# Patient Record
Sex: FEMALE | Race: WHITE | NOT HISPANIC OR LATINO | Employment: OTHER | URBAN - METROPOLITAN AREA
[De-identification: names, ages, dates, MRNs, and addresses within clinical notes are randomized per-mention and may not be internally consistent; named-entity substitution may affect disease eponyms.]

---

## 2017-03-05 ENCOUNTER — HOSPITAL ENCOUNTER (INPATIENT)
Facility: MEDICAL CENTER | Age: 57
LOS: 9 days | DRG: 902 | End: 2017-03-14
Attending: EMERGENCY MEDICINE | Admitting: SURGERY
Payer: MEDICARE

## 2017-03-05 ENCOUNTER — RESOLUTE PROFESSIONAL BILLING HOSPITAL PROF FEE (OUTPATIENT)
Dept: HOSPITALIST | Facility: MEDICAL CENTER | Age: 57
End: 2017-03-05
Payer: MEDICARE

## 2017-03-05 ENCOUNTER — APPOINTMENT (OUTPATIENT)
Dept: RADIOLOGY | Facility: MEDICAL CENTER | Age: 57
DRG: 902 | End: 2017-03-05
Attending: EMERGENCY MEDICINE
Payer: MEDICARE

## 2017-03-05 DIAGNOSIS — R10.10 PAIN OF UPPER ABDOMEN: ICD-10-CM

## 2017-03-05 DIAGNOSIS — L02.211 ABSCESS OF ABDOMINAL WALL: ICD-10-CM

## 2017-03-05 DIAGNOSIS — K59.00 CONSTIPATION, UNSPECIFIED CONSTIPATION TYPE: ICD-10-CM

## 2017-03-05 DIAGNOSIS — R11.0 NAUSEA: ICD-10-CM

## 2017-03-05 DIAGNOSIS — L02.91 ABSCESS: ICD-10-CM

## 2017-03-05 DIAGNOSIS — R19.09 ABDOMINAL MASS OF OTHER SITE: ICD-10-CM

## 2017-03-05 LAB
ALBUMIN SERPL BCP-MCNC: 3.5 G/DL (ref 3.2–4.9)
ALBUMIN/GLOB SERPL: 0.9 G/DL
ALP SERPL-CCNC: 56 U/L (ref 30–99)
ALT SERPL-CCNC: 9 U/L (ref 2–50)
ANION GAP SERPL CALC-SCNC: 7 MMOL/L (ref 0–11.9)
APPEARANCE UR: CLEAR
AST SERPL-CCNC: 14 U/L (ref 12–45)
BASOPHILS # BLD AUTO: 0.5 % (ref 0–1.8)
BASOPHILS # BLD: 0.07 K/UL (ref 0–0.12)
BILIRUB SERPL-MCNC: 0.6 MG/DL (ref 0.1–1.5)
BUN SERPL-MCNC: 10 MG/DL (ref 8–22)
CALCIUM SERPL-MCNC: 9.5 MG/DL (ref 8.5–10.5)
CHLORIDE SERPL-SCNC: 102 MMOL/L (ref 96–112)
CO2 SERPL-SCNC: 23 MMOL/L (ref 20–33)
COLOR UR AUTO: NORMAL
CREAT SERPL-MCNC: 0.8 MG/DL (ref 0.5–1.4)
EOSINOPHIL # BLD AUTO: 0.14 K/UL (ref 0–0.51)
EOSINOPHIL NFR BLD: 1.1 % (ref 0–6.9)
ERYTHROCYTE [DISTWIDTH] IN BLOOD BY AUTOMATED COUNT: 47.8 FL (ref 35.9–50)
GFR SERPL CREATININE-BSD FRML MDRD: >60 ML/MIN/1.73 M 2
GLOBULIN SER CALC-MCNC: 4 G/DL (ref 1.9–3.5)
GLUCOSE SERPL-MCNC: 136 MG/DL (ref 65–99)
GLUCOSE UR QL STRIP.AUTO: NEGATIVE MG/DL
GRAM STN SPEC: NORMAL
HCT VFR BLD AUTO: 44.6 % (ref 37–47)
HGB BLD-MCNC: 14.3 G/DL (ref 12–16)
IMM GRANULOCYTES # BLD AUTO: 0.07 K/UL (ref 0–0.11)
IMM GRANULOCYTES NFR BLD AUTO: 0.5 % (ref 0–0.9)
KETONES UR QL STRIP.AUTO: NEGATIVE MG/DL
LEUKOCYTE ESTERASE UR QL STRIP.AUTO: NEGATIVE
LIPASE SERPL-CCNC: 8 U/L (ref 11–82)
LYMPHOCYTES # BLD AUTO: 1.84 K/UL (ref 1–4.8)
LYMPHOCYTES NFR BLD: 14 % (ref 22–41)
MCH RBC QN AUTO: 28.3 PG (ref 27–33)
MCHC RBC AUTO-ENTMCNC: 32.1 G/DL (ref 33.6–35)
MCV RBC AUTO: 88.3 FL (ref 81.4–97.8)
MONOCYTES # BLD AUTO: 0.8 K/UL (ref 0–0.85)
MONOCYTES NFR BLD AUTO: 6.1 % (ref 0–13.4)
NEUTROPHILS # BLD AUTO: 10.24 K/UL (ref 2–7.15)
NEUTROPHILS NFR BLD: 77.8 % (ref 44–72)
NITRITE UR QL STRIP.AUTO: NEGATIVE
NRBC # BLD AUTO: 0 K/UL
NRBC BLD AUTO-RTO: 0 /100 WBC
PH UR STRIP.AUTO: 6 [PH]
PLATELET # BLD AUTO: 307 K/UL (ref 164–446)
PMV BLD AUTO: 8.7 FL (ref 9–12.9)
POTASSIUM SERPL-SCNC: 3.8 MMOL/L (ref 3.6–5.5)
PROT SERPL-MCNC: 7.5 G/DL (ref 6–8.2)
PROT UR QL STRIP: NEGATIVE MG/DL
RBC # BLD AUTO: 5.05 M/UL (ref 4.2–5.4)
RBC UR QL AUTO: NEGATIVE
SIGNIFICANT IND 70042: NORMAL
SITE SITE: NORMAL
SODIUM SERPL-SCNC: 132 MMOL/L (ref 135–145)
SOURCE SOURCE: NORMAL
SP GR UR: 1.01
WBC # BLD AUTO: 13.2 K/UL (ref 4.8–10.8)

## 2017-03-05 PROCEDURE — 500887 HCHG PACK, MAJOR BASIN: Performed by: COLON & RECTAL SURGERY

## 2017-03-05 PROCEDURE — 110382 HCHG SHELL REV 271: Performed by: COLON & RECTAL SURGERY

## 2017-03-05 PROCEDURE — 87205 SMEAR GRAM STAIN: CPT

## 2017-03-05 PROCEDURE — 160036 HCHG PACU - EA ADDL 30 MINS PHASE I: Performed by: COLON & RECTAL SURGERY

## 2017-03-05 PROCEDURE — 160048 HCHG OR STATISTICAL LEVEL 1-5: Performed by: COLON & RECTAL SURGERY

## 2017-03-05 PROCEDURE — 500371 HCHG DRAIN, BLAKE 10MM: Performed by: COLON & RECTAL SURGERY

## 2017-03-05 PROCEDURE — 96374 THER/PROPH/DIAG INJ IV PUSH: CPT

## 2017-03-05 PROCEDURE — 700111 HCHG RX REV CODE 636 W/ 250 OVERRIDE (IP): Performed by: HOSPITALIST

## 2017-03-05 PROCEDURE — 700111 HCHG RX REV CODE 636 W/ 250 OVERRIDE (IP)

## 2017-03-05 PROCEDURE — 700111 HCHG RX REV CODE 636 W/ 250 OVERRIDE (IP): Performed by: EMERGENCY MEDICINE

## 2017-03-05 PROCEDURE — 87077 CULTURE AEROBIC IDENTIFY: CPT

## 2017-03-05 PROCEDURE — 160041 HCHG SURGERY MINUTES - EA ADDL 1 MIN LEVEL 4: Performed by: COLON & RECTAL SURGERY

## 2017-03-05 PROCEDURE — 500122 HCHG BOVIE, BLADE: Performed by: COLON & RECTAL SURGERY

## 2017-03-05 PROCEDURE — 700111 HCHG RX REV CODE 636 W/ 250 OVERRIDE (IP): Performed by: INTERNAL MEDICINE

## 2017-03-05 PROCEDURE — 700105 HCHG RX REV CODE 258: Performed by: HOSPITALIST

## 2017-03-05 PROCEDURE — 80053 COMPREHEN METABOLIC PANEL: CPT

## 2017-03-05 PROCEDURE — 700105 HCHG RX REV CODE 258

## 2017-03-05 PROCEDURE — 87186 SC STD MICRODIL/AGAR DIL: CPT

## 2017-03-05 PROCEDURE — 700102 HCHG RX REV CODE 250 W/ 637 OVERRIDE(OP)

## 2017-03-05 PROCEDURE — 160035 HCHG PACU - 1ST 60 MINS PHASE I: Performed by: COLON & RECTAL SURGERY

## 2017-03-05 PROCEDURE — 0WPF0JZ REMOVAL OF SYNTHETIC SUBSTITUTE FROM ABDOMINAL WALL, OPEN APPROACH: ICD-10-PCS | Performed by: COLON & RECTAL SURGERY

## 2017-03-05 PROCEDURE — 94760 N-INVAS EAR/PLS OXIMETRY 1: CPT

## 2017-03-05 PROCEDURE — 501838 HCHG SUTURE GENERAL: Performed by: COLON & RECTAL SURGERY

## 2017-03-05 PROCEDURE — 700102 HCHG RX REV CODE 250 W/ 637 OVERRIDE(OP): Performed by: HOSPITALIST

## 2017-03-05 PROCEDURE — 0JB80ZZ EXCISION OF ABDOMEN SUBCUTANEOUS TISSUE AND FASCIA, OPEN APPROACH: ICD-10-PCS | Performed by: COLON & RECTAL SURGERY

## 2017-03-05 PROCEDURE — 99291 CRITICAL CARE FIRST HOUR: CPT

## 2017-03-05 PROCEDURE — 87040 BLOOD CULTURE FOR BACTERIA: CPT | Mod: 91

## 2017-03-05 PROCEDURE — 0HR7XK3 REPLACEMENT OF ABDOMEN SKIN WITH NONAUTOLOGOUS TISSUE SUBSTITUTE, FULL THICKNESS, EXTERNAL APPROACH: ICD-10-PCS | Performed by: COLON & RECTAL SURGERY

## 2017-03-05 PROCEDURE — 700101 HCHG RX REV CODE 250

## 2017-03-05 PROCEDURE — 87070 CULTURE OTHR SPECIMN AEROBIC: CPT

## 2017-03-05 PROCEDURE — 0J980ZZ DRAINAGE OF ABDOMEN SUBCUTANEOUS TISSUE AND FASCIA, OPEN APPROACH: ICD-10-PCS | Performed by: COLON & RECTAL SURGERY

## 2017-03-05 PROCEDURE — 96376 TX/PRO/DX INJ SAME DRUG ADON: CPT

## 2017-03-05 PROCEDURE — 502240 HCHG MISC OR SUPPLY RC 0272: Performed by: COLON & RECTAL SURGERY

## 2017-03-05 PROCEDURE — 500380 HCHG DRAIN, PENROSE 1/4X12: Performed by: COLON & RECTAL SURGERY

## 2017-03-05 PROCEDURE — 160029 HCHG SURGERY MINUTES - 1ST 30 MINS LEVEL 4: Performed by: COLON & RECTAL SURGERY

## 2017-03-05 PROCEDURE — 85025 COMPLETE CBC W/AUTO DIFF WBC: CPT

## 2017-03-05 PROCEDURE — 700105 HCHG RX REV CODE 258: Performed by: EMERGENCY MEDICINE

## 2017-03-05 PROCEDURE — 501445 HCHG STAPLER, SKIN DISP: Performed by: COLON & RECTAL SURGERY

## 2017-03-05 PROCEDURE — 36415 COLL VENOUS BLD VENIPUNCTURE: CPT

## 2017-03-05 PROCEDURE — 770001 HCHG ROOM/CARE - MED/SURG/GYN PRIV*

## 2017-03-05 PROCEDURE — 87102 FUNGUS ISOLATION CULTURE: CPT

## 2017-03-05 PROCEDURE — 74177 CT ABD & PELVIS W/CONTRAST: CPT

## 2017-03-05 PROCEDURE — 160002 HCHG RECOVERY MINUTES (STAT): Performed by: COLON & RECTAL SURGERY

## 2017-03-05 PROCEDURE — A9270 NON-COVERED ITEM OR SERVICE: HCPCS

## 2017-03-05 PROCEDURE — 500440 HCHG DRESSING, STERILE ROLL (KERLIX): Performed by: COLON & RECTAL SURGERY

## 2017-03-05 PROCEDURE — 160009 HCHG ANES TIME/MIN: Performed by: COLON & RECTAL SURGERY

## 2017-03-05 PROCEDURE — 81002 URINALYSIS NONAUTO W/O SCOPE: CPT

## 2017-03-05 PROCEDURE — A9270 NON-COVERED ITEM OR SERVICE: HCPCS | Performed by: HOSPITALIST

## 2017-03-05 PROCEDURE — 83690 ASSAY OF LIPASE: CPT

## 2017-03-05 PROCEDURE — 502704 HCHG DEVICE, LIGASURE IMPACT: Performed by: COLON & RECTAL SURGERY

## 2017-03-05 PROCEDURE — 87075 CULTR BACTERIA EXCEPT BLOOD: CPT

## 2017-03-05 PROCEDURE — 500698 HCHG HEMOCLIP, MEDIUM: Performed by: COLON & RECTAL SURGERY

## 2017-03-05 PROCEDURE — 110371 HCHG SHELL REV 272: Performed by: COLON & RECTAL SURGERY

## 2017-03-05 PROCEDURE — A4606 OXYGEN PROBE USED W OXIMETER: HCPCS | Performed by: COLON & RECTAL SURGERY

## 2017-03-05 PROCEDURE — 700117 HCHG RX CONTRAST REV CODE 255: Performed by: EMERGENCY MEDICINE

## 2017-03-05 PROCEDURE — 500389 HCHG DRAIN, RESERVOIR SUCT JP 100CC: Performed by: COLON & RECTAL SURGERY

## 2017-03-05 PROCEDURE — 96375 TX/PRO/DX INJ NEW DRUG ADDON: CPT

## 2017-03-05 DEVICE — MATRISTEM MICROMATRIX 1000MG (1/EA): Type: IMPLANTABLE DEVICE | Status: FUNCTIONAL

## 2017-03-05 RX ORDER — AMOXICILLIN 250 MG
2 CAPSULE ORAL 2 TIMES DAILY
Status: DISCONTINUED | OUTPATIENT
Start: 2017-03-05 | End: 2017-03-14 | Stop reason: HOSPADM

## 2017-03-05 RX ORDER — PAROXETINE HYDROCHLORIDE 20 MG/1
20 TABLET, FILM COATED ORAL
Status: DISCONTINUED | OUTPATIENT
Start: 2017-03-05 | End: 2017-03-14 | Stop reason: HOSPADM

## 2017-03-05 RX ORDER — MORPHINE SULFATE 4 MG/ML
3 INJECTION, SOLUTION INTRAMUSCULAR; INTRAVENOUS EVERY 4 HOURS PRN
Status: DISCONTINUED | OUTPATIENT
Start: 2017-03-05 | End: 2017-03-06

## 2017-03-05 RX ORDER — DEXTROSE MONOHYDRATE 25 G/50ML
25 INJECTION, SOLUTION INTRAVENOUS
Status: DISCONTINUED | OUTPATIENT
Start: 2017-03-05 | End: 2017-03-11

## 2017-03-05 RX ORDER — BISACODYL 5 MG
10 TABLET, DELAYED RELEASE (ENTERIC COATED) ORAL 2 TIMES DAILY
COMMUNITY
End: 2017-04-04

## 2017-03-05 RX ORDER — POLYETHYLENE GLYCOL 3350 17 G/17G
1 POWDER, FOR SOLUTION ORAL
Status: DISCONTINUED | OUTPATIENT
Start: 2017-03-05 | End: 2017-03-14 | Stop reason: HOSPADM

## 2017-03-05 RX ORDER — ONDANSETRON 2 MG/ML
4 INJECTION INTRAMUSCULAR; INTRAVENOUS ONCE
Status: COMPLETED | OUTPATIENT
Start: 2017-03-05 | End: 2017-03-05

## 2017-03-05 RX ORDER — HYDROCODONE BITARTRATE AND ACETAMINOPHEN 10; 325 MG/1; MG/1
1 TABLET ORAL EVERY 4 HOURS PRN
COMMUNITY

## 2017-03-05 RX ORDER — OXYCODONE HCL 20 MG/1
20 TABLET, FILM COATED, EXTENDED RELEASE ORAL EVERY 12 HOURS
Status: DISCONTINUED | OUTPATIENT
Start: 2017-03-05 | End: 2017-03-14

## 2017-03-05 RX ORDER — SODIUM CHLORIDE 9 MG/ML
INJECTION, SOLUTION INTRAVENOUS CONTINUOUS
Status: DISCONTINUED | OUTPATIENT
Start: 2017-03-05 | End: 2017-03-08

## 2017-03-05 RX ORDER — OXYCODONE HCL 5 MG/5 ML
SOLUTION, ORAL ORAL
Status: COMPLETED
Start: 2017-03-05 | End: 2017-03-05

## 2017-03-05 RX ORDER — LEVOTHYROXINE SODIUM 0.05 MG/1
200 TABLET ORAL EVERY MORNING
Status: DISCONTINUED | OUTPATIENT
Start: 2017-03-05 | End: 2017-03-14 | Stop reason: HOSPADM

## 2017-03-05 RX ORDER — LIDOCAINE HYDROCHLORIDE 10 MG/ML
INJECTION, SOLUTION INFILTRATION; PERINEURAL
Status: COMPLETED
Start: 2017-03-05 | End: 2017-03-05

## 2017-03-05 RX ORDER — MEPERIDINE HYDROCHLORIDE 50 MG/ML
25 INJECTION INTRAMUSCULAR; INTRAVENOUS; SUBCUTANEOUS ONCE
Status: COMPLETED | OUTPATIENT
Start: 2017-03-05 | End: 2017-03-05

## 2017-03-05 RX ORDER — HYDROMORPHONE HYDROCHLORIDE 2 MG/ML
INJECTION, SOLUTION INTRAMUSCULAR; INTRAVENOUS; SUBCUTANEOUS
Status: COMPLETED
Start: 2017-03-05 | End: 2017-03-05

## 2017-03-05 RX ORDER — OXYCODONE HCL 10 MG/1
20 TABLET, FILM COATED, EXTENDED RELEASE ORAL EVERY 12 HOURS
Status: DISCONTINUED | OUTPATIENT
Start: 2017-03-05 | End: 2017-03-05

## 2017-03-05 RX ORDER — MORPHINE SULFATE 4 MG/ML
3 INJECTION, SOLUTION INTRAMUSCULAR; INTRAVENOUS ONCE
Status: COMPLETED | OUTPATIENT
Start: 2017-03-05 | End: 2017-03-05

## 2017-03-05 RX ORDER — CLONAZEPAM 1 MG/1
2 TABLET ORAL
Status: DISCONTINUED | OUTPATIENT
Start: 2017-03-05 | End: 2017-03-14 | Stop reason: HOSPADM

## 2017-03-05 RX ORDER — NICOTINE 21 MG/24HR
21 PATCH, TRANSDERMAL 24 HOURS TRANSDERMAL
Status: DISCONTINUED | OUTPATIENT
Start: 2017-03-05 | End: 2017-03-06

## 2017-03-05 RX ORDER — MORPHINE SULFATE 4 MG/ML
4 INJECTION, SOLUTION INTRAMUSCULAR; INTRAVENOUS ONCE
Status: COMPLETED | OUTPATIENT
Start: 2017-03-05 | End: 2017-03-05

## 2017-03-05 RX ORDER — MEPERIDINE HYDROCHLORIDE 25 MG/ML
INJECTION INTRAMUSCULAR; INTRAVENOUS; SUBCUTANEOUS
Status: COMPLETED
Start: 2017-03-05 | End: 2017-03-05

## 2017-03-05 RX ORDER — DOCUSATE SODIUM 100 MG/1
200 CAPSULE, LIQUID FILLED ORAL 2 TIMES DAILY
COMMUNITY
End: 2017-04-04

## 2017-03-05 RX ORDER — SODIUM CHLORIDE 9 MG/ML
INJECTION, SOLUTION INTRAVENOUS
Status: COMPLETED
Start: 2017-03-05 | End: 2017-03-05

## 2017-03-05 RX ORDER — SODIUM CHLORIDE 9 MG/ML
INJECTION, SOLUTION INTRAVENOUS CONTINUOUS
Status: DISCONTINUED | OUTPATIENT
Start: 2017-03-05 | End: 2017-03-05

## 2017-03-05 RX ORDER — BISACODYL 10 MG
10 SUPPOSITORY, RECTAL RECTAL
Status: DISCONTINUED | OUTPATIENT
Start: 2017-03-05 | End: 2017-03-14 | Stop reason: HOSPADM

## 2017-03-05 RX ORDER — HYDROCODONE BITARTRATE AND ACETAMINOPHEN 7.5; 325 MG/1; MG/1
1 TABLET ORAL EVERY 6 HOURS PRN
Status: DISCONTINUED | OUTPATIENT
Start: 2017-03-05 | End: 2017-03-06

## 2017-03-05 RX ADMIN — HYDROMORPHONE HYDROCHLORIDE 1 MG: 1 INJECTION, SOLUTION INTRAMUSCULAR; INTRAVENOUS; SUBCUTANEOUS at 12:19

## 2017-03-05 RX ADMIN — SODIUM CHLORIDE: 9 INJECTION, SOLUTION INTRAVENOUS at 18:52

## 2017-03-05 RX ADMIN — FENTANYL CITRATE 50 MCG: 50 INJECTION, SOLUTION INTRAMUSCULAR; INTRAVENOUS at 18:10

## 2017-03-05 RX ADMIN — AMPICILLIN SODIUM AND SULBACTAM SODIUM 3 G: 2; 1 INJECTION, POWDER, FOR SOLUTION INTRAMUSCULAR; INTRAVENOUS at 21:49

## 2017-03-05 RX ADMIN — HYDROMORPHONE HYDROCHLORIDE 0.2 MG: 1 INJECTION, SOLUTION INTRAMUSCULAR; INTRAVENOUS; SUBCUTANEOUS at 18:59

## 2017-03-05 RX ADMIN — MORPHINE SULFATE 4 MG: 4 INJECTION INTRAVENOUS at 11:32

## 2017-03-05 RX ADMIN — PAROXETINE HYDROCHLORIDE 20 MG: 20 TABLET, FILM COATED ORAL at 21:52

## 2017-03-05 RX ADMIN — IOHEXOL 100 ML: 350 INJECTION, SOLUTION INTRAVENOUS at 13:00

## 2017-03-05 RX ADMIN — OXYCODONE HYDROCHLORIDE 10 MG: 5 SOLUTION ORAL at 17:47

## 2017-03-05 RX ADMIN — MORPHINE SULFATE 3 MG: 4 INJECTION INTRAVENOUS at 21:52

## 2017-03-05 RX ADMIN — VANCOMYCIN HYDROCHLORIDE 2500 MG: 10 INJECTION, POWDER, LYOPHILIZED, FOR SOLUTION INTRAVENOUS at 22:57

## 2017-03-05 RX ADMIN — SODIUM CHLORIDE: 9 INJECTION, SOLUTION INTRAVENOUS at 11:30

## 2017-03-05 RX ADMIN — MEPERIDINE HYDROCHLORIDE 25 MG: 25 INJECTION INTRAMUSCULAR; INTRAVENOUS; SUBCUTANEOUS at 17:56

## 2017-03-05 RX ADMIN — HYDROMORPHONE HYDROCHLORIDE 1 MG: 1 INJECTION, SOLUTION INTRAMUSCULAR; INTRAVENOUS; SUBCUTANEOUS at 15:33

## 2017-03-05 RX ADMIN — HYDROMORPHONE HYDROCHLORIDE 0.2 MG: 1 INJECTION, SOLUTION INTRAMUSCULAR; INTRAVENOUS; SUBCUTANEOUS at 18:49

## 2017-03-05 RX ADMIN — LIDOCAINE HYDROCHLORIDE: 10 INJECTION, SOLUTION INFILTRATION; PERINEURAL at 18:03

## 2017-03-05 RX ADMIN — STANDARDIZED SENNA CONCENTRATE AND DOCUSATE SODIUM 2 TABLET: 8.6; 5 TABLET, FILM COATED ORAL at 21:52

## 2017-03-05 RX ADMIN — HYDROMORPHONE HYDROCHLORIDE 0.2 MG: 1 INJECTION, SOLUTION INTRAMUSCULAR; INTRAVENOUS; SUBCUTANEOUS at 18:44

## 2017-03-05 RX ADMIN — HYDROMORPHONE HYDROCHLORIDE 0.5 MG: 2 INJECTION INTRAMUSCULAR; INTRAVENOUS; SUBCUTANEOUS at 19:22

## 2017-03-05 RX ADMIN — HYDROMORPHONE HYDROCHLORIDE 0.5 MG: 2 INJECTION INTRAMUSCULAR; INTRAVENOUS; SUBCUTANEOUS at 19:27

## 2017-03-05 RX ADMIN — FENTANYL CITRATE 50 MCG: 50 INJECTION, SOLUTION INTRAMUSCULAR; INTRAVENOUS at 18:20

## 2017-03-05 RX ADMIN — HYDROMORPHONE HYDROCHLORIDE 0.2 MG: 1 INJECTION, SOLUTION INTRAMUSCULAR; INTRAVENOUS; SUBCUTANEOUS at 18:54

## 2017-03-05 RX ADMIN — ONDANSETRON 4 MG: 2 INJECTION, SOLUTION INTRAMUSCULAR; INTRAVENOUS at 11:30

## 2017-03-05 RX ADMIN — CLONAZEPAM 2 MG: 1 TABLET ORAL at 21:00

## 2017-03-05 RX ADMIN — HYDROMORPHONE HYDROCHLORIDE 0.2 MG: 1 INJECTION, SOLUTION INTRAMUSCULAR; INTRAVENOUS; SUBCUTANEOUS at 19:04

## 2017-03-05 RX ADMIN — OXYCODONE HYDROCHLORIDE 20 MG: 20 TABLET, FILM COATED, EXTENDED RELEASE ORAL at 20:27

## 2017-03-05 RX ADMIN — MORPHINE SULFATE 3 MG: 4 INJECTION INTRAVENOUS at 23:48

## 2017-03-05 ASSESSMENT — PAIN SCALES - GENERAL
PAINLEVEL_OUTOF10: 7
PAINLEVEL_OUTOF10: 7
PAINLEVEL_OUTOF10: 6
PAINLEVEL_OUTOF10: 8
PAINLEVEL_OUTOF10: 6
PAINLEVEL_OUTOF10: 5
PAINLEVEL_OUTOF10: 7
PAINLEVEL_OUTOF10: 8
PAINLEVEL_OUTOF10: 7
PAINLEVEL_OUTOF10: 7
PAINLEVEL_OUTOF10: 9
PAINLEVEL_OUTOF10: 5
PAINLEVEL_OUTOF10: 8
PAINLEVEL_OUTOF10: 6
PAINLEVEL_OUTOF10: 7
PAINLEVEL_OUTOF10: 6
PAINLEVEL_OUTOF10: 6

## 2017-03-05 ASSESSMENT — LIFESTYLE VARIABLES
SUBSTANCE_ABUSE: 1
DO YOU DRINK ALCOHOL: NO
EVER_SMOKED: YES

## 2017-03-05 ASSESSMENT — ENCOUNTER SYMPTOMS
CONSTIPATION: 1
BRUISES/BLEEDS EASILY: 0
WEIGHT LOSS: 0
LOSS OF CONSCIOUSNESS: 0
SHORTNESS OF BREATH: 0
COUGH: 0
VOMITING: 0
NAUSEA: 0
WHEEZING: 0
ABDOMINAL PAIN: 1
BLURRED VISION: 0
PALPITATIONS: 0
SORE THROAT: 0
DOUBLE VISION: 0
DIARRHEA: 0
CONSTIPATION: 0
DEPRESSION: 1
BLOOD IN STOOL: 0
PND: 0
WEAKNESS: 0
BACK PAIN: 1
FEVER: 1
DIZZINESS: 0
MYALGIAS: 0
HEARTBURN: 0
CHILLS: 1
SEIZURES: 0
FLANK PAIN: 0
POLYDIPSIA: 0
NERVOUS/ANXIOUS: 1
INSOMNIA: 1
ORTHOPNEA: 0
DIAPHORESIS: 0
HEARTBURN: 1
HEMOPTYSIS: 0
SPUTUM PRODUCTION: 0

## 2017-03-05 NOTE — IP AVS SNAPSHOT
" Home Care Instructions                                                                                                                  Name:Thelma Abbott  Medical Record Number:[id number]  CSN: [id number]    YOB: 1960   Age: 56 y.o.  Sex: female  HT:1.651 m (5' 5\") WT: 104.1 kg (229 lb 8 oz)          Admit Date: 3/5/2017     Discharge Date:   Today's Date: 3/14/2017  Attending Doctor:  Warner Figueroa D.O.                  Allergies:  Tape            Discharge Instructions       - Call or seek medical attention for questions or concerns  - Follow up with Dr. Figueroa on 3/22/17. Bring wound vac dressing supplies and wound vac with you to this visit.   - Follow up with IV infusion center daily, starting 3/15/17 for Daptomycin infusion. PICC  will be done here. Leave dressing in place. Do not submerge line. Do not inject anything into line.   - Follow up with primary care provider within one weeks time  - Home health to manage woundvac  - Resume regular diet  - Continue daily over the counter stool softener while on narcotics  - No operation of machinery or motorized vehicles while under the influence of narcotics  - No alcohol use while under the influence of narcotics  - No swimming, hot tubs, baths or wound submersion until cleared by outpatient provider. May shower.   - No contact sports, strenuous activities, or heavy lifting until cleared by outpatient provider  - If respiratory decompensation, increase in pain or changes in bowels, or signs or symptoms of infection occur seek medical attention    Discharge Instructions    Discharged to home by car with relative. Discharged via wheelchair, hospital escort: Yes.  Special equipment needed: Oxygen, wound vac    Be sure to schedule a follow-up appointment with your primary care doctor or any specialists as instructed.     Discharge Plan:   Diet Plan: Discussed  Activity Level: Discussed  Smoking Cessation Offered: Patient " Counseled  Confirmed Follow up Appointment: Patient to Call and Schedule Appointment  Confirmed Symptoms Management: Discussed  Medication Reconciliation Updated: Yes  Influenza Vaccine Indication: Patient Refuses    I understand that a diet low in cholesterol, fat, and sodium is recommended for good health. Unless I have been given specific instructions below for another diet, I accept this instruction as my diet prescription.   Other diet: Regular Diet    Special Instructions: None    · Is patient discharged on Warfarin / Coumadin?   No     · Is patient Post Blood Transfusion?  No    Depression / Suicide Risk    As you are discharged from this [facility] facility, it is important to learn how to keep safe from harming yourself.    Recognize the warning signs:  · Abrupt changes in personality, positive or negative- including increase in energy   · Giving away possessions  · Change in eating patterns- significant weight changes-  positive or negative  · Change in sleeping patterns- unable to sleep or sleeping all the time   · Unwillingness or inability to communicate  · Depression  · Unusual sadness, discouragement and loneliness  · Talk of wanting to die  · Neglect of personal appearance   · Rebelliousness- reckless behavior  · Withdrawal from people/activities they love  · Confusion- inability to concentrate     If you or a loved one observes any of these behaviors or has concerns about self-harm, here's what you can do:  · Talk about it- your feelings and reasons for harming yourself  · Remove any means that you might use to hurt yourself (examples: pills, rope, extension cords, firearm)  · Get professional help from the community (Mental Health, Substance Abuse, psychological counseling)  · Do not be alone:Call your Safe Contact- someone whom you trust who will be there for you.  · Call your local CRISIS HOTLINE 690-0855 or 860-461-6101  · Call your local Children's Mobile Crisis Response Team [facility]  (756) 430-9962 or www.[employer]  · Call the toll free National Suicide Prevention Hotlines   · National Suicide Prevention Lifeline 285-145-VIGM (5408)  · National Hope Line Network 800-SUICIDE (910-6444)        Your appointments     Mar 15, 2017  5:00 PM   New Antibiotics with RN 2   Infusion Services ([facility])    [id number]  [place] [address]   [phone]            Mar 16, 2017  4:00 PM   Est Antibiotics with RN 5   Infusion Services ([facility])    [address]  [place] [address]   [phone]            Mar 17, 2017  4:30 PM   Est Antibiotics with INFUSION QUICK INJECT   Infusion Services ([facility])    [address] [address]  [place] [address]   [phone]            Mar 18, 2017  4:00 PM   Est Antibiotics with RN 2   Infusion Services ([facility])    [address] [address]  [place] [address]   [phone]            Mar 19, 2017  4:30 PM   Est Antibiotics with RN 1   Infusion Services ([facility])    [address] [address]  [place] [id number] [phone]            Mar 20, 2017  5:30 PM   Est Antibiotics with INFUSION QUICK INJECT   Infusion Services ([facility])    [address] [address]  [place] [address]   [phone]            Mar 21, 2017  5:00 PM   Est Antibiotics with RN 6   Infusion Services ([facility])    [address] [address]  [place] [address]   [phone]            Mar 22, 2017  4:00 PM   Est Antibiotics with RN 5   Infusion Services ([facility])    [address] [address]  [place] [address]   [phone]            Mar 23, 2017  4:00 PM   Est Antibiotics with RN 5   Infusion Services ([facility])    [address] [address]  [place] [address]   [phone]            Mar 24, 2017  4:30 PM   Est Antibiotics with INFUSION QUICK INJECT   Infusion Services ([facility])    [address]  [place] [address]   [phone]            Mar 25, 2017  4:30 PM   Est Antibiotics with RN 2   Infusion Services ([facility])    [address] [address]  [place]  [address]   [phone]            Mar 26, 2017  3:00 PM   Est Antibiotics with RN 3   Infusion Services ([facility])    [address]  [place] [address]   [phone]            Mar 27, 2017  5:00 PM   Est Antibiotics with INFUSION QUICK INJECT   Infusion Services ([facility])    [address]  [place] [address]   [phone]            Mar 28, 2017  5:00 PM   Est Antibiotics with INFUSION QUICK INJECT   Infusion Services ([facility])    [address]  [place] [address]   [phone]            Mar 29, 2017  5:00 PM   Est Antibiotics with INFUSION QUICK INJECT   Infusion Services ([facility])    [address]  [place] [address]   [phone]            Mar 30, 2017  5:00 PM   Est Antibiotics with INFUSION QUICK INJECT   Infusion Services ([facility])    [address]  [place] [address]   [phone]            Mar 31, 2017  4:30 PM   Est Antibiotics with INFUSION QUICK INJECT   Infusion Services ([facility])    [address]  [place] [address]   [phone]            Apr 01, 2017  4:00 PM   Est Antibiotics with RN 3   Infusion Services ([facility])    [address]  [place] [address]   [phone]            Apr 02, 2017  4:00 PM   Est Antibiotics with RN 3   Infusion Services ([facility])    [address]  [place] [address]   [phone]            Apr 03, 2017  5:00 PM   Est Antibiotics with INFUSION QUICK INJECT   Infusion Services ([facility])    [address]  [place] [address]   [phone]            Apr 04, 2017  4:30 PM   Est Antibiotics with INFUSION QUICK INJECT   Infusion Services ([facility])    [address]  [place] [address]   [phone]            Apr 05, 2017  5:00 PM   Est Antibiotics with INFUSION QUICK INJECT   Infusion Services ([facility])    [address]  [place] [address]   [phone]            Apr 06, 2017  5:00 PM   Est Antibiotics with INFUSION QUICK INJECT   Infusion  Services ([facility])    [address]  [place] [address]   [phone]              Follow-up Information     1. Follow up with Warner Figueroa D.O.. Schedule an appointment as soon as possible for a visit on 3/22/2017.    Specialty:  Surgery    Why:  For wound re-check    Contact information    [address] [facility] [address]  [place] [address] [phone]          2. Schedule an appointment as soon as possible for a visit with JUSTICE Pedersen.    Specialty:  Family Medicine    Contact information    [address]  [address]  [place] [address]  [phone]          3. Follow up with IV infusion center On 3/15/2017.    Why:  for IV antibiotic infusion         Discharge Medication Instructions:    Below are the medications your physician expects you to take upon discharge:    Review all your home medications and newly ordered medications with your doctor and/or pharmacist. Follow medication instructions as directed by your doctor and/or pharmacist.    Please keep your medication list with you and share with your physician.               Medication List      CONTINUE taking these medications        Instructions    bisacodyl EC 5 MG Tbec    Take 10 mg by mouth 2 Times a Day.   Dose:  10 mg       docusate sodium 100 MG Caps   Commonly known as:  COLACE    Take 200 mg by mouth 2 times a day.   Dose:  200 mg       hydrocodone/acetaminophen  MG Tabs   Commonly known as:  NORCO    Take 1 Tab by mouth every four hours as needed (breakthrough pain).   Dose:  1 Tab       levothyroxine 200 MCG Tabs   Last time this was given:  200 mcg on 3/14/2017  7:55 AM   Commonly known as:  SYNTHROID    Take 1 Tab by mouth every morning. 30 mins before breakfast   Dose:  200 mcg       OXYCONTIN 20 MG T12a   Last time this was given:  20 mg on 3/14/2017  7:55 AM   Generic drug:  oxyCODONE CR    Take 20 mg by mouth every 12 hours. Indications: Moderate to Severe Chronic Pain   Dose:  20 mg       paroxetine  20 MG Tabs   Last time this was given:  20 mg on 3/13/2017  9:00 PM   Commonly known as:  PAXIL    Take 20 mg by mouth every bedtime.   Dose:  20 mg       trazodone 100 MG Tabs   Last time this was given:  200 mg on 3/13/2017  9:39 PM   Commonly known as:  DESYREL    Take 200 mg by mouth every bedtime.   Dose:  200 mg         ASK your doctor about these medications        Instructions    clonazepam 1 MG Tabs   Last time this was given:  2 mg on 3/13/2017  9:39 PM   Commonly known as:  KLONOPIN   Ask about: Which instructions should I use?    Take 2 mg by mouth every bedtime.   Dose:  2 mg               Instructions           Diet / Nutrition:    Follow any diet instructions given to you by your doctor or the dietician, including how much salt (sodium) you are allowed each day.    If you are overweight, talk to your doctor about a weight reduction plan.    Activity:    Remain physically active following your doctor's instructions about exercise and activity.    Rest often.     Any time you become even a little tired or short of breath, SIT DOWN and rest.    Worsening Symptoms:    Report any of the following signs and symptoms to the doctor's office immediately:    *Pain of jaw, arm, or neck  *Chest pain not relieved by medication                               *Dizziness or loss of consciousness  *Difficulty breathing even when at rest   *More tired than usual                                       *Bleeding drainage or swelling of surgical site  *Swelling of feet, ankles, legs or stomach                 *Fever (>100ºF)  *Pink or blood tinged sputum  *Weight gain (3lbs/day or 5lbs /week)           *Shock from internal defibrillator (if applicable)  *Palpitations or irregular heartbeats                *Cool and/or numb extremities    Stroke Awareness    Common Risk Factors for Stroke include:    Age  Atrial Fibrillation  Carotid Artery Stenosis  Diabetes Mellitus  Excessive alcohol consumption  High blood  pressure  Overweight   Physical inactivity  Smoking    Warning signs and symptoms of a stroke include:    *Sudden numbness or weakness of the face, arm or leg (especially on one side of the body).  *Sudden confusion, trouble speaking or understanding.  *Sudden trouble seeing in one or both eyes.  *Sudden trouble walking, dizziness, loss of balance or coordination.Sudden severe headache with no known cause.    It is very important to get treatment quickly when a stroke occurs. If you experience any of the above warning signs, call 911 immediately.                   Disclaimer         Quit Smoking / Tobacco Use:    I understand the use of any tobacco products increases my chance of suffering from future heart disease or stroke and could cause other illnesses which may shorten my life. Quitting the use of tobacco products is the single most important thing I can do to improve my health. For further information on smoking / tobacco cessation call a Toll Free Quit Line at 1-514.116.3958 (*National Cancer [place]) or 1-539.238.6939 (American Lung Association) or you can access the web based program at www.lungCo-Work.org.    [place] Tobacco Users Help Line:  (580) 747-7294       Toll Free: 1-560.584.2756  Quit Tobacco Program [facility] ([phone]    Crisis Hotline:    [place] Crisis Hotline:  4-287-GYMGNDG or 1-193.955.6158    [place] Crisis Hotline:    1-793.797.3908 or 057-304-6724    Discharge Survey:   Thank you for choosing [facility]. We hope we did everything we could to make your hospital stay a pleasant one. You may be receiving a phone survey and we would appreciate your time and participation in answering the questions. Your input is very valuable to us in our efforts to improve our service to our patients and their families.        My signature on this form indicates that:    1. I have reviewed and understand the above information.  2. My questions regarding this information have  been answered to my satisfaction.  3. I have formulated a plan with my discharge nurse to obtain my prescribed medications for home.                  Disclaimer         __________________________________                     __________       ________                       Patient Signature                                                 Date                    Time

## 2017-03-05 NOTE — ED PROVIDER NOTES
ED Provider Note    CHIEF COMPLAINT  Chief Complaint   Patient presents with   • Abdominal Pain     started on Friday in [place].  Pt has palpable hardened mass to abdomen which she states is the origin of the pain. Pt reports this mass moved over to the L upper side of her abdomen yesterday.   • Constipation     x 2 weeks.  Pt reported to EMS she took senokot, stool softener, and a handful of other laxatives and had 1/2 cup watery BM this morning, other wise last BM 2 weeks ago.       HPI  Thelma Abbott is a 56 y.o. female who presents with abdominal pain, for the last 3 days, upper abdomen, severe dull gradual in onset, nausea no vomiting no diarrhea no stool no gas, unable to pass gas. Pain is severe dull gradual without radiation going on for 3 days no dysuria urgency or frequency and no fever no chills. History of several abdominal hernias, diverticulitis,  Evidently she had a bowel anastomosis, with leakage,. Dr. Figueroa did not do the original surgery but took care of the complications and she would like us to call Dr. Figueroa for any surgical problem. However she evidently did have surgery from a plastic surgeon, Dr. Fernandez to repair defects in the abdominal wall  REVIEW OF SYSTEMS  See HPI for further details. History of diverticulitis, as multiple hernia surgeries depression hypertension GERD psoriasis diverticulitisDenies other G.I., G.U.. endrocine, cardiovascular, respriatory or neurological problems.  All other systems are negative.     PAST MEDICAL HISTORY  Past Medical History   Diagnosis Date   • Diverticulitis    • Diverticulosis    • Hypothyroid 6/24/2010   • Thyroid cancer (CMS-HCC) 1990's   • HTN (hypertension) 6/24/2010   • Psoriasis 6/24/2010   • GERD (gastroesophageal reflux disease)    • Palpitations    • Unspecified urinary incontinence    • Psychiatric disorder      depression/anxiety   • MRSA infection      MRSA   • Pain 02-03-05     abd, 5/10       FAMILY HISTORY  Family History    Problem Relation Age of Onset   • Cancer Mother    • Stroke Father    • Hypertension Father        SOCIAL HISTORY she is a smoker and I have urged her to stop  Social History     Social History   • Marital Status:      Spouse Name: N/A   • Number of Children: N/A   • Years of Education: N/A     Social History Main Topics   • Smoking status: Current Some Day Smoker -- 0.50 packs/day     Types: Cigarettes   • Smokeless tobacco: Never Used   • Alcohol Use: No   • Drug Use: No   • Sexual Activity: Not Asked     Other Topics Concern   • None     Social History Narrative    She [place] from [facility].  She has 4 grandkids.  She likes to [place] in [place].  Used to live there.  Grew up swimming with a pool in her back yard in [facility].   Wants to quit smoking.  Previously a book keeper, now retired on disability.               SURGICAL HISTORY  Past Surgical History   Procedure Laterality Date   • Wound dehiscence  12/11/2012     Performed by Magno Baez M.D. at SURGERY [facility]   • Abdominal abscess drainage  12/15/2012     Performed by Warner Figueroa D.O. at SURGERY [facility]   • Colostomy  12/15/2012     Performed by Warner Figueroa D.O. at SURGERY [facility]   • Parastomal hernia repair   5/22/2013     Performed by Warner Figueroa D.O. at SURGERY [facility]   • Colostomy closure  5/22/2013     Performed by Warner Figueroa D.O. at SURGERY [facility]   • Other abdominal surgery  c/section x 2   • Colon resection laparoscopic  12/5/2012     Performed by Magno Baez M.D. at SURGERY [facility]   • Exploratory laparotomy  12/15/2012     Performed by Warner Figueroa D.O. at SURGERY [facility]   • Primary c section     • Tubal coagulation laparoscopic bilateral     • Other  1990's     thyroidectomy   • Incision hernia repair  5/1/2014     Performed by Joaquin Larios M.D. at SURGERY [facility] DAY [facility]   • Wide excision  2/5/2015     Performed  "by Miki Samuel Jr., M.D. at SURGERY SAME DAY [facility]   • Flap closure  2/5/2015     Performed by Miki Samuel Jr., M.D. at SURGERY SAME DAY [facility]   • Colonoscopy  1/18/2016     Procedure: COLONOSCOPY;  Surgeon: Denilson Menendez M.D.;  Location: SURGERY [facility];  Service:        CURRENT MEDICATIONS  Home Medications     **Home medications have not yet been reviewed for this encounter**          ALLERGIES  Allergies   Allergen Reactions   • Nkda [No Known Drug Allergy]    • Tape Rash       PHYSICAL EXAM  VITAL SIGNS: /62 mmHg  Pulse 73  Temp(Src) 37.1 °C (98.8 °F)  Resp 18  Ht 1.651 m (5' 5\")  Wt 100.699 kg (222 lb)  BMI 36.94 kg/m2  SpO2 91%  LMP 09/17/2007  Constitutional: Well developed, Well nourished, moderately obese appears to be uncomfortable HENT: Normocephalic, Atraumatic, Bilateral external ears normal, Oropharynx moist, No oral exudates, Nose normal.   Eyes: PERRL, EOMI, Conjunctiva normal, No discharge.   Neck: Normal range of motion, No tenderness, Supple, No stridor.   Lymphatic: No lymphadenopathy noted.   Cardiovascular: Normal heart rate, Normal rhythm, No murmurs, No rubs, No gallops.   Thorax & Lungs: Few radials bilaterally, No chest tenderness.   Abdomen: Tender mass, left upper periumbilical area no guarding no rigidity and the abdomen is soft.  No masses, No pulsatile masses.  Skin: Warm, Dry, No erythema, No rash.   Back: No tenderness, No CVA tenderness.   Extremities: Intact distal pulses, No edema, No tenderness, No cyanosis, No clubbing.   Musculoskeletal: Good range of motion in all major joints. No tenderness to palpation or major deformities noted.   Neurologic: Alert & oriented x 3, Normal motor function, Normal sensory function, No focal deficits noted.   Psychiatric: Affect normal, Judgment normal, Mood normal.       RADIOLOGY/PROCEDURES  CT-ABDOMEN-PELVIS WITH   Final Result      1.  Two rim enhancing abdominal wall fluid collection " that are highly suspicious for abscess      2.  More cranial and superficial measures 10.4 x 7.8 x 3.9 cm      3.  More caudal and deeper measures 8.7 x 7.8 x 1.8 cm      4.  Resolution of colonic inflammation/wall thickening since most recent CT scan            COURSE & MEDICAL DECISION MAKING  Pertinent Labs & Imaging studies reviewed. (See chart for details) urinalysis normal, electrolytes unremarkable renal function normal liver function, lipase normal white count elevated at 13.2, hematocrit normal platelet count normal,    . Her white count is elevated. CAT scan consistent with possible abscess abdominal wall ×2. I have talked with Dr. monroy who will consult [facility] to admit  FINAL IMPRESSION  1.   1. Pain of upper abdomen    2. Constipation, unspecified constipation type    3. Nausea    4. Abdominal mass of other site        2. Abdominal wall abscess  3.     Disposition  [facility] to admit, Dr. monroy consultan. Blood cultures pending, given IV vancomycin    Electronically signed by: David Petty, 3/5/2017 11:08 AM

## 2017-03-05 NOTE — ED NOTES
Pt on the call light multiple times (x4-5) requesting more pain medication.  ERP has been aware, admitting MD bedside at this time.

## 2017-03-05 NOTE — IP AVS SNAPSHOT
" <p align=\"LEFT\"><IMG SRC=\"//[email or web address]\" alt=\"Image\" WIDTH=\"50%\" HEIGHT=\"200\" BORDER=\"\"></p>                   Name:Thelma Abbott  Medical Record Number:[id number]  CSN: [id number]    YOB: 1960   Age: 56 y.o.  Sex: female  HT:1.651 m (5' 5\") WT: 104.1 kg (229 lb 8 oz)          Admit Date: 3/5/2017     Discharge Date:   Today's Date: 3/14/2017  Attending Doctor:  Warner Figueroa D.O.                  Allergies:  Tape          Your appointments     Mar 15, 2017  5:00 PM   New Antibiotics with RN 2   [facility] ([facility])    [address] [address]  [place] [address]   [phone]            Mar 16, 2017  4:00 PM   Est Antibiotics with RN 5   [facility] ([facility])    [address] [address]  [place] [address]   [phone]            Mar 17, 2017  4:30 PM   Est Antibiotics with INFUSION QUICK INJECT   [facility] ([facility])    [address] [address]  [place] [address]   [phone]            Mar 18, 2017  4:00 PM   Est Antibiotics with RN 2   [facility] ([facility])    [address] [address]  [place] [address]   [phone]            Mar 19, 2017  4:30 PM   Est Antibiotics with RN 1   [facility] ([facility])    [address] [address]  [place] [address]   [phone]            Mar 20, 2017  5:30 PM   Est Antibiotics with INFUSION QUICK INJECT   [facility] ([facility])    [address] [address]  [place] [address]   [phone]            Mar 21, 2017  5:00 PM   Est Antibiotics with RN 6   [facility] ([facility])    [address] [address]  [place] [address]   [phone]            Mar 22, 2017  4:00 PM   Est Antibiotics with RN 5   [facility] ([facility])    [address] [address]  [place] [address]   [phone]            Mar 23, 2017  4:00 PM   Est Antibiotics with RN 5   [facility] ([facility])    [address] [facility] [address]  [place] [address]   [phone]            Mar 24, 2017  4:30 PM   Est Antibiotics " with INFUSION QUICK INJECT   [facility] ([facility])    [address] [facility] [address]  [place] [address]   [phone]            Mar 25, 2017  4:30 PM   Est Antibiotics with RN 2   [facility] ([facility])    [address] [facility] [address]  [place] [address]   [phone]            Mar 26, 2017  3:00 PM   Est Antibiotics with RN 3   [facility] ([facility])    [address]  [place] [address]   [phone]            Mar 27, 2017  5:00 PM   Est Antibiotics with INFUSION QUICK INJECT   [facility] ([facility])    [address]  [place] [address]   [phone]            Mar 28, 2017  5:00 PM   Est Antibiotics with INFUSION QUICK INJECT   [facility] ([facility])    [address] [facility] [address]  [place] [address]   [phone]            Mar 29, 2017  5:00 PM   Est Antibiotics with INFUSION QUICK INJECT   [facility] ([facility])    [address]  [place] [address]   [phone]            Mar 30, 2017  5:00 PM   Est Antibiotics with INFUSION QUICK INJECT   [facility] ([facility])    [address]  [place] [address]   [phone]            Mar 31, 2017  4:30 PM   Est Antibiotics with INFUSION QUICK INJECT   [facility] ([facility])    [address] [facility] [address]  [place] [address]   [phone]            Apr 01, 2017  4:00 PM   Est Antibiotics with RN 3   [facility] ([facility])    [address] [facility] [address]  [place] [address]   [phone]            Apr 02, 2017  4:00 PM   Est Antibiotics with RN 3   [facility] ([facility])    [address]  [place] [address]   [phone]            Apr 03, 2017  5:00 PM   Est Antibiotics with INFUSION QUICK INJECT   [facility] ([facility])    [address] [facility] [address]  [place] [address]   [phone]            Apr 04, 2017  4:30 PM   Est Antibiotics with INFUSION QUICK INJECT   [facility] ([facility])    [address]  [place] [address]   [phone]            Apr  05, 2017  5:00 PM   Est Antibiotics with INFUSION QUICK INJECT   [facility] ([facility])    [address] [facility] [address]  [place] [address]   [phone]            Apr 06, 2017  5:00 PM   Est Antibiotics with INFUSION QUICK INJECT   [facility] ([facility])    [address] [facility] [address]  [place] [address]   [phone]              Follow-up Information     1. Follow up with Warner Figueroa D.O.. Schedule an appointment as soon as possible for a visit on 3/22/2017.    Specialty:  Surgery    Why:  For wound re-check    Contact information    [address] [facility] [address]  [place] [address]  [phone]          2. Schedule an appointment as soon as possible for a visit with JUSTICE Pedersen.    Specialty:  Family Medicine    Contact information    [address] [facility]  [address]  [place] [address]  [phone]          3. Follow up with IV infusion center On 3/15/2017.    Why:  for IV antibiotic infusion         Medication List      Take these Medications        Instructions    bisacodyl EC 5 MG Tbec    Take 10 mg by mouth 2 Times a Day.   Dose:  10 mg       docusate sodium 100 MG Caps   Commonly known as:  COLACE    Take 200 mg by mouth 2 times a day.   Dose:  200 mg       hydrocodone/acetaminophen  MG Tabs   Commonly known as:  NORCO    Take 1 Tab by mouth every four hours as needed (breakthrough pain).   Dose:  1 Tab       levothyroxine 200 MCG Tabs   Commonly known as:  SYNTHROID    Take 1 Tab by mouth every morning. 30 mins before breakfast   Dose:  200 mcg       OXYCONTIN 20 MG T12a   Generic drug:  oxyCODONE CR    Take 20 mg by mouth every 12 hours. Indications: Moderate to Severe Chronic Pain   Dose:  20 mg       paroxetine 20 MG Tabs   Commonly known as:  PAXIL    Take 20 mg by mouth every bedtime.   Dose:  20 mg       trazodone 100 MG Tabs   Commonly known as:  DESYREL    Take 200 mg by mouth every bedtime.   Dose:  200 mg         Ask your Physician about these medications         Instructions    clonazepam 1 MG Tabs   Commonly known as:  KLONOPIN   Ask about: Which instructions should I use?    Take 2 mg by mouth every bedtime.   Dose:  2 mg

## 2017-03-05 NOTE — ED NOTES
Med rec updated and complete  Allergies reviewed.  Pt denies antibiotics in last 30 days.  Pt stated that her pain medications at   Home are not working.

## 2017-03-05 NOTE — ED NOTES
"Pt screaming and yelling from the room, cursing \"Get me a fucking doctor right now!\"  Pt was assisted by pod mate who she told \"Get the hell out of my room!\"  "

## 2017-03-05 NOTE — H&P
"       [facility] Internal Medicine Admitting History and Physical    Name Thelma Abbott     1960   Age/Sex 56 y.o. female   MRN [id number]   Code Status Full code      After 5PM or if no immediate response to page, please call for cross-coverage  Attending/Team: Dr Huddleston / [place]  Call [phone] to page   1st Call - Day Intern (R1):   Dr Huntley  2nd Call - Day Sr. Resident (R2/R3):   Dr Conway        Chief Complaint:  Abdominal pain     HPI:  Ms Abbott is a 57 y/o female with past medical history significant for ischemic colitis (1/15/17), chronic diverticulosis, perforated diverticulitis with subsequent wound dehiscence and anastomotic dehiscence, repair of large incisional hernia with mesh complicated with infected abdominal wound (2/27/15), necrotizing fasciitis of abdominal wall and perforated viscus (2012) s/p incision and debridement by Dr Figueroa, patient presented with worsening abdominal pain over the last 5 days. Pain started over the right lateral quadrant and \"moved across\" the epigastric region to the left lateral quadrant, associated with subjective fever, chills, night sweats.   In the ED she received a dose of vancomycin, NS bolus, ERP consulted Dr Curran who will do incision and drainage.   During my interview she was very upset, tearful about not getting dilaudid for pain. She states that she is in high dose pain medications given by her pain doctor.        Review of Systems   Constitutional: Positive for fever and chills.   HENT: Negative for congestion and sore throat.    Eyes: Negative for blurred vision.   Respiratory: Negative for sputum production and shortness of breath.    Cardiovascular: Negative for chest pain and palpitations.   Gastrointestinal: Positive for heartburn, abdominal pain and constipation. Negative for nausea, vomiting and blood in stool.   Genitourinary: Negative for dysuria.   Musculoskeletal: Positive for back pain. Negative for myalgias.        Chronic " back pain    Skin: Positive for rash. Negative for itching.        Over bilateral elbows and knees    Neurological: Negative for dizziness, seizures, loss of consciousness and weakness.   Psychiatric/Behavioral: Positive for depression and substance abuse. The patient is nervous/anxious and has insomnia.         Tobacco            Past Medical History:   Past Medical History   Diagnosis Date   • Diverticulitis    • Diverticulosis    • Hypothyroid 6/24/2010   • Thyroid cancer (CMS-HCC) 1990's   • HTN (hypertension) 6/24/2010   • Psoriasis 6/24/2010   • GERD (gastroesophageal reflux disease)    • Palpitations    • Unspecified urinary incontinence    • Psychiatric disorder      depression/anxiety   • MRSA infection      MRSA   • Pain 02-03-05     abd, 5/10       Past Surgical History:  Past Surgical History   Procedure Laterality Date   • Wound dehiscence  12/11/2012     Performed by Magno Baez M.D. at SURGERY [facility]   • Abdominal abscess drainage  12/15/2012     Performed by Warner Figueroa D.O. at SURGERY [facility]   • Colostomy  12/15/2012     Performed by Warner Figueroa D.O. at SURGERY [facility]   • Parastomal hernia repair   5/22/2013     Performed by Warner Figueroa D.O. at SURGERY [facility]   • Colostomy closure  5/22/2013     Performed by Warner Figueroa D.O. at SURGERY [facility]   • Other abdominal surgery  c/section x 2   • Colon resection laparoscopic  12/5/2012     Performed by Magno Baez M.D. at SURGERY [facility]   • Exploratory laparotomy  12/15/2012     Performed by Warner Figueroa D.O. at SURGERY [facility]   • Primary c section     • Tubal coagulation laparoscopic bilateral     • Other  1990's     thyroidectomy   • Incision hernia repair  5/1/2014     Performed by Joaquin Larios M.D. at [facility]   • Wide excision  2/5/2015     Performed by Miki Samuel Jr., M.D. at [facility]ERY [facility]   • Flap  closure  2/5/2015     Performed by Miki Samuel Jr., M.D. at SURGERY [facility]   • Colonoscopy  1/18/2016     Procedure: COLONOSCOPY;  Surgeon: Denilson Menendez M.D.;  Location: SURGERY [facility];  Service:        Current Outpatient Medications:  Home Medications     Reviewed by Markell Lozano (Pharmacy Tech) on 03/05/17 at 1431  Med List Status: Complete    Medication Last Dose Status    bisacodyl EC (DULCOLAX) 5 MG Tablet Delayed Response 3/4/2017 Active    clonazepam (KLONOPIN) 1 MG TABS 3/4/2017 Active    docusate sodium (COLACE) 100 MG Cap 3/4/2017 Active    hydrocodone/acetaminophen (NORCO)  MG Tab 3/4/2017 Active    levothyroxine (SYNTHROID) 200 MCG TABS 3/4/2017 Active    oxyCODONE CR (OXYCONTIN) 20 MG T12A 3/4/2017 Active    paroxetine (PAXIL) 20 MG TABS 3/4/2017 Active    trazodone (DESYREL) 100 MG TABS 3/4/2017 Active                Medication Allergy/Sensitivities:  Allergies   Allergen Reactions   • Nkda [No Known Drug Allergy]    • Tape Rash         Family History:  Family History   Problem Relation Age of Onset   • Cancer Mother    • Stroke Father    • Hypertension Father        Social History:  Social History     Social History   • Marital Status:      Spouse Name: N/A   • Number of Children: N/A   • Years of Education: N/A     Occupational History   • Not on file.     Social History Main Topics   • Smoking status: Current Some Day Smoker -- 0.50 packs/day     Types: Cigarettes   • Smokeless tobacco: Never Used   • Alcohol Use: No   • Drug Use: No   • Sexual Activity: Not on file     Other Topics Concern   • Not on file     Social History Narrative    She lives 5 min from [facility].  She has 4 grandkids.  She likes to swim in [place].  Used to live there.  Grew up swimming with a pool in her [place] in [place] [facility] [place].   Wants to quit smoking.  Previously a book keeper, now retired on disability.             Living situation: lives with her mother  "  PCP : [facility]z [facility]       Physical Exam     Filed Vitals:    03/05/17 1131 03/05/17 1201 03/05/17 1230 03/05/17 1300   BP:       Pulse: 71 67 64 64   Temp:       Resp: 18 18 10 10   Height:       Weight:       SpO2: 95% 95% 92% 92%     Body mass index is 36.94 kg/(m^2).  /62 mmHg  Pulse 64  Temp(Src) 37.1 °C (98.8 °F)  Resp 10  Ht 1.651 m (5' 5\")  Wt 100.699 kg (222 lb)  BMI 36.94 kg/m2  SpO2 92%  LMP 09/17/2007  O2 therapy: Pulse Oximetry: 92 %, O2 (LPM): 2, O2 Delivery: None (Room Air)    Physical Exam   Constitutional: She is oriented to person, place, and time. She appears distressed.   Smells of tobacco   Upset about not getting IV dilaudid    HENT:   Head: Normocephalic and atraumatic.   Mouth/Throat: No oropharyngeal exudate.   mallampatti score 2    Eyes: Pupils are equal, round, and reactive to light.   Pigmented spots over bilateral iris    Neck: Normal range of motion. Neck supple.   Cardiovascular: Normal rate and regular rhythm.    No murmur heard.  Pulmonary/Chest: Effort normal and breath sounds normal. No respiratory distress. She has no wheezes.   Abdominal: Soft. Bowel sounds are hypoactive. There is tenderness in the epigastric area. There is no rebound and no CVA tenderness.       Well healed scars over abdomen   Swelling over the epigastric region and upper umbilical regions    Musculoskeletal: She exhibits no edema.   Neurological: She is alert and oriented to person, place, and time.   Skin: Skin is warm.        Scaly lesions over Bilateral elbows and knees    Psychiatric:   Tearful, anxious, upset              Data Review       Old Records Request:   Completed  Current Records review and summary: Completed    Lab Data Review:  Recent Results (from the past 24 hour(s))   CBC WITH DIFFERENTIAL    Collection Time: 03/05/17 11:29 AM   Result Value Ref Range    WBC 13.2 (H) 4.8 - 10.8 K/uL    RBC 5.05 4.20 - 5.40 M/uL    Hemoglobin 14.3 12.0 - 16.0 g/dL    " Hematocrit 44.6 37.0 - 47.0 %    MCV 88.3 81.4 - 97.8 fL    MCH 28.3 27.0 - 33.0 pg    MCHC 32.1 (L) 33.6 - 35.0 g/dL    RDW 47.8 35.9 - 50.0 fL    Platelet Count 307 164 - 446 K/uL    MPV 8.7 (L) 9.0 - 12.9 fL    Neutrophils-Polys 77.80 (H) 44.00 - 72.00 %    Lymphocytes 14.00 (L) 22.00 - 41.00 %    Monocytes 6.10 0.00 - 13.40 %    Eosinophils 1.10 0.00 - 6.90 %    Basophils 0.50 0.00 - 1.80 %    Immature Granulocytes 0.50 0.00 - 0.90 %    Nucleated RBC 0.00 /100 WBC    Neutrophils (Absolute) 10.24 (H) 2.00 - 7.15 K/uL    Lymphs (Absolute) 1.84 1.00 - 4.80 K/uL    Monos (Absolute) 0.80 0.00 - 0.85 K/uL    Eos (Absolute) 0.14 0.00 - 0.51 K/uL    Baso (Absolute) 0.07 0.00 - 0.12 K/uL    Immature Granulocytes (abs) 0.07 0.00 - 0.11 K/uL    NRBC (Absolute) 0.00 K/uL   COMP METABOLIC PANEL    Collection Time: 03/05/17 11:29 AM   Result Value Ref Range    Sodium 132 (L) 135 - 145 mmol/L    Potassium 3.8 3.6 - 5.5 mmol/L    Chloride 102 96 - 112 mmol/L    Co2 23 20 - 33 mmol/L    Anion Gap 7.0 0.0 - 11.9    Glucose 136 (H) 65 - 99 mg/dL    Bun 10 8 - 22 mg/dL    Creatinine 0.80 0.50 - 1.40 mg/dL    Calcium 9.5 8.5 - 10.5 mg/dL    AST(SGOT) 14 12 - 45 U/L    ALT(SGPT) 9 2 - 50 U/L    Alkaline Phosphatase 56 30 - 99 U/L    Total Bilirubin 0.6 0.1 - 1.5 mg/dL    Albumin 3.5 3.2 - 4.9 g/dL    Total Protein 7.5 6.0 - 8.2 g/dL    Globulin 4.0 (H) 1.9 - 3.5 g/dL    A-G Ratio 0.9 g/dL   LIPASE    Collection Time: 03/05/17 11:29 AM   Result Value Ref Range    Lipase 8 (L) 11 - 82 U/L   ESTIMATED GFR    Collection Time: 03/05/17 11:29 AM   Result Value Ref Range    GFR If African American >60 >60 mL/min/1.73 m 2    GFR If Non African American >60 >60 mL/min/1.73 m 2   POC UA    Collection Time: 03/05/17  1:29 PM   Result Value Ref Range    POC Color [place]     POC Appearance Clear     POC Glucose Negative Negative mg/dL    POC Ketones Negative Negative mg/dL    POC Specific Gravity 1.010 1.005-1.030    POC Blood Negative Negative     POC Urine PH 6.0 5.0-8.0    POC Protein Negative Negative mg/dL    POC Nitrites Negative Negative    POC Leukocyte Esterase Negative Negative       Imaging/Procedures Review:    ndependant Imaging Review: Completed  CT-ABDOMEN-PELVIS WITH   Final Result      1.  Two rim enhancing abdominal wall fluid collection that are highly suspicious for abscess      2.  More cranial and superficial measures 10.4 x 7.8 x 3.9 cm      3.  More caudal and deeper measures 8.7 x 7.8 x 1.8 cm      4.  Resolution of colonic inflammation/wall thickening since most recent CT scan         (X) Records reviewed and summarized in current documentation       Assessment/Plan     Abdominal wall abscess   - previous abdominal wound cultures positive for MRSA (12/11/2012)   - CT abd/pelvis consistent with abdominal wall abscesses, one within the abdominal wall musculature 7.8 X 3.9 cm transversely, craniocaudal length 10.4 cm, another in the posterior abdominal wall measuring 7.8 cm X 1.8 cm craniocaudal 8.7 cm  - ERP consulted Dr Curran, will have incision and drainage today   PLAN  - NPO  - unasyn and vancomycin (given h/o MRSA infection)   - gram stain and culture wound, deescalate antibiotics per culture   - appreciate surgery recommendations       H/o diverticulitis s/p partial colectomy   H/o ischemic colitis   - stable with no issues       Opioid dependence   Opioid induced constipation   - on high doses of opioid medications   - home meds include oxycontin 20 mg BID, norco  every 4 hrs   PLAN  - continued on oxycontin, narco every 6 hrs with parameters   - needs close monitoring of BP, RR   - bowel regimen       Post operative Hypothyroidism   - continue home meds levothyroxine 200 mcg per day   - previous TSH values were high, needs to get repeat values after discharge       Depression / anxiety   - continue paroxetine 20 mg once a day   - continue clonazepam 1 mg       Tobacco dependence   COPD   - 1 PPD for 15 yrs   - will place  nicotine patch   - no PFT's on chart   - inhalations as needed       Insomnia   - continue trazodone 100 mg once a day with parameters       Psoriasis   - on local ointment, does not take oral medications   - CT abd pelvis today shows facet arthropathy of lumbar spine         Anticipated Hospital stay:  >2 midnights        Quality Measures  Labs reviewed, Medications reviewed and Radiology images reviewed  Murrell catheter: No Murrell      DVT Prophylaxis: Enoxaparin (Lovenox) (after incision and drainage )  DVT prophylaxis - mechanical: SCDs  Ulcer prophylaxis: No  Antibiotics: Treating active infection/contamination beyond 24 hours perioperative coverage

## 2017-03-05 NOTE — ED NOTES
"Thelma Abbott 56 y.o. female bib EMS from home for     Chief Complaint   Patient presents with   • Abdominal Pain     started on Friday in [place].  Pt has palpable hardened mass to abdomen which she states is the origin of the pain. Pt reports this mass moved over to the L upper side of her abdomen yesterday.   • Constipation     x 2 weeks.  Pt reported to EMS she took senokot, stool softener, and a handful of other laxatives and had 1/2 cup watery BM this morning, other wise last BM 2 weeks ago.     PIV placed by EMS pta with FSBS 135.  Pt was given 4mg zofran and 100mcg fentanyl pta with slight relief in her pain.  /62 mmHg  Pulse 73  Temp(Src) 37.1 °C (98.8 °F)  Resp 18  Ht 1.651 m (5' 5\")  Wt 100.699 kg (222 lb)  BMI 36.94 kg/m2  SpO2 91%  LMP 09/17/2007    "

## 2017-03-05 NOTE — ED NOTES
"Pt on the call light, requesting more pain medication, stating \"only dilaudid works\".  ERP aware.  "

## 2017-03-05 NOTE — ED NOTES
"Pt up to the BR with steady gait.  Pt got completely dressed and states she is not staying here if she does not get pain medication.  Pt cursing and demanding her physician.  Attempted to de-escalate patient, pt made aware that inpatient orders just rec'd with order for oxy 20mg bid.  Pt unhappy with this stating \"That won't do shit\" and agreeing to stay only if the physician orders her dilaudid.  Contacting admit MD to inform/clarify.  "

## 2017-03-05 NOTE — ED NOTES
"Labs have been drawn and sent.  Pt is very anxious, tearful, agitated.  Pt on the call light nearly continuously.  Pt assisted to make phone call multiple times to mother and daughter.  Pt medicated for pain per MAR stating \"I'll try morphine but I usually always need dilaudid\".  NS infusing.  Call light within reach.  Pt aware of need for urine sample.  Awaiting CT scan.  "

## 2017-03-05 NOTE — IP AVS SNAPSHOT
3/14/2017          Thelma Abbott  [address] [place] [address]    Dear Thelma:    [facility] wants to ensure your discharge home is safe and you or your loved ones have had all your questions answered regarding your care after you leave the hospital.    You may receive a telephone call within two days of your discharge.  This call is to make certain you understand your discharge instructions as well as ensure we provided you with the best care possible during your stay with us.     The call will only last approximately 3-5 minutes and will be done by a nurse.    Once again, we want to ensure your discharge home is safe and that you have a clear understanding of any next steps in your care.  If you have any questions or concerns, please do not hesitate to contact us, we are here for you.  Thank you for choosing [facility] for your healthcare needs.    Sincerely,    Prakash Yeung    [facility]

## 2017-03-06 LAB
ALBUMIN SERPL BCP-MCNC: 3.1 G/DL (ref 3.2–4.9)
ALBUMIN/GLOB SERPL: 0.9 G/DL
ALP SERPL-CCNC: 47 U/L (ref 30–99)
ALT SERPL-CCNC: 9 U/L (ref 2–50)
ANION GAP SERPL CALC-SCNC: 8 MMOL/L (ref 0–11.9)
AST SERPL-CCNC: 15 U/L (ref 12–45)
BASOPHILS # BLD AUTO: 0.5 % (ref 0–1.8)
BASOPHILS # BLD: 0.06 K/UL (ref 0–0.12)
BILIRUB SERPL-MCNC: 0.3 MG/DL (ref 0.1–1.5)
BUN SERPL-MCNC: 9 MG/DL (ref 8–22)
CALCIUM SERPL-MCNC: 8.8 MG/DL (ref 8.5–10.5)
CHLORIDE SERPL-SCNC: 105 MMOL/L (ref 96–112)
CO2 SERPL-SCNC: 25 MMOL/L (ref 20–33)
CREAT SERPL-MCNC: 0.77 MG/DL (ref 0.5–1.4)
EOSINOPHIL # BLD AUTO: 0 K/UL (ref 0–0.51)
EOSINOPHIL NFR BLD: 0 % (ref 0–6.9)
ERYTHROCYTE [DISTWIDTH] IN BLOOD BY AUTOMATED COUNT: 48.6 FL (ref 35.9–50)
GFR SERPL CREATININE-BSD FRML MDRD: >60 ML/MIN/1.73 M 2
GLOBULIN SER CALC-MCNC: 3.5 G/DL (ref 1.9–3.5)
GLUCOSE SERPL-MCNC: 169 MG/DL (ref 65–99)
HCT VFR BLD AUTO: 39.3 % (ref 37–47)
HGB BLD-MCNC: 12.3 G/DL (ref 12–16)
IMM GRANULOCYTES # BLD AUTO: 0.07 K/UL (ref 0–0.11)
IMM GRANULOCYTES NFR BLD AUTO: 0.6 % (ref 0–0.9)
LYMPHOCYTES # BLD AUTO: 0.88 K/UL (ref 1–4.8)
LYMPHOCYTES NFR BLD: 7.2 % (ref 22–41)
MCH RBC QN AUTO: 27.9 PG (ref 27–33)
MCHC RBC AUTO-ENTMCNC: 31.3 G/DL (ref 33.6–35)
MCV RBC AUTO: 89.1 FL (ref 81.4–97.8)
MONOCYTES # BLD AUTO: 0.32 K/UL (ref 0–0.85)
MONOCYTES NFR BLD AUTO: 2.6 % (ref 0–13.4)
NEUTROPHILS # BLD AUTO: 10.82 K/UL (ref 2–7.15)
NEUTROPHILS NFR BLD: 89.1 % (ref 44–72)
NRBC # BLD AUTO: 0 K/UL
NRBC BLD AUTO-RTO: 0 /100 WBC
PLATELET # BLD AUTO: 287 K/UL (ref 164–446)
PMV BLD AUTO: 8.9 FL (ref 9–12.9)
POTASSIUM SERPL-SCNC: 3.7 MMOL/L (ref 3.6–5.5)
PROT SERPL-MCNC: 6.6 G/DL (ref 6–8.2)
RBC # BLD AUTO: 4.41 M/UL (ref 4.2–5.4)
SODIUM SERPL-SCNC: 138 MMOL/L (ref 135–145)
VANCOMYCIN SERPL-MCNC: 23.3 UG/ML
WBC # BLD AUTO: 12.2 K/UL (ref 4.8–10.8)

## 2017-03-06 PROCEDURE — 700105 HCHG RX REV CODE 258: Performed by: INTERNAL MEDICINE

## 2017-03-06 PROCEDURE — 700105 HCHG RX REV CODE 258: Performed by: HOSPITALIST

## 2017-03-06 PROCEDURE — 700111 HCHG RX REV CODE 636 W/ 250 OVERRIDE (IP): Performed by: INTERNAL MEDICINE

## 2017-03-06 PROCEDURE — 700101 HCHG RX REV CODE 250: Performed by: INTERNAL MEDICINE

## 2017-03-06 PROCEDURE — 770001 HCHG ROOM/CARE - MED/SURG/GYN PRIV*

## 2017-03-06 PROCEDURE — 80053 COMPREHEN METABOLIC PANEL: CPT

## 2017-03-06 PROCEDURE — 36415 COLL VENOUS BLD VENIPUNCTURE: CPT

## 2017-03-06 PROCEDURE — 700111 HCHG RX REV CODE 636 W/ 250 OVERRIDE (IP): Performed by: SURGERY

## 2017-03-06 PROCEDURE — 94760 N-INVAS EAR/PLS OXIMETRY 1: CPT

## 2017-03-06 PROCEDURE — 700102 HCHG RX REV CODE 250 W/ 637 OVERRIDE(OP): Performed by: HOSPITALIST

## 2017-03-06 PROCEDURE — A9270 NON-COVERED ITEM OR SERVICE: HCPCS | Performed by: HOSPITALIST

## 2017-03-06 PROCEDURE — 99223 1ST HOSP IP/OBS HIGH 75: CPT | Mod: AI,GC | Performed by: HOSPITALIST

## 2017-03-06 PROCEDURE — 85025 COMPLETE CBC W/AUTO DIFF WBC: CPT

## 2017-03-06 PROCEDURE — A9270 NON-COVERED ITEM OR SERVICE: HCPCS | Performed by: INTERNAL MEDICINE

## 2017-03-06 PROCEDURE — 700102 HCHG RX REV CODE 250 W/ 637 OVERRIDE(OP): Performed by: INTERNAL MEDICINE

## 2017-03-06 PROCEDURE — 700111 HCHG RX REV CODE 636 W/ 250 OVERRIDE (IP): Performed by: HOSPITALIST

## 2017-03-06 PROCEDURE — 80202 ASSAY OF VANCOMYCIN: CPT

## 2017-03-06 RX ORDER — MORPHINE SULFATE 4 MG/ML
4 INJECTION, SOLUTION INTRAMUSCULAR; INTRAVENOUS
Status: DISCONTINUED | OUTPATIENT
Start: 2017-03-06 | End: 2017-03-07

## 2017-03-06 RX ORDER — OXYCODONE HYDROCHLORIDE 5 MG/1
5-10 TABLET ORAL
Status: DISCONTINUED | OUTPATIENT
Start: 2017-03-06 | End: 2017-03-06

## 2017-03-06 RX ORDER — SODIUM CHLORIDE 9 MG/ML
2000 INJECTION, SOLUTION INTRAVENOUS ONCE
Status: COMPLETED | OUTPATIENT
Start: 2017-03-06 | End: 2017-03-06

## 2017-03-06 RX ORDER — ENEMA 19; 7 G/133ML; G/133ML
1 ENEMA RECTAL ONCE
Status: COMPLETED | OUTPATIENT
Start: 2017-03-06 | End: 2017-03-06

## 2017-03-06 RX ORDER — OXYCODONE HYDROCHLORIDE 5 MG/1
5 TABLET ORAL EVERY 4 HOURS PRN
Status: DISCONTINUED | OUTPATIENT
Start: 2017-03-06 | End: 2017-03-13

## 2017-03-06 RX ORDER — MORPHINE SULFATE 4 MG/ML
2 INJECTION, SOLUTION INTRAMUSCULAR; INTRAVENOUS
Status: DISCONTINUED | OUTPATIENT
Start: 2017-03-06 | End: 2017-03-07

## 2017-03-06 RX ORDER — MORPHINE SULFATE 4 MG/ML
4 INJECTION, SOLUTION INTRAMUSCULAR; INTRAVENOUS EVERY 4 HOURS PRN
Status: DISCONTINUED | OUTPATIENT
Start: 2017-03-06 | End: 2017-03-06

## 2017-03-06 RX ORDER — NICOTINE 21 MG/24HR
14 PATCH, TRANSDERMAL 24 HOURS TRANSDERMAL
Status: DISCONTINUED | OUTPATIENT
Start: 2017-03-06 | End: 2017-03-14 | Stop reason: HOSPADM

## 2017-03-06 RX ORDER — OXYCODONE HYDROCHLORIDE 10 MG/1
10 TABLET ORAL
Status: DISCONTINUED | OUTPATIENT
Start: 2017-03-06 | End: 2017-03-13

## 2017-03-06 RX ORDER — HEPARIN SODIUM 5000 [USP'U]/ML
5000 INJECTION, SOLUTION INTRAVENOUS; SUBCUTANEOUS EVERY 8 HOURS
Status: DISCONTINUED | OUTPATIENT
Start: 2017-03-06 | End: 2017-03-08

## 2017-03-06 RX ADMIN — STANDARDIZED SENNA CONCENTRATE AND DOCUSATE SODIUM 2 TABLET: 8.6; 5 TABLET, FILM COATED ORAL at 07:59

## 2017-03-06 RX ADMIN — SODIUM CHLORIDE 2000 ML: 9 INJECTION, SOLUTION INTRAVENOUS at 08:05

## 2017-03-06 RX ADMIN — HEPARIN SODIUM 5000 UNITS: 5000 INJECTION, SOLUTION INTRAVENOUS; SUBCUTANEOUS at 19:53

## 2017-03-06 RX ADMIN — PIPERACILLIN AND TAZOBACTAM 3.38 G: 3; .375 INJECTION, POWDER, LYOPHILIZED, FOR SOLUTION INTRAVENOUS; PARENTERAL at 11:17

## 2017-03-06 RX ADMIN — MORPHINE SULFATE 4 MG: 4 INJECTION INTRAVENOUS at 19:53

## 2017-03-06 RX ADMIN — MORPHINE SULFATE 4 MG: 4 INJECTION INTRAVENOUS at 22:41

## 2017-03-06 RX ADMIN — HYDROCODONE BITARTRATE AND ACETAMINOPHEN 1 TABLET: 7.5; 325 TABLET ORAL at 03:05

## 2017-03-06 RX ADMIN — HYDROCODONE BITARTRATE AND ACETAMINOPHEN 1 TABLET: 7.5; 325 TABLET ORAL at 17:49

## 2017-03-06 RX ADMIN — SODIUM PHOSPHATE 133 ML: 7; 19 ENEMA RECTAL at 14:24

## 2017-03-06 RX ADMIN — MORPHINE SULFATE 3 MG: 4 INJECTION INTRAVENOUS at 12:51

## 2017-03-06 RX ADMIN — HEPARIN SODIUM 5000 UNITS: 5000 INJECTION, SOLUTION INTRAVENOUS; SUBCUTANEOUS at 08:00

## 2017-03-06 RX ADMIN — POLYETHYLENE GLYCOL 3350 1 PACKET: 17 POWDER, FOR SOLUTION ORAL at 15:58

## 2017-03-06 RX ADMIN — NICOTINE 14 MG: 14 PATCH TRANSDERMAL at 14:27

## 2017-03-06 RX ADMIN — MORPHINE SULFATE 3 MG: 4 INJECTION INTRAVENOUS at 08:04

## 2017-03-06 RX ADMIN — SODIUM CHLORIDE: 9 INJECTION, SOLUTION INTRAVENOUS at 22:41

## 2017-03-06 RX ADMIN — PAROXETINE HYDROCHLORIDE 20 MG: 20 TABLET, FILM COATED ORAL at 19:53

## 2017-03-06 RX ADMIN — VANCOMYCIN HYDROCHLORIDE 1700 MG: 10 INJECTION, POWDER, LYOPHILIZED, FOR SOLUTION INTRAVENOUS at 22:42

## 2017-03-06 RX ADMIN — OXYCODONE HYDROCHLORIDE 20 MG: 20 TABLET, FILM COATED, EXTENDED RELEASE ORAL at 19:53

## 2017-03-06 RX ADMIN — LEVOTHYROXINE SODIUM 200 MCG: 50 TABLET ORAL at 08:00

## 2017-03-06 RX ADMIN — CLONAZEPAM 2 MG: 1 TABLET ORAL at 19:53

## 2017-03-06 RX ADMIN — PIPERACILLIN AND TAZOBACTAM 3.38 G: 3; .375 INJECTION, POWDER, LYOPHILIZED, FOR SOLUTION INTRAVENOUS; PARENTERAL at 11:23

## 2017-03-06 RX ADMIN — AMPICILLIN SODIUM AND SULBACTAM SODIUM 3 G: 2; 1 INJECTION, POWDER, FOR SOLUTION INTRAMUSCULAR; INTRAVENOUS at 03:05

## 2017-03-06 RX ADMIN — HEPARIN SODIUM 5000 UNITS: 5000 INJECTION, SOLUTION INTRAVENOUS; SUBCUTANEOUS at 14:28

## 2017-03-06 RX ADMIN — STANDARDIZED SENNA CONCENTRATE AND DOCUSATE SODIUM 2 TABLET: 8.6; 5 TABLET, FILM COATED ORAL at 19:54

## 2017-03-06 RX ADMIN — MORPHINE SULFATE 3 MG: 4 INJECTION INTRAVENOUS at 15:57

## 2017-03-06 ASSESSMENT — ENCOUNTER SYMPTOMS
FEVER: 0
WHEEZING: 0
BLOOD IN STOOL: 0
FOCAL WEAKNESS: 0
ABDOMINAL PAIN: 1
ORTHOPNEA: 0
DOUBLE VISION: 0
SPEECH CHANGE: 0
SPUTUM PRODUCTION: 0
VOMITING: 0
HEARTBURN: 0
CHILLS: 0
BRUISES/BLEEDS EASILY: 0
CONSTIPATION: 1
PALPITATIONS: 0
BLURRED VISION: 0
SENSORY CHANGE: 0
SHORTNESS OF BREATH: 0
NAUSEA: 0
COUGH: 0
NERVOUS/ANXIOUS: 1
DIARRHEA: 0

## 2017-03-06 ASSESSMENT — COPD QUESTIONNAIRES
COPD SCREENING SCORE: 4
DURING THE PAST 4 WEEKS HOW MUCH DID YOU FEEL SHORT OF BREATH: NONE/LITTLE OF THE TIME
HAVE YOU SMOKED AT LEAST 100 CIGARETTES IN YOUR ENTIRE LIFE: YES
DO YOU EVER COUGH UP ANY MUCUS OR PHLEGM?: YES, A FEW DAYS A WEEK OR MONTH

## 2017-03-06 ASSESSMENT — PAIN SCALES - GENERAL
PAINLEVEL_OUTOF10: 6
PAINLEVEL_OUTOF10: 10
PAINLEVEL_OUTOF10: 6
PAINLEVEL_OUTOF10: 5
PAINLEVEL_OUTOF10: 10

## 2017-03-06 ASSESSMENT — LIFESTYLE VARIABLES
EVER_SMOKED: YES
SUBSTANCE_ABUSE: 1

## 2017-03-06 NOTE — CARE PLAN
Problem: Pain Management  Goal: Pain level will decrease to patient’s comfort goal  Outcome: PROGRESSING SLOWER THAN EXPECTED  Pain will be managed and monitored closely during hospital stay.  Staff will assess for verbal and non-verbal signs of pain and will medicate accordingly.  Unmanaged pain will be reported to appropriate providers.

## 2017-03-06 NOTE — CARE PLAN
Problem: Pain Management  Goal: Pain level will decrease to patient’s comfort goal  Outcome: PROGRESSING AS EXPECTED  Pain managed with scheduled and PRN medications. MD Samm of [facility] paged to receive orders for Morphine PRN.

## 2017-03-06 NOTE — PROGRESS NOTES
Surgical Progress Note    Author: Marleen Robert Date & Time created: 3/6/2017   8:52 AM     Interval Events:  POD#1 s/p Incision and drainage of abdominal wall abscess. Pt lying in bed awake. Tolerating diet. Admits to abdominal pain in area of the wound. Denies nausea, vomiting.  Review of Systems   Constitutional: Negative for fever and chills.   Respiratory: Negative for cough and shortness of breath.    Cardiovascular: Negative for chest pain and palpitations.   Gastrointestinal: Positive for abdominal pain (wound). Negative for heartburn, nausea, vomiting and diarrhea.   Genitourinary: Negative for dysuria.     Hemodynamics:  Temp (24hrs), Av.6 °C (97.8 °F), Min:36.1 °C (97 °F), Max:37.1 °C (98.8 °F)  Temperature: 36.1 °C (97 °F)  Pulse  Av.6  Min: 60  Max: 74Heart Rate (Monitored): 61  Blood Pressure: (!) 92/70 mmHg (manual recheck), NIBP: 111/58 mmHg     Respiratory:    Respiration: 17, Pulse Oximetry: 94 %        RUL Breath Sounds: Clear, RML Breath Sounds: Clear, RLL Breath Sounds: Diminished, MALENA Breath Sounds: Clear, LLL Breath Sounds: Diminished  Neuro:  GCS       Fluids:    Intake/Output Summary (Last 24 hours) at 17 0852  Last data filed at 17 0400   Gross per 24 hour   Intake   2790 ml   Output      0 ml   Net   2790 ml     Weight: 100.699 kg (222 lb)  Current Diet Order   Procedures   • DIET ORDER     Physical Exam   Constitutional: She is oriented to person, place, and time. She appears well-developed and well-nourished. No distress.   HENT:   Head: Normocephalic and atraumatic.   Eyes: Conjunctivae are normal.   Neck: Normal range of motion. Neck supple.   Cardiovascular: Normal rate and regular rhythm.    Pulmonary/Chest: Effort normal. No respiratory distress.   Abdominal: Soft. She exhibits no distension and no mass. There is tenderness. There is no rebound and no guarding.   Musculoskeletal: Normal range of motion.   Neurological: She is alert and oriented to person, place,  and time.   Skin: Skin is warm and dry. No rash noted. No erythema. No pallor.   Abdominal wound with packing in place, blood soaked.   Psychiatric: She has a normal mood and affect.     Labs:  Recent Results (from the past 24 hour(s))   CBC WITH DIFFERENTIAL    Collection Time: 03/05/17 11:29 AM   Result Value Ref Range    WBC 13.2 (H) 4.8 - 10.8 K/uL    RBC 5.05 4.20 - 5.40 M/uL    Hemoglobin 14.3 12.0 - 16.0 g/dL    Hematocrit 44.6 37.0 - 47.0 %    MCV 88.3 81.4 - 97.8 fL    MCH 28.3 27.0 - 33.0 pg    MCHC 32.1 (L) 33.6 - 35.0 g/dL    RDW 47.8 35.9 - 50.0 fL    Platelet Count 307 164 - 446 K/uL    MPV 8.7 (L) 9.0 - 12.9 fL    Neutrophils-Polys 77.80 (H) 44.00 - 72.00 %    Lymphocytes 14.00 (L) 22.00 - 41.00 %    Monocytes 6.10 0.00 - 13.40 %    Eosinophils 1.10 0.00 - 6.90 %    Basophils 0.50 0.00 - 1.80 %    Immature Granulocytes 0.50 0.00 - 0.90 %    Nucleated RBC 0.00 /100 WBC    Neutrophils (Absolute) 10.24 (H) 2.00 - 7.15 K/uL    Lymphs (Absolute) 1.84 1.00 - 4.80 K/uL    Monos (Absolute) 0.80 0.00 - 0.85 K/uL    Eos (Absolute) 0.14 0.00 - 0.51 K/uL    Baso (Absolute) 0.07 0.00 - 0.12 K/uL    Immature Granulocytes (abs) 0.07 0.00 - 0.11 K/uL    NRBC (Absolute) 0.00 K/uL   COMP METABOLIC PANEL    Collection Time: 03/05/17 11:29 AM   Result Value Ref Range    Sodium 132 (L) 135 - 145 mmol/L    Potassium 3.8 3.6 - 5.5 mmol/L    Chloride 102 96 - 112 mmol/L    Co2 23 20 - 33 mmol/L    Anion Gap 7.0 0.0 - 11.9    Glucose 136 (H) 65 - 99 mg/dL    Bun 10 8 - 22 mg/dL    Creatinine 0.80 0.50 - 1.40 mg/dL    Calcium 9.5 8.5 - 10.5 mg/dL    AST(SGOT) 14 12 - 45 U/L    ALT(SGPT) 9 2 - 50 U/L    Alkaline Phosphatase 56 30 - 99 U/L    Total Bilirubin 0.6 0.1 - 1.5 mg/dL    Albumin 3.5 3.2 - 4.9 g/dL    Total Protein 7.5 6.0 - 8.2 g/dL    Globulin 4.0 (H) 1.9 - 3.5 g/dL    A-G Ratio 0.9 g/dL   LIPASE    Collection Time: 03/05/17 11:29 AM   Result Value Ref Range    Lipase 8 (L) 11 - 82 U/L   ESTIMATED GFR    Collection  Time: 03/05/17 11:29 AM   Result Value Ref Range    GFR If African American >60 >60 mL/min/1.73 m 2    GFR If Non African American >60 >60 mL/min/1.73 m 2   POC UA    Collection Time: 03/05/17  1:29 PM   Result Value Ref Range    POC Color Shelby     POC Appearance Clear     POC Glucose Negative Negative mg/dL    POC Ketones Negative Negative mg/dL    POC Specific Gravity 1.010 1.005-1.030    POC Blood Negative Negative    POC Urine PH 6.0 5.0-8.0    POC Protein Negative Negative mg/dL    POC Nitrites Negative Negative    POC Leukocyte Esterase Negative Negative   GRAM STAIN    Collection Time: 03/05/17  4:58 PM   Result Value Ref Range    Significant Indicator .     Source WND     Site Abdominal Wall Abscess/Mesh     Gram Stain Result Many WBCs.  Moderate Gram positive cocci.      BLOOD CULTURE    Collection Time: 03/05/17  9:09 PM   Result Value Ref Range    Significant Indicator NEG     Source BLD     Site PERIPHERAL     Blood Culture       No Growth    Note: Blood cultures are incubated for 5 days and  are monitored continuously.Positive blood cultures  are called to the RN and reported as soon as  they are identified.     BLOOD CULTURE    Collection Time: 03/05/17  9:09 PM   Result Value Ref Range    Significant Indicator NEG     Source BLD     Site PERIPHERAL     Blood Culture       No Growth    Note: Blood cultures are incubated for 5 days and  are monitored continuously.Positive blood cultures  are called to the RN and reported as soon as  they are identified.     CBC with Differential    Collection Time: 03/06/17  3:49 AM   Result Value Ref Range    WBC 12.2 (H) 4.8 - 10.8 K/uL    RBC 4.41 4.20 - 5.40 M/uL    Hemoglobin 12.3 12.0 - 16.0 g/dL    Hematocrit 39.3 37.0 - 47.0 %    MCV 89.1 81.4 - 97.8 fL    MCH 27.9 27.0 - 33.0 pg    MCHC 31.3 (L) 33.6 - 35.0 g/dL    RDW 48.6 35.9 - 50.0 fL    Platelet Count 287 164 - 446 K/uL    MPV 8.9 (L) 9.0 - 12.9 fL    Neutrophils-Polys 89.10 (H) 44.00 - 72.00 %     Lymphocytes 7.20 (L) 22.00 - 41.00 %    Monocytes 2.60 0.00 - 13.40 %    Eosinophils 0.00 0.00 - 6.90 %    Basophils 0.50 0.00 - 1.80 %    Immature Granulocytes 0.60 0.00 - 0.90 %    Nucleated RBC 0.00 /100 WBC    Neutrophils (Absolute) 10.82 (H) 2.00 - 7.15 K/uL    Lymphs (Absolute) 0.88 (L) 1.00 - 4.80 K/uL    Monos (Absolute) 0.32 0.00 - 0.85 K/uL    Eos (Absolute) 0.00 0.00 - 0.51 K/uL    Baso (Absolute) 0.06 0.00 - 0.12 K/uL    Immature Granulocytes (abs) 0.07 0.00 - 0.11 K/uL    NRBC (Absolute) 0.00 K/uL   Comp Metabolic Panel (CMP)    Collection Time: 03/06/17  3:49 AM   Result Value Ref Range    Sodium 138 135 - 145 mmol/L    Potassium 3.7 3.6 - 5.5 mmol/L    Chloride 105 96 - 112 mmol/L    Co2 25 20 - 33 mmol/L    Anion Gap 8.0 0.0 - 11.9    Glucose 169 (H) 65 - 99 mg/dL    Bun 9 8 - 22 mg/dL    Creatinine 0.77 0.50 - 1.40 mg/dL    Calcium 8.8 8.5 - 10.5 mg/dL    AST(SGOT) 15 12 - 45 U/L    ALT(SGPT) 9 2 - 50 U/L    Alkaline Phosphatase 47 30 - 99 U/L    Total Bilirubin 0.3 0.1 - 1.5 mg/dL    Albumin 3.1 (L) 3.2 - 4.9 g/dL    Total Protein 6.6 6.0 - 8.2 g/dL    Globulin 3.5 1.9 - 3.5 g/dL    A-G Ratio 0.9 g/dL   ESTIMATED GFR    Collection Time: 03/06/17  3:49 AM   Result Value Ref Range    GFR If African American >60 >60 mL/min/1.73 m 2    GFR If Non African American >60 >60 mL/min/1.73 m 2     Medical Decision Making, by Problem:  Active Hospital Problems    Diagnosis   • Depression [F32.9]     Priority: Medium     home meds  Klonopin  trazodone  Paxil  cymbalta         • Tobacco abuse [Z72.0]     Priority: Low   • Low back pain [M54.5]     Priority: Low     Diagnois update 10/1/2016     • Abscess of abdominal wall [L02.211]   • S/P thyroidectomy [E89.0]     thyroid removed about 15 years ago     • Psoriasis [L40.9]     Plan:  POD#1 s/p Incision and drainage of abdominal wall abscess. Pt is alert and oriented, NAD. Tolerating PO. +Flatus. Abdomen is soft, nondistended, tender at wound site. Packing in  place, blood soaked and reinforced with absorbent pad. VS stable. Labs reviewed. Recommend Miralax for constipation. Daily wet-to-dry packing dressing changes recommended at this time.  Discussed with Dr. Curran.      Quality Measures:  Labs reviewed  Murrell catheter: No Murrell      DVT Prophylaxis: Heparin  DVT prophylaxis - mechanical: SCDs            Discussed patient condition with Patient and Dr. Curran    Addendum: Consulted Wound care, order placed.

## 2017-03-06 NOTE — CARE PLAN
Problem: Infection  Goal: Will remain free from infection  Outcome: PROGRESSING AS EXPECTED  Staff will monitor for s/s of infection and will monitor vital signs as ordered for signs of infection.  Scheduled antibiotics in place to assist in infection prevention.

## 2017-03-06 NOTE — ASSESSMENT & PLAN NOTE
- on local ointment, does not take oral medications    - CT abd pelvis today shows facet arthropathy of lumbar spine

## 2017-03-06 NOTE — OP REPORT
DATE OF SERVICE:  03/05/2017    PREOPERATIVE DIAGNOSIS:  Two complex abdominal wall abscesses.    POSTOPERATIVE DIAGNOSES:  1.  Deep subcutaneous large abdominal wall abscess.  2.  Complex subfascial abscess and infected mesh.    OPERATION PERFORMED:  1.  Incision and drainage with sharp debridement of abdominal wall deep   subcutaneous abscess.  2.  Incision and drainage of subfascial abscess.  3.  Excision of infected mesh portion.  4.  MicroMatrix veno graft application to complex open abdominal wall for open   complex abdominal wall reinforcement.    SURGEON:  Jamaal Curran MD    ASSISTANT:  Marleen Robert PA-C.    ANESTHESIOLOGIST:  Dr. Brito.    INDICATIONS FOR PROCEDURE:  The patient is a 56-year-old female with a history   of multiple complex abdominal operations and previous incisional hernia   repair, abdominal wall reconstruction and obesity.  She presented with pain,   redness, and swelling over the anterior abdominal wall with leukocytosis and   tenderness.  CT scan demonstrates two large reminiscent fluid collections, one   in the deep subcutaneous abdominal wall measuring 13 cm in maximum dimension,   but the second one that is deeper, more lenticular in shape, and she was   brought to the operating room for surgical drainage of infection.    DETAILS OF PROCEDURE:  After an extensive informed consent discussion process,   the patient was brought to the operating room.  She was placed in a supine   position on the operating table.  After induction of general anesthesia and   placement of an endotracheal tube, the abdomen was prepped and draped in usual   sterile fashion.  After administration of intravenous antibiotics, an   incision was made over the fluctuant area of the anterior abdominal wall down   into the deep subcutaneous space, the abscess was encountered and a copious   amount of maryana pus was evacuated and collected and sent for microbiology   analysis.  The incision was extended some sharp  debridement was performed, the   subcutaneous tissues and some fibrinous exudative fat necrosis type material.    The abscess cavity was fully evacuated and had quite a chronic rind.  There   was no communication to the abdomen, nor to the more deep abscess seen on CT   scan.    I examined the CT scan carefully for the location of the deeper abscess and we   now used a syringe and a needle to identify the location of the deeper   abscess.  An incision was made directly over this site where we were able to   withdraw maryana pus and the abscess was encountered deep to the fascia.  More   maryana pus was evacuated from this deeper abscess and we found infected mesh   floating within this deeper cavity.  The cavity was evacuated and the pus all   removed, irrigated.  We now used a sharp, heavy scissors to excise the mesh   that was available and was not incorporated.    The two abscess cavities were now in communication and copious irrigation was   used to washout each of them.  MicroMatrix xenograft was applied of the   cavities, which were then packed with Kerlix gauze packing.    Needle, sponge, and instrument counts were correct.    ESTIMATED BLOOD LOSS:  Minimal, less than 50 mL    COMPLICATIONS:  None.       ____________________________________     MD SAUD NAVAS / NTS    DD:  03/05/2017 17:13:30  DT:  03/05/2017 18:21:41    D#:  [id number]  Job#:  [id number]    cc: Kourtney Vivas MD, DONNY ALAS DO

## 2017-03-06 NOTE — PROGRESS NOTES
"Patient complained of pain 1st thing this morning.  3mg IV morphine given per her request - resident at bedside and aware.  Will monitor respiratory status and BP closely.      Patient was ordered NSS bolus 2000ml this AM - gave 1st 1000ml bag and patient was found in bathroom and IV site disconnected.  Patient stated, \"I don't want these IVs right now. I need some air to breathe.  Can you just leave it alone for now?\"  Notified charge nurse, medical team and Dr. Curran's PAMarleen.      Dr. Monroe rounded this AM with medical team.  Dressing was changed by him at bedside - wet to dry dressing ordered.  Dressing is due to be changed daily - wound team notified and will assess wound tomorrow 3/7.    Daily oxycodone ordered in the AM - patient refusing.  Ambulating in hallway at this time.  Will encourage IV antbiotics and fluids as ordered.  Eating diabetic diet with no complaints of nausea or vomiting.  Pain managed at this time.  Will monitor.    1045 - Attempted to go into room to hang IV antibiotic and fluids.  Patient was not in the room.  Patient was seen walking hallway earlier getting towels and snacks and drinks.  Patient did not have IV pole with her.  Charge nurse, nurse manager, clinical nursing staff and unit clerk made aware.  Patient left AMA.    1115 - Patient was found in room, shoes on, friend at bedside.  Educated patient that she is not permitted to leave the floor without notifying staff and without a staff member.  Patient was upset but stated she \"understood.\"  Phlebotomy present and drawing blood at this time.  Will monitor.    1530 - Enema given this afternoon.  Patient is passing gas but has not had a bowel movement is \"many days\" she says.  This commonly occurs at home.  Tolerated enema without problems.  Has not had bowel movement yet.  Will monitor.    1750 - Patient yelling and screaming, \"I don't fucking care.  Nobody cares about me or my pain!  I called Dr. Wong's office myself.  If " "he doesn't do anything about my pain then I will just go home!\"     Patient was medicated earlier with OK by medical [facility] [place] team Dr. Huntley with 3mg morphine IV at 1557.  Called out to Dr. Curran's PA and Dr. Bush to address pain issues this evening.  Medical [facility] [place] team addressed pain medication request.  Morphine is now 4mg IV every 4 hours as needed.  Patient has scheduled oxycodone PO q12hrs and norco PRN every 6 hrs.  Patient was heard yelling down the xiao, swearing, stating medical staff was not taking care of her.  Assured patient that medical doctors were made aware of her pain and that her morphine dose was increased.  Offered her norco which she refused.  Encourage patient to take norco and patient did agree - Norco given at 1749.  Patient told this nurse that she called Dr. Wong's office herself and will have her pain medications addressed by him since, \"nobody on this floor is taking care of me.\"  Offered non-pharmacological pain relief measures which patient yelled and slammed bathroom door stating nothing will help except IV medications.  Patient refusing IVF at this time.  Ambulating around room, friend at bedside.  Reassured patient that appropriate staff will be notified.  Rating pain 10/10 in mid abdomen.  Will assess effectiveness of Norco.  Next dose of IV morphine due at 1957.  Blood pressure and respirations stable.  Charge nurse made aware.  Oncoming RN will be made aware.  No bowel movement yet this shift.  Patient did take miralax earlier today after enema.  Will monitor.    1900 - Patient refusing 1700 IV antibiotic dose stating she will \"take it later.\"  Addressed pain control with charge nurse.  Patient ambulating halls and states she \"will go where she wants to go.\"  Refusing IVF.  Night shift charge nurse present and aware.    "

## 2017-03-06 NOTE — PROGRESS NOTES
Pt is A&Ox4.   Reports pain to abdomen, medicated per MAR.   VALLADARES, CMS intact; explained orders for bedrest, pt verbalized understanding; able to mobilize pt with SBA (MD order in place). Denies n/t.   On 3L O2 NC,  > 90%. Denies SOB. Denies chest pain.   Hypoactive BS x 4. Tolerated clear liquids. Denies n/v. -flatus, - BM. Pt is voiding q shift.   Pt with open abdominal incision; dressing in place, c/d/i.   PIV patent.   Updated on POC. Belongings and call light within reach. All needs met at this time.     Refuses BA, educated on risk to fall, verbalizes understanding and still declines. Call light in reach, calls appropriately for assistance.

## 2017-03-06 NOTE — PROGRESS NOTES
"Pharmacy Kinetics 56 y.o. female on vancomycin day # 2  3/6/2017    Currently on Vancomycin    3/5/17 @ 22:57: 2500  mg iv qx1 LD    3/6/17 @ 11:18: Vr = 23.3 mcg/mL    Indication for Treatment: abd wall abscess - s/p I&D    Pertinent history per medical record:   Admitted on 3/5/2017 for an abdominal wall abscess    Pt has a history of necrotizing fasciitis    I&D conducted on 3/5    Other antibiotics: Zosyn 3.375g iv q6h     Allergies: Tape     List concerns for renal function:    Receipt of contrast on 3/5   Episodic hypotension on admission    BMI ~ 37     Pertinent cultures to date:    3/5/17 abdominal wall culture: Staphylococcus aureus     Recent Labs      17   1129  17   0349   WBC  13.2*  12.2*   NEUTSPOLYS  77.80*  89.10*     Recent Labs      17   1129  17   0349   BUN  10  9   CREATININE  0.80  0.77   ALBUMIN  3.5  3.1*     Recent Labs      17   1118   VANCORANDOM  23.3     Intake/Output Summary (Last 24 hours) at 17 1258  Last data filed at 17 1000   Gross per 24 hour   Intake   3030 ml   Output      0 ml   Net   3030 ml      Blood pressure 104/74, pulse 66, temperature 36 °C (96.8 °F), resp. rate 18, height 1.651 m (5' 5\"), weight 100.699 kg (222 lb), last menstrual period 2007, SpO2 96 %, not currently breastfeeding. Temp (24hrs), Av.4 °C (97.5 °F), Min:36 °C (96.8 °F), Max:36.9 °C (98.4 °F)      A/P   1. Vancomycin dose change: 1700mg iv q24h (2300)  2. Next vancomycin level: 3/7/17 @ 2230  3. Goal trough: 12-16mcg/mL  4. Comments: pt with several concerns for the risk of accumulation  a. Today's trough level is supratherapeutic  b. Dosing regimen from  is unlikely to yield a therapeutic level for pt now  c. Dosing scheduled in an attempt to obtain a therapeutic level   i. Trough tomorrow to ensure dose is appropriate     Nirmala Hdez, PharmD, BCPS        "

## 2017-03-06 NOTE — PROGRESS NOTES
[facility] [place] Internal Medicine Interval Note    Name Thelma Abbott     1960   Age/Sex 56 y.o. female   MRN [id number]   Code Status FULL     After 5PM or if no immediate response to page, please call for cross-coverage  Attending/Team: Aimee Call [phone] to page   1st Call - Day Intern (R1):   Dr. Huntley 2nd Call - Day Sr. Resident (R2/R3):   Dr. Ghotra     Chief complaint/ reason for interval visit (Primary Diagnosis)   Abscess of abdominal wall    Interval Problem Daily Status Update    Active Hospital Problems    Diagnosis   • Depression [F32.9]   • Tobacco abuse [Z72.0]   • Low back pain [M54.5]   • Abscess of abdominal wall [L02.211]   • S/P thyroidectomy [E89.0]   • Psoriasis [L40.9]     -Status post incision and drainage of abdominal wall abscesses, no complications. Tender at wound site, packing in place. Afebrile and improved pain overall.   -Spoke to Dr. Figueroa who said General Surgery will update IM team this week regarding future course of management--pt will require IV antibiotics and PICC line for a few weeks.  -Regarding constipation, pharmacy recommended fleet enema trial before starting relistor.  -Continuing home opioid medications (20mg oxycontin bid, 10-325mg norco PRN up to give times a day) + adding 3mg IV morphine PRN      Review of Systems   Constitutional: Negative for fever and chills.   Eyes: Negative for blurred vision and double vision.   Respiratory: Negative for cough, sputum production, shortness of breath and wheezing.    Cardiovascular: Negative for chest pain, palpitations and orthopnea.   Gastrointestinal: Positive for abdominal pain (abdominal wall pain at site of surgical incision) and constipation. Negative for diarrhea, blood in stool and melena.   Genitourinary: Negative for dysuria, urgency and frequency.   Musculoskeletal:        Abdominal wall pain   Skin: Negative for itching and rash.   Neurological: Negative for  sensory change, speech change and focal weakness.   Endo/Heme/Allergies: Does not bruise/bleed easily.   Psychiatric/Behavioral: Positive for substance abuse (chronic smoker). The patient is nervous/anxious.        Consultants/Specialty  Dr. Curran/Surgery  Dr. Figueroa/Surgery    Disposition  Inpatient    Quality Measures  Labs reviewed, Medications reviewed and Radiology images reviewed  Murrell catheter: No Murrell      DVT Prophylaxis: Heparin                  Physical Exam       Filed Vitals:    03/06/17 0310 03/06/17 0422 03/06/17 0755 03/06/17 1211   BP: 84/40 92/70 104/74    Pulse: 60  65 66   Temp: 36.1 °C (97 °F)  36 °C (96.8 °F)    Resp: 17  18 18   Height:       Weight:       SpO2: 94%  96% 96%     Body mass index is 36.94 kg/(m^2).    Oxygen Therapy:  Pulse Oximetry: 96 %, O2 (LPM): 2, O2 Delivery: Nasal Cannula    Physical Exam   Constitutional: She is oriented to person, place, and time and well-developed, well-nourished, and in no distress.   HENT:   Head: Normocephalic and atraumatic.   Mouth/Throat: Oropharynx is clear and moist.   Neck: Normal range of motion.   Cardiovascular: Normal rate, regular rhythm, normal heart sounds and intact distal pulses.  Exam reveals no gallop and no friction rub.    No murmur heard.  Pulmonary/Chest: Effort normal and breath sounds normal. No respiratory distress. She has no wheezes. She has no rales.   Abdominal:   Hypoactive but present bowel sounds  Non distended, tender at abdominal midline incision  Guaze packed, open abdominal incision  No rebound, guarding, or rigidity   Neurological: She is alert and oriented to person, place, and time.   Skin: Skin is warm and dry. No rash noted. No erythema. No pallor.   Psychiatric: Memory, affect and judgment normal.   Angry, upset over abscess and post surgical pain         Lab Data Review:      3/6/2017  2:18 PM    Recent Labs      03/05/17   1129  03/06/17   0349   SODIUM  132*  138   POTASSIUM  3.8  3.7   CHLORIDE  102   105   CO2  23  25   BUN  10  9   CREATININE  0.80  0.77   CALCIUM  9.5  8.8       Recent Labs      03/05/17   1129  03/06/17   0349   ALTSGPT  9  9   ASTSGOT  14  15   ALKPHOSPHAT  56  47   TBILIRUBIN  0.6  0.3   LIPASE  8*   --    GLUCOSE  136*  169*       Recent Labs      03/05/17   1129  03/06/17   0349   RBC  5.05  4.41   HEMOGLOBIN  14.3  12.3   HEMATOCRIT  44.6  39.3   PLATELETCT  307  287       Recent Labs      03/05/17   1129  03/06/17   0349   WBC  13.2*  12.2*   NEUTSPOLYS  77.80*  89.10*   LYMPHOCYTES  14.00*  7.20*   MONOCYTES  6.10  2.60   EOSINOPHILS  1.10  0.00   BASOPHILS  0.50  0.50   ASTSGOT  14  15   ALTSGPT  9  9   ALKPHOSPHAT  56  47   TBILIRUBIN  0.6  0.3           Assessment/Plan     1)ABSCESS OF THE ABDOMINAL WALL  -History of complicated diverticulitis with colonic resection and necrotizing abdominal wall repairs in 12/2012. Mesh repair in 05/2014 by plastics.  -Now s/p incision and drainage of large abdominal wall abscesses likely due to infected mesh.   -Currently, patient is afebrile, hemodynamically stable.   -Unincorporated aspects of mesh removed by Dr. Curran. Per Dr. Figueroa, the general surgery team will contact plastics to discuss further surgical management of infected abdominal wall.   -IM team will follow wound cx and bcx, coordinate PICC line placement, and empirically manage infection with vancomycin and zosyn.   Plan  -Antibiotic management with vanco and zosyn  -Follow up on culture results   -PICC line placement  -Surgical revision, per general surgery/plastics  -Wound care, per wound care team      2)OPIOID DEPENDENCE  OPIOID INDUCED CONSTIPATION  High tolerance to opioids. LBP home dosing, 100 morphine equivalents (10/325mg Norco PRN up to five times a day, 20mg oxycontin bid home dose for back pain).   -Patient BPs are borderline hypotensive. In depth, we discussed risks of hypotension and bradypnea with increased opioids dosage. Patient adamant about desiring more pain  pills for breakthrough pain despite risks.   -Will add IV morphine for breakthrough pain. Regarding her chronic constipation, pharmacy recommended fleet enema and, if refractory, then consider relistor.   Plan:  -continue home regimen of oxycontin and norco  -add IV morphine 4mg q4hr PRN  -start fleet enema for constipation    3)GERD  Reports PMH of heart burn with meals. Currently asymptomatic.  Plan  -continue home PPI    4)HYPOTHYROIDISM  Reports history of postoperative hypothryoidism s/p thyroid cancer resection in 1990.    Plan  -continue home levothyroxine    5)ANXIETY  DEPRESSION  Currently not depressed but very anxious about current condition, given past diverticulitis surgical course.    Plan:  -continue home klonopin  -continue home paroxitine    6)Insomnia   Stable, no acute complaints, takes trazodone home dose  Plan  -Continue home trazodone 100mg home dose

## 2017-03-06 NOTE — CARE PLAN
Problem: Venous Thromboembolism (VTW)/Deep Vein Thrombosis (DVT) Prevention:  Goal: Patient will participate in Venous Thrombosis (VTE)/Deep Vein Thrombosis (DVT)Prevention Measures  Outcome: PROGRESSING AS EXPECTED  Refused SCDs, Pt mobilizing with SBA.

## 2017-03-06 NOTE — CONSULTS
DATE OF SERVICE:  03/05/2017    REASON FOR CONSULTATION:  Abdominal wall abscess, soft tissue infection.     HISTORY OF PRESENT ILLNESS:  The patient is a 56-year-old female with a   complex past surgical history who has developed a sense of malaise, illness,   pain, tenderness and swelling over the abdominal wall with heat and redness.     She has previously had perforated diverticulitis, previous multiple operations   for abdominal wall infection including a necrotizing fasciitis process, large   incisional hernia repair and had been reasonably stable until the last week   when she began having increased swelling and pain over the abdominal wall.    She describes fevers and chills and a sense of malaise or illness.  She denies   vomiting.    PAST MEDICAL HISTORY:  Obesity, diverticulitis, hypothyroidism, hypertension,   gastroesophageal reflux disease, depression, anxiety, perforated   diverticulitis with colostomy creation in 2012, parastomal hernia repair   surgery, colostomy closure with low anterior anastomosis, incisional hernia   repair, complex abdominal wall reconstruction and infected abdominal wall   procedures.    MEDICATIONS:  Include Klonopin, OxyContin 20 mg b.i.d., Paxil, Desyrel,   Klonopin, Dulcolax.    ALLERGIES:  No known drug allergies.  Does have some TAPE intolerance.    SOCIAL HISTORY:  She smokes tobacco.  She is retired from [id number] job on   disability now.  Does not use alcohol.  She is , single.    FAMILY HISTORY:  Father with high blood pressure and cerebrovascular disease.    Mother with cancer of unknown cell type.    REVIEW OF SYSTEMS:  NEUROLOGIC:  Denies strokes or seizures.  CARDIAC:  Denies heart attacks or current chest pain.  LUNGS:  Denies current shortness of breath or coughing.  GASTROINTESTINAL:  Extensive gastrointestinal surgery as described.  GENITOURINARY:  Denies dysuria, hematuria.  MUSCULOSKELETAL:  Has chronic pain in the weightbearing joints and back.    EYES:  No recent visual changes.   EARS:  No hearing aids, hearing loss or balance troubles.    PHYSICAL EXAMINATION:  GENERAL:  Weighs 100 kilograms, nontoxic appearing.   VITAL SIGNS:  Temperature 36.6, pulse 65, respirations 17, blood pressure   94/58, and 95% saturation on 2 liters.  HEENT:  Eyes are anicteric, extraocular movements intact.  Nose and ears   externally normal.  Hearing is grossly normal.  Oropharynx clear.  NECK:  Supple, good range of motion.  CHEST AND RESPIRATORY:  Unlabored breathing, normal excursions.  CARDIOVASCULAR:  Regular rate and rhythm.  ABDOMEN:  Has healed scars, that is very warm and erythematous over the mid   abdomen, there is a large fluctuant fluid collection in the mid abdomen, there   does not appear to be any peritonitis.  EXTREMITIES:  Warm and acyanotic.  No edema.  NEUROLOGIC:  Appears nonfocal with normal power and strength throughout.    Mood, affect and judgment appear within normal limits.    LABORATORY STUDIES:  White blood count of 13.  The remainder of laboratory   studies reviewed as depicted.    IMAGING:  CT scan of the abdomen and pelvis is reviewed.  This demonstrates 2   rim enhancing abdominal wall fluid collections highly suspicious for abscess.    One that measures 10 cm in greatest dimension and the next measuring 8.7 cm   in greatest dimension.  Resolution of colonic inflammation and wall thickening   since it was seen on the past CT scan.    IMPRESSION:    1.  Abdominal pain with abdominal wall swelling, warmth and redness.   2.  Abnormal CT scan with abdominal wall fluid collections highly suspicious   for abscess.   3.  Leukocytosis.   4.  Chronic opioid tolerance.   5.  Anxiety.   6.  Tobacco abuse.  _____     PLAN:  I talked at length with the patient about the nature of her symptoms,   nature of the findings.  We discussed rationale, pros and cons, risks and   alternatives of incision and drainage of the abscesses, possible open wound   packing  or VAC treatment, possible drain placement, other potential long or   short term interventions, possible complications, nonhealing or prolonged   wound care, intraabdominal injury, infection, fistula.  Her questions were   answered in full.  She understands and wished to proceed ahead.       ____________________________________     MD SAUD NAVAS / LUKAS    DD:  03/05/2017 16:43:25  DT:  03/05/2017 19:50:07    D#:  [id number]  Job#:  [id number]

## 2017-03-06 NOTE — ASSESSMENT & PLAN NOTE
- previous abdominal wound cultures positive for MRSA (12/11/2012)    - CT abd/pelvis consistent with abdominal wall abscesses, one within the abdominal wall musculature 7.8 X 3.9 cm transversely, craniocaudal length 10.4 cm, another in the posterior abdominal wall measuring 7.8 cm X 1.8 cm craniocaudal 8.7 cm  - ERP consulted Dr Curran, will have incision and drainage today    PLAN  - NPO  - unasyn and vancomycin (given h/o MRSA infection)    - gram stain and culture wound, deescalate antibiotics per culture    - appreciate surgery recommendations

## 2017-03-06 NOTE — PROGRESS NOTES
"Pharmacy Kinetics 56 y.o. female on vancomycin day # 1 3/5/2017    Currently on Vancomycin 2500 mg iv loading dose    Indication for Treatment: SSTI    Pertinent history per medical record: Admitted on 3/5/2017 for abscess of abdominal wall. Pt with history of perforated diverticulitis with previous multiple operations for abdominal wall infection including necrotizing fasciitis and large incisional hernia repair presents to ED complaining of upper abdominal pain for 5 days. CT of abdomen tonight reveals two rim enhancing abdominal wall fluid collection that are highly suspicious for abscess. Plan for incision and drainage of abscess by Dr. Curran.    Other antibiotics: Ampicillin/sulbactam 3g IV Q6H    Allergies: Tape     List concerns for renal function: Obesity (BMI=36.9)    Pertinent cultures to date:   3/5/17 at 1658 Wound from abdominal wall abscess/mesh: Gram stain - Many WBCs, Moderate Gram positive cocci    Recent Labs      17   1129   WBC  13.2*   NEUTSPOLYS  77.80*     Recent Labs      17   1129   BUN  10   CREATININE  0.80   ALBUMIN  3.5     No results for input(s): VANCOTROUGH, VANCOPEAK, VANCORANDOM in the last 72 hours.  Intake/Output Summary (Last 24 hours) at 17 2227  Last data filed at 17 1930   Gross per 24 hour   Intake   1070 ml   Output      0 ml   Net   1070 ml      Blood pressure 101/80, pulse 60, temperature 36.5 °C (97.7 °F), resp. rate 14, height 1.651 m (5' 5\"), weight 100.699 kg (222 lb), last menstrual period 2007, SpO2 96 %, not currently breastfeeding. Temp (24hrs), Av.8 °C (98.2 °F), Min:36.5 °C (97.7 °F), Max:37.1 °C (98.8 °F)      A/P   1. Vancomycin dose change: Initiate pulse dosing  2. Next vancomycin level: 12 hour VR  3. Goal trough: 12-16 mcg/mL  4. Comments: Significant concern for accumulation due to obesity (BMI=36.9). Will check a 12 hour vancomycin random level to assess patient's ability to clear loading dose. Pt has history of " vancomycin use in December 2012; however, pt has since gained about 17kg. Clinical pharmacist to f/u in AM.    Roel LepeD

## 2017-03-06 NOTE — OR NURSING
Dr. Brito notified that pt's SBP remains low, DBP is in 90's. Pt asymptomatic. No additional orders given by MD.

## 2017-03-06 NOTE — WOUND TEAM
Wound team consult placed regarding wound care for patient following surgical intervention.  Wound team will need an order from the specific surgeon to initiate any desired wound care.  Will await order from surgeon.

## 2017-03-06 NOTE — PROGRESS NOTES
[facility] [place] Internal Medicine Admitting History and Physical    Name Thelma Abbott     1960   Age/Sex 56 y.o. female   MRN [id number]   Code Status FULL     After 5PM or if no immediate response to page, please call for cross-coverage  Attending/Team: Ivanna Call [phone] to page   1st Call - Day Intern (R1):   Audi 2nd Call - Day Sr. Resident (R2/R3):   Man       Chief Complaint:  Abdominal pain with abdominal mass    HPI:  Patient is a 56 year old female with a past medical history significant for complicated diverticulitis with abdominal wall fascial dehisence, necrotizing fascitis of abdominal wall, intra-abdominal abscess, and perforated colon. Her most recent hospitalization was in 2016 for colitis, presenting as bright watery diarrhea--she improved with cipro and flagyl, and her colonoscopy revealed likely ischemic colitis.     Since 2016, she reports that she has been asymptomatic and in good health. She reports that on Friday, she developed two tender palpable abdominal wall masses in her midline above her umbilicus and L lateral to her umbilicus. Initially dull, now exquisitely tender. She reports associated fevers since Friday. She also reports constipation over the last ten days, occasionally defecating small amounts of stool. She denies any post prandial pain, nausea, vomiting, diarrhea, hematemesis, hematochezia, or melena.      Review of Systems   Constitutional: Positive for fever and chills. Negative for weight loss, malaise/fatigue and diaphoresis.   HENT: Negative for congestion and sore throat.    Eyes: Negative for blurred vision and double vision.   Respiratory: Negative for cough, hemoptysis, sputum production, shortness of breath and wheezing.    Cardiovascular: Negative for chest pain, palpitations, orthopnea, leg swelling and PND.   Gastrointestinal: Positive for abdominal pain. Negative for heartburn, nausea, vomiting, diarrhea, constipation, blood in  stool and melena.   Genitourinary: Negative for dysuria, urgency, frequency, hematuria and flank pain.   Musculoskeletal: Negative for myalgias and joint pain.   Skin: Negative for itching and rash.   Endo/Heme/Allergies: Negative for environmental allergies and polydipsia. Does not bruise/bleed easily.   Psychiatric/Behavioral: The patient is nervous/anxious.              Past Medical History:   Past Medical History   Diagnosis Date   • Diverticulitis    • Diverticulosis    • Hypothyroid 6/24/2010   • Thyroid cancer (CMS-HCC) 1990's   • HTN (hypertension) 6/24/2010   • Psoriasis 6/24/2010   • GERD (gastroesophageal reflux disease)    • Palpitations    • Unspecified urinary incontinence    • Psychiatric disorder      depression/anxiety   • MRSA infection      MRSA   • Pain 02-03-05     abd, 5/10       Past Surgical History:  Past Surgical History   Procedure Laterality Date   • Wound dehiscence  12/11/2012     Performed by Magno Baez M.D. at SURGERY [facility]   • Abdominal abscess drainage  12/15/2012     Performed by Warner Figueroa D.O. at SURGERY [facility]   • Colostomy  12/15/2012     Performed by Warner Figueroa D.O. at SURGERY [facility]   • Parastomal hernia repair   5/22/2013     Performed by Warner Figueroa D.O. at SURGERY [facility]   • Colostomy closure  5/22/2013     Performed by Warner Figueroa D.O. at SURGERY [facility]   • Other abdominal surgery  c/section x 2   • Colon resection laparoscopic  12/5/2012     Performed by Magno Baez M.D. at SURGERY [facility]   • Exploratory laparotomy  12/15/2012     Performed by Warner Figueroa D.O. at SURGERY [facility]   • Primary c section     • Tubal coagulation laparoscopic bilateral     • Other  1990's     thyroidectomy   • Incision hernia repair  5/1/2014     Performed by Joaquin Larios M.D. at SURGERY [facility] DAY [facility]   • Wide excision  2/5/2015     Performed by Miki Samuel Jr., M.D. at  SURGERY SAME DAY [facility]S   • Flap closure  2/5/2015     Performed by Miki Samuel Jr., M.D. at SURGERY SAME DAY [facility]   • Colonoscopy  1/18/2016     Procedure: COLONOSCOPY;  Surgeon: Denilson Menendez M.D.;  Location: SURGERY [facility];  Service:        Complicated diverticulitis: 12/2012, anterior resection for recurrent diverticulitis of colon of the colon, complicated by facial dehisence, necrotizing fascitits of abdominal wall, intra-abdominal abscess, and perforated colon. 05/2013, colostomy closure. 05/2014, repair of large incisional hernia with mesh.         Current Outpatient Medications:  Home Medications     Reviewed by Beau Rod R.N. (Registered Nurse) on 03/05/17 at 1451  Med List Status: Complete    Medication Last Dose Status    bisacodyl EC (DULCOLAX) 5 MG Tablet Delayed Response 3/4/2017 Active    clonazepam (KLONOPIN) 1 MG TABS 3/4/2017 Active    docusate sodium (COLACE) 100 MG Cap 3/4/2017 Active    hydrocodone/acetaminophen (NORCO)  MG Tab 3/4/2017 Active    levothyroxine (SYNTHROID) 200 MCG TABS 3/4/2017 Active    oxyCODONE CR (OXYCONTIN) 20 MG T12A 3/4/2017 Active    paroxetine (PAXIL) 20 MG TABS 3/4/2017 Active    trazodone (DESYREL) 100 MG TABS 3/4/2017 Active                Medication Allergy/Sensitivities:  Allergies   Allergen Reactions   • Tape Rash         Family History:  Family History   Problem Relation Age of Onset   • Cancer Mother    • Stroke Father    • Hypertension Father        Social History:  Social History     Social History   • Marital Status:      Spouse Name: N/A   • Number of Children: N/A   • Years of Education: N/A     Occupational History   • Not on file.     Social History Main Topics   • Smoking status: Current Some Day Smoker -- 0.50 packs/day     Types: Cigarettes   • Smokeless tobacco: Never Used   • Alcohol Use: No   • Drug Use: No   • Sexual Activity: Not on file     Other Topics Concern   • Not on file     Social  "History Narrative    She lives 5 min from [facility].  She has 4 grandkids.  She likes to swim in [place].  Used to live there.  Grew up swimming with a pool in her back yard in [facility].   Wants to quit smoking.  Previously a book keeper, now retired on disability.             Living situation: Lives with 84 year old mother  PCP : TORIN      Physical Exam     Filed Vitals:    03/05/17 1401 03/05/17 1432 03/05/17 1501 03/05/17 1530   BP:       Pulse: 63 68  62   Temp:       Resp: 23 24 22 21   Height:       Weight:       SpO2:    97%     Body mass index is 36.94 kg/(m^2).  /62 mmHg  Pulse 62  Temp(Src) 37.1 °C (98.8 °F)  Resp 21  Ht 1.651 m (5' 5\")  Wt 100.699 kg (222 lb)  BMI 36.94 kg/m2  SpO2 97%  LMP 09/17/2007  O2 therapy: Pulse Oximetry: 97 %, O2 (LPM): 2, O2 Delivery: Nasal Cannula    Physical Exam   Constitutional: She is oriented to person, place, and time. She appears distressed.   HENT:   Head: Normocephalic and atraumatic.   Eyes: Conjunctivae and EOM are normal. Pupils are equal, round, and reactive to light. No scleral icterus.   Cardiovascular: Normal rate, regular rhythm, normal heart sounds and intact distal pulses.  Exam reveals no gallop and no friction rub.    No murmur heard.  Pulmonary/Chest: Effort normal and breath sounds normal. No respiratory distress. She has no wheezes. She has no rales.   Abdominal:       Large midline, ventral incisional scar  Normoactive BS  Non distended, tender in the abdominal wall above the umbilicus, tender in the lateral rectus left to the umbilicus. Two palpable fluctuating, indurated masses in the same same regions.   No rebound, guarding, or rigidity     Neurological: She is alert and oriented to person, place, and time.   Skin: Skin is warm and dry. No rash noted. She is not diaphoretic. No erythema. No pallor.             Data Review       Old Records Request:   Completed  Current Records review and summary: Completed    Lab Data " Review:  Recent Results (from the past 24 hour(s))   CBC WITH DIFFERENTIAL    Collection Time: 03/05/17 11:29 AM   Result Value Ref Range    WBC 13.2 (H) 4.8 - 10.8 K/uL    RBC 5.05 4.20 - 5.40 M/uL    Hemoglobin 14.3 12.0 - 16.0 g/dL    Hematocrit 44.6 37.0 - 47.0 %    MCV 88.3 81.4 - 97.8 fL    MCH 28.3 27.0 - 33.0 pg    MCHC 32.1 (L) 33.6 - 35.0 g/dL    RDW 47.8 35.9 - 50.0 fL    Platelet Count 307 164 - 446 K/uL    MPV 8.7 (L) 9.0 - 12.9 fL    Neutrophils-Polys 77.80 (H) 44.00 - 72.00 %    Lymphocytes 14.00 (L) 22.00 - 41.00 %    Monocytes 6.10 0.00 - 13.40 %    Eosinophils 1.10 0.00 - 6.90 %    Basophils 0.50 0.00 - 1.80 %    Immature Granulocytes 0.50 0.00 - 0.90 %    Nucleated RBC 0.00 /100 WBC    Neutrophils (Absolute) 10.24 (H) 2.00 - 7.15 K/uL    Lymphs (Absolute) 1.84 1.00 - 4.80 K/uL    Monos (Absolute) 0.80 0.00 - 0.85 K/uL    Eos (Absolute) 0.14 0.00 - 0.51 K/uL    Baso (Absolute) 0.07 0.00 - 0.12 K/uL    Immature Granulocytes (abs) 0.07 0.00 - 0.11 K/uL    NRBC (Absolute) 0.00 K/uL   COMP METABOLIC PANEL    Collection Time: 03/05/17 11:29 AM   Result Value Ref Range    Sodium 132 (L) 135 - 145 mmol/L    Potassium 3.8 3.6 - 5.5 mmol/L    Chloride 102 96 - 112 mmol/L    Co2 23 20 - 33 mmol/L    Anion Gap 7.0 0.0 - 11.9    Glucose 136 (H) 65 - 99 mg/dL    Bun 10 8 - 22 mg/dL    Creatinine 0.80 0.50 - 1.40 mg/dL    Calcium 9.5 8.5 - 10.5 mg/dL    AST(SGOT) 14 12 - 45 U/L    ALT(SGPT) 9 2 - 50 U/L    Alkaline Phosphatase 56 30 - 99 U/L    Total Bilirubin 0.6 0.1 - 1.5 mg/dL    Albumin 3.5 3.2 - 4.9 g/dL    Total Protein 7.5 6.0 - 8.2 g/dL    Globulin 4.0 (H) 1.9 - 3.5 g/dL    A-G Ratio 0.9 g/dL   LIPASE    Collection Time: 03/05/17 11:29 AM   Result Value Ref Range    Lipase 8 (L) 11 - 82 U/L   ESTIMATED GFR    Collection Time: 03/05/17 11:29 AM   Result Value Ref Range    GFR If African American >60 >60 mL/min/1.73 m 2    GFR If Non African American >60 >60 mL/min/1.73 m 2   POC UA    Collection Time:  03/05/17  1:29 PM   Result Value Ref Range    POC Color Shelby     POC Appearance Clear     POC Glucose Negative Negative mg/dL    POC Ketones Negative Negative mg/dL    POC Specific Gravity 1.010 1.005-1.030    POC Blood Negative Negative    POC Urine PH 6.0 5.0-8.0    POC Protein Negative Negative mg/dL    POC Nitrites Negative Negative    POC Leukocyte Esterase Negative Negative       Imaging/Procedures Review:    ndependant Imaging Review: Completed  CT-ABDOMEN-PELVIS WITH   Final Result      1.  Two rim enhancing abdominal wall fluid collection that are highly suspicious for abscess      2.  More cranial and superficial measures 10.4 x 7.8 x 3.9 cm      3.  More caudal and deeper measures 8.7 x 7.8 x 1.8 cm      4.  Resolution of colonic inflammation/wall thickening since most recent CT scan                  Assessment/Plan     ABSCESS OF THE ABDOMINAL WALL  Patient has a history of multiple abdominal surgeries and abdominal wall repairs s/p colonic resection in 12/2012, now presenting with two abdominal wall abscesses. At this moment, not quite sure of the source of her colonization as she does not have any signs of recurrent diverticulitis, colitis, or superficial entry wounds. Nevertheless, she will treated with unasyn and vancomycin (previous MRSA positive wound) and sent to the OR for incision and drainage.   Plan  -unasyn  -vancomycin  -general surgery consult  -maintain NPO    OPIOID DEPENDENCE  Currently on 10/325mg Norco PRN up to five times a day, 20mg oxycontin bid home dose for back pain. Likely contributing to her constipation. Morphine equivalent at 100 daily. Understandably, patient is in acute pain, and she may be tolerate to opioids with caution, with attention to RR and BPs   Plan  -continue home back pain regimen  -uptitrate acute pain opioids with caution    GERD  Reports PMH of heart burn with meals. Currently asymptomatic.  Plan  -continue home PPI    HYPOTHYROIDISM  Reports history of  postoperative hypothryoidism s/p thyroid cancer resection in 1990.   Plan  -continue home levothyroxine    ANXIETY  DEPRESSION  Currently not depressed but very anxious about current condition, given past diverticulitis surgical course.   Plan:  -continue home klonopin  -continue home paroxitine    Insomnia   Stable, no acute complaints, takes trazodone home dose  Plan  -Continue home trazodone 100mg home dose      Quality Measures  Labs reviewed, Medications reviewed and Radiology images reviewed  Murrell catheter: No Murrell        DVT prophylaxis - mechanical: SCDs

## 2017-03-07 ENCOUNTER — APPOINTMENT (OUTPATIENT)
Dept: RADIOLOGY | Facility: MEDICAL CENTER | Age: 57
DRG: 902 | End: 2017-03-07
Attending: INTERNAL MEDICINE
Payer: MEDICARE

## 2017-03-07 LAB
BACTERIA WND AEROBE CULT: ABNORMAL
BACTERIA WND AEROBE CULT: ABNORMAL
GRAM STN SPEC: ABNORMAL
SIGNIFICANT IND 70042: ABNORMAL
SITE SITE: ABNORMAL
SOURCE SOURCE: ABNORMAL
VANCOMYCIN TROUGH SERPL-MCNC: 8.3 UG/ML (ref 10–20)

## 2017-03-07 PROCEDURE — 700111 HCHG RX REV CODE 636 W/ 250 OVERRIDE (IP): Performed by: INTERNAL MEDICINE

## 2017-03-07 PROCEDURE — 700105 HCHG RX REV CODE 258: Performed by: INTERNAL MEDICINE

## 2017-03-07 PROCEDURE — 700111 HCHG RX REV CODE 636 W/ 250 OVERRIDE (IP): Performed by: HOSPITALIST

## 2017-03-07 PROCEDURE — 700102 HCHG RX REV CODE 250 W/ 637 OVERRIDE(OP): Performed by: HOSPITALIST

## 2017-03-07 PROCEDURE — 700105 HCHG RX REV CODE 258: Performed by: HOSPITALIST

## 2017-03-07 PROCEDURE — 36415 COLL VENOUS BLD VENIPUNCTURE: CPT

## 2017-03-07 PROCEDURE — A9270 NON-COVERED ITEM OR SERVICE: HCPCS | Performed by: SURGERY

## 2017-03-07 PROCEDURE — 99233 SBSQ HOSP IP/OBS HIGH 50: CPT | Mod: GC | Performed by: HOSPITALIST

## 2017-03-07 PROCEDURE — A9270 NON-COVERED ITEM OR SERVICE: HCPCS | Performed by: HOSPITALIST

## 2017-03-07 PROCEDURE — 700111 HCHG RX REV CODE 636 W/ 250 OVERRIDE (IP): Performed by: SURGERY

## 2017-03-07 PROCEDURE — 80202 ASSAY OF VANCOMYCIN: CPT

## 2017-03-07 PROCEDURE — 700102 HCHG RX REV CODE 250 W/ 637 OVERRIDE(OP): Performed by: SURGERY

## 2017-03-07 PROCEDURE — 770001 HCHG ROOM/CARE - MED/SURG/GYN PRIV*

## 2017-03-07 PROCEDURE — A9270 NON-COVERED ITEM OR SERVICE: HCPCS | Performed by: INTERNAL MEDICINE

## 2017-03-07 PROCEDURE — 700102 HCHG RX REV CODE 250 W/ 637 OVERRIDE(OP): Performed by: INTERNAL MEDICINE

## 2017-03-07 RX ORDER — FLUCONAZOLE 50 MG/1
50 TABLET ORAL ONCE
Status: COMPLETED | OUTPATIENT
Start: 2017-03-07 | End: 2017-03-07

## 2017-03-07 RX ORDER — MORPHINE SULFATE 4 MG/ML
2 INJECTION, SOLUTION INTRAMUSCULAR; INTRAVENOUS
Status: DISCONTINUED | OUTPATIENT
Start: 2017-03-07 | End: 2017-03-07

## 2017-03-07 RX ORDER — MORPHINE SULFATE 4 MG/ML
4 INJECTION, SOLUTION INTRAMUSCULAR; INTRAVENOUS
Status: DISCONTINUED | OUTPATIENT
Start: 2017-03-07 | End: 2017-03-12

## 2017-03-07 RX ORDER — TRAZODONE HYDROCHLORIDE 50 MG/1
200 TABLET ORAL
Status: DISCONTINUED | OUTPATIENT
Start: 2017-03-07 | End: 2017-03-14 | Stop reason: HOSPADM

## 2017-03-07 RX ADMIN — MORPHINE SULFATE 4 MG: 4 INJECTION INTRAVENOUS at 08:19

## 2017-03-07 RX ADMIN — MORPHINE SULFATE 4 MG: 4 INJECTION INTRAVENOUS at 01:49

## 2017-03-07 RX ADMIN — PIPERACILLIN AND TAZOBACTAM 3.38 G: 3; .375 INJECTION, POWDER, LYOPHILIZED, FOR SOLUTION INTRAVENOUS; PARENTERAL at 17:00

## 2017-03-07 RX ADMIN — PIPERACILLIN AND TAZOBACTAM 3.38 G: 3; .375 INJECTION, POWDER, LYOPHILIZED, FOR SOLUTION INTRAVENOUS; PARENTERAL at 11:25

## 2017-03-07 RX ADMIN — PAROXETINE HYDROCHLORIDE 20 MG: 20 TABLET, FILM COATED ORAL at 21:00

## 2017-03-07 RX ADMIN — FLUCONAZOLE 50 MG: 50 TABLET ORAL at 12:48

## 2017-03-07 RX ADMIN — METHYLNALTREXONE BROMIDE 12 MG: 12 INJECTION, SOLUTION SUBCUTANEOUS at 12:48

## 2017-03-07 RX ADMIN — OXYCODONE HYDROCHLORIDE 20 MG: 20 TABLET, FILM COATED, EXTENDED RELEASE ORAL at 08:19

## 2017-03-07 RX ADMIN — OXYCODONE HYDROCHLORIDE 20 MG: 20 TABLET, FILM COATED, EXTENDED RELEASE ORAL at 20:22

## 2017-03-07 RX ADMIN — NICOTINE 14 MG: 14 PATCH TRANSDERMAL at 05:28

## 2017-03-07 RX ADMIN — MORPHINE SULFATE 4 MG: 4 INJECTION INTRAVENOUS at 11:20

## 2017-03-07 RX ADMIN — HEPARIN SODIUM 5000 UNITS: 5000 INJECTION, SOLUTION INTRAVENOUS; SUBCUTANEOUS at 20:21

## 2017-03-07 RX ADMIN — SODIUM CHLORIDE: 9 INJECTION, SOLUTION INTRAVENOUS at 20:23

## 2017-03-07 RX ADMIN — VANCOMYCIN HYDROCHLORIDE 1700 MG: 10 INJECTION, POWDER, LYOPHILIZED, FOR SOLUTION INTRAVENOUS at 23:27

## 2017-03-07 RX ADMIN — LEVOTHYROXINE SODIUM 200 MCG: 50 TABLET ORAL at 08:19

## 2017-03-07 RX ADMIN — MORPHINE SULFATE 4 MG: 4 INJECTION INTRAVENOUS at 21:19

## 2017-03-07 RX ADMIN — HEPARIN SODIUM 5000 UNITS: 5000 INJECTION, SOLUTION INTRAVENOUS; SUBCUTANEOUS at 12:48

## 2017-03-07 RX ADMIN — PIPERACILLIN AND TAZOBACTAM 3.38 G: 3; .375 INJECTION, POWDER, LYOPHILIZED, FOR SOLUTION INTRAVENOUS; PARENTERAL at 05:28

## 2017-03-07 RX ADMIN — MORPHINE SULFATE 4 MG: 4 INJECTION INTRAVENOUS at 17:00

## 2017-03-07 RX ADMIN — MORPHINE SULFATE 4 MG: 4 INJECTION INTRAVENOUS at 19:08

## 2017-03-07 RX ADMIN — STANDARDIZED SENNA CONCENTRATE AND DOCUSATE SODIUM 2 TABLET: 8.6; 5 TABLET, FILM COATED ORAL at 20:22

## 2017-03-07 RX ADMIN — MORPHINE SULFATE 4 MG: 4 INJECTION INTRAVENOUS at 05:07

## 2017-03-07 RX ADMIN — STANDARDIZED SENNA CONCENTRATE AND DOCUSATE SODIUM 2 TABLET: 8.6; 5 TABLET, FILM COATED ORAL at 08:19

## 2017-03-07 RX ADMIN — MORPHINE SULFATE 4 MG: 4 INJECTION INTRAVENOUS at 23:27

## 2017-03-07 RX ADMIN — OXYCODONE HYDROCHLORIDE 10 MG: 10 TABLET ORAL at 18:25

## 2017-03-07 RX ADMIN — MAGNESIUM HYDROXIDE 30 ML: 400 SUSPENSION ORAL at 08:19

## 2017-03-07 RX ADMIN — OXYCODONE HYDROCHLORIDE 10 MG: 10 TABLET ORAL at 22:10

## 2017-03-07 RX ADMIN — TRAZODONE HYDROCHLORIDE 200 MG: 50 TABLET ORAL at 20:22

## 2017-03-07 RX ADMIN — CLONAZEPAM 2 MG: 1 TABLET ORAL at 20:22

## 2017-03-07 RX ADMIN — MORPHINE SULFATE 2 MG: 4 INJECTION INTRAVENOUS at 13:05

## 2017-03-07 RX ADMIN — SODIUM CHLORIDE: 9 INJECTION, SOLUTION INTRAVENOUS at 12:49

## 2017-03-07 RX ADMIN — PIPERACILLIN AND TAZOBACTAM 3.38 G: 3; .375 INJECTION, POWDER, LYOPHILIZED, FOR SOLUTION INTRAVENOUS; PARENTERAL at 22:07

## 2017-03-07 ASSESSMENT — ENCOUNTER SYMPTOMS
BACK PAIN: 0
TINGLING: 0
HEARTBURN: 0
BLOOD IN STOOL: 0
SPUTUM PRODUCTION: 0
FEVER: 0
PALPITATIONS: 0
VOMITING: 0
CONSTIPATION: 1
ABDOMINAL PAIN: 1
SENSORY CHANGE: 0
HEMOPTYSIS: 0
DIZZINESS: 0
PHOTOPHOBIA: 0
HEADACHES: 0
WEIGHT LOSS: 0
WHEEZING: 0
DIAPHORESIS: 0
SPEECH CHANGE: 0
BRUISES/BLEEDS EASILY: 0
CLAUDICATION: 0
CHILLS: 0
SHORTNESS OF BREATH: 0
FOCAL WEAKNESS: 0
BLURRED VISION: 0
DOUBLE VISION: 0
ORTHOPNEA: 0
MYALGIAS: 0
PND: 0
FLANK PAIN: 0
DEPRESSION: 0
NAUSEA: 0
NECK PAIN: 0
COUGH: 0
TREMORS: 0

## 2017-03-07 ASSESSMENT — PAIN SCALES - GENERAL
PAINLEVEL_OUTOF10: 8
PAINLEVEL_OUTOF10: 6
PAINLEVEL_OUTOF10: 9
PAINLEVEL_OUTOF10: 9
PAINLEVEL_OUTOF10: 10
PAINLEVEL_OUTOF10: 9
PAINLEVEL_OUTOF10: 6
PAINLEVEL_OUTOF10: 9
PAINLEVEL_OUTOF10: 8
PAINLEVEL_OUTOF10: 10
PAINLEVEL_OUTOF10: 6
PAINLEVEL_OUTOF10: 9
PAINLEVEL_OUTOF10: 8
PAINLEVEL_OUTOF10: 10

## 2017-03-07 ASSESSMENT — LIFESTYLE VARIABLES: SUBSTANCE_ABUSE: 0

## 2017-03-07 NOTE — PROGRESS NOTES
"Surgery    Pain control improved w changes    Cx MRSA    BP 86/43 mmHg  Pulse 60  Temp(Src) 36.5 °C (97.7 °F)  Resp 17  Ht 1.651 m (5' 5\")  Wt 104.1 kg (229 lb 8 oz)  BMI 38.19 kg/m2  SpO2 95%  LMP 09/17/2007  Breastfeeding? No    Wound clean, packing changed    S/P drainage abd wall abscess, removal of mesh remnant and closure w xenograft    MRSA: pt will likely need at least 2 weeks of abx  ? If there is any retained mesh: discussed w Donna Curran and Jimmie  Wound care consult for VAC dressings  Pt education RE VAC dressings  "

## 2017-03-07 NOTE — CARE PLAN
Problem: Safety  Goal: Will remain free from injury  Outcome: PROGRESSING AS EXPECTED  Pt ambulated self with steady gait.     Problem: Pain Management  Goal: Pain level will decrease to patient’s comfort goal  Outcome: PROGRESSING SLOWER THAN EXPECTED  Pt states pain is not being controlled. Dr. Mccord.

## 2017-03-07 NOTE — PROGRESS NOTES
Pt A&O x 4  Complains of unresolved pain. Medicated per MAR  Denies nausea and vomiting  Educated pt on plan for the night and medication for pain regimen  VSS.   Abdominal dressing in place. CDI  POC discussed  No further needs at this time.

## 2017-03-07 NOTE — PROGRESS NOTES
Pt reports MD Ghotra stated she would change pt diet, however no diet order has been placed. Page has been sent to [facility] regarding this matter.

## 2017-03-07 NOTE — PROGRESS NOTES
"Pharmacy Kinetics 56 y.o. female on vancomycin day # 3  3/7/2017    Currently on Vancomycin     3/5/17 @ 22:57: 2500  mg iv qx1 LD                          3/6/17 @ 11:18: Vr = 23.3 mcg/mL   Followed by: 1700 mg iv q24h (2300)    Indication for Treatment: abd wall abscess - s/p I&D    Pertinent history per medical record:    Admitted on 3/5/2017 for an abdominal wall abscess                          Pt has a history of necrotizing fasciitis                I&D conducted on 3/5    MRSA grew in cultures - per surgery, likely x2 weeks of abx therapy     Other antibiotics: piperacillin/tazobactam 3.375g iv q6h    Allergies: Tape     List concerns for renal function:    3/5 receipt of contrast              Episodic hypotension               BMI ~ 37      Pertinent cultures to date:    3/5/17 abdominal wall culture: MRSA     Recent Labs      17   1129  17   0349   WBC  13.2*  12.2*   NEUTSPOLYS  77.80*  89.10*     Recent Labs      17   1129  17   0349   BUN  10  9   CREATININE  0.80  0.77   ALBUMIN  3.5  3.1*     Recent Labs      17   1118   VANCORANDOM  23.3     Intake/Output Summary (Last 24 hours) at 17 1007  Last data filed at 17 0500   Gross per 24 hour   Intake   1340 ml   Output      1 ml   Net   1339 ml      Blood pressure 86/43, pulse 60, temperature 36.5 °C (97.7 °F), resp. rate 17, height 1.651 m (5' 5\"), weight 104.1 kg (229 lb 8 oz), last menstrual period 2007, SpO2 95 %, not currently breastfeeding. Temp (24hrs), Av.4 °C (97.5 °F), Min:36.3 °C (97.3 °F), Max:36.5 °C (97.7 °F)      A/P   1. Vancomycin dose change: not indicated  2. Next vancomycin level: 3/7/17 @ 2230  3. Goal trough: 12-16 mcg/mL  4. Comments: several concerns exist regarding the risk of accumulation/renal insult  a. Random level was supratherapeutic at 12 hours; thus, q24h dosing was established  b. Trough level tonight to determine if a dose increase is indicated  c. Reviewed pt's dosing " regimen from 2012; suspect that will yield a supratherapeutic level    Nirmala Hdez, PharmD, BCPS

## 2017-03-07 NOTE — PROGRESS NOTES
Received report from off going RN.  Assumed patient care and introduced self to patient. Patient AOx4. Pt had low BP at 86/44 this AM, however pt alert, awake, denies dizziness/lighthededness. Update given to [facility] MD Ghotra at the bedside. MD Ghotra request update if manual BP is still low. Pt complaints of chronic pain/discomfort, and updated MD Ghotra at this time. Bed in lowest position, bed locked, treaded socks in place, call light within reach. No bed alarm applicable since pt ambulates independently; pt still encouraged to call/page for assistance when getting out of bed due to hospital setting and IV fluids infusing. Will continue to monitor.

## 2017-03-07 NOTE — PROGRESS NOTES
[facility] Internal Medicine Interval Note    Name Thelma Abbott     1960   Age/Sex 56 y.o. female   MRN [id number]   Code Status FULL     After 5PM or if no immediate response to page, please call for cross-coverage  Attending/Team: Aimee Call [phone] to page   1st Call - Day Intern (R1):   Audi 2nd Call - Day Sr. Resident (R2/R3):   Brandin         Chief complaint/ reason for interval visit (Primary Diagnosis)   Abscess of abdominal wall    Interval Problem Daily Status Update    Active Hospital Problems    Diagnosis   • Depression [F32.9]   • Tobacco abuse [Z72.0]   • Low back pain [M54.5]   • Abscess of abdominal wall [L02.211]   • S/P thyroidectomy [E89.0]   • Psoriasis [L40.9]     POD#2 s/p Incision and drainage of abdominal wall abscess.   -Tolerating PO intake with pain much better controlled(increased frequency of  IV Morphine 4 mg q3prn.)  - Packing in place, blood soaked with absorbent pad.   -No Bowel movement, but + flatus after fleet enema, gave a trial of Methylnaltrexone IV once.   -Dr. Figueroa recommends antibiotics, wound care. Waiting of mesh repair recs from plastics-gen surg joint discussions  -Wound Cx speciation back: Positive for MRSA.  -Ordered PICC line consult, pending.    Review of Systems   Constitutional: Negative for fever, chills, weight loss, malaise/fatigue and diaphoresis.   HENT: Negative for hearing loss.    Eyes: Negative for blurred vision, double vision and photophobia.   Respiratory: Negative for cough, hemoptysis, sputum production, shortness of breath and wheezing.    Cardiovascular: Negative for chest pain, palpitations, orthopnea, claudication, leg swelling and PND.   Gastrointestinal: Positive for abdominal pain and constipation. Negative for heartburn, nausea, vomiting, blood in stool and melena.   Genitourinary: Negative for dysuria, urgency, frequency, hematuria and flank pain.   Musculoskeletal: Negative for  myalgias, back pain, joint pain and neck pain.   Skin: Negative for rash.   Neurological: Negative for dizziness, tingling, tremors, sensory change, speech change, focal weakness and headaches.   Endo/Heme/Allergies: Does not bruise/bleed easily.   Psychiatric/Behavioral: Negative for depression, suicidal ideas and substance abuse.     Consultants:  Bina Figueroa/General Surgery    Disposition  Inpatient    Quality Measures  Labs reviewed, Medications reviewed and Radiology images reviewed  Murrell catheter: No Murrell      DVT Prophylaxis: Enoxaparin (Lovenox)            Labs reviewed, Medications reviewed and Radiology images reviewed  Murrell catheter: No Murrell  DVT Prophylaxis: Heparin          Physical Exam       Filed Vitals:    03/07/17 0415 03/07/17 0709 03/07/17 0800 03/07/17 1046   BP: 91/59 86/43 98/70 87/49   Pulse: 61 60  60   Temp: 36.3 °C (97.3 °F) 36.5 °C (97.7 °F)  36.3 °C (97.4 °F)   Resp: 18 17  17   Height:       Weight:       SpO2: 95% 95%  98%     Body mass index is 38.19 kg/(m^2).    Oxygen Therapy:  Pulse Oximetry: 98 %, O2 (LPM): 2, O2 Delivery: Nasal Cannula    Physical Exam   Constitutional: She is oriented to person, place, and time. No distress.   Neck: No JVD present.   Cardiovascular: Normal rate, regular rhythm, normal heart sounds and intact distal pulses.  Exam reveals no gallop and no friction rub.    No murmur heard.  Pulmonary/Chest: Effort normal and breath sounds normal. No respiratory distress. She has no wheezes. She has no rales. She exhibits no tenderness.   Abdominal: She exhibits no distension and no mass. There is tenderness. There is no rebound and no guarding.   -Hypoactive BS  -Non distended, tender at the midline incision site.  -No peritoneal signs of rebound/guarding/rigidity.        Musculoskeletal: Normal range of motion. She exhibits no edema or tenderness.   Neurological: She is alert and oriented to person, place, and time. She displays normal reflexes. No cranial  nerve deficit. She exhibits normal muscle tone. Coordination normal.         3/7/2017  3:30 PM    Recent Labs      03/05/17   1129  03/06/17   0349   SODIUM  132*  138   POTASSIUM  3.8  3.7   CHLORIDE  102  105   CO2  23  25   BUN  10  9   CREATININE  0.80  0.77   CALCIUM  9.5  8.8       Recent Labs      03/05/17   1129  03/06/17   0349   ALTSGPT  9  9   ASTSGOT  14  15   ALKPHOSPHAT  56  47   TBILIRUBIN  0.6  0.3   LIPASE  8*   --    GLUCOSE  136*  169*       Recent Labs      03/05/17   1129  03/06/17   0349   RBC  5.05  4.41   HEMOGLOBIN  14.3  12.3   HEMATOCRIT  44.6  39.3   PLATELETCT  307  287       Recent Labs      03/05/17 1129 03/06/17   0349   WBC  13.2*  12.2*   NEUTSPOLYS  77.80*  89.10*   LYMPHOCYTES  14.00*  7.20*   MONOCYTES  6.10  2.60   EOSINOPHILS  1.10  0.00   BASOPHILS  0.50  0.50   ASTSGOT  14  15   ALTSGPT  9  9   ALKPHOSPHAT  56  47   TBILIRUBIN  0.6  0.3       Assessment/Plan   1)ABSCESS OF THE ABDOMINAL WALL  -History of complicated diverticulitis with colonic resection and necrotizing abdominal wall repairs in 12/2012. Mesh repair in 05/2014 by plastics. Now s/p incision and drainage of large abdominal wall abscesses.   -Unincorporated aspects of mesh removed by Dr. Curran. Per Dr. Figueroa, the general surgery team will contact plastics to discuss further surgical management of infected abdominal wall  -Wound cx: MRSA. Blood cx pending. Waiting on PICC line placement. Will empirically manage with vancomycin and zosyn (high risk of polymicrobial).   Plan  -Antibiotic management with vanco and zosyn   -Bcx no growth yet.  -PICC line placement  -Surgical revision, per general surgery/plastics  -Wound care, per wound care team      2)OPIOID DEPENDENCE  OPIOID INDUCED CONSTIPATION  -High tolerance to opioids. LBP home dosing, 100 morphine equivalents (10/325mg Norco PRN up to five times a day, 20mg oxycontin bid home dose for back pain).    -Added 4mg IV morphine PRN q 3 hrs. BPs 90-100s  systolic--stable since admission.   -Pt reports some pain relief. Reiterated previous risks of hypotension/bradynpea with increased opioids. Pt remains adamant of opioid dose need.   -Fleet enema working marginally (passing gas); starting relistor.     Plan:  -continue home regimen of oxycontin and norco  -add IV morphine 4mg q3hr PRN  -start fleet enema for constipation    3)GERD  Reports PMH of heart burn with meals. Currently asymptomatic.  Plan  -continue home PPI    4)HYPOTHYROIDISM  Reports history of postoperative hypothryoidism s/p thyroid cancer resection in 1990.    Plan  -continue home levothyroxine    5)ANXIETY  DEPRESSION  Currently not depressed but very anxious about current condition, given past diverticulitis surgical course.    Plan:  -continue home klonopin  -continue home paroxitine    6)Insomnia   Stable, no acute complaints, takes trazodone home dose  Plan  -Continue home trazodone 100mg home dose

## 2017-03-07 NOTE — PROGRESS NOTES
Received return call from MD Huntley regarding pt diet. MD Huntley gives this RN verbal order for Regular Diet; will place accordingly.

## 2017-03-08 ENCOUNTER — APPOINTMENT (OUTPATIENT)
Dept: RADIOLOGY | Facility: MEDICAL CENTER | Age: 57
DRG: 902 | End: 2017-03-08
Attending: INTERNAL MEDICINE
Payer: MEDICARE

## 2017-03-08 ENCOUNTER — APPOINTMENT (OUTPATIENT)
Dept: RADIOLOGY | Facility: MEDICAL CENTER | Age: 57
DRG: 902 | End: 2017-03-08
Attending: SURGERY
Payer: MEDICARE

## 2017-03-08 PROCEDURE — 700111 HCHG RX REV CODE 636 W/ 250 OVERRIDE (IP): Performed by: INTERNAL MEDICINE

## 2017-03-08 PROCEDURE — 700101 HCHG RX REV CODE 250: Performed by: SURGERY

## 2017-03-08 PROCEDURE — A9270 NON-COVERED ITEM OR SERVICE: HCPCS | Performed by: SURGERY

## 2017-03-08 PROCEDURE — 770001 HCHG ROOM/CARE - MED/SURG/GYN PRIV*

## 2017-03-08 PROCEDURE — G8990 OTHER PT/OT CURRENT STATUS: HCPCS | Mod: CH

## 2017-03-08 PROCEDURE — 700111 HCHG RX REV CODE 636 W/ 250 OVERRIDE (IP)

## 2017-03-08 PROCEDURE — 700102 HCHG RX REV CODE 250 W/ 637 OVERRIDE(OP): Performed by: SURGERY

## 2017-03-08 PROCEDURE — 700105 HCHG RX REV CODE 258: Performed by: INTERNAL MEDICINE

## 2017-03-08 PROCEDURE — A9270 NON-COVERED ITEM OR SERVICE: HCPCS | Performed by: INTERNAL MEDICINE

## 2017-03-08 PROCEDURE — 700102 HCHG RX REV CODE 250 W/ 637 OVERRIDE(OP): Performed by: HOSPITALIST

## 2017-03-08 PROCEDURE — 306263 VAC CANNISTER W/GEL 500ML: Performed by: SURGERY

## 2017-03-08 PROCEDURE — 700105 HCHG RX REV CODE 258: Performed by: HOSPITALIST

## 2017-03-08 PROCEDURE — G8991 OTHER PT/OT GOAL STATUS: HCPCS | Mod: CH

## 2017-03-08 PROCEDURE — 700105 HCHG RX REV CODE 258

## 2017-03-08 PROCEDURE — 700102 HCHG RX REV CODE 250 W/ 637 OVERRIDE(OP): Performed by: INTERNAL MEDICINE

## 2017-03-08 PROCEDURE — 99233 SBSQ HOSP IP/OBS HIGH 50: CPT | Mod: GC | Performed by: HOSPITALIST

## 2017-03-08 PROCEDURE — 36569 INSJ PICC 5 YR+ W/O IMAGING: CPT

## 2017-03-08 PROCEDURE — 700111 HCHG RX REV CODE 636 W/ 250 OVERRIDE (IP): Performed by: SURGERY

## 2017-03-08 PROCEDURE — A9270 NON-COVERED ITEM OR SERVICE: HCPCS | Performed by: HOSPITALIST

## 2017-03-08 PROCEDURE — 76937 US GUIDE VASCULAR ACCESS: CPT

## 2017-03-08 PROCEDURE — 97161 PT EVAL LOW COMPLEX 20 MIN: CPT

## 2017-03-08 PROCEDURE — 97606 NEG PRS WND THER DME>50 SQCM: CPT

## 2017-03-08 RX ORDER — DEXTROSE MONOHYDRATE, SODIUM CHLORIDE, AND POTASSIUM CHLORIDE 50; 1.49; 4.5 G/1000ML; G/1000ML; G/1000ML
INJECTION, SOLUTION INTRAVENOUS CONTINUOUS
Status: DISCONTINUED | OUTPATIENT
Start: 2017-03-08 | End: 2017-03-11

## 2017-03-08 RX ORDER — POLYETHYLENE GLYCOL 3350 17 G/17G
1 POWDER, FOR SOLUTION ORAL DAILY
Status: DISCONTINUED | OUTPATIENT
Start: 2017-03-08 | End: 2017-03-14 | Stop reason: HOSPADM

## 2017-03-08 RX ADMIN — STANDARDIZED SENNA CONCENTRATE AND DOCUSATE SODIUM 2 TABLET: 8.6; 5 TABLET, FILM COATED ORAL at 20:07

## 2017-03-08 RX ADMIN — MORPHINE SULFATE 4 MG: 4 INJECTION INTRAVENOUS at 12:25

## 2017-03-08 RX ADMIN — MORPHINE SULFATE 4 MG: 4 INJECTION INTRAVENOUS at 01:28

## 2017-03-08 RX ADMIN — OXYCODONE HYDROCHLORIDE 10 MG: 10 TABLET ORAL at 01:28

## 2017-03-08 RX ADMIN — STANDARDIZED SENNA CONCENTRATE AND DOCUSATE SODIUM 2 TABLET: 8.6; 5 TABLET, FILM COATED ORAL at 07:42

## 2017-03-08 RX ADMIN — MORPHINE SULFATE 4 MG: 4 INJECTION INTRAVENOUS at 15:05

## 2017-03-08 RX ADMIN — TRAZODONE HYDROCHLORIDE 200 MG: 50 TABLET ORAL at 20:07

## 2017-03-08 RX ADMIN — OXYCODONE HYDROCHLORIDE 20 MG: 20 TABLET, FILM COATED, EXTENDED RELEASE ORAL at 07:42

## 2017-03-08 RX ADMIN — MORPHINE SULFATE 4 MG: 4 INJECTION INTRAVENOUS at 05:35

## 2017-03-08 RX ADMIN — CLONAZEPAM 2 MG: 1 TABLET ORAL at 20:07

## 2017-03-08 RX ADMIN — PIPERACILLIN AND TAZOBACTAM 3.38 G: 3; .375 INJECTION, POWDER, LYOPHILIZED, FOR SOLUTION INTRAVENOUS; PARENTERAL at 10:18

## 2017-03-08 RX ADMIN — MORPHINE SULFATE 4 MG: 4 INJECTION INTRAVENOUS at 03:38

## 2017-03-08 RX ADMIN — MORPHINE SULFATE 4 MG: 4 INJECTION INTRAVENOUS at 07:41

## 2017-03-08 RX ADMIN — NICOTINE 14 MG: 14 PATCH TRANSDERMAL at 05:36

## 2017-03-08 RX ADMIN — OXYCODONE HYDROCHLORIDE 10 MG: 10 TABLET ORAL at 05:28

## 2017-03-08 RX ADMIN — SODIUM CHLORIDE: 9 INJECTION, SOLUTION INTRAVENOUS at 08:09

## 2017-03-08 RX ADMIN — OXYCODONE HYDROCHLORIDE 20 MG: 20 TABLET, FILM COATED, EXTENDED RELEASE ORAL at 20:07

## 2017-03-08 RX ADMIN — ENOXAPARIN SODIUM 40 MG: 40 INJECTION SUBCUTANEOUS at 10:25

## 2017-03-08 RX ADMIN — MORPHINE SULFATE 4 MG: 4 INJECTION INTRAVENOUS at 17:22

## 2017-03-08 RX ADMIN — PAROXETINE HYDROCHLORIDE 20 MG: 20 TABLET, FILM COATED ORAL at 20:07

## 2017-03-08 RX ADMIN — POTASSIUM CHLORIDE, DEXTROSE MONOHYDRATE AND SODIUM CHLORIDE: 150; 5; 450 INJECTION, SOLUTION INTRAVENOUS at 12:24

## 2017-03-08 RX ADMIN — MORPHINE SULFATE 4 MG: 4 INJECTION INTRAVENOUS at 10:25

## 2017-03-08 RX ADMIN — MORPHINE SULFATE 4 MG: 4 INJECTION INTRAVENOUS at 23:01

## 2017-03-08 RX ADMIN — POLYETHYLENE GLYCOL 3350 1 PACKET: 17 POWDER, FOR SOLUTION ORAL at 15:04

## 2017-03-08 RX ADMIN — HEPARIN SODIUM 5000 UNITS: 5000 INJECTION, SOLUTION INTRAVENOUS; SUBCUTANEOUS at 06:00

## 2017-03-08 RX ADMIN — PIPERACILLIN AND TAZOBACTAM 3.38 G: 3; .375 INJECTION, POWDER, LYOPHILIZED, FOR SOLUTION INTRAVENOUS; PARENTERAL at 05:35

## 2017-03-08 RX ADMIN — POTASSIUM CHLORIDE, DEXTROSE MONOHYDRATE AND SODIUM CHLORIDE: 150; 5; 450 INJECTION, SOLUTION INTRAVENOUS at 23:01

## 2017-03-08 RX ADMIN — LEVOTHYROXINE SODIUM 200 MCG: 50 TABLET ORAL at 07:41

## 2017-03-08 RX ADMIN — VANCOMYCIN HYDROCHLORIDE 1700 MG: 10 INJECTION, POWDER, LYOPHILIZED, FOR SOLUTION INTRAVENOUS at 17:22

## 2017-03-08 RX ADMIN — MORPHINE SULFATE 4 MG: 4 INJECTION INTRAVENOUS at 20:06

## 2017-03-08 ASSESSMENT — ENCOUNTER SYMPTOMS
SENSORY CHANGE: 0
PHOTOPHOBIA: 0
DIAPHORESIS: 0
SHORTNESS OF BREATH: 0
PND: 0
DIZZINESS: 0
WEIGHT LOSS: 0
CONSTIPATION: 1
VOMITING: 0
HEADACHES: 0
CLAUDICATION: 0
ABDOMINAL PAIN: 1
BRUISES/BLEEDS EASILY: 0
NAUSEA: 0
HEMOPTYSIS: 0
MYALGIAS: 0
CHILLS: 0
HEARTBURN: 0
FLANK PAIN: 0
BLOOD IN STOOL: 0
PALPITATIONS: 0
WHEEZING: 0
NECK PAIN: 0
FOCAL WEAKNESS: 0
DEPRESSION: 0
SPEECH CHANGE: 0
BACK PAIN: 0
TREMORS: 0
SPUTUM PRODUCTION: 0
DOUBLE VISION: 0
ORTHOPNEA: 0
TINGLING: 0
COUGH: 0
FEVER: 0
BLURRED VISION: 0

## 2017-03-08 ASSESSMENT — PAIN SCALES - GENERAL
PAINLEVEL_OUTOF10: 8
PAINLEVEL_OUTOF10: 9
PAINLEVEL_OUTOF10: 8
PAINLEVEL_OUTOF10: 7
PAINLEVEL_OUTOF10: 8
PAINLEVEL_OUTOF10: 9
PAINLEVEL_OUTOF10: 10

## 2017-03-08 ASSESSMENT — LIFESTYLE VARIABLES: SUBSTANCE_ABUSE: 0

## 2017-03-08 NOTE — CARE PLAN
Problem: Infection  Goal: Will remain free from infection  Outcome: PROGRESSING SLOWER THAN EXPECTED  MRSA infection confirmed, vancomycin in use as well as zosyn.  Reminded patient of the signs and symptoms of infection and encouraged patient to report any of these findings in order to promote best outcomes.    Problem: Pain Management  Goal: Pain level will decrease to patient’s comfort goal  Outcome: PROGRESSING SLOWER THAN EXPECTED  Assessed pain and medicated per MAR.  Reminded patient to report any changes in severity or quality of pain in order to best manage pain.

## 2017-03-08 NOTE — PROGRESS NOTES
"Pharmacy Kinetics 56 y.o. female on vancomycin day # 4 3/8/2017    Currently on Vancomycin:               3/5/17 @ 22:57: 2500  mg IV x 1 LD                          3/6/17 @ 11:18: VR = 23.3 mcg/mL              Followed by: 1700 mg IV q24h (2300)    Indication for Treatment: abd wall abscess - s/p I&D    Pertinent history per medical record: Admitted on 3/5/2017 for an abdominal wall abscess. Pt has a history of necrotizing fasciitis. I&D conducted on 3/5. MRSA grew in cultures - per surgery, likely x 2 weeks of abx therapy .    Other antibiotics: Piperacillin/tazobactam 3.375 g IV Q6h    Allergies: Tape     List concerns for renal function: BMI ~38 kg/m2, CT w/contrast 3/5, albumin 3.1, episodic hypotension    Pertinent cultures to date:   3/5/17 - wound (abdominal wall): MRSA (Vanc KATARZYNA 1)  3/5/17 - blood (peripheral) x 2: NGTD    Recent Labs      17   0349   WBC  12.2*   NEUTSPOLYS  89.10*     Recent Labs      17   0349   BUN  9   CREATININE  0.77   ALBUMIN  3.1*     Recent Labs      17   1118  17   2218   VANCOTROUGH   --   8.3*   VANCORANDOM  23.3   --      Intake/Output Summary (Last 24 hours) at 17 1352  Last data filed at 17 0900   Gross per 24 hour   Intake   2772 ml   Output      0 ml   Net   2772 ml      Blood pressure 100/42, pulse 57, temperature 36.2 °C (97.2 °F), resp. rate 18, height 1.651 m (5' 5\"), weight 104.1 kg (229 lb 8 oz), last menstrual period 2007, SpO2 91 %, not currently breastfeeding. Temp (24hrs), Av.4 °C (97.5 °F), Min:36.1 °C (97 °F), Max:36.7 °C (98 °F)    A/P   1. Vancomycin dose change: Start vancomycin 1700 mg (~16 mg/kg) IV Q24h (1700)  2. Next vancomycin level: ~2-3 days  3. Goal trough: 12-16 mcg/mL  4. A/P: Patient afebrile overnight, BP labile (80s-130s/40s-90s), otherwise VSS. Renal indices stable; concerns for accumulation/clearance noted above. Vanco level prior to the 3rd total dose resulted subtherapeutic at 8.3 mcg/mL. " Patient likely to accumulate some once steady state achieved. Will continue vancomycin ~16 mg/kg IV Q24h as outlined above, however schedule next dose for ~18 hours following previous dose. Per surgery note, plan for removal of mesh remnant and biologic mesh reconstruction for 3/9; ID consult requested for abx management. Pharmacy to monitor and adjust as needed.     Angie Esquivel, SherleyD

## 2017-03-08 NOTE — CARE PLAN
Problem: Safety  Goal: Will remain free from falls  Outcome: PROGRESSING AS EXPECTED  Patient is resting in bed with call light in reach.      Problem: Pain Management  Goal: Pain level will decrease to patient’s comfort goal  Outcome: PROGRESSING AS EXPECTED  Patient advised to request prn pain medication as needed for pain management.

## 2017-03-08 NOTE — PROGRESS NOTES
Patient is alert and oriented to person, place, time, and situation.  Ambulates independently.  Tolerating diet.  Bed in lowest position, non slip socks on, and call light within reach.

## 2017-03-08 NOTE — PROGRESS NOTES
PICC Insertion Procedure Note    Consents confirmed, vessel patency confirmed with ultrasound. Risks and benefits of procedure explained to patient/family and education regarding central line associated bloodstream infections provided. Questions answered.     PICC placed in RUE per MD order with ultrasound guidance. 4 Fr, SINGLE lumen PICC placed in BASILIC vein after 1 attempt(s). 4 cc's of 1% lidocaine injected intradermally, 21 gauge microintroducer needle and modified Seldinger technique used. 41 cm total catheter length inserted with good blood return. Each lumen flushed without resistance with 10 mL 0.9% normal saline. PICC line secured with Biopatch and Tegaderm.    Pt tolerated procedure WELL.  Patient condition relayed to unit RN or ordering physician via this post procedure note in the EMR.     BARD Power PICC LOT [id number] REF [id number]

## 2017-03-08 NOTE — PROGRESS NOTES
Received bedside report and assumed care of patient.  Assessment complete; discussed POC with patient.  AO x4, patient calls for assistance.  Patient appears anxious but exhibits no signs of distress.  Patient reports pain of 8/10, medicated per MAR PRN with oxycodone IR and IV morphine, and with scheduled oxycodone.  Abdominal dressing in place, moderate saturation to dressing, will change today.  Pt inquired regarding PICC placement today, confirmed order with [facility] MD.  Awaiting assessment from MD for possible need for wound vac to abdominal MLI.  Patient tolerating current diet.  BS normoactive x 4, LBM this morning per pt, patient voids ambulating to bathroom PRN.  Patient is self ambulating with no assist, gait steady.  Call light within reach, bed in lowest position, SCDs refused, bed alarm refused.

## 2017-03-08 NOTE — PROGRESS NOTES
Surgery    Case discussed with Plastics and Gen Surg.  Plan for removal of mesh remnant and biologic mesh reconstruction  On schedule for 3/9 at 1200  Discussed w patient: she is willing to proceed  Consent requested  NPO at midnight    ID consult requested for abx mgmt.    Will take pt onto surgical service post op

## 2017-03-08 NOTE — PROGRESS NOTES
[facility] [place] [facility] [place]    Name Thelma Abbott     1960   Age/Sex 56 y.o. female   MRN [id number]   Code Status FULL     After 5PM or if no immediate response to page, please call for cross-coverage  Attending/Team: Aimee Call [phone] to page   1st Call - Day Intern (R1):   Audi 2nd Call - Day Sr. Resident (R2/R3):   Brandin         Chief complaint/ reason for interval visit (Primary Diagnosis)   Abscess of abdominal wall    Interval Problem Daily Status Update    Active Hospital Problems    Diagnosis   • Depression [F32.9]   • Tobacco abuse [Z72.0]   • Low back pain [M54.5]   • Abscess of abdominal wall [L02.211]   • S/P thyroidectomy [E89.0]   • Psoriasis [L40.9]     POD#3 s/p Incision and drainage of abdominal wall abscess.   -Admitted on 3/5/2017 for an abdominal wall abscess with complicated history of necrotizing fasciitis and diverticulitis s/p colonic resection and hernia repairs.  I&D conducted on 3/5. MRSA grew in cultures .  - Packing in place, blood soaked with absorbent pad.   -Small Bowel movement s/p Methylnaltrexone, + flatus. Added Miralax today. Spoke to pharmacy and advised to wait for atleast 24 hours before giving another shot.   -Plan for removal of mesh remnant and biological mesh reconstruction tomorrow. NPO after midnight.  -Wound team consult placed regarding VAC to abdomen  -Wound Cx speciation back: Positive for MRSA>> discontinued Zosyn today.   -Awaiting PICC line for IV antibiotics.          Review of Systems   Constitutional: Negative for fever, chills, weight loss, malaise/fatigue and diaphoresis.   HENT: Negative for hearing loss.    Eyes: Negative for blurred vision, double vision and photophobia.   Respiratory: Negative for cough, hemoptysis, sputum production, shortness of breath and wheezing.    Cardiovascular: Negative for chest pain, palpitations, orthopnea, claudication, leg swelling and PND.    Gastrointestinal: Positive for abdominal pain and constipation. Negative for heartburn, nausea, vomiting, blood in stool and melena.   Genitourinary: Negative for dysuria, urgency, frequency, hematuria and flank pain.   Musculoskeletal: Negative for myalgias, back pain, joint pain and neck pain.   Skin: Negative for rash.   Neurological: Negative for dizziness, tingling, tremors, sensory change, speech change, focal weakness and headaches.   Endo/Heme/Allergies: Does not bruise/bleed easily.   Psychiatric/Behavioral: Negative for depression, suicidal ideas and substance abuse.     Consultants:  Bina Figueroa/General Surgery    Disposition  Inpatient for IV antibiotics for abdominal wall abscess. Will go for removal of mesh tomorrow.    Quality Measures  Labs reviewed, Medications reviewed and Radiology images reviewed  Murrell catheter: No Murrell      DVT Prophylaxis: Enoxaparin (Lovenox)            Labs reviewed, Medications reviewed and Radiology images reviewed  Murrell catheter: No Murrell  DVT Prophylaxis: Heparin          Physical Exam       Filed Vitals:    03/08/17 0420 03/08/17 0528 03/08/17 0746 03/08/17 1140   BP: 96/58 105/53 90/51 100/42   Pulse: 78 75 60 57   Temp: 36.6 °C (97.8 °F)  36.1 °C (97 °F) 36.2 °C (97.2 °F)   Resp: 18  18 18   Height:       Weight:       SpO2: 98%  97% 91%     Body mass index is 38.19 kg/(m^2).    Oxygen Therapy:  Pulse Oximetry: 91 %, O2 (LPM): 2, O2 Delivery: Nasal Cannula    Physical Exam   Constitutional: She is oriented to person, place, and time. No distress.   Neck: No JVD present.   Cardiovascular: Normal rate, regular rhythm, normal heart sounds and intact distal pulses.  Exam reveals no gallop and no friction rub.    No murmur heard.  Pulmonary/Chest: Effort normal and breath sounds normal. No respiratory distress. She has no wheezes. She has no rales. She exhibits no tenderness.   Abdominal: She exhibits no distension and no mass. There is tenderness. There is no  rebound and no guarding.   -Normoactive BS.   -Non distended, tender at the midline incision site.  -No peritoneal signs of rebound/guarding/rigidity.        Musculoskeletal: Normal range of motion. She exhibits no edema or tenderness.   Neurological: She is alert and oriented to person, place, and time. She displays normal reflexes. No cranial nerve deficit. She exhibits normal muscle tone. Coordination normal.         3/7/2017  3:30 PM    Recent Labs      03/06/17 0349   SODIUM  138   POTASSIUM  3.7   CHLORIDE  105   CO2  25   BUN  9   CREATININE  0.77   CALCIUM  8.8       Recent Labs      03/06/17 0349   ALTSGPT  9   ASTSGOT  15   ALKPHOSPHAT  47   TBILIRUBIN  0.3   GLUCOSE  169*       Recent Labs      03/06/17 0349   RBC  4.41   HEMOGLOBIN  12.3   HEMATOCRIT  39.3   PLATELETCT  287       Recent Labs      03/06/17 0349   WBC  12.2*   NEUTSPOLYS  89.10*   LYMPHOCYTES  7.20*   MONOCYTES  2.60   EOSINOPHILS  0.00   BASOPHILS  0.50   ASTSGOT  15   ALTSGPT  9   ALKPHOSPHAT  47   TBILIRUBIN  0.3       Assessment/Plan     1)ABSCESS OF THE ABDOMINAL WALL  -History of complicated diverticulitis with colonic resection and necrotizing abdominal wall repairs in 12/2012. Mesh repair in 05/2014 by plastics.   -Now s/p incision and drainage of large abdominal wall abscesses.   -Unincorporated aspects of mesh removed by Dr. Curran.   --Plan for removal of mesh remnant and biological mesh reconstruction tomorrow. NPO after midnight.  -Wound cx: MRSA.  - Awaiting on PICC line placement.   -Discontinue Zosyn and will continue with Vancomycin based on sensitivities.     Plan  -Antibiotic management with vanco   -Bcx no growth yet.  -PICC line placement  -Surgical revision, per general surgery/plastics  --Wound team consult placed regarding VAC to abdomen.      2)OPIOID DEPENDENCE  OPIOID INDUCED CONSTIPATION  -High tolerance to opioids. LBP home dosing, 100 morphine equivalents (10/325mg Norco PRN up to five times a day, 20mg  oxycontin bid home dose for back pain).    -Added 4mg IV morphine PRN q 3 hrs. BPs 90-100s systolic--stable since admission.   -Pt reports some pain relief. Reiterated previous risks of hypotension/bradynpea with increased opioids. Pt remains adamant of opioid dose need.   -Fleet enema working marginally (passing gas); s/p Relistor .   Plan:  -continue home regimen of oxycontin and norco  - IV morphine 4mg q2hr PRN  -Added scheduled Miralax today.     3)GERD  Reports PMH of heart burn with meals. Currently asymptomatic.  Plan  -continue home PPI    4)HYPOTHYROIDISM  Reports history of postoperative hypothryoidism s/p thyroid cancer resection in 1990.    Plan  -continue home levothyroxine    5)ANXIETY  DEPRESSION  Currently not depressed but very anxious about current condition, given past diverticulitis surgical course.    Plan:  -continue home klonopin  -continue home paroxitine    6)Insomnia   Stable, no acute complaints, takes trazodone home dose  Plan  -Continue home trazodone 100mg home dose

## 2017-03-09 LAB
ANION GAP SERPL CALC-SCNC: 7 MMOL/L (ref 0–11.9)
BACTERIA SPEC ANAEROBE CULT: NORMAL
BASOPHILS # BLD AUTO: 0.2 % (ref 0–1.8)
BASOPHILS # BLD: 0.02 K/UL (ref 0–0.12)
BUN SERPL-MCNC: 6 MG/DL (ref 8–22)
CALCIUM SERPL-MCNC: 9 MG/DL (ref 8.5–10.5)
CHLORIDE SERPL-SCNC: 107 MMOL/L (ref 96–112)
CO2 SERPL-SCNC: 27 MMOL/L (ref 20–33)
CREAT SERPL-MCNC: 0.86 MG/DL (ref 0.5–1.4)
EOSINOPHIL # BLD AUTO: 0.32 K/UL (ref 0–0.51)
EOSINOPHIL NFR BLD: 3.8 % (ref 0–6.9)
ERYTHROCYTE [DISTWIDTH] IN BLOOD BY AUTOMATED COUNT: 51.5 FL (ref 35.9–50)
GFR SERPL CREATININE-BSD FRML MDRD: >60 ML/MIN/1.73 M 2
GLUCOSE SERPL-MCNC: 99 MG/DL (ref 65–99)
HCT VFR BLD AUTO: 39.4 % (ref 37–47)
HGB BLD-MCNC: 12.1 G/DL (ref 12–16)
IMM GRANULOCYTES # BLD AUTO: 0.03 K/UL (ref 0–0.11)
IMM GRANULOCYTES NFR BLD AUTO: 0.4 % (ref 0–0.9)
LYMPHOCYTES # BLD AUTO: 2.39 K/UL (ref 1–4.8)
LYMPHOCYTES NFR BLD: 28.3 % (ref 22–41)
MCH RBC QN AUTO: 28.4 PG (ref 27–33)
MCHC RBC AUTO-ENTMCNC: 30.7 G/DL (ref 33.6–35)
MCV RBC AUTO: 92.5 FL (ref 81.4–97.8)
MONOCYTES # BLD AUTO: 0.62 K/UL (ref 0–0.85)
MONOCYTES NFR BLD AUTO: 7.3 % (ref 0–13.4)
NEUTROPHILS # BLD AUTO: 5.07 K/UL (ref 2–7.15)
NEUTROPHILS NFR BLD: 60 % (ref 44–72)
NRBC # BLD AUTO: 0 K/UL
NRBC BLD AUTO-RTO: 0 /100 WBC
PLATELET # BLD AUTO: 246 K/UL (ref 164–446)
PMV BLD AUTO: 8.6 FL (ref 9–12.9)
POTASSIUM SERPL-SCNC: 4.2 MMOL/L (ref 3.6–5.5)
RBC # BLD AUTO: 4.26 M/UL (ref 4.2–5.4)
SIGNIFICANT IND 70042: NORMAL
SITE SITE: NORMAL
SODIUM SERPL-SCNC: 141 MMOL/L (ref 135–145)
SOURCE SOURCE: NORMAL
WBC # BLD AUTO: 8.5 K/UL (ref 4.8–10.8)

## 2017-03-09 PROCEDURE — 700111 HCHG RX REV CODE 636 W/ 250 OVERRIDE (IP): Performed by: INTERNAL MEDICINE

## 2017-03-09 PROCEDURE — 99233 SBSQ HOSP IP/OBS HIGH 50: CPT | Mod: GC | Performed by: HOSPITALIST

## 2017-03-09 PROCEDURE — 500452 HCHG DRESSING, WOUND VAC MED.: Performed by: SURGERY

## 2017-03-09 PROCEDURE — 160035 HCHG PACU - 1ST 60 MINS PHASE I: Performed by: SURGERY

## 2017-03-09 PROCEDURE — 700111 HCHG RX REV CODE 636 W/ 250 OVERRIDE (IP)

## 2017-03-09 PROCEDURE — 700102 HCHG RX REV CODE 250 W/ 637 OVERRIDE(OP): Performed by: HOSPITALIST

## 2017-03-09 PROCEDURE — 500122 HCHG BOVIE, BLADE: Performed by: SURGERY

## 2017-03-09 PROCEDURE — 160002 HCHG RECOVERY MINUTES (STAT): Performed by: SURGERY

## 2017-03-09 PROCEDURE — 160048 HCHG OR STATISTICAL LEVEL 1-5: Performed by: SURGERY

## 2017-03-09 PROCEDURE — 700102 HCHG RX REV CODE 250 W/ 637 OVERRIDE(OP)

## 2017-03-09 PROCEDURE — 501445 HCHG STAPLER, SKIN DISP: Performed by: SURGERY

## 2017-03-09 PROCEDURE — A9270 NON-COVERED ITEM OR SERVICE: HCPCS | Performed by: HOSPITALIST

## 2017-03-09 PROCEDURE — 500697 HCHG HEMOCLIP, LARGE (ORANGE): Performed by: SURGERY

## 2017-03-09 PROCEDURE — 160029 HCHG SURGERY MINUTES - 1ST 30 MINS LEVEL 4: Performed by: SURGERY

## 2017-03-09 PROCEDURE — A9270 NON-COVERED ITEM OR SERVICE: HCPCS

## 2017-03-09 PROCEDURE — 700101 HCHG RX REV CODE 250

## 2017-03-09 PROCEDURE — 0JB80ZZ EXCISION OF ABDOMEN SUBCUTANEOUS TISSUE AND FASCIA, OPEN APPROACH: ICD-10-PCS | Performed by: SURGERY

## 2017-03-09 PROCEDURE — 700101 HCHG RX REV CODE 250: Performed by: SURGERY

## 2017-03-09 PROCEDURE — 110371 HCHG SHELL REV 272: Performed by: SURGERY

## 2017-03-09 PROCEDURE — A4606 OXYGEN PROBE USED W OXIMETER: HCPCS | Performed by: SURGERY

## 2017-03-09 PROCEDURE — 500698 HCHG HEMOCLIP, MEDIUM: Performed by: SURGERY

## 2017-03-09 PROCEDURE — 770021 HCHG ROOM/CARE - ISO PRIVATE

## 2017-03-09 PROCEDURE — 110382 HCHG SHELL REV 271: Performed by: SURGERY

## 2017-03-09 PROCEDURE — 700102 HCHG RX REV CODE 250 W/ 637 OVERRIDE(OP): Performed by: SURGERY

## 2017-03-09 PROCEDURE — 85025 COMPLETE CBC W/AUTO DIFF WBC: CPT

## 2017-03-09 PROCEDURE — 501838 HCHG SUTURE GENERAL: Performed by: SURGERY

## 2017-03-09 PROCEDURE — 160041 HCHG SURGERY MINUTES - EA ADDL 1 MIN LEVEL 4: Performed by: SURGERY

## 2017-03-09 PROCEDURE — 502240 HCHG MISC OR SUPPLY RC 0272: Performed by: SURGERY

## 2017-03-09 PROCEDURE — 160009 HCHG ANES TIME/MIN: Performed by: SURGERY

## 2017-03-09 PROCEDURE — 500887 HCHG PACK, MAJOR BASIN: Performed by: SURGERY

## 2017-03-09 PROCEDURE — A9270 NON-COVERED ITEM OR SERVICE: HCPCS | Performed by: INTERNAL MEDICINE

## 2017-03-09 PROCEDURE — 80048 BASIC METABOLIC PNL TOTAL CA: CPT

## 2017-03-09 PROCEDURE — 700105 HCHG RX REV CODE 258

## 2017-03-09 PROCEDURE — 700102 HCHG RX REV CODE 250 W/ 637 OVERRIDE(OP): Performed by: INTERNAL MEDICINE

## 2017-03-09 PROCEDURE — 160036 HCHG PACU - EA ADDL 30 MINS PHASE I: Performed by: SURGERY

## 2017-03-09 PROCEDURE — 0WUF0KZ SUPPLEMENT ABDOMINAL WALL WITH NONAUTOLOGOUS TISSUE SUBSTITUTE, OPEN APPROACH: ICD-10-PCS | Performed by: SURGERY

## 2017-03-09 PROCEDURE — A9270 NON-COVERED ITEM OR SERVICE: HCPCS | Performed by: SURGERY

## 2017-03-09 DEVICE — TISSUE STRATTICE PLIABLE 8X16: Type: IMPLANTABLE DEVICE | Status: FUNCTIONAL

## 2017-03-09 RX ORDER — IPRATROPIUM BROMIDE AND ALBUTEROL SULFATE 2.5; .5 MG/3ML; MG/3ML
SOLUTION RESPIRATORY (INHALATION)
Status: COMPLETED
Start: 2017-03-09 | End: 2017-03-09

## 2017-03-09 RX ORDER — ONDANSETRON 2 MG/ML
INJECTION INTRAMUSCULAR; INTRAVENOUS
Status: COMPLETED
Start: 2017-03-09 | End: 2017-03-09

## 2017-03-09 RX ORDER — BACITRACIN 50000 [IU]/1
INJECTION, POWDER, FOR SOLUTION INTRAMUSCULAR
Status: DISCONTINUED | OUTPATIENT
Start: 2017-03-09 | End: 2017-03-09 | Stop reason: HOSPADM

## 2017-03-09 RX ORDER — OXYCODONE HCL 5 MG/5 ML
SOLUTION, ORAL ORAL
Status: COMPLETED
Start: 2017-03-09 | End: 2017-03-09

## 2017-03-09 RX ADMIN — STANDARDIZED SENNA CONCENTRATE AND DOCUSATE SODIUM 2 TABLET: 8.6; 5 TABLET, FILM COATED ORAL at 20:13

## 2017-03-09 RX ADMIN — VANCOMYCIN HYDROCHLORIDE 1700 MG: 10 INJECTION, POWDER, LYOPHILIZED, FOR SOLUTION INTRAVENOUS at 18:11

## 2017-03-09 RX ADMIN — MORPHINE SULFATE 4 MG: 4 INJECTION INTRAVENOUS at 05:06

## 2017-03-09 RX ADMIN — TRAZODONE HYDROCHLORIDE 200 MG: 50 TABLET ORAL at 20:12

## 2017-03-09 RX ADMIN — OXYCODONE HYDROCHLORIDE 10 MG: 5 SOLUTION ORAL at 15:52

## 2017-03-09 RX ADMIN — FENTANYL CITRATE 50 MCG: 50 INJECTION, SOLUTION INTRAMUSCULAR; INTRAVENOUS at 15:53

## 2017-03-09 RX ADMIN — MORPHINE SULFATE 4 MG: 4 INJECTION INTRAVENOUS at 11:31

## 2017-03-09 RX ADMIN — NICOTINE 14 MG: 14 PATCH TRANSDERMAL at 05:03

## 2017-03-09 RX ADMIN — CLONAZEPAM 2 MG: 1 TABLET ORAL at 20:13

## 2017-03-09 RX ADMIN — PAROXETINE HYDROCHLORIDE 20 MG: 20 TABLET, FILM COATED ORAL at 20:14

## 2017-03-09 RX ADMIN — MORPHINE SULFATE 4 MG: 4 INJECTION INTRAVENOUS at 20:11

## 2017-03-09 RX ADMIN — ONDANSETRON 4 MG: 2 INJECTION, SOLUTION INTRAMUSCULAR; INTRAVENOUS at 15:37

## 2017-03-09 RX ADMIN — IPRATROPIUM BROMIDE AND ALBUTEROL SULFATE 3 ML: .5; 3 SOLUTION RESPIRATORY (INHALATION) at 15:13

## 2017-03-09 RX ADMIN — MORPHINE SULFATE 4 MG: 4 INJECTION INTRAVENOUS at 07:56

## 2017-03-09 RX ADMIN — OXYCODONE HYDROCHLORIDE 10 MG: 10 TABLET ORAL at 02:36

## 2017-03-09 RX ADMIN — MORPHINE SULFATE 4 MG: 4 INJECTION INTRAVENOUS at 18:04

## 2017-03-09 RX ADMIN — POTASSIUM CHLORIDE, DEXTROSE MONOHYDRATE AND SODIUM CHLORIDE: 150; 5; 450 INJECTION, SOLUTION INTRAVENOUS at 20:14

## 2017-03-09 RX ADMIN — FENTANYL CITRATE 50 MCG: 50 INJECTION, SOLUTION INTRAMUSCULAR; INTRAVENOUS at 15:37

## 2017-03-09 RX ADMIN — OXYCODONE HYDROCHLORIDE 20 MG: 20 TABLET, FILM COATED, EXTENDED RELEASE ORAL at 20:14

## 2017-03-09 ASSESSMENT — ENCOUNTER SYMPTOMS
ORTHOPNEA: 0
WEIGHT LOSS: 0
DEPRESSION: 1
NERVOUS/ANXIOUS: 1
HEARTBURN: 0
TINGLING: 0
HEMOPTYSIS: 0
DEPRESSION: 0
COUGH: 0
SPUTUM PRODUCTION: 0
DIAPHORESIS: 0
TREMORS: 0
BRUISES/BLEEDS EASILY: 0
PND: 0
WHEEZING: 0
BLURRED VISION: 0
SHORTNESS OF BREATH: 0
BACK PAIN: 0
NECK PAIN: 0
SHORTNESS OF BREATH: 1
ABDOMINAL PAIN: 1
NAUSEA: 0
DIZZINESS: 0
CLAUDICATION: 0
FOCAL WEAKNESS: 0
PHOTOPHOBIA: 0
CHILLS: 0
BLOOD IN STOOL: 0
CONSTIPATION: 1
DOUBLE VISION: 0
FLANK PAIN: 0
FEVER: 0
SENSORY CHANGE: 0
PALPITATIONS: 0
HEADACHES: 0
SPEECH CHANGE: 0
MYALGIAS: 0
VOMITING: 0

## 2017-03-09 ASSESSMENT — LIFESTYLE VARIABLES: SUBSTANCE_ABUSE: 0

## 2017-03-09 ASSESSMENT — PAIN SCALES - GENERAL
PAINLEVEL_OUTOF10: 7
PAINLEVEL_OUTOF10: 7
PAINLEVEL_OUTOF10: 10
PAINLEVEL_OUTOF10: 7
PAINLEVEL_OUTOF10: 3
PAINLEVEL_OUTOF10: 8
PAINLEVEL_OUTOF10: 6
PAINLEVEL_OUTOF10: 5

## 2017-03-09 NOTE — PROGRESS NOTES
[facility] [place] [facility] Interval Note    Name Thelma Abbott     1960   Age/Sex 56 y.o. female   MRN [id number]   Code Status FULL     After 5PM or if no immediate response to page, please call for cross-coverage  Attending/Team: Aimee Call [phone] to page   1st Call - Day Intern (R1):   Audi 2nd Call - Day Sr. Resident (R2/R3):   Brandin         Chief complaint/ reason for interval visit (Primary Diagnosis)   Abscess of abdominal wall    Interval Problem Daily Status Update    Abdominal wall abscess and infected mesh: Pain well controlled. Wound vac placed yesterday. OR today for mesh removal. Continuing IV vancomycin for MRSA + infection.  Microperforation of bowel: Clinically stable. Continuing IV C3+ Flagyl  Chronic pain and Opioid dependence: IV morphine 4 mg Q2 hrs alternates with po oxycodone.  Opioid induced Constipation: Has one BM yesterday after a dose of methylnalotrexone. Now on scheduled miralax.   Depression: Stable    Review of Systems   Constitutional: Negative for fever, chills, weight loss, malaise/fatigue and diaphoresis.   HENT: Negative for hearing loss.    Eyes: Negative for blurred vision, double vision and photophobia.   Respiratory: Negative for cough, hemoptysis, sputum production, shortness of breath and wheezing.    Cardiovascular: Negative for chest pain, palpitations, orthopnea, claudication, leg swelling and PND.   Gastrointestinal: Positive for abdominal pain and constipation. Negative for heartburn, nausea, vomiting, blood in stool and melena.   Genitourinary: Negative for dysuria, urgency, frequency, hematuria and flank pain.   Musculoskeletal: Negative for myalgias, back pain, joint pain and neck pain.   Skin: Negative for rash.   Neurological: Negative for dizziness, tingling, tremors, sensory change, speech change, focal weakness and headaches.   Endo/Heme/Allergies: Does not bruise/bleed easily.   Psychiatric/Behavioral:  Negative for depression, suicidal ideas and substance abuse.     Consultants:  Bina Figueroa/General Surgery  Dr. Raymundo/ ID    Disposition  Inpatient for IV antibiotics for abdominal wall abscess. Will go for removal of mesh tomorrow.    Quality Measures  Labs reviewed, Medications reviewed and Radiology images reviewed  Murrell catheter: No Murrell      DVT Prophylaxis: Enoxaparin (Lovenox)            Labs reviewed, Medications reviewed and Radiology images reviewed  Murrell catheter: No Murrell  DVT Prophylaxis: Heparin    Physical Exam       Filed Vitals:    03/08/17 1939 03/09/17 0315 03/09/17 0715 03/09/17 1325   BP: 105/65 127/70 119/68 117/73   Pulse: 60 71 53 61   Temp: 36.4 °C (97.5 °F) 36.4 °C (97.6 °F) 37 °C (98.6 °F) 36.8 °C (98.3 °F)   Resp: 16 17 18 16   Height:       Weight:       SpO2: 97% 96%  94%     Body mass index is 38.19 kg/(m^2).    Oxygen Therapy:  Pulse Oximetry: 94 %, O2 (LPM): 2, O2 Delivery: None (Room Air)    Physical Exam   Constitutional: She is oriented to person, place, and time. No distress.   Neck: No JVD present.   Cardiovascular: Normal rate, regular rhythm, normal heart sounds and intact distal pulses.  Exam reveals no gallop and no friction rub.    No murmur heard.  Pulmonary/Chest: Effort normal and breath sounds normal. No respiratory distress. She has no wheezes. She has no rales. She exhibits no tenderness.   Abdominal: She exhibits no distension and no mass. There is tenderness. There is no rebound and no guarding.   -Normoactive BS.   -Non distended, tender at the midline incision site.  -No peritoneal signs of rebound/guarding/rigidity.  - Wound vac in place with minimal drainage        Musculoskeletal: Normal range of motion. She exhibits no edema or tenderness.   Neurological: She is alert and oriented to person, place, and time. She displays normal reflexes. No cranial nerve deficit. She exhibits normal muscle tone. Coordination normal.         3/7/2017  3:30 PM    Recent  Labs      03/09/17   0230   SODIUM  141   POTASSIUM  4.2   CHLORIDE  107   CO2  27   BUN  6*   CREATININE  0.86   CALCIUM  9.0       Recent Labs      03/09/17   0230   GLUCOSE  99       Recent Labs      03/09/17   0230   RBC  4.26   HEMOGLOBIN  12.1   HEMATOCRIT  39.4   PLATELETCT  246       Recent Labs      03/09/17 0230   WBC  8.5   NEUTSPOLYS  60.00   LYMPHOCYTES  28.30   MONOCYTES  7.30   EOSINOPHILS  3.80   BASOPHILS  0.20       Assessment/Plan     1)ABSCESS OF THE ABDOMINAL WALL  History of complicated diverticulitis with colonic resection and necrotizing abdominal wall repairs in 12/2012. Mesh repair in 05/2014 by plastics. Now s/p incision and drainage of large abdominal wall abscesses.   Unincorporated aspects of mesh removed by Dr. Curran. Wound cx: MRSA.  - To surgery today for mesh removal  - Will continue with IV vancomycin per ID recs. Zosyn was stopped yesterday.     2)OPIOID DEPENDENCE  High tolerance to opioids. LBP home dosing, 100 morphine equivalents (10/325mg Norco PRN up to five times a day, 20mg oxycontin bid home dose for back pain).    - Now at a reasonable pain control with scheduled oxycontin. PRN Oxycodone and IV morphine 4 mg Qhrs.      3)OPIOID INDUCED CONSTIPATION  Finally has one BM after a dose of Relistor on 3/8  - Will continue with scheduled miralax and bowel protocol  - May consider repeating a dose of Relistor tomorrow if needed    3)GERD  Stable  -continue home PPI    4)HYPOTHYROIDISM  Reports history of postoperative hypothryoidism s/p thyroid cancer resection in 1990.    -continue home dose levothyroxine    5)ANXIETY/ DEPRESSION  Currently not depressed but very anxious about current condition, given past diverticulitis surgical course.    -continue home klonopin  -continue home paroxitine    6)Insomnia   Stable, no acute complaints, takes trazodone home dose  -Continue home trazodone 100mg home dose

## 2017-03-09 NOTE — PROGRESS NOTES
Assumed care of Ms Abbott at 1900.    Pt is A&O x4.  Pain 8/10.  Medicated per MAR  Nausea denied  Tolerating Diet   Midline abdominal incision.  Wound vac in place.  Set to suction, Patent, no leaks  Positive Urine output  Positive BM Void   Positive Flatus  Up Self   Bed in lowest position and locked.    ** Bed alarm refused despite pt education on fall risk.  Pt is A&O x4 with steady gait and demonstrates appropriate use of call light to call for assistance.  CLIP, Hourly rounding in place.    .  Pt resting comfortably now.  Review plan of care with patient  Call light within reach  Hourly rounds in place  All needs met at this time

## 2017-03-09 NOTE — PROGRESS NOTES
Received bedside report and assumed care of patient.  Assessment complete; discussed POC with patient.  AO x4, patient calls for assistance.  Patient appears calm and exhibits no signs of distress.  Patient reports pain of 5/10, IV morphine given for pain.  Abdominal wound vac dressing in place and CDI.  Patient NPO since 0000, morning medications held.  BS normoactive x 4, LBM 3/8/17, patient voids ambulating to bathroom PRN.  Patient is self ambulating with no assist, gait steady.  Call light within reach, bed in lowest position, SCDs refused, bed alarm refused.  Will continue to monitor pt for changes in status and provide care.

## 2017-03-09 NOTE — PROGRESS NOTES
"  Trauma/Surgical Progress Note    Author: Wolf Ruggiero Date & Time created: 3/9/2017   10:56 AM     Interval Events:    OR today for removal of mesh remnant and biologic mesh reconstruction  Antibiotics per ID   Counseled     Review of Systems   Constitutional: Negative for fever and chills.   Respiratory: Positive for shortness of breath.    Cardiovascular: Negative for chest pain.   Gastrointestinal: Positive for abdominal pain. Negative for nausea and vomiting.        Incisional   3/8 BM   Psychiatric/Behavioral: Positive for depression (history of ). The patient is nervous/anxious (surgery today).      Hemodynamics:  Blood pressure 119/68, pulse 53, temperature 37 °C (98.6 °F), resp. rate 18, height 1.651 m (5' 5\"), weight 104.1 kg (229 lb 8 oz), last menstrual period 09/17/2007, SpO2 96 %, not currently breastfeeding.     Respiratory:    Respiration: 18, Pulse Oximetry: 96 %        RUL Breath Sounds: Clear, RML Breath Sounds: Clear, RLL Breath Sounds: Clear, MALENA Breath Sounds: Clear, LLL Breath Sounds: Diminished  Fluids:    Intake/Output Summary (Last 24 hours) at 03/09/17 1056  Last data filed at 03/09/17 0523   Gross per 24 hour   Intake   2160 ml   Output      0 ml   Net   2160 ml     Admit Weight: 100.699 kg (222 lb)  Current      Physical Exam   Constitutional: She is oriented to person, place, and time. No distress.   Pulmonary/Chest: She exhibits no tenderness.   Supplemental oxygen    Abdominal: There is tenderness.   Obese   Wound Vac functioning   Musculoskeletal: She exhibits no edema.   Neurological: She is alert and oriented to person, place, and time.   Skin: Skin is warm and dry. She is not diaphoretic.   Nursing note and vitals reviewed.      Medical Decision Making/Problem List:    Active Hospital Problems    Diagnosis   • Abscess of abdominal wall [L02.211]     Priority: High     3/5 To OR    - I&D  of abdominal wall deep subcutaneous abscess and subfascial abscess.  - Excision of infected " mesh portion.  - [employer] veno graft application to complex open abdominal wall for open complex abdominal wall reinforcement  3/9  OR today removal of mesh remnant and biologic mesh reconstruction     • Depression [F32.9]     Priority: Medium     Chronic condition treated with Klonopin,trazodone, Paxil, cymbalta .  Resume maintenance medication.     • Tobacco abuse [Z72.0]     Priority: Low   • Low back pain [M54.5]     Priority: Low     Diagnois update 10/1/2016     • S/P thyroidectomy [E89.0]     Priority: Low     Chronic condition treated with synthroid.  Resume maintenance medication.     • Psoriasis [L40.9]     Priority: Low     Core Measures & Quality Metrics:  Labs reviewed, Medications reviewed and Radiology images reviewed  Murrell catheter: No Murrell  Central line in place: Need for access    DVT Prophylaxis: Enoxaparin (Lovenox)    Ulcer prophylaxis: No  Antibiotics: Treating active infection/contamination beyond 24 hours perioperative coverage  Assessed for rehab: Patient returned to prior level of function, rehabilitation not indicated at this time    Total Score: 0    Discussed patient condition with RN, Patient and trauma surgery, Dr. Warner Figueroa.

## 2017-03-09 NOTE — WOUND TEAM
[facility]  Inpatient Services  Initial Wound and Skin Care Evaluation    Admission Date:    3/5/17  HPI, PMH, SH: Reviewed  Unit where seen by Wound Team:   T4    WOUND CONSULT RELATED TO:  md request to place npwt drsg; pt to go to surgery 3/8    SUBJECTIVE:  Pleasant/agreeable    Self Report / Pain Level:   maggie well with pre-med    OBJECTIVE:   Prev wet-to-dry drsg was intact    WOUND TYPE, LOCATION, CHARACTERISTICS (Pressure ulcers: location, stage, POA or date identified)    Location and type of wound: Full thickness surgical wnd midline abdomen; POA        Periwound:      intact  Drainage:      Min serosanguinous  Tissue Type and %:     100% pale pink  Wound Edges:     intact  Odor:       none  Exposed structure(s):   adipose  S&S of Infection:    none      Measurements: (cm)   Taken 3/8/17  Length:      12.0  Width:       6.0  Depth:     6.5      INTERVENTIONS BY WOUND TEAM:   Prev drsg removed -- wnd irrigated with wnd cleanser -- measurements and photograph done -- no-sting and drape ruben-wnd -- black foam to fill the wnd -- sealed with drape and cont neg pressure applied at 125mmhg    Interdisciplinary consultation: nsg; pt    EVALUATION:  Clean wnd at this point; will await post-op orders; pt reports having had a bad reaction to the vac drape at some point in the past > will see how she does using no-sting.    Factors affecting wound healing:  Infection?    Goals:   Will reassess post-op    NURSING PLAN OF CARE ORDERS (x):    Dressing changes: See Dressing Maintenance orders: x  Skin care: See Skin Care orders: x  Rectal tube care: See Rectal Tube Care orders:   Other orders:    RSKIN: CURRENT (X) ORDERED (O)  Q shift Jama:  X  Q shift pressure point assessments:  X  Pressure redistribution mattress        CLAIR      Bariatric CLAIR      Bariatric foam        Heel float boots       Heels floated on pillows      Barrier wipes      Barrier Cream      Barrier paste      Sacral silicone dressing       Silicone O2 tubing      Anchorfast      Trach with Optifoam split foam       Waffle cushion      Rectal tube or BMS      Antifungal tx    Turn q 2 hours     Up to chair     Ambulate x  PT/OT     Dietician      PO     TF   TPN     PVN    NPO   # days   Other       WOUND TEAM PLAN OF CARE (x):   NPWT change 3 x week:        Dressing changes by wound team:       Follow up as needed:     x  Other (explain):    Anticipated discharge plans (x):  SNF:           Home Care:           Outpatient Wound Center:            Self Care:            Other:    tbd

## 2017-03-09 NOTE — PROGRESS NOTES
"Pharmacy Kinetics 56 y.o. female on vancomycin day # 5 3/9/2017    Currently on Vancomycin 1700 mg IV q24hr    Indication for Treatment: Abd wall abscess - s/p I&D    Pertinent history per medical record: Admitted on 3/5/2017 for an abdominal wall abscess. Pt has a history of necrotizing fasciitis. I&D conducted on 3/5. MRSA grew in cultures - per surgery, likely x 2 weeks of abx therapy .    Other antibiotics:     Allergies: Tape     List concerns for renal function: BMI ~38 kg/m2, CT w/contrast 3/5, albumin 3.1, episodic hypotension    Pertinent cultures to date:    3/5/17 - wound (abdominal wall): MRSA (Vanc KATARZYNA 1)  3/5/17 - blood (peripheral) x 2: NGTD    Recent Labs      17   0230   WBC  8.5   NEUTSPOLYS  60.00     Recent Labs      17   0230   BUN  6*   CREATININE  0.86     Recent Labs      17   2218   VANCOTROUGH  8.3*     Intake/Output Summary (Last 24 hours) at 17 1152  Last data filed at 17 0523   Gross per 24 hour   Intake   2160 ml   Output      0 ml   Net   2160 ml      Blood pressure 119/68, pulse 53, temperature 37 °C (98.6 °F), resp. rate 18, height 1.651 m (5' 5\"), weight 104.1 kg (229 lb 8 oz), last menstrual period 2007, SpO2 96 %, not currently breastfeeding. Temp (24hrs), Av.5 °C (97.7 °F), Min:36.1 °C (97 °F), Max:37 °C (98.6 °F)    A/P   1. Vancomycin dose change: Continue vancomycin 1700 mg (~16 mg/kg) IV Q24h (1700)  2. Next vancomycin level: 1-2 days  3. Goal trough: 12-16 mcg/mL  4. A/P: BP stable today, patient afebrile, leukocytosis resolved. Slight bump in SCr. Vanco level prior to the 3rd total dose resulted subtherapeutic yesterday at 8.3 mcg/mL; subsequent dose scheduled for 18 hours following 3rd dose. Patient likely to accumulate some once steady state achieved, additionally, patient's body habitus puts patient at risk for accumulation as well. ID consulted, recommends vancomycin therapy and removal of mesh followed by prolonged antibiotics. " Plan for OR today for removal of mesh remnant and biologic mesh reconstruction. Will continue with vancomycin ~16 mg/kg IV Q24h as outlined above and check a level tomorrow @ 1630. Pharmacy to monitor and adjust as needed.     Angie Esquivel, SherleyD

## 2017-03-09 NOTE — CARE PLAN
Problem: Safety  Goal: Will remain free from falls  Outcome: PROGRESSING AS EXPECTED  Pt remains free from fall.  Pt educated on fall risk.  Pt  Demonstrates understanding by appropriate use of call light.  CLIP, Hourly rounding in place    Problem: Venous Thromboembolism (VTW)/Deep Vein Thrombosis (DVT) Prevention:  Goal: Patient will participate in Venous Thrombosis (VTE)/Deep Vein Thrombosis (DVT)Prevention Measures    03/08/17 1957   OTHER   Risk Assessment Score 4   VTE RISK High   Mechanical Prophylaxis SCDs, Sequentials (Intermittent Pneumatic Compression Devices)   Pharmacologic Prophylaxis Used Lovenox

## 2017-03-09 NOTE — OP REPORT
DATE OF SERVICE:  03/09/2017    PREOPERATIVE DIAGNOSIS:  Abdominal abscess with open abdominal wall wound.    POSTOPERATIVE DIAGNOSIS:  Abdominal abscess with open abdominal wall wound.    PROCEDURES PERFORMED:  Abdominal wound exploration, excision of infected   foreign material and abdominal closure with Strattice mesh with negative   pressure wound therapy.    SURGEON:  Warner Figueroa DO    ASSISTANT:  Topher Sánchez MD    ANESTHESIA:  General endotracheal.    ESTIMATED BLOOD LOSS:  Less than 20 mL.    SPECIMENS:  None.    DRAINS:  Negative pressure wound therapy device to 125 mmHg suction.    BRIEF HISTORY:  This is a 56-year-old female who is well known to me over the   last several years, who had presented to the hospital this past weekend with   an abdominal wall abscess that was drained by Dr. Curran.  The patient had   significant amount of retained mesh from his prior hernia repair performed by   another surgeon, so we decided we should explore the wound and remove as much   foreign material as possible.    OPERATIVE REPORT:  The patient was brought to the operating room and placed   supine on the operating table.  After adequate general anesthesia, the abdomen   was prepped with Betadine gel and then draped out in the standard fashion.    We initially explored the open wound and noted there to be significant   granulation and healing tissue throughout the wound base and edges.  Below the   subcutaneous tissues, there appeared to be a layer of fascial closure, which   did include some form of incorporated foreign material.  This was trimmed away   all the way to the wound edges.  The entirety of the wound edge at this point   appeared to be pink, healing tissue that was granulating very well.  There   was clearly an open area fascia in the deep midline of the wound and we were   unable to identify any structures or organs in that space.  There was   significant amount of adhesion and scar tissue and what we  were looking it may   well have been the abdominal wall or may well have been bowel, it was   uncertain.  We were able to dissect up along the length of the wound opening   the midline fascia up until we reached an area where it became softer, though   we were still unable to identify anything that would be called normal anatomy.    We decided at this point that since the wound was very clean and there was   no residual signs of infection after debridement that we would approximate the   fascia as best we could and perform an onlay with biologic mesh.  Less than   10 cubic cm of tissue was removed.  The wound was irrigated and then we closed   the midline tissues with 2-0 Vicryl stitch in interrupted fashion, taking   care not to penetrate into the deeper areas of the wound that were more   nebulous.  Once this was completed, the wound was again irrigated.  We took a   piece of Strattice mesh and trimmed it to fit the deep area of the wound in an   ovoid shape.  The mesh was perforated to allow for drainage and avoid   collection of fluid deep to the mesh.  The mesh was then circumferentially   tacked to the free edge of the superficial fascias using interrupted 2-0 and   3-0 Vicryl stitch.  The mesh appeared to lay well in the base of the wound and   appeared to be draining fluid well from underneath.  We then placed the   vacuum sponge and applied the occlusive drape.  The negative pressure wound   device was then applied at 125 mmHg suction.  The patient was then cleaned and   dried and awakened from anesthesia.  She was extubated and taken to the   postanesthesia care unit in stable condition.  The sponge, needle, and   instrument count was correct at the end of the case.       ____________________________________     DO SHANNON PRIEST / LUKAS    DD:  03/09/2017 15:13:54  DT:  03/09/2017 15:30:37    D#:  [id number]  Job#:  [id number]

## 2017-03-09 NOTE — CONSULTS
DATE OF SERVICE:  03/08/2017    REFERRING PHYSICIAN:  Warner Figueroa DO.    REASON FOR CONSULTATION:  Abdominal wall infection.    HISTORY OF PRESENT ILLNESS:  The patient is a 56-year-old white female who has   history of obesity and tobacco dependence.  She had repeated episodes of   diverticulitis in 2012.  On 12/05/2012, she underwent low anterior resection   of the colon with EEA anastomosis.  She had multiple issues with nonhealing of   the wound after that.  On December 11th, she underwent again wound   exploration and closure of the dehiscence, and on 12/15/2012, she underwent I   and D of the abdominal wall, necrotizing fasciitis, drainage of the abdominal   abscess, colostomy, and placement of a wound VAC.  In May 2013 she underwent   laparoscopic reversal of the colostomy.  She developed multiple incisional   hernias at the old colostomy site.  She underwent incisional hernia repair in   May of 2014.  She continued to have draining wound and on 02/05/2015, she   underwent excision of the infected abdominal wound and removal of infected   tissues and foreign bodies.  Ultimately, her wound healed and she had been   doing fairly well when she developed abdominal pain, as well as she developed   a hardened mass, hence she came back into the emergency room.  Since she has   been here, she underwent a CT scan on 03/05/2017, which showed two abscesses.    She was taken to the OR by Dr. Curran and she underwent I and D of both the   subcutaneous and subfascial abscesses, as well as an excision of the infected   mesh.  The OR cultures are growing MRSA.  In view of that, infectious disease   consult has been called.    REVIEW OF SYSTEMS:  Patient denies any fevers, denies any chills.  No nausea,   vomiting, had some abdominal pain.  No cough.  No shortness of breath.  Other   review of systems is negative per AMA and CMS criteria.    ALLERGIES:  No known drug allergies.    PAST MEDICAL HISTORY:  History of  thyroid CA resulting in hypothyroidism, mood   disorder, and multiple surgeries for abdominal abscess.    PAST SURGICAL HISTORY:  Left breast biopsy, thyroidectomy, and multiple   surgeries as above.    SOCIAL HISTORY:  Continues to smoke one pack per day.  Denies any alcohol or   drug use.    FAMILY HISTORY:  Reviewed and noncontributory.    MEDICATIONS:  Currently, she is on Klonopin, Lovenox, Synthroid, Nicoderm,   OxyContin, paroxetine, vancomycin, and Desyrel.    LABORATORY DATA:  Her WBC count is 12.2, platelets of 287,000, and creatinine   is 0.77.  Wound cultures are as above.    PHYSICAL EXAMINATION:  GENERAL:  Patient is nontoxic, looks tired.  VITAL SIGNS:  T-max is 98, blood pressure is 90/51, and pulse is 60.  HEAD AND ENT:  Mucosa is moist.  No oral thrush.  NECK:  Supple.  No lymphadenopathy.  PULMONARY:  Bilateral air entry.  Occasional rhonchi present.  CARDIOVASCULAR:  Regular.  No murmurs and no gallops heard.  ABDOMEN:  Obese, has a wound VAC, soft.  No significant tenderness.  EXTREMITIES:  No edema.  CENTRAL NERVOUS SYSTEM:  Alert and oriented x3.  No focal neurological   deficit.    ASSESSMENT:  1.  Abdominal wound infection.  Cultures are Methicillin-resistant   Staphylococcus aureus.  2.  Infected mesh.  3.  Tobacco abuse.  4.  Previous history of nonhealing wound.    RECOMMENDATIONS:  At this time, I would recommend to continue with the   vancomycin.  I would recommend taking the entire mesh out and then follow it   with the prolonged antibiotics.  Continue with the wound care.  I have   reviewed all the records.  Case was discussed with surgery.    Thank you for the consultation.       ____________________________________     BRIANNE LONG MD JD / LUKAS    DD:  03/08/2017 15:21:00  DT:  03/08/2017 18:10:35    D#:  [id number]  Job#:  [id number]

## 2017-03-09 NOTE — OR SURGEON
Immediate Post-Operative Note      PreOp Diagnosis: Abdominal abscess with open abdominal wound    PostOp Diagnosis: same    Procedure(s):  ABDMONIAL WOUND EXPLORATION, DEBRIDEMENT, CLOSURE WITH STRATISS MESH AND PLACEMENT OF WOUND VAC - Wound Class: Dirty or Infected    Surgeon(s):  Warner Figueroa D.O.  Assistant:  Topher Sánchez M.D.    Anesthesiologist/Type of Anesthesia:  Anesthesiologist: Ja Anne M.D./General    Surgical Staff:  Circulator: Maria Alejandra Hernandez R.N.; Jesi Oviedo  Scrub Person: OMKAR Agustin IV; Marcelle Domínguez    Specimen: none    Estimated Blood Loss: <20cc    Findings: clean, granulating wound base. Minimal contaminated appearing foreign material debrided from edges of fascia.    Complications: none noted        3/9/2017 3:14 PM Warner Figueroa

## 2017-03-10 LAB
ALBUMIN SERPL BCP-MCNC: 2.6 G/DL (ref 3.2–4.9)
ALBUMIN/GLOB SERPL: 0.9 G/DL
ALP SERPL-CCNC: 41 U/L (ref 30–99)
ALT SERPL-CCNC: 8 U/L (ref 2–50)
ANION GAP SERPL CALC-SCNC: 3 MMOL/L (ref 0–11.9)
AST SERPL-CCNC: 11 U/L (ref 12–45)
BACTERIA BLD CULT: NORMAL
BACTERIA BLD CULT: NORMAL
BASOPHILS # BLD AUTO: 0.5 % (ref 0–1.8)
BASOPHILS # BLD: 0.04 K/UL (ref 0–0.12)
BILIRUB SERPL-MCNC: 0.2 MG/DL (ref 0.1–1.5)
BUN SERPL-MCNC: 4 MG/DL (ref 8–22)
CALCIUM SERPL-MCNC: 8.2 MG/DL (ref 8.5–10.5)
CHLORIDE SERPL-SCNC: 106 MMOL/L (ref 96–112)
CO2 SERPL-SCNC: 29 MMOL/L (ref 20–33)
CREAT SERPL-MCNC: 0.75 MG/DL (ref 0.5–1.4)
EOSINOPHIL # BLD AUTO: 0.29 K/UL (ref 0–0.51)
EOSINOPHIL NFR BLD: 3.3 % (ref 0–6.9)
ERYTHROCYTE [DISTWIDTH] IN BLOOD BY AUTOMATED COUNT: 49.9 FL (ref 35.9–50)
GFR SERPL CREATININE-BSD FRML MDRD: >60 ML/MIN/1.73 M 2
GLOBULIN SER CALC-MCNC: 2.8 G/DL (ref 1.9–3.5)
GLUCOSE SERPL-MCNC: 121 MG/DL (ref 65–99)
HCT VFR BLD AUTO: 36.2 % (ref 37–47)
HGB BLD-MCNC: 11.3 G/DL (ref 12–16)
IMM GRANULOCYTES # BLD AUTO: 0.11 K/UL (ref 0–0.11)
IMM GRANULOCYTES NFR BLD AUTO: 1.2 % (ref 0–0.9)
LYMPHOCYTES # BLD AUTO: 1.44 K/UL (ref 1–4.8)
LYMPHOCYTES NFR BLD: 16.3 % (ref 22–41)
MCH RBC QN AUTO: 28.5 PG (ref 27–33)
MCHC RBC AUTO-ENTMCNC: 31.2 G/DL (ref 33.6–35)
MCV RBC AUTO: 91.2 FL (ref 81.4–97.8)
MONOCYTES # BLD AUTO: 0.52 K/UL (ref 0–0.85)
MONOCYTES NFR BLD AUTO: 5.9 % (ref 0–13.4)
NEUTROPHILS # BLD AUTO: 6.44 K/UL (ref 2–7.15)
NEUTROPHILS NFR BLD: 72.8 % (ref 44–72)
NRBC # BLD AUTO: 0 K/UL
NRBC BLD AUTO-RTO: 0 /100 WBC
PLATELET # BLD AUTO: 264 K/UL (ref 164–446)
PMV BLD AUTO: 8.4 FL (ref 9–12.9)
POTASSIUM SERPL-SCNC: 4.2 MMOL/L (ref 3.6–5.5)
PROT SERPL-MCNC: 5.4 G/DL (ref 6–8.2)
RBC # BLD AUTO: 3.97 M/UL (ref 4.2–5.4)
SIGNIFICANT IND 70042: NORMAL
SIGNIFICANT IND 70042: NORMAL
SITE SITE: NORMAL
SITE SITE: NORMAL
SODIUM SERPL-SCNC: 138 MMOL/L (ref 135–145)
SOURCE SOURCE: NORMAL
SOURCE SOURCE: NORMAL
WBC # BLD AUTO: 8.8 K/UL (ref 4.8–10.8)

## 2017-03-10 PROCEDURE — A9270 NON-COVERED ITEM OR SERVICE: HCPCS | Performed by: HOSPITALIST

## 2017-03-10 PROCEDURE — 99232 SBSQ HOSP IP/OBS MODERATE 35: CPT | Mod: GC | Performed by: HOSPITALIST

## 2017-03-10 PROCEDURE — 770021 HCHG ROOM/CARE - ISO PRIVATE

## 2017-03-10 PROCEDURE — 700102 HCHG RX REV CODE 250 W/ 637 OVERRIDE(OP): Performed by: HOSPITALIST

## 2017-03-10 PROCEDURE — 700111 HCHG RX REV CODE 636 W/ 250 OVERRIDE (IP)

## 2017-03-10 PROCEDURE — 80053 COMPREHEN METABOLIC PANEL: CPT

## 2017-03-10 PROCEDURE — 85025 COMPLETE CBC W/AUTO DIFF WBC: CPT

## 2017-03-10 PROCEDURE — A9270 NON-COVERED ITEM OR SERVICE: HCPCS | Performed by: SURGERY

## 2017-03-10 PROCEDURE — 700111 HCHG RX REV CODE 636 W/ 250 OVERRIDE (IP): Performed by: SURGERY

## 2017-03-10 PROCEDURE — 700102 HCHG RX REV CODE 250 W/ 637 OVERRIDE(OP): Performed by: SURGERY

## 2017-03-10 PROCEDURE — A9270 NON-COVERED ITEM OR SERVICE: HCPCS | Performed by: INTERNAL MEDICINE

## 2017-03-10 PROCEDURE — 700105 HCHG RX REV CODE 258

## 2017-03-10 PROCEDURE — 700111 HCHG RX REV CODE 636 W/ 250 OVERRIDE (IP): Performed by: INTERNAL MEDICINE

## 2017-03-10 PROCEDURE — 700102 HCHG RX REV CODE 250 W/ 637 OVERRIDE(OP): Performed by: INTERNAL MEDICINE

## 2017-03-10 RX ADMIN — ENOXAPARIN SODIUM 40 MG: 40 INJECTION SUBCUTANEOUS at 08:01

## 2017-03-10 RX ADMIN — MORPHINE SULFATE 4 MG: 4 INJECTION INTRAVENOUS at 16:25

## 2017-03-10 RX ADMIN — PAROXETINE HYDROCHLORIDE 20 MG: 20 TABLET, FILM COATED ORAL at 20:03

## 2017-03-10 RX ADMIN — TRAZODONE HYDROCHLORIDE 200 MG: 50 TABLET ORAL at 20:02

## 2017-03-10 RX ADMIN — MORPHINE SULFATE 4 MG: 4 INJECTION INTRAVENOUS at 18:35

## 2017-03-10 RX ADMIN — NICOTINE 14 MG: 14 PATCH TRANSDERMAL at 05:09

## 2017-03-10 RX ADMIN — MORPHINE SULFATE 4 MG: 4 INJECTION INTRAVENOUS at 05:09

## 2017-03-10 RX ADMIN — MORPHINE SULFATE 4 MG: 4 INJECTION INTRAVENOUS at 10:12

## 2017-03-10 RX ADMIN — OXYCODONE HYDROCHLORIDE 20 MG: 20 TABLET, FILM COATED, EXTENDED RELEASE ORAL at 20:02

## 2017-03-10 RX ADMIN — STANDARDIZED SENNA CONCENTRATE AND DOCUSATE SODIUM 2 TABLET: 8.6; 5 TABLET, FILM COATED ORAL at 08:02

## 2017-03-10 RX ADMIN — STANDARDIZED SENNA CONCENTRATE AND DOCUSATE SODIUM 2 TABLET: 8.6; 5 TABLET, FILM COATED ORAL at 20:02

## 2017-03-10 RX ADMIN — MORPHINE SULFATE 4 MG: 4 INJECTION INTRAVENOUS at 13:43

## 2017-03-10 RX ADMIN — OXYCODONE HYDROCHLORIDE 20 MG: 20 TABLET, FILM COATED, EXTENDED RELEASE ORAL at 08:01

## 2017-03-10 RX ADMIN — MORPHINE SULFATE 4 MG: 4 INJECTION INTRAVENOUS at 21:27

## 2017-03-10 RX ADMIN — CLONAZEPAM 2 MG: 1 TABLET ORAL at 20:02

## 2017-03-10 RX ADMIN — POLYETHYLENE GLYCOL 3350 1 PACKET: 17 POWDER, FOR SOLUTION ORAL at 08:02

## 2017-03-10 RX ADMIN — LEVOTHYROXINE SODIUM 200 MCG: 50 TABLET ORAL at 08:01

## 2017-03-10 RX ADMIN — VANCOMYCIN HYDROCHLORIDE 1700 MG: 10 INJECTION, POWDER, LYOPHILIZED, FOR SOLUTION INTRAVENOUS at 18:32

## 2017-03-10 ASSESSMENT — ENCOUNTER SYMPTOMS
NAUSEA: 0
WEIGHT LOSS: 0
DIZZINESS: 0
PALPITATIONS: 0
BRUISES/BLEEDS EASILY: 0
ORTHOPNEA: 0
SPUTUM PRODUCTION: 0
VOMITING: 0
SENSORY CHANGE: 0
CHILLS: 0
COUGH: 0
SHORTNESS OF BREATH: 0
SPEECH CHANGE: 0
FLANK PAIN: 0
FOCAL WEAKNESS: 0
NECK PAIN: 0
TINGLING: 0
SHORTNESS OF BREATH: 1
ABDOMINAL PAIN: 1
FEVER: 0
BLURRED VISION: 0
CONSTIPATION: 1
HEARTBURN: 0
CLAUDICATION: 0
PHOTOPHOBIA: 0
TREMORS: 0
PND: 0
DOUBLE VISION: 0
DEPRESSION: 1
MYALGIAS: 0
BLOOD IN STOOL: 0
WHEEZING: 0
BACK PAIN: 0
DIAPHORESIS: 0
DEPRESSION: 0
HEADACHES: 0
HEMOPTYSIS: 0

## 2017-03-10 ASSESSMENT — PAIN SCALES - GENERAL
PAINLEVEL_OUTOF10: 5
PAINLEVEL_OUTOF10: 8
PAINLEVEL_OUTOF10: 7

## 2017-03-10 ASSESSMENT — LIFESTYLE VARIABLES: SUBSTANCE_ABUSE: 0

## 2017-03-10 NOTE — PROGRESS NOTES
"  Trauma/Surgical Progress Note    Author: Wolf Ruggiero Date & Time created: 3/10/2017   9:36 AM     Interval Events:    Wound vac functioning. Adequate pain control. Murrell removal. Antibiotics per ID   Counseled     Review of Systems   Constitutional: Negative for fever and chills.   Respiratory: Positive for shortness of breath.    Cardiovascular: Negative for chest pain.   Gastrointestinal: Positive for abdominal pain. Negative for nausea and vomiting.        Incisional   3/8 BM   Psychiatric/Behavioral: Positive for depression (history of ).     Hemodynamics:  Blood pressure 96/58, pulse 64, temperature 36.7 °C (98.1 °F), resp. rate 17, height 1.651 m (5' 5\"), weight 104.1 kg (229 lb 8 oz), last menstrual period 09/17/2007, SpO2 96 %, not currently breastfeeding.     Respiratory:    Respiration: 17, Pulse Oximetry: 96 %     Work Of Breathing / Effort: Mild;Shallow  RUL Breath Sounds: Clear, RML Breath Sounds: Clear, RLL Breath Sounds: Diminished, MALENA Breath Sounds: Clear, LLL Breath Sounds: Diminished  Fluids:    Intake/Output Summary (Last 24 hours) at 03/10/17 0936  Last data filed at 03/10/17 0800   Gross per 24 hour   Intake      0 ml   Output   3200 ml   Net  -3200 ml     Admit Weight: 100.699 kg (222 lb)  Current      Physical Exam   Constitutional: She is oriented to person, place, and time. No distress.   Pulmonary/Chest: She exhibits no tenderness.   Supplemental oxygen    Abdominal: There is tenderness.   Obese   Wound Vac functioning   Musculoskeletal: She exhibits no edema.   Neurological: She is alert and oriented to person, place, and time.   Skin: Skin is warm and dry. She is not diaphoretic.   Nursing note and vitals reviewed.      Medical Decision Making/Problem List:    Active Hospital Problems    Diagnosis   • Abscess of abdominal wall [L02.211]     Priority: High     3/5 To OR    - I&D of abdominal wall deep subcutaneous abscess and subfascial abscess. Wound culture positive for MRSA.  - " Excision of infected mesh portion.  - MicroMatrix veno graft application to complex open abdominal wall for open complex abdominal wall reinforcement  3/9 - Abdominal wound exploration, excision of infected foreign material and abdominal closure with Strattice mesh with negative pressure wound therapy.  Antibiotics per ID      • Depression [F32.9]     Priority: Medium     Chronic condition treated with Klonopin,trazodone, Paxil, cymbalta .  Resume maintenance medication.      • Tobacco abuse [Z72.0]     Priority: Low   • Low back pain [M54.5]     Priority: Low     Diagnois update 10/1/2016     • S/P thyroidectomy [E89.0]     Priority: Low     Chronic condition treated with synthroid.  Resume maintenance medication.      • Psoriasis [L40.9]     Priority: Low     Core Measures & Quality Metrics:  Labs reviewed, Medications reviewed and Radiology images reviewed  Murrell catheter: No Murrell  Central line in place: Need for access    DVT Prophylaxis: Enoxaparin (Lovenox)    Ulcer prophylaxis: No  Antibiotics: Treating active infection/contamination beyond 24 hours perioperative coverage  Assessed for rehab: Patient returned to prior level of function, rehabilitation not indicated at this time    Total Score: 0    Discussed patient condition with RN, Patient and trauma surgery, Dr. Warner Figueroa.

## 2017-03-10 NOTE — PROGRESS NOTES
Infectious Disease Progress Note    Author: Joleen Joshua M.D.    Date & Time created: 3/10/2017  3:45 PM        Chief Complaint   Patient presents with   • Abdominal Pain     started on Friday in [facility].  Pt has palpable hardened mass to abdomen which she states is the origin of the pain. Pt reports this mass moved over to the L upper side of her abdomen yesterday.   • Constipation     x 2 weeks.  Pt reported to EMS she took senokot, stool softener, and a handful of other laxatives and had 1/2 cup watery BM this morning, other wise last BM 2 weeks ago.     F/u mrsa mesh infection  Interval History:  56 year old female with multiple surgeries to heal a wound from the past now again presents with infected abdominal wound  3/10- no fevers. C/o abdominal pain. Wbc 9  Labs Reviewed, Medications Reviewed, Radiology Reviewed and Wound Reviewed.    Review of Systems:  Review of Systems   Constitutional: Positive for malaise/fatigue. Negative for fever.   Respiratory: Negative for cough and shortness of breath.    Cardiovascular: Negative for chest pain and leg swelling.   Gastrointestinal: Positive for abdominal pain. Negative for nausea and vomiting.   Genitourinary: Negative for dysuria.   Musculoskeletal: Negative for myalgias.   Neurological: Negative for sensory change, speech change and headaches.       Hemodynamics:  Temp (24hrs), Av.7 °C (98 °F), Min:36.6 °C (97.9 °F), Max:36.8 °C (98.2 °F)  Temperature: 36.7 °C (98 °F)  Pulse  Av.1  Min: 52  Max: 82Heart Rate (Monitored): 68  Blood Pressure: (!) 97/60 mmHg (nurse notified), NIBP: 126/65 mmHg       Physical Exam:  Physical Exam   Constitutional: She is oriented to person, place, and time. No distress.   HENT:   Mouth/Throat: No oropharyngeal exudate.   Eyes: No scleral icterus.   Neck: Neck supple.   Cardiovascular: Regular rhythm.    No murmur heard.  Pulmonary/Chest: She has no wheezes. She has no rales.   Abdominal: There is tenderness.   Midline wound  vac   Musculoskeletal: She exhibits no edema.   Neurological: She is alert and oriented to person, place, and time.   Vitals reviewed.      Labs:  Recent Labs      03/09/17   0230  03/10/17   0753   WBC  8.5  8.8   RBC  4.26  3.97*   HEMOGLOBIN  12.1  11.3*   HEMATOCRIT  39.4  36.2*   MCV  92.5  91.2   MCH  28.4  28.5   RDW  51.5*  49.9   PLATELETCT  246  264   MPV  8.6*  8.4*   NEUTSPOLYS  60.00  72.80*   LYMPHOCYTES  28.30  16.30*   MONOCYTES  7.30  5.90   EOSINOPHILS  3.80  3.30   BASOPHILS  0.20  0.50     Recent Labs      03/09/17   0230  03/10/17   0753   SODIUM  141  138   POTASSIUM  4.2  4.2   CHLORIDE  107  106   CO2  27  29   GLUCOSE  99  121*   BUN  6*  4*     Recent Labs      03/09/17   0230  03/10/17   0753   ALBUMIN   --   2.6*   TBILIRUBIN   --   0.2   ALKPHOSPHAT   --   41   TOTPROTEIN   --   5.4*   ALTSGPT   --   8   ASTSGOT   --   11*   CREATININE  0.86  0.75        Imaging:  Ct-abdomen-pelvis With    3/5/2017  3/5/2017 12:45 PM HISTORY/REASON FOR EXAM:  Tender mass midabdomen. Evaluate for hernia TECHNIQUE/EXAM DESCRIPTION:  CT scan of the abdomen and pelvis with contrast. Contrast-enhanced helical scanning was obtained from the diaphragmatic domes through the pubic symphysis following the bolus administration of nonionic contrast without complication. 100 mL of Omnipaque 350 nonionic contrast was administered without complication. Low dose optimization technique was utilized for this CT exam including automated exposure control and adjustment of the mA and/or kV according to patient size. COMPARISON: CT abdomen and pelvis 1/15/2016 FINDINGS: Lung bases: Show mild atelectasis. Osseous structures:  There is facet arthropathy of the lumbar spine. Abdomen: There is a rim-enhancing fluid collection within the abdominal wall musculature within into the left midline. This fluid collection measures 7.8 x 3.9 cm transversely and spans a craniocaudal length of 10.4 cm There is a second slightly more  posterior abdominal wall fluid collection measuring 7.8 x 1.8 cm transversely and spanning craniocaudal length of 8.7 cm. There is moderate surrounding inflammation. Both are suspicious for abscess. There is hepatomegaly with craniocaudal length of 21 cm. There is no biliary dilation. The spleen is normal in appearance. The pancreas is normal in appearance. The splenic vein and portal vein enhance normally. Both adrenal glands are normal in appearance. The right kidney contains a simple cyst. The left kidney is normal in appearance. There is no hydronephrosis. Bowel and mesentery: Within normal limits. Specifically, colonic wall thickening/inflammation has resolved since prior CT scan. The aorta is normal in appearance. The terminal ileum is normal in appearance. The appendix is normal in appearance. Pelvis: Within normal limits. Uterus and adnexa appear normal.     3/5/2017  1.  Two rim enhancing abdominal wall fluid collection that are highly suspicious for abscess 2.  More cranial and superficial measures 10.4 x 7.8 x 3.9 cm 3.  More caudal and deeper measures 8.7 x 7.8 x 1.8 cm 4.  Resolution of colonic inflammation/wall thickening since most recent CT scan    Dx-chest-for Picc Line Perform Procedure In:: Picc Room    3/8/2017  3/8/2017 9:52 AM HISTORY/REASON FOR EXAM:  PICC line placement. TECHNIQUE/EXAM DESCRIPTION AND NUMBER OF VIEWS: Single view of the chest. COMPARISON: 9/14/2015 FINDINGS: A peripherally inserted catheter has been placed.  The tip projects appropriately over the superior vena cava near the cavoatrial juncture. Cardiomegaly. Hypoinflation and diffuse interstitial opacities. No pleural abnormalities are noted.     3/8/2017  1.  Right upper extremity PICC line tip overlies the SVC with tip near the cavoatrial juncture.. 2.  Hypoinflation and diffuse moderate atelectasis/possible interstitial edema.    Ir-picc Line Placement > Age 5    3/8/2017  HISTORY/REASON FOR EXAM:   PICC placement.  TECHNIQUE/EXAM DESCRIPTION AND NUMBER OF VIEWS:   PICC line insertion with ultrasound guidance.  The procedure was performed using maximal sterile barrier technique including sterile gown, mask, cap, and donning of sterile gloves following appropriate hand hygiene and/or sterile scrub. Patient skin site was prepped with 2% Chlorhexidine solution. FINDINGS:  PICC line insertion with Ultrasound Guidance was performed by qualified nursing staff without the assistance of a Radiologist.  A follow up chest x-ray will be performed to determine appropriateness of PICC positioning.     3/8/2017           Ultrasound-guided PICC placement performed by qualified nursing staff as above.     Medications:  Current Facility-Administered Medications   Medication Dose Frequency Provider Last Rate Last Dose   • enoxaparin (LOVENOX) inj 40 mg  40 mg DAILY Warner Figueroa D.O.   40 mg at 03/10/17 0801   • normal saline PF 10-20 mL  10-20 mL PRN SUSSY Osuna.OJordon       • dextrose 5 % and 0.45 % NaCl with KCl 20 mEq   Continuous SUSSY Osuna.O. 75 mL/hr at 03/09/17 2014     • polyethylene glycol/lytes (MIRALAX) PACKET 1 Packet  1 Packet DAILY Bala Huntley M.D.   1 Packet at 03/10/17 0802   • vancomycin 1,700 mg in  mL IVPB  1,700 mg Q24HR Angie Esquivel, PHARMD   Stopped at 03/09/17 2011   • trazodone (DESYREL) tablet 200 mg  200 mg QHS Warner Figueroa D.O.   200 mg at 03/09/17 2012   • normal saline PF 10-20 mL  10-20 mL PRN Bala Huntley M.D.       • morphine (pf) 4 mg/ml injection 4 mg  4 mg Q2HRS PRN Ellen Ghotra M.D.   4 mg at 03/10/17 1343   • nicotine (NICODERM) 14 MG/24HR 14 mg  14 mg Daily-0600 Bala Huntley M.D.   14 mg at 03/10/17 0509   • oxycodone immediate-release (ROXICODONE) tablet 5 mg  5 mg Q4HRS PRN MADELEINE OsunaOJordon        Or   • oxycodone immediate release (ROXICODONE) tablet 10 mg  10 mg Q3HRS PRN Warner Figueroa D.O.   10 mg at 03/09/17 0236   • senna-docusate (PERICOLACE or  SENOKOT S) 8.6-50 MG per tablet 2 Tab  2 Tab BID Kourtney Conway M.D.   2 Tab at 03/10/17 0802    And   • polyethylene glycol/lytes (MIRALAX) PACKET 1 Packet  1 Packet QDAY PRGRISELDA Conway M.D.   1 Packet at 03/08/17 1504    And   • magnesium hydroxide (MILK OF MAGNESIA) suspension 30 mL  30 mL QDAY PRN Kourtney Conway M.D.   30 mL at 03/07/17 0819    And   • bisacodyl (DULCOLAX) suppository 10 mg  10 mg QDAY PRN Kourtney Conway M.D.       • Respiratory Care per Protocol   Continuous RT Kourtney Conway M.D.       • nicotine polacrilex (NICORETTE) 2 MG piece 2 mg  2 mg Q HOUR PRN Kourtney Conway M.D.       • glucose 4 g chewable tablet 16 g  16 g Q15 MIN PRN Kourtney Conway M.D.        And   • dextrose 50% (D50W) injection 25 mL  25 mL Q15 MIN PRGRISELDA Conway M.D.       • clonazepam (KLONOPIN) tablet 2 mg  2 mg QHS Kourtney Conway M.D.   2 mg at 03/09/17 2013   • levothyroxine (SYNTHROID) tablet 200 mcg  200 mcg [facility] Kourtney Conway M.D.   200 mcg at 03/10/17 0801   • paroxetine (PAXIL) tablet 20 mg  20 mg QHS Kourtney Conway M.D.   20 mg at 03/09/17 2014   • oxyCODONE CR (OXYCONTIN) tablet 20 mg  20 mg Q12HRS Kourtney Conway M.D.   20 mg at 03/10/17 0801   • MD ALERT... vancomycin per pharmacy protocol   PRGRISELDA Conway M.D.         Last reviewed on 3/5/2017  2:51 PM by Beau Rod R.N.    Micro:  Results     Procedure Component Value Units Date/Time    CULTURE WOUND W/ GRAM STAIN [[id number]]  (Abnormal)  (Susceptibility) Collected:  03/05/17 1658    Order Status:  Completed Specimen Information:  Wound Updated:  03/09/17 1617     Significant Indicator POS (POS)      Source WND      Site Abdominal Wall Abscess/Mesh      Culture Result Wound -- (A)      Gram Stain Result --      Result:        Many WBCs.  Moderate Gram positive cocci.       Culture Result Wound -- (A)      Result:        Methicillin Resistant Staphylococcus aureus  Moderate growth      Narrative:      CALL   Rodriguez  141 tel. [phone],  CALLED  141 tel. [phone] 03/07/2017, 08:05, RB PERF. RESULTS CALLED  TO:[id number] RN and faxed to [facility]    Culture & Susceptibility     METHICILLIN RESISTANT STAPHYLOCOCCUS AUREUS     Antibiotic Sensitivity Microscan Unit Status    Ampicillin/sulbactam Resistant 16/8 mcg/mL Final    Clindamycin Sensitive <=0.5 mcg/mL Final    Daptomycin Sensitive <=0.5 mcg/mL Final    Erythromycin Resistant >4 mcg/mL Final    Moxifloxacin Sensitive 2 mcg/mL Final    Oxacillin Resistant >2 mcg/mL Final    Penicillin Resistant >8 mcg/mL Final    Tetracycline Sensitive <=4 mcg/mL Final    Trimeth/Sulfa Sensitive <=0.5/9.5 mcg/mL Final    Vancomycin Sensitive 1 mcg/mL Final                       ANAEROBIC CULTURE [[id number]] Collected:  03/05/17 1658    Order Status:  Completed Specimen Information:  Wound Updated:  03/09/17 1617     Significant Indicator NEG      Source WND      Site Abdominal Wall Abscess/Mesh      Anaerobic Culture, Culture Res No Anaerobes isolated.     Narrative:      CALL  Rodriguez  141 tel. [phone],  CALLED  141 tel. [phone] 03/07/2017, 08:05, RB PERF. RESULTS CALLED  TO:[id number] RN and faxed to [facility]    FUNGAL CULTURE [[id number]] Collected:  03/05/17 1658    Order Status:  Completed Specimen Information:  Wound Updated:  03/09/17 1617     Significant Indicator NEG      Source WND      Site Abdominal Wall Abscess/Mesh      Fungal Culture Culture in progress.     Narrative:      CALL  Rodriguez  141 tel. [phone],  CALLED  141 tel. [phone] 03/07/2017, 08:05, RB PERF. RESULTS CALLED  TO:[phone] RN and faxed to [facility]    BLOOD CULTURE [[id number]] Collected:  03/05/17 2109    Order Status:  Completed Specimen Information:  Blood from Peripheral Updated:  03/06/17 0743     Significant Indicator NEG      Source BLD      Site PERIPHERAL      Blood Culture --      Result:        No Growth    Note: Blood cultures are incubated for 5 days and  are monitored continuously.Positive blood cultures  are  "called to the RN and reported as soon as  they are identified.      Narrative:      1 of 2 for Blood Culture x 2 sites order. Per Hospital  Policy: Only change Specimen Src: to \"Line\" if specified by  physician order.    BLOOD CULTURE [[id number]] Collected:  03/05/17 2109    Order Status:  Completed Specimen Information:  Blood from Peripheral Updated:  03/06/17 0743     Significant Indicator NEG      Source BLD      Site PERIPHERAL      Blood Culture --      Result:        No Growth    Note: Blood cultures are incubated for 5 days and  are monitored continuously.Positive blood cultures  are called to the RN and reported as soon as  they are identified.      Narrative:      2 of 2 blood culture x2  Sites order. Per Hospital Policy:  Only change Specimen Src: to \"Line\" if specified by physician  order.    GRAM STAIN [[id number]] Collected:  03/05/17 1658    Order Status:  Completed Specimen Information:  Wound Updated:  03/05/17 2008     Significant Indicator .      Source WND      Site Abdominal Wall Abscess/Mesh      Gram Stain Result --      Result:        Many WBCs.  Moderate Gram positive cocci.      CULTURE WOUND W/ GRAM STAIN [[id number]] Collected:  03/05/17 0000    Order Status:  Canceled Specimen Information:  Other from Abscess     Narrative:      TEST Wound Culture w/GS WAS CANCELLED, 03/09/17 01:05 Test autocancelled, not  collected or received  Abdominal wall abscess after incision and drainage          Assessment:  Active Hospital Problems    Diagnosis   • Abscess of abdominal wall [L02.211]   • Depression [F32.9]   • Tobacco abuse [Z72.0]   • Low back pain [M54.5]   • S/P thyroidectomy [E89.0]   • Psoriasis [L40.9]       Plan:    Abdominal  wall abscess  S/p drainage 3/5  Another I&D 3/9  Spoke to surgery- not able to remove the entire mesh  C/s are mrsa  Continue vanco  Literature suggests there may be a role of gent irrigation- will discuss logistics with surgery  Aim for 4 weeks of abx followed by " oral    Tobacco abuse  Counseled that it hinders wound healing    Obesity  Risk factor for hernia    Prognosis  guarded    Discussed with internal medicine

## 2017-03-10 NOTE — CARE PLAN
Problem: Infection  Goal: Will remain free from infection  Outcome: PROGRESSING AS EXPECTED  Reminded patient of the signs and symptoms of infection and encouraged patient to report any of these findings in order to promote best outcomes.    Problem: Psychosocial Needs:  Goal: Level of anxiety will decrease  Outcome: PROGRESSING AS EXPECTED  Decreased stimuli, dimmed lighting, provided emotional support, reduced noise levels and minimized interruptions.

## 2017-03-10 NOTE — FACE TO FACE
Face to Face Supporting Documentation - Home Health    The encounter with this patient was in whole or in part the primary reason for home health admission.    Date of encounter:   Patient:                    MRN:                       YOB: 2015  Thelma Abbott  [id number]  1960     Home health to see patient for:  Skilled Nursing care for assessment, interventions & education, Wound Care and Comment: wound Vac changes    Skilled need for:  Surgical Aftercare wound care and vac    Skilled nursing interventions to include:  Wound Care    Homebound status evidenced by:  Need the aid of supportive devices such as crutches, canes, wheelchairs or walkers or Needs the assistance of another person in order to leave the home. Leaving home requires a considerable and taxing effort. There is a normal inability to leave the home.    Community Physician to provide follow up care: JUSTICE Pedersen     Optional Interventions? No      I certify the face to face encounter for this home health care referral meets the CMS requirements and the encounter/clinical assessment with the patient was, in whole, or in part, for the medical condition(s) listed above, which is the primary reason for home health care. Based on my clinical findings: the service(s) are medically necessary, support the need for home health care, and the homebound criteria are met.  I certify that this patient has had a face to face encounter by the nurse practitioner working collaboratively with me.  NATAN Lee. - NPI: [id number]

## 2017-03-10 NOTE — PROGRESS NOTES
"Pharmacy Kinetics 56 y.o. female on vancomycin day # 6 3/10/2017    Currently on Vancomycin 1700 mg iv q24hr    Indication for Treatment: Abd wall abscess - s/p I&D    Pertinent history per medical record: Admitted on 3/5/2017 for an abdominal wall abscess. Pt has a history of necrotizing fasciitis. I&D conducted on 3/5. MRSA grew in cultures - per surgery, likely x 2 weeks of abx therapy .    Other antibiotics:     Allergies: Tape     List concerns for renal function: BMI ~38 kg/m2, CT w/contrast 3/5, albumin 3.1, episodic hypotension    Pertinent cultures to date:    3/5/17 - wound (abdominal wall): MRSA (Vanc KATARZYNA 1)  3/5/17 - blood (peripheral) x 2: NGTD    Recent Labs      17   0230  03/10/17   0753   WBC  8.5  8.8   NEUTSPOLYS  60.00  72.80*     Recent Labs      17   0230  03/10/17   0753   BUN  6*  4*   CREATININE  0.86  0.75   ALBUMIN   --   2.6*     Recent Labs      17   2218   VANCOTROUGH  8.3*     Intake/Output Summary (Last 24 hours) at 03/10/17 1357  Last data filed at 03/10/17 1200   Gross per 24 hour   Intake    240 ml   Output   3200 ml   Net  -2960 ml      Blood pressure 97/60, pulse 60, temperature 36.7 °C (98 °F), resp. rate 17, height 1.651 m (5' 5\"), weight 104.1 kg (229 lb 8 oz), last menstrual period 2007, SpO2 92 %, not currently breastfeeding. Temp (24hrs), Av.7 °C (98 °F), Min:36.6 °C (97.9 °F), Max:36.8 °C (98.2 °F)    A/P   1. Vancomycin dose change: Continue vancomycin 1700 mg (~16 mg/kg) IV Q24h (1700)  2. Next vancomycin level: 3/11 @ 1630  3. Goal trough: 12-16 mcg/mL  4. A/P: Patient afebrile overnight without leukocytosis; IG 1.2% today. Vital signs relatively stable, having episodes of hypotension 90s/50-60s. Renal indices relatively stable, UOP ~0.68 mL/kg/hr so far today. Patient went for removal of mesh yesterday; ID recommends prolonged antibiotic course following mesh removal. Will continue with vancomcyin ~16 mg/kg IV Q24h as outlined above and " check a level tomorrow @ 1140. Pharmacy to monitor and adjust as needed.      Angie Esquivel, SherleyD

## 2017-03-10 NOTE — PROGRESS NOTES
Received bedside report and assumed care of patient.  Assessment complete; discussed POC with patient.  AO x4, patient calls for assistance.  Patient appears calm and exhibits no signs of distress.  Patient reports pain of 5/10, medicating per MAR with scheduled oxycodone and PRN with IV morphine.  Abdominal wound with wound vac dressing intact, no leaks, draining small amounts of serosanguinous fluid.  Patient tolerating current diet.  BS hypoactive x 4, LBM 3/8/17, patient voiding with sibley adequately, sibley removed per POD#1 removal order, notified pt of need to void by 1400 today, encouraged pt to call before ambulating.  Patient is standby assist, gait not yet observed post-surgery.  Call light within reach, bed in lowest position, SCDs refused, bed alarm refused.  Will continue to monitor pt for changes in status and provide care.

## 2017-03-11 LAB — VANCOMYCIN TROUGH SERPL-MCNC: 12.3 UG/ML (ref 10–20)

## 2017-03-11 PROCEDURE — A9270 NON-COVERED ITEM OR SERVICE: HCPCS | Performed by: NURSE PRACTITIONER

## 2017-03-11 PROCEDURE — 80202 ASSAY OF VANCOMYCIN: CPT

## 2017-03-11 PROCEDURE — 97606 NEG PRS WND THER DME>50 SQCM: CPT

## 2017-03-11 PROCEDURE — A9270 NON-COVERED ITEM OR SERVICE: HCPCS | Performed by: INTERNAL MEDICINE

## 2017-03-11 PROCEDURE — 700111 HCHG RX REV CODE 636 W/ 250 OVERRIDE (IP): Performed by: INTERNAL MEDICINE

## 2017-03-11 PROCEDURE — 700102 HCHG RX REV CODE 250 W/ 637 OVERRIDE(OP): Performed by: INTERNAL MEDICINE

## 2017-03-11 PROCEDURE — 700111 HCHG RX REV CODE 636 W/ 250 OVERRIDE (IP): Performed by: SURGERY

## 2017-03-11 PROCEDURE — 700102 HCHG RX REV CODE 250 W/ 637 OVERRIDE(OP): Performed by: HOSPITALIST

## 2017-03-11 PROCEDURE — 770021 HCHG ROOM/CARE - ISO PRIVATE

## 2017-03-11 PROCEDURE — A9270 NON-COVERED ITEM OR SERVICE: HCPCS | Performed by: SURGERY

## 2017-03-11 PROCEDURE — 700101 HCHG RX REV CODE 250: Performed by: SURGERY

## 2017-03-11 PROCEDURE — 700102 HCHG RX REV CODE 250 W/ 637 OVERRIDE(OP): Performed by: NURSE PRACTITIONER

## 2017-03-11 PROCEDURE — 700111 HCHG RX REV CODE 636 W/ 250 OVERRIDE (IP)

## 2017-03-11 PROCEDURE — 700105 HCHG RX REV CODE 258

## 2017-03-11 PROCEDURE — A9270 NON-COVERED ITEM OR SERVICE: HCPCS | Performed by: HOSPITALIST

## 2017-03-11 PROCEDURE — 700102 HCHG RX REV CODE 250 W/ 637 OVERRIDE(OP): Performed by: SURGERY

## 2017-03-11 RX ADMIN — OXYCODONE HYDROCHLORIDE 10 MG: 10 TABLET ORAL at 04:08

## 2017-03-11 RX ADMIN — ENOXAPARIN SODIUM 40 MG: 40 INJECTION SUBCUTANEOUS at 08:45

## 2017-03-11 RX ADMIN — STANDARDIZED SENNA CONCENTRATE AND DOCUSATE SODIUM 2 TABLET: 8.6; 5 TABLET, FILM COATED ORAL at 08:44

## 2017-03-11 RX ADMIN — MORPHINE SULFATE 4 MG: 4 INJECTION INTRAVENOUS at 12:51

## 2017-03-11 RX ADMIN — MAGNESIUM CITRATE 148 ML: 1.75 LIQUID ORAL at 12:10

## 2017-03-11 RX ADMIN — VANCOMYCIN HYDROCHLORIDE 1700 MG: 10 INJECTION, POWDER, LYOPHILIZED, FOR SOLUTION INTRAVENOUS at 18:38

## 2017-03-11 RX ADMIN — OXYCODONE HYDROCHLORIDE 10 MG: 10 TABLET ORAL at 00:01

## 2017-03-11 RX ADMIN — PAROXETINE HYDROCHLORIDE 20 MG: 20 TABLET, FILM COATED ORAL at 21:00

## 2017-03-11 RX ADMIN — MORPHINE SULFATE 4 MG: 4 INJECTION INTRAVENOUS at 18:38

## 2017-03-11 RX ADMIN — MORPHINE SULFATE 4 MG: 4 INJECTION INTRAVENOUS at 22:45

## 2017-03-11 RX ADMIN — POTASSIUM CHLORIDE, DEXTROSE MONOHYDRATE AND SODIUM CHLORIDE: 150; 5; 450 INJECTION, SOLUTION INTRAVENOUS at 04:08

## 2017-03-11 RX ADMIN — OXYCODONE HYDROCHLORIDE 10 MG: 10 TABLET ORAL at 11:24

## 2017-03-11 RX ADMIN — OXYCODONE HYDROCHLORIDE 10 MG: 10 TABLET ORAL at 16:55

## 2017-03-11 RX ADMIN — LEVOTHYROXINE SODIUM 200 MCG: 50 TABLET ORAL at 08:44

## 2017-03-11 RX ADMIN — MORPHINE SULFATE 4 MG: 4 INJECTION INTRAVENOUS at 20:37

## 2017-03-11 RX ADMIN — TRAZODONE HYDROCHLORIDE 200 MG: 50 TABLET ORAL at 20:50

## 2017-03-11 RX ADMIN — MORPHINE SULFATE 4 MG: 4 INJECTION INTRAVENOUS at 08:44

## 2017-03-11 RX ADMIN — POLYETHYLENE GLYCOL 3350 1 PACKET: 17 POWDER, FOR SOLUTION ORAL at 08:44

## 2017-03-11 RX ADMIN — MORPHINE SULFATE 4 MG: 4 INJECTION INTRAVENOUS at 15:01

## 2017-03-11 RX ADMIN — NICOTINE 14 MG: 14 PATCH TRANSDERMAL at 04:08

## 2017-03-11 RX ADMIN — STANDARDIZED SENNA CONCENTRATE AND DOCUSATE SODIUM 2 TABLET: 8.6; 5 TABLET, FILM COATED ORAL at 20:50

## 2017-03-11 RX ADMIN — CLONAZEPAM 2 MG: 1 TABLET ORAL at 20:50

## 2017-03-11 RX ADMIN — OXYCODONE HYDROCHLORIDE 20 MG: 20 TABLET, FILM COATED, EXTENDED RELEASE ORAL at 20:50

## 2017-03-11 RX ADMIN — OXYCODONE HYDROCHLORIDE 20 MG: 20 TABLET, FILM COATED, EXTENDED RELEASE ORAL at 08:44

## 2017-03-11 ASSESSMENT — ENCOUNTER SYMPTOMS
FEVER: 0
CHILLS: 0
VOMITING: 0
SHORTNESS OF BREATH: 1
DEPRESSION: 1
ABDOMINAL PAIN: 1
NAUSEA: 0

## 2017-03-11 ASSESSMENT — PAIN SCALES - GENERAL
PAINLEVEL_OUTOF10: 7
PAINLEVEL_OUTOF10: 8
PAINLEVEL_OUTOF10: 7
PAINLEVEL_OUTOF10: 4
PAINLEVEL_OUTOF10: 6
PAINLEVEL_OUTOF10: 6
PAINLEVEL_OUTOF10: 9
PAINLEVEL_OUTOF10: 8
PAINLEVEL_OUTOF10: 7
PAINLEVEL_OUTOF10: 7

## 2017-03-11 NOTE — PROGRESS NOTES
[facility] Interval Note    Name Thelma Abbott     1960   Age/Sex 56 y.o. female   MRN [id number]   Code Status FULL     After 5PM or if no immediate response to page, please call for cross-coverage  Attending/Team: Aimee Call [phone] to page   1st Call - Day Intern (R1):   Audi 2nd Call - Day Sr. Resident (R2/R3):   Brandin         Chief complaint/ reason for interval visit (Primary Diagnosis)   Abscess of abdominal wall    Interval Problem Daily Status Update    Discussed with Dr Figueroa and General Surgery will take over the whole case. Thanks for letting medicine team be a part of the case for learning purposes.   Abdominal wall abscess and infected mesh: Pain well controlled. Wound vac placed yesterday s/p Mesh removal . Continuing IV vancomycin for MRSA + infection.  Chronic pain and Opioid dependence: IV morphine 4 mg Q2 hrs alternates with po oxycodone.  Opioid induced Constipation: Has one BM after a dose of methylnalotrexone. Now on scheduled miralax.       Review of Systems   Constitutional: Negative for fever, chills, weight loss, malaise/fatigue and diaphoresis.   HENT: Negative for hearing loss.    Eyes: Negative for blurred vision, double vision and photophobia.   Respiratory: Negative for cough, hemoptysis, sputum production, shortness of breath and wheezing.    Cardiovascular: Negative for chest pain, palpitations, orthopnea, claudication, leg swelling and PND.   Gastrointestinal: Positive for abdominal pain and constipation. Negative for heartburn, nausea, vomiting, blood in stool and melena.   Genitourinary: Negative for dysuria, urgency, frequency, hematuria and flank pain.   Musculoskeletal: Negative for myalgias, back pain, joint pain and neck pain.   Skin: Negative for rash.   Neurological: Negative for dizziness, tingling, tremors, sensory change, speech change, focal weakness and headaches.   Endo/Heme/Allergies: Does  not bruise/bleed easily.   Psychiatric/Behavioral: Negative for depression, suicidal ideas and substance abuse.     Consultants:  Bina Figueroa/General Surgery  Dr. Raymundo/ ID    Disposition  Inpatient for IV antibiotics for abdominal wall abscess. Will go for removal of mesh tomorrow.    Quality Measures  Labs reviewed, Medications reviewed and Radiology images reviewed  Murrell catheter: No Murrell      DVT Prophylaxis: Enoxaparin (Lovenox)            Labs reviewed, Medications reviewed and Radiology images reviewed  Murrell catheter: No Murrell  DVT Prophylaxis: Heparin    Physical Exam       Filed Vitals:    03/09/17 2300 03/10/17 0303 03/10/17 0838 03/10/17 1200   BP: 105/71 101/57 96/58 97/60   Pulse: 72 66 64 60   Temp: 36.8 °C (98.2 °F) 36.6 °C (97.9 °F) 36.7 °C (98.1 °F) 36.7 °C (98 °F)   Resp: 17 16 17 17   Height:       Weight:       SpO2: 92% 91% 96% 92%     Body mass index is 38.19 kg/(m^2).    Oxygen Therapy:  Pulse Oximetry: 92 %, O2 (LPM): 4, O2 Delivery: Nasal Cannula    Physical Exam   Constitutional: She is oriented to person, place, and time. No distress.   Neck: No JVD present.   Cardiovascular: Normal rate, regular rhythm, normal heart sounds and intact distal pulses.  Exam reveals no gallop and no friction rub.    No murmur heard.  Pulmonary/Chest: Effort normal and breath sounds normal. No respiratory distress. She has no wheezes. She has no rales. She exhibits no tenderness.   Abdominal: She exhibits no distension and no mass. There is tenderness. There is no rebound and no guarding.   -Normoactive BS.   -Non distended, tender at the midline incision site.  -No peritoneal signs of rebound/guarding/rigidity.  - Wound vac in place with minimal drainage        Musculoskeletal: Normal range of motion. She exhibits no edema or tenderness.   Neurological: She is alert and oriented to person, place, and time. She displays normal reflexes. No cranial nerve deficit. She exhibits normal muscle tone.  Coordination normal.         3/7/2017  3:30 PM    Recent Labs      03/09/17 0230  03/10/17   0753   SODIUM  141  138   POTASSIUM  4.2  4.2   CHLORIDE  107  106   CO2  27  29   BUN  6*  4*   CREATININE  0.86  0.75   CALCIUM  9.0  8.2*       Recent Labs      03/09/17   0230  03/10/17   0753   ALTSGPT   --   8   ASTSGOT   --   11*   ALKPHOSPHAT   --   41   TBILIRUBIN   --   0.2   GLUCOSE  99  121*       Recent Labs      03/09/17   0230  03/10/17   0753   RBC  4.26  3.97*   HEMOGLOBIN  12.1  11.3*   HEMATOCRIT  39.4  36.2*   PLATELETCT  246  264       Recent Labs      03/09/17   0230  03/10/17   0753   WBC  8.5  8.8   NEUTSPOLYS  60.00  72.80*   LYMPHOCYTES  28.30  16.30*   MONOCYTES  7.30  5.90   EOSINOPHILS  3.80  3.30   BASOPHILS  0.20  0.50   ASTSGOT   --   11*   ALTSGPT   --   8   ALKPHOSPHAT   --   41   TBILIRUBIN   --   0.2       Assessment/Plan     1)ABSCESS OF THE ABDOMINAL WALL  -History of complicated diverticulitis with colonic resection and necrotizing abdominal wall repairs in 12/2012. Mesh repair in 05/2014 by plastics.   - s/p incision and drainage of large abdominal wall abscesses.   -Unincorporated aspects of mesh removed by Dr. Curran. -03/09: s/p abdominal wound exploration, debridement, removal of mesh and placement of wound vac.   -Wound cx: MRSA.  -Will need to be on IV Vancomycin long term.     2)OPIOID DEPENDENCE  High tolerance to opioids. LBP home dosing, 100 morphine equivalents (10/325mg Norco PRN up to five times a day, 20mg oxycontin bid home dose for back pain).    - Now at a reasonable pain control with scheduled oxycontin. PRN Oxycodone and IV morphine 4 mg Qhrs.    3)OPIOID INDUCED CONSTIPATION  Finally has one BM after a dose of Relistor on 3/8  - Will continue with scheduled miralax and bowel protocol  - May consider repeating a dose of Relistor if needed.    4)GERD  Stable  -continue home PPI    5)HYPOTHYROIDISM  Reports history of postoperative hypothryoidism s/p thyroid cancer  resection in 1990.    -continue home dose levothyroxine    6)ANXIETY/ DEPRESSION  Currently not depressed but very anxious about current condition, given past diverticulitis surgical course.    -continue home klonopin  -continue home paroxitine    7)Insomnia   Stable, no acute complaints, takes trazodone home dose  -Continue home trazodone 100mg home dose-

## 2017-03-11 NOTE — PROGRESS NOTES
"Pharmacy Kinetics      56 y.o. female on vancomycin day # 7           3/11/2017    Currently on Vancomycin 1700 mg iv q24hr    Indication for Treatment: Abd wall abscess - s/p I&D    Pertinent history per medical record: Admitted on 3/5/2017 for an abdominal wall abscess. Pt has a history of necrotizing fasciitis. I&D conducted on 3/5. MRSA grew in cultures - per surgery, likely x 2 weeks of abx therapy .    Other antibiotics:     Allergies: Tape     List concerns for renal function: BMI ~38 kg/m2, CT w/contrast 3/5, albumin 3.1, episodic hypotension    Pertinent cultures to date:    3/5/17 - wound (abdominal wall): MRSA (Vanc KATARZYNA 1)  3/5/17 - blood (peripheral) x 2: NGTD    Recent Labs      17   0230  03/10/17   0753   WBC  8.5  8.8   NEUTSPOLYS  60.00  72.80*     Recent Labs      17   0230  03/10/17   0753   BUN  6*  4*   CREATININE  0.86  0.75   ALBUMIN   --   2.6*     Intake/Output Summary (Last 24 hours) at 17 1053  Last data filed at 17 0800   Gross per 24 hour   Intake    420 ml   Output      0 ml   Net    420 ml      Blood pressure 96/55, pulse 53, temperature 36.2 °C (97.1 °F), resp. rate 16, height 1.651 m (5' 5\"), weight 104.1 kg (229 lb 8 oz), last menstrual period 2007, SpO2 95 %, not currently breastfeeding. Temp (24hrs), Av.4 °C (97.5 °F), Min:36.1 °C (97 °F), Max:36.7 °C (98.1 °F)    A/P   1. Vancomycin dose change: Continue vancomycin 1700 mg (~16 mg/kg) IV Q24h (1700)  2. Next vancomycin level: Today @ 1630  3. Goal trough: 12-16 mcg/mL  4. A/P: Patient remains afebrile. Hypotensive, however BP stable overall. Renal indices have been stable. Patient is POD#1 mesh removal, however surgery unable to remove the entire mesh. Per ID, may be a role for gent irrigation. Plan for 4 weeks of abx followed by oral. Vanco level remains in process. Pharmacy to monitor and adjust as needed.     Angie Esquivel, PharmD        "

## 2017-03-11 NOTE — PROGRESS NOTES
Patient report received from day shift RN, patient resting comfortably in bed. Patient AxO x4, VSS, medicated for 8/10 pain with scheduled oxycontin. Patient ambulating with SBA to restroom, tolerates well. Denies n/v. ABD wound vac in place with SS draiange, dressing to be changed tomorrow. All needs met at this time, call light in reach.

## 2017-03-11 NOTE — FACE TO FACE
Face to Face Note  -  Durable Medical Equipment    JUSTICE Maldonado - NPI: [id number]  I certify that this patient is under my care and that they had a durable medical equipment(DME)face to face encounter by the nurse practitioner working collaboratively with me that meets the physician DME face-to-face encounter requirements with this patient on:    Date of encounter:   Patient:                    MRN:                       YOB: 2015  Thelma Abbott  [id number]  1960     The encounter with the patient was in whole, or in part, for the following medical condition, which is the primary reason for durable medical equipment:  Wound Care    I certify that, based on my findings, the following durable medical equipment is medically necessary:  Wound Vac.    HOME O2 Saturation Measurements:(Values must be present for Home Oxygen orders)         ,     ,         My Clinical findings support the need for the above equipment due to:  Wound/Incision    Supporting Symptoms: midline abdominal wound with wound vac

## 2017-03-11 NOTE — PROGRESS NOTES
"  Trauma/Surgical Progress Note    Author: Wolf Ruggiero Date & Time created: 3/10/2017   9:36 AM     Interval Events:  Adequate pain control  Functional wound vac  Constipation addressed  ABX per ID  Disposition: arranging for wound vac and IV ABX    Review of Systems   Constitutional: Negative for fever and chills.   Respiratory: Positive for shortness of breath.    Cardiovascular: Negative for chest pain.   Gastrointestinal: Positive for abdominal pain. Negative for nausea and vomiting.        Incisional   3/8 BM   Genitourinary:        Voiding   Psychiatric/Behavioral: Positive for depression.     Hemodynamics:  Blood pressure 96/55, pulse 53, temperature 36.2 °C (97.1 °F), resp. rate 16, height 1.651 m (5' 5\"), weight 104.1 kg (229 lb 8 oz), last menstrual period 09/17/2007, SpO2 95 %, not currently breastfeeding.     Respiratory:    Respiration: 16, Pulse Oximetry: 95 %     Work Of Breathing / Effort: Mild  RUL Breath Sounds: Clear, RML Breath Sounds: Clear, RLL Breath Sounds: Diminished, MALENA Breath Sounds: Clear, LLL Breath Sounds: Diminished  Fluids:    Intake/Output Summary (Last 24 hours) at 03/10/17 0936  Last data filed at 03/10/17 0800   Gross per 24 hour   Intake      0 ml   Output   3200 ml   Net  -3200 ml     Admit Weight: 100.699 kg (222 lb)  Current      Physical Exam   Constitutional: She is oriented to person, place, and time. No distress.   Obese   Pulmonary/Chest: She exhibits no tenderness.   Supplemental oxygen    Abdominal: There is tenderness.   Wound Vac functioning   Musculoskeletal: She exhibits no edema.   Neurological: She is alert and oriented to person, place, and time.   Skin: Skin is warm and dry. She is not diaphoretic.   Nursing note and vitals reviewed.      Medical Decision Making/Problem List:    Active Hospital Problems    Diagnosis   • Abscess of abdominal wall [L02.211]     Priority: High     3/5 To OR    - I&D of abdominal wall deep subcutaneous abscess and subfascial " abscess. Wound culture positive for MRSA.  - Excision of infected mesh portion.  - MicroMatrix veno graft application to complex open abdominal wall for open complex abdominal wall reinforcement  3/9 - Abdominal wound exploration, excision of infected foreign material and abdominal closure with Strattice mesh with negative pressure wound therapy.  Arranging wound vac and IV ABX outpatient  Antibiotics per ID      • Depression [F32.9]     Priority: Medium     Chronic condition treated with Klonopin,trazodone, Paxil, cymbalta .  Resume maintenance medication.      • Tobacco abuse [Z72.0]     Priority: Low   • Low back pain [M54.5]     Priority: Low     Diagnois update 10/1/2016     • S/P thyroidectomy [E89.0]     Priority: Low     Chronic condition treated with synthroid.  Resume maintenance medication.      • Psoriasis [L40.9]     Priority: Low     Core Measures & Quality Metrics:  Labs reviewed, Medications reviewed and Radiology images reviewed  Murrell catheter: No Murrell  Central line in place: Need for access    DVT Prophylaxis: Enoxaparin (Lovenox)    Ulcer prophylaxis: No  Antibiotics: Treating active infection/contamination beyond 24 hours perioperative coverage  Assessed for rehab: Patient returned to prior level of function, rehabilitation not indicated at this time    Total Score: 0    Discussed patient condition with RN, Patient and trauma surgery, Dr. Warner Figueroa.

## 2017-03-12 PROCEDURE — 700111 HCHG RX REV CODE 636 W/ 250 OVERRIDE (IP): Performed by: SURGERY

## 2017-03-12 PROCEDURE — 700102 HCHG RX REV CODE 250 W/ 637 OVERRIDE(OP): Performed by: SURGERY

## 2017-03-12 PROCEDURE — 770021 HCHG ROOM/CARE - ISO PRIVATE

## 2017-03-12 PROCEDURE — 700111 HCHG RX REV CODE 636 W/ 250 OVERRIDE (IP)

## 2017-03-12 PROCEDURE — A9270 NON-COVERED ITEM OR SERVICE: HCPCS | Performed by: SURGERY

## 2017-03-12 PROCEDURE — 700102 HCHG RX REV CODE 250 W/ 637 OVERRIDE(OP): Performed by: INTERNAL MEDICINE

## 2017-03-12 PROCEDURE — 700111 HCHG RX REV CODE 636 W/ 250 OVERRIDE (IP): Performed by: INTERNAL MEDICINE

## 2017-03-12 PROCEDURE — A9270 NON-COVERED ITEM OR SERVICE: HCPCS | Performed by: INTERNAL MEDICINE

## 2017-03-12 PROCEDURE — 700105 HCHG RX REV CODE 258

## 2017-03-12 PROCEDURE — 700102 HCHG RX REV CODE 250 W/ 637 OVERRIDE(OP): Performed by: HOSPITALIST

## 2017-03-12 PROCEDURE — A9270 NON-COVERED ITEM OR SERVICE: HCPCS | Performed by: HOSPITALIST

## 2017-03-12 PROCEDURE — 700105 HCHG RX REV CODE 258: Performed by: SURGERY

## 2017-03-12 RX ORDER — SODIUM CHLORIDE 9 MG/ML
INJECTION, SOLUTION INTRAVENOUS CONTINUOUS
Status: DISCONTINUED | OUTPATIENT
Start: 2017-03-12 | End: 2017-03-14

## 2017-03-12 RX ADMIN — POLYETHYLENE GLYCOL 3350 1 PACKET: 17 POWDER, FOR SOLUTION ORAL at 07:47

## 2017-03-12 RX ADMIN — OXYCODONE HYDROCHLORIDE 10 MG: 10 TABLET ORAL at 13:24

## 2017-03-12 RX ADMIN — ENOXAPARIN SODIUM 40 MG: 40 INJECTION SUBCUTANEOUS at 07:46

## 2017-03-12 RX ADMIN — CLONAZEPAM 2 MG: 1 TABLET ORAL at 20:20

## 2017-03-12 RX ADMIN — STANDARDIZED SENNA CONCENTRATE AND DOCUSATE SODIUM 2 TABLET: 8.6; 5 TABLET, FILM COATED ORAL at 07:46

## 2017-03-12 RX ADMIN — OXYCODONE HYDROCHLORIDE 20 MG: 20 TABLET, FILM COATED, EXTENDED RELEASE ORAL at 20:23

## 2017-03-12 RX ADMIN — TRAZODONE HYDROCHLORIDE 200 MG: 50 TABLET ORAL at 20:20

## 2017-03-12 RX ADMIN — VANCOMYCIN HYDROCHLORIDE 1700 MG: 10 INJECTION, POWDER, LYOPHILIZED, FOR SOLUTION INTRAVENOUS at 17:22

## 2017-03-12 RX ADMIN — OXYCODONE HYDROCHLORIDE 10 MG: 10 TABLET ORAL at 17:21

## 2017-03-12 RX ADMIN — NICOTINE 14 MG: 14 PATCH TRANSDERMAL at 05:40

## 2017-03-12 RX ADMIN — LEVOTHYROXINE SODIUM 200 MCG: 50 TABLET ORAL at 07:46

## 2017-03-12 RX ADMIN — OXYCODONE HYDROCHLORIDE 10 MG: 10 TABLET ORAL at 20:21

## 2017-03-12 RX ADMIN — MORPHINE SULFATE 4 MG: 4 INJECTION INTRAVENOUS at 05:40

## 2017-03-12 RX ADMIN — MORPHINE SULFATE 4 MG: 4 INJECTION INTRAVENOUS at 07:47

## 2017-03-12 RX ADMIN — MORPHINE SULFATE 4 MG: 4 INJECTION INTRAVENOUS at 00:35

## 2017-03-12 RX ADMIN — OXYCODONE HYDROCHLORIDE 20 MG: 20 TABLET, FILM COATED, EXTENDED RELEASE ORAL at 07:47

## 2017-03-12 RX ADMIN — SODIUM CHLORIDE: 9 INJECTION, SOLUTION INTRAVENOUS at 03:33

## 2017-03-12 RX ADMIN — MORPHINE SULFATE 4 MG: 4 INJECTION INTRAVENOUS at 03:34

## 2017-03-12 RX ADMIN — STANDARDIZED SENNA CONCENTRATE AND DOCUSATE SODIUM 2 TABLET: 8.6; 5 TABLET, FILM COATED ORAL at 20:20

## 2017-03-12 RX ADMIN — PAROXETINE HYDROCHLORIDE 20 MG: 20 TABLET, FILM COATED ORAL at 20:23

## 2017-03-12 RX ADMIN — OXYCODONE HYDROCHLORIDE 10 MG: 10 TABLET ORAL at 10:13

## 2017-03-12 ASSESSMENT — PAIN SCALES - GENERAL
PAINLEVEL_OUTOF10: 5
PAINLEVEL_OUTOF10: 4
PAINLEVEL_OUTOF10: 8
PAINLEVEL_OUTOF10: 9
PAINLEVEL_OUTOF10: 8
PAINLEVEL_OUTOF10: 7
PAINLEVEL_OUTOF10: 8
PAINLEVEL_OUTOF10: 6
PAINLEVEL_OUTOF10: 6
PAINLEVEL_OUTOF10: 4
PAINLEVEL_OUTOF10: 6
PAINLEVEL_OUTOF10: 6

## 2017-03-12 ASSESSMENT — ENCOUNTER SYMPTOMS
CHILLS: 0
NAUSEA: 0
MYALGIAS: 0
VOMITING: 0
DEPRESSION: 1
COUGH: 0
SHORTNESS OF BREATH: 0
SHORTNESS OF BREATH: 1
ABDOMINAL PAIN: 1
HEADACHES: 0
SENSORY CHANGE: 0
FEVER: 0
SPEECH CHANGE: 0

## 2017-03-12 NOTE — PROGRESS NOTES
Infectious Disease Progress Note    Author: Joleen Joshua M.D.    Date & Time created: 3/12/2017  1:43 PM        Chief Complaint   Patient presents with   • Abdominal Pain     started on Friday in [facility].  Pt has palpable hardened mass to abdomen which she states is the origin of the pain. Pt reports this mass moved over to the L upper side of her abdomen yesterday.   • Constipation     x 2 weeks.  Pt reported to EMS she took senokot, stool softener, and a handful of other laxatives and had 1/2 cup watery BM this morning, other wise last BM 2 weeks ago.     F/u mrsa mesh infection  Interval History:  56 year old female with multiple surgeries to heal a wound from the past now again presents with infected abdominal wound  3/10- no fevers. C/o abdominal pain. Wbc 9  3/12- no fevers. Wants to know the plan  Labs Reviewed, Medications Reviewed, Radiology Reviewed and Wound Reviewed.    Review of Systems:  Review of Systems   Constitutional: Positive for malaise/fatigue. Negative for fever.   Respiratory: Negative for cough and shortness of breath.    Cardiovascular: Negative for chest pain and leg swelling.   Gastrointestinal: Positive for abdominal pain. Negative for nausea and vomiting.   Genitourinary: Negative for dysuria.   Musculoskeletal: Negative for myalgias.   Neurological: Negative for sensory change, speech change and headaches.       Hemodynamics:  Temp (24hrs), Av.6 °C (97.9 °F), Min:36.2 °C (97.2 °F), Max:37 °C (98.6 °F)  Temperature: 37 °C (98.6 °F)  Pulse  Av.1  Min: 52  Max: 82   Blood Pressure: 134/74 mmHg       Physical Exam:  Physical Exam   Constitutional: She is oriented to person, place, and time. No distress.   HENT:   Mouth/Throat: No oropharyngeal exudate.   Eyes: No scleral icterus.   Neck: Neck supple.   Cardiovascular: Regular rhythm.    No murmur heard.  Pulmonary/Chest: She has no wheezes. She has no rales.   Abdominal: There is tenderness.   Midline wound vac   Musculoskeletal:  She exhibits no edema.   Neurological: She is alert and oriented to person, place, and time.   Vitals reviewed.      Labs:  Recent Labs      03/10/17   0753   WBC  8.8   RBC  3.97*   HEMOGLOBIN  11.3*   HEMATOCRIT  36.2*   MCV  91.2   MCH  28.5   RDW  49.9   PLATELETCT  264   MPV  8.4*   NEUTSPOLYS  72.80*   LYMPHOCYTES  16.30*   MONOCYTES  5.90   EOSINOPHILS  3.30   BASOPHILS  0.50     Recent Labs      03/10/17   0753   SODIUM  138   POTASSIUM  4.2   CHLORIDE  106   CO2  29   GLUCOSE  121*   BUN  4*     Recent Labs      03/10/17   0753   ALBUMIN  2.6*   TBILIRUBIN  0.2   ALKPHOSPHAT  41   TOTPROTEIN  5.4*   ALTSGPT  8   ASTSGOT  11*   CREATININE  0.75        Imaging:  Ct-abdomen-pelvis With    3/5/2017  3/5/2017 12:45 PM HISTORY/REASON FOR EXAM:  Tender mass midabdomen. Evaluate for hernia TECHNIQUE/EXAM DESCRIPTION:  CT scan of the abdomen and pelvis with contrast. Contrast-enhanced helical scanning was obtained from the diaphragmatic domes through the pubic symphysis following the bolus administration of nonionic contrast without complication. 100 mL of Omnipaque 350 nonionic contrast was administered without complication. Low dose optimization technique was utilized for this CT exam including automated exposure control and adjustment of the mA and/or kV according to patient size. COMPARISON: CT abdomen and pelvis 1/15/2016 FINDINGS: Lung bases: Show mild atelectasis. Osseous structures:  There is facet arthropathy of the lumbar spine. Abdomen: There is a rim-enhancing fluid collection within the abdominal wall musculature within into the left midline. This fluid collection measures 7.8 x 3.9 cm transversely and spans a craniocaudal length of 10.4 cm There is a second slightly more posterior abdominal wall fluid collection measuring 7.8 x 1.8 cm transversely and spanning craniocaudal length of 8.7 cm. There is moderate surrounding inflammation. Both are suspicious for abscess. There is hepatomegaly with  craniocaudal length of 21 cm. There is no biliary dilation. The spleen is normal in appearance. The pancreas is normal in appearance. The splenic vein and portal vein enhance normally. Both adrenal glands are normal in appearance. The right kidney contains a simple cyst. The left kidney is normal in appearance. There is no hydronephrosis. Bowel and mesentery: Within normal limits. Specifically, colonic wall thickening/inflammation has resolved since prior CT scan. The aorta is normal in appearance. The terminal ileum is normal in appearance. The appendix is normal in appearance. Pelvis: Within normal limits. Uterus and adnexa appear normal.     3/5/2017  1.  Two rim enhancing abdominal wall fluid collection that are highly suspicious for abscess 2.  More cranial and superficial measures 10.4 x 7.8 x 3.9 cm 3.  More caudal and deeper measures 8.7 x 7.8 x 1.8 cm 4.  Resolution of colonic inflammation/wall thickening since most recent CT scan    Dx-chest-for Picc Line Perform Procedure In:: Picc Room    3/8/2017  3/8/2017 9:52 AM HISTORY/REASON FOR EXAM:  PICC line placement. TECHNIQUE/EXAM DESCRIPTION AND NUMBER OF VIEWS: Single view of the chest. COMPARISON: 9/14/2015 FINDINGS: A peripherally inserted catheter has been placed.  The tip projects appropriately over the superior vena cava near the cavoatrial juncture. Cardiomegaly. Hypoinflation and diffuse interstitial opacities. No pleural abnormalities are noted.     3/8/2017  1.  Right upper extremity PICC line tip overlies the SVC with tip near the cavoatrial juncture.. 2.  Hypoinflation and diffuse moderate atelectasis/possible interstitial edema.    Ir-picc Line Placement > Age 5    3/8/2017  HISTORY/REASON FOR EXAM:   PICC placement. TECHNIQUE/EXAM DESCRIPTION AND NUMBER OF VIEWS:   PICC line insertion with ultrasound guidance.  The procedure was performed using maximal sterile barrier technique including sterile gown, mask, cap, and donning of sterile gloves  following appropriate hand hygiene and/or sterile scrub. Patient skin site was prepped with 2% Chlorhexidine solution. FINDINGS:  PICC line insertion with Ultrasound Guidance was performed by qualified nursing staff without the assistance of a Radiologist.  A follow up chest x-ray will be performed to determine appropriateness of PICC positioning.     3/8/2017           Ultrasound-guided PICC placement performed by qualified nursing staff as above.     Medications:  Current Facility-Administered Medications   Medication Dose Frequency Provider Last Rate Last Dose   • NS infusion   Continuous MADELEINE OsunaOJordon 30 mL/hr at 03/12/17 0333     • enoxaparin (LOVENOX) inj 40 mg  40 mg DAILY MADELEINE OsunaOJordon   40 mg at 03/12/17 0746   • normal saline PF 10-20 mL  10-20 mL PRN Warner Figueroa D.O.       • polyethylene glycol/lytes (MIRALAX) PACKET 1 Packet  1 Packet DAILY Bala Huntley M.D.   1 Packet at 03/12/17 0747   • vancomycin 1,700 mg in  mL IVPB  1,700 mg Q24HR Angie Esquivel, PHARMD   Stopped at 03/11/17 2038   • trazodone (DESYREL) tablet 200 mg  200 mg QHS MADELEINE OsunaOJordon   200 mg at 03/11/17 2050   • nicotine (NICODERM) 14 MG/24HR 14 mg  14 mg Daily-0600 Bala Huntley M.D.   14 mg at 03/12/17 0540   • oxycodone immediate-release (ROXICODONE) tablet 5 mg  5 mg Q4HRS PRN Warner Figueroa D.O.        Or   • oxycodone immediate release (ROXICODONE) tablet 10 mg  10 mg Q3HRS PRN MADELEINE OsunaOJordon   10 mg at 03/12/17 1324   • senna-docusate (PERICOLACE or SENOKOT S) 8.6-50 MG per tablet 2 Tab  2 Tab BID Kourtney Conway M.D.   2 Tab at 03/12/17 0746    And   • polyethylene glycol/lytes (MIRALAX) PACKET 1 Packet  1 Packet QDAY PRN Kourtney Conway M.D.   1 Packet at 03/08/17 1504    And   • magnesium hydroxide (MILK OF MAGNESIA) suspension 30 mL  30 mL QDAY PRGRISELDA Conway M.D.   30 mL at 03/07/17 0819    And   • bisacodyl (DULCOLAX) suppository 10 mg  10 mg QDAY SWATHI Oconnell  "ZACK Conway       • Respiratory Care per Protocol   Continuous RT Kourtney Conway M.D.       • nicotine polacrilex (NICORETTE) 2 MG piece 2 mg  2 mg Q HOUR PRN Kourtney Conway M.D.       • clonazepam (KLONOPIN) tablet 2 mg  2 mg QHS Kourtney Conway M.D.   2 mg at 03/11/17 2050   • levothyroxine (SYNTHROID) tablet 200 mcg  200 mcg QAM Kourtney Conway M.D.   200 mcg at 03/12/17 0746   • paroxetine (PAXIL) tablet 20 mg  20 mg QHS Kourtney Conway M.D.   20 mg at 03/11/17 2100   • oxyCODONE CR (OXYCONTIN) tablet 20 mg  20 mg Q12HRS Kourtney Conway M.D.   20 mg at 03/12/17 0747   • MD ALERT... vancomycin per pharmacy protocol   PRN Kourtney Conway M.D.         Last reviewed on 3/5/2017  2:51 PM by Beau Rod R.N.    Micro:  Results     Procedure Component Value Units Date/Time    BLOOD CULTURE [[id number]] Collected:  03/05/17 2109    Order Status:  Completed Specimen Information:  Blood from Peripheral Updated:  03/10/17 2300     Significant Indicator NEG      Source BLD      Site PERIPHERAL      Blood Culture No growth after 5 days of incubation.     Narrative:      1 of 2 for Blood Culture x 2 sites order. Per Hospital  Policy: Only change Specimen Src: to \"Line\" if specified by  physician order.    BLOOD CULTURE [[id number]] Collected:  03/05/17 2109    Order Status:  Completed Specimen Information:  Blood from Peripheral Updated:  03/10/17 2300     Significant Indicator NEG      Source BLD      Site PERIPHERAL      Blood Culture No growth after 5 days of incubation.     Narrative:      2 of 2 blood culture x2  Sites order. Per Hospital Policy:  Only change Specimen Src: to \"Line\" if specified by physician  order.    CULTURE WOUND W/ GRAM STAIN [[id number]]  (Abnormal)  (Susceptibility) Collected:  03/05/17 1658    Order Status:  Completed Specimen Information:  Wound Updated:  03/09/17 1617     Significant Indicator POS (POS)      Source WND      Site Abdominal Wall Abscess/Mesh      Culture " Result Wound -- (A)      Gram Stain Result --      Result:        Many WBCs.  Moderate Gram positive cocci.       Culture Result Wound -- (A)      Result:        Methicillin Resistant Staphylococcus aureus  Moderate growth      Narrative:      CALL  Rodriguez  141 tel. [phone],  CALLED  141 tel. [phone] 03/07/2017, 08:05, RB PERF. RESULTS CALLED  TO:[id number] RN and faxed to [facility]    Culture & Susceptibility     METHICILLIN RESISTANT STAPHYLOCOCCUS AUREUS     Antibiotic Sensitivity Microscan Unit Status    Ampicillin/sulbactam Resistant 16/8 mcg/mL Final    Clindamycin Sensitive <=0.5 mcg/mL Final    Daptomycin Sensitive <=0.5 mcg/mL Final    Erythromycin Resistant >4 mcg/mL Final    Moxifloxacin Sensitive 2 mcg/mL Final    Oxacillin Resistant >2 mcg/mL Final    Penicillin Resistant >8 mcg/mL Final    Tetracycline Sensitive <=4 mcg/mL Final    Trimeth/Sulfa Sensitive <=0.5/9.5 mcg/mL Final    Vancomycin Sensitive 1 mcg/mL Final                       ANAEROBIC CULTURE [[id number]] Collected:  03/05/17 1658    Order Status:  Completed Specimen Information:  Wound Updated:  03/09/17 1617     Significant Indicator NEG      Source WND      Site Abdominal Wall Abscess/Mesh      Anaerobic Culture, Culture Res No Anaerobes isolated.     Narrative:      CALL  Rodriguez  141 tel. [phone],  CALLED  141 tel. [phone] 03/07/2017, 08:05, RB PERF. RESULTS CALLED  TO:[phone] RN and faxed to [facility]    FUNGAL CULTURE [[id number]] Collected:  03/05/17 1658    Order Status:  Completed Specimen Information:  Wound Updated:  03/09/17 1617     Significant Indicator NEG      Source WND      Site Abdominal Wall Abscess/Mesh      Fungal Culture Culture in progress.     Narrative:      CALL  Rodriguez  141 tel. [phone],  CALLED  141 tel. [phone] 03/07/2017, 08:05, RB PERF. RESULTS CALLED  TO:[phone] RN and faxed to [facility]    GRAM STAIN [[id number]] Collected:  03/05/17 1658    Order Status:  Completed Specimen Information:  Wound Updated:  03/05/17 2008      Significant Indicator .      Source WND      Site Abdominal Wall Abscess/Mesh      Gram Stain Result --      Result:        Many WBCs.  Moderate Gram positive cocci.            Assessment:  Active Hospital Problems    Diagnosis   • Abscess of abdominal wall [L02.211]   • Depression [F32.9]   • Tobacco abuse [Z72.0]   • Low back pain [M54.5]   • S/P thyroidectomy [E89.0]   • Psoriasis [L40.9]       Plan:    Abdominal  wall abscess  S/p drainage 3/5  Another I&D 3/9  Spoke to surgery- not able to remove the entire mesh  C/s are mrsa  Continue vanco  Aim for 4 weeks of  ivabx followed by oral  Because of the presence of infected mesh complete resolution may be difficult    Tobacco abuse  Counseled that it hinders wound healing    Obesity  Risk factor for hernia    Prognosis  guarded    Discussed with internal medicine

## 2017-03-12 NOTE — CARE PLAN
Problem: Safety  Goal: Will remain free from falls  Outcome: PROGRESSING AS EXPECTED  Pt has steady gait. Up self. Calls appropriately for needs        Problem: Pain Management  Goal: Pain level will decrease to patient’s comfort goal  Outcome: PROGRESSING AS EXPECTED  Pain is managed with morphine iv prn, oxycodone prn.

## 2017-03-12 NOTE — PROGRESS NOTES
Received report from off going RN.  Assumed patient care and introduced self to patient. Patient AOx4. No complaints of pain/discomfort at this time. Wound vac in place to abdominal wound with 125 mmHg suction. Bed in lowest position, bed locked, treaded socks in place, call light within reach. Pt ambulates independently with steady gait. Will continue to monitor.

## 2017-03-12 NOTE — PROGRESS NOTES
"  Trauma/Surgical Progress Note    Author: Wolf Ruggiero Date & Time created: 3/12/2017   9:00 AM     Interval Events:    Feeling better, would like to go home   Wound Vac functioning. Antibiotics per ID.  Disposition: DC when home health and outpatient antibiotic therapy arranged   Counseled     Review of Systems   Constitutional: Negative for fever and chills.   Respiratory: Positive for shortness of breath.    Cardiovascular: Negative for chest pain.   Gastrointestinal: Positive for abdominal pain. Negative for nausea and vomiting.        Incisional   3/12 BM   Genitourinary:        Voiding   Psychiatric/Behavioral: Positive for depression.     Hemodynamics:  Blood pressure 136/80, pulse 54, temperature 36.8 °C (98.3 °F), resp. rate 15, height 1.651 m (5' 5\"), weight 104.1 kg (229 lb 8 oz), last menstrual period 09/17/2007, SpO2 93 %, not currently breastfeeding.     Respiratory:    Respiration: 15, Pulse Oximetry: 93 %     Work Of Breathing / Effort: Mild  RUL Breath Sounds: Clear, RML Breath Sounds: Clear, RLL Breath Sounds: Diminished, MALENA Breath Sounds: Clear, LLL Breath Sounds: Diminished  Fluids:    Intake/Output Summary (Last 24 hours) at 03/12/17 0900  Last data filed at 03/12/17 0800   Gross per 24 hour   Intake   1080 ml   Output      0 ml   Net   1080 ml     Admit Weight: 100.699 kg (222 lb)  Current      Physical Exam   Constitutional: She is oriented to person, place, and time. No distress.   Obese   Pulmonary/Chest: She exhibits no tenderness.   Supplemental oxygen    Abdominal: There is tenderness.   Wound Vac functioning   Musculoskeletal: She exhibits no edema.   Neurological: She is alert and oriented to person, place, and time.   Skin: Skin is warm and dry. She is not diaphoretic.   Nursing note and vitals reviewed.      Medical Decision Making/Problem List:    Active Hospital Problems    Diagnosis   • Abscess of abdominal wall [L02.211]     Priority: High     3/5 To OR    - I&D of abdominal " wall deep subcutaneous abscess and subfascial abscess. Wound culture positive for MRSA.  - Excision of infected mesh portion.  - MicroMatrix veno graft application to complex open abdominal wall for open complex abdominal wall reinforcement  3/9 - Abdominal wound exploration, excision of infected foreign material and abdominal closure with Strattice mesh with negative pressure wound therapy.  Arranging wound vac and IV ABX outpatient  Antibiotics per ID       • Depression [F32.9]     Priority: Medium     Chronic condition treated with Klonopin,trazodone, Paxil, cymbalta .  Resume maintenance medication.       • Tobacco abuse [Z72.0]     Priority: Low   • Low back pain [M54.5]     Priority: Low     Diagnois update 10/1/2016     • S/P thyroidectomy [E89.0]     Priority: Low     Chronic condition treated with synthroid.  Resume maintenance medication.       • Psoriasis [L40.9]     Priority: Low     Core Measures & Quality Metrics:  Labs reviewed, Medications reviewed and Radiology images reviewed  Murrell catheter: No Murrell  Central line in place: Need for access    DVT Prophylaxis: Enoxaparin (Lovenox)    Ulcer prophylaxis: No  Antibiotics: Treating active infection/contamination beyond 24 hours perioperative coverage  Assessed for rehab: Patient returned to prior level of function, rehabilitation not indicated at this time    Total Score: 0    Discussed patient condition with RN, Patient and trauma surgery, Dr. Warner Figueroa.

## 2017-03-12 NOTE — PROGRESS NOTES
Patient is A&O x 4.  Wound vac is to suctioning and has no signs or symptoms of leaks.  Right arm PICC is TKO.  Ambulates independently.  Resting in bed with call light in reach, bed in lowest position, and non slip socks on.

## 2017-03-12 NOTE — CARE PLAN
Problem: Communication  Goal: The ability to communicate needs accurately and effectively will improve  Outcome: PROGRESSING AS EXPECTED  Pt able to effectively communicate needs, discomforts to nursing staff        Problem: Safety  Goal: Will remain free from injury  Outcome: PROGRESSING AS EXPECTED  Pt has not experienced injury during shift. Safety precautions in place

## 2017-03-12 NOTE — WOUND TEAM
[facility]  Inpatient Services  Progress Wound and Skin Care Evaluation    Admission Date:    3/5/17  HPI, PMH, SH: Reviewed  Unit where seen by Wound Team:  [facility]    WOUND CONSULT RELATED TO:  Surgery 03/09, wound team to continue VAC dressing changes     SUBJECTIVE:  Pleasant/agreeable    Self Report / Pain Level:   maggie well with pre-med    OBJECTIVE:   Prev wet-to-dry drsg was intact    WOUND TYPE, LOCATION, CHARACTERISTICS (Pressure ulcers: location, stage, POA or date identified)    Location and type of wound: Full thickness surgical wnd midline abdomen; POA        Periwound:      intact  Drainage:      Min serosanguinous  Tissue Type and %:     100% red/pink with white mesh at base  Wound Edges:     intact  Odor:       none  Exposed structure(s):   Mesh   S&S of Infection:    none      Measurements: (cm)   (Taken 3/11/17)   Length:      11.5 cm   Width:       6.5 cm    Depth:     3.5 cm with undermining following mesh      INTERVENTIONS BY WOUND TEAM:   Prev drsg removed -- wnd irrigated with wnd cleanser -- measurements and photograph done -- benzoin and drape ruben-wnd -- black foam to fill the wnd -- sealed with drape and cont neg pressure applied at 125mmhg    Interdisciplinary consultation: Sharri VERDIN RN, patient     EVALUATION:  Post op Abdominal wound exploration, excision of infected foreign material and abdominal closure with Strattice mesh with negative pressure wound therapy with Dr. Figueroa 03/09.  Spoke to Sharri VERDIN, plan to continue dressing changes.  Patient will DC next week with home VAC.          Factors affecting wound healing:  Abscess, anxiety       Goals:  Decrease in wound size by 1% each week.     NURSING PLAN OF CARE ORDERS (x):    Dressing changes: See Dressing Maintenance orders: x  Skin care: See Skin Care orders: x  Rectal tube care: See Rectal Tube Care orders:   Other orders:    WOUND TEAM PLAN OF CARE (x):   NPWT change 3 x week:      X  Dressing changes by wound  team:       Follow up as needed:     x  Other (explain):    Anticipated discharge plans (x):  SNF:           Home Care:         X  Outpatient Wound Center:            Self Care:            Other:

## 2017-03-12 NOTE — PROGRESS NOTES
"Pharmacy Kinetics 56 y.o. female on vancomycin day # 8  3/12/2017    Currently on Vancomycin 1200 mg iv q24hr (17:00)    Indication for Treatment: abdominal wall abscess    Pertinent history per medical record:    Admitted on 3/5/2017 for an abdominal wall abscess                          Pt has a history of necrotizing fasciitis                I&D conducted on 3/5                          MRSA grew in cultures - per surgery, likely x2 weeks of abx therapy     Allergies: Tape     List concerns for renal function:    BMI ~ 38   Albumin 2.6 g/dL    Pertinent cultures to date:    3/5/17 - wound (abdominal wall): MRSA (Vanc KATARZYNA 1)   3/5/17 - blood (peripheral) x 2: NGTD    Recent Labs      03/10/17   0753   WBC  8.8   NEUTSPOLYS  72.80*     Recent Labs      03/10/17   0753   BUN  4*   CREATININE  0.75   ALBUMIN  2.6*     Recent Labs      17   1656   VANCOTROUGH  12.3     Intake/Output Summary (Last 24 hours) at 17 0940  Last data filed at 17 0800   Gross per 24 hour   Intake   1080 ml   Output      0 ml   Net   1080 ml      Blood pressure 136/80, pulse 54, temperature 36.8 °C (98.3 °F), resp. rate 15, height 1.651 m (5' 5\"), weight 104.1 kg (229 lb 8 oz), last menstrual period 2007, SpO2 93 %, not currently breastfeeding. Temp (24hrs), Av.5 °C (97.7 °F), Min:36.2 °C (97.2 °F), Max:36.8 °C (98.3 °F)      A/P   1. Vancomycin dose change: not indicated   2. Next vancomycin level: 2-3 days   3. Goal trough: 12-16 mcg/mL  4. Comments: trough level was therapeutic at [facility] yesterday  a. NNL since 3/10; BMP ordered with AM labs   b. Hypotension resolved, 3/9: pt underwent excision of infected mesh on 3/9 with abdominal wound closure  c. Abx per ID- Plan is x4 weeks followed by oral   d. Final plans for displacement pending at present    Nirmala Hdez, SherleyD, BCPS        "

## 2017-03-12 NOTE — PROGRESS NOTES
Assumed care of pt  Awake, A&O x4  C/o abd pain  Medicated per MAR  2L 02 via NC. VS WNL  Up self to bathroom, no BM this shift, Mag citrate ordered  Wound vac to midline abd intact, vac on  Diet changed to regular- good appetite  PICC to [facility], dressing due to be change today. Line draw for lab

## 2017-03-12 NOTE — PROGRESS NOTES
Pharmacy Kinetics Addendum:    56 y.o. female on vancomycin day # 7 3/11/2017    Currently on Vancomycin 1700 mg iv q24hr    Indication for Treatment: Abd wall abscess - s/p I&D    Recent Labs      03/09/17   0230  03/10/17   0753   BUN  6*  4*   CREATININE  0.86  0.75   ALBUMIN   --   2.6*     Recent Labs      03/11/17   1656   VANCOTROUGH  12.3       A/P   1. Vancomycin dose change: Continue vancomycin 1700 mg (~16 mg/kg) IV Q24h (1700)  2. Next vancomycin level: to be scheduled by clinical pharmacist on rounds   3. Goal trough: 12-16 mcg/mL  4. Comments: Vancomycin trough level therapeutic at 12.3.  Plan to continue current regimen.  Pharmacy will continue to monitor.     Denilson Arnold, PharmD

## 2017-03-13 LAB
ANION GAP SERPL CALC-SCNC: 3 MMOL/L (ref 0–11.9)
BASOPHILS # BLD AUTO: 0.4 % (ref 0–1.8)
BASOPHILS # BLD: 0.03 K/UL (ref 0–0.12)
BUN SERPL-MCNC: 8 MG/DL (ref 8–22)
CALCIUM SERPL-MCNC: 8.5 MG/DL (ref 8.5–10.5)
CHLORIDE SERPL-SCNC: 99 MMOL/L (ref 96–112)
CO2 SERPL-SCNC: 33 MMOL/L (ref 20–33)
CREAT SERPL-MCNC: 0.78 MG/DL (ref 0.5–1.4)
EOSINOPHIL # BLD AUTO: 0.45 K/UL (ref 0–0.51)
EOSINOPHIL NFR BLD: 6.6 % (ref 0–6.9)
ERYTHROCYTE [DISTWIDTH] IN BLOOD BY AUTOMATED COUNT: 49.4 FL (ref 35.9–50)
GFR SERPL CREATININE-BSD FRML MDRD: >60 ML/MIN/1.73 M 2
GLUCOSE SERPL-MCNC: 92 MG/DL (ref 65–99)
HCT VFR BLD AUTO: 36.1 % (ref 37–47)
HGB BLD-MCNC: 11.4 G/DL (ref 12–16)
IMM GRANULOCYTES # BLD AUTO: 0.04 K/UL (ref 0–0.11)
IMM GRANULOCYTES NFR BLD AUTO: 0.6 % (ref 0–0.9)
LYMPHOCYTES # BLD AUTO: 1.84 K/UL (ref 1–4.8)
LYMPHOCYTES NFR BLD: 26.9 % (ref 22–41)
MCH RBC QN AUTO: 28.9 PG (ref 27–33)
MCHC RBC AUTO-ENTMCNC: 31.6 G/DL (ref 33.6–35)
MCV RBC AUTO: 91.4 FL (ref 81.4–97.8)
MONOCYTES # BLD AUTO: 0.47 K/UL (ref 0–0.85)
MONOCYTES NFR BLD AUTO: 6.9 % (ref 0–13.4)
NEUTROPHILS # BLD AUTO: 4 K/UL (ref 2–7.15)
NEUTROPHILS NFR BLD: 58.6 % (ref 44–72)
NRBC # BLD AUTO: 0 K/UL
NRBC BLD AUTO-RTO: 0 /100 WBC
PLATELET # BLD AUTO: 248 K/UL (ref 164–446)
PMV BLD AUTO: 8.8 FL (ref 9–12.9)
POTASSIUM SERPL-SCNC: 3.6 MMOL/L (ref 3.6–5.5)
RBC # BLD AUTO: 3.95 M/UL (ref 4.2–5.4)
SODIUM SERPL-SCNC: 135 MMOL/L (ref 135–145)
WBC # BLD AUTO: 6.8 K/UL (ref 4.8–10.8)

## 2017-03-13 PROCEDURE — A9270 NON-COVERED ITEM OR SERVICE: HCPCS | Performed by: NURSE PRACTITIONER

## 2017-03-13 PROCEDURE — 80048 BASIC METABOLIC PNL TOTAL CA: CPT

## 2017-03-13 PROCEDURE — 700111 HCHG RX REV CODE 636 W/ 250 OVERRIDE (IP)

## 2017-03-13 PROCEDURE — A9270 NON-COVERED ITEM OR SERVICE: HCPCS | Performed by: SURGERY

## 2017-03-13 PROCEDURE — 700102 HCHG RX REV CODE 250 W/ 637 OVERRIDE(OP): Performed by: INTERNAL MEDICINE

## 2017-03-13 PROCEDURE — 700102 HCHG RX REV CODE 250 W/ 637 OVERRIDE(OP): Performed by: HOSPITALIST

## 2017-03-13 PROCEDURE — 700111 HCHG RX REV CODE 636 W/ 250 OVERRIDE (IP): Performed by: SURGERY

## 2017-03-13 PROCEDURE — A9270 NON-COVERED ITEM OR SERVICE: HCPCS | Performed by: INTERNAL MEDICINE

## 2017-03-13 PROCEDURE — 85025 COMPLETE CBC W/AUTO DIFF WBC: CPT

## 2017-03-13 PROCEDURE — 700105 HCHG RX REV CODE 258

## 2017-03-13 PROCEDURE — A9270 NON-COVERED ITEM OR SERVICE: HCPCS | Performed by: HOSPITALIST

## 2017-03-13 PROCEDURE — 770021 HCHG ROOM/CARE - ISO PRIVATE

## 2017-03-13 PROCEDURE — 700102 HCHG RX REV CODE 250 W/ 637 OVERRIDE(OP): Performed by: NURSE PRACTITIONER

## 2017-03-13 PROCEDURE — 700102 HCHG RX REV CODE 250 W/ 637 OVERRIDE(OP): Performed by: SURGERY

## 2017-03-13 PROCEDURE — 97606 NEG PRS WND THER DME>50 SQCM: CPT

## 2017-03-13 RX ORDER — OXYCODONE HYDROCHLORIDE 10 MG/1
10 TABLET ORAL EVERY 4 HOURS PRN
Status: DISCONTINUED | OUTPATIENT
Start: 2017-03-13 | End: 2017-03-14

## 2017-03-13 RX ORDER — OXYCODONE HYDROCHLORIDE 5 MG/1
5 TABLET ORAL EVERY 4 HOURS PRN
Status: DISCONTINUED | OUTPATIENT
Start: 2017-03-13 | End: 2017-03-14

## 2017-03-13 RX ORDER — BUTALBITAL, ACETAMINOPHEN AND CAFFEINE 50; 325; 40 MG/1; MG/1; MG/1
1 TABLET ORAL EVERY 6 HOURS PRN
Status: DISCONTINUED | OUTPATIENT
Start: 2017-03-13 | End: 2017-03-14 | Stop reason: HOSPADM

## 2017-03-13 RX ORDER — LEVOTHYROXINE SODIUM 0.05 MG/1
200 TABLET ORAL ONCE
Status: COMPLETED | OUTPATIENT
Start: 2017-03-13 | End: 2017-03-13

## 2017-03-13 RX ADMIN — CLONAZEPAM 2 MG: 1 TABLET ORAL at 21:39

## 2017-03-13 RX ADMIN — LEVOTHYROXINE SODIUM 200 MCG: 50 TABLET ORAL at 08:08

## 2017-03-13 RX ADMIN — OXYCODONE HYDROCHLORIDE 10 MG: 10 TABLET ORAL at 11:03

## 2017-03-13 RX ADMIN — NICOTINE 14 MG: 14 PATCH TRANSDERMAL at 06:09

## 2017-03-13 RX ADMIN — TRAZODONE HYDROCHLORIDE 200 MG: 50 TABLET ORAL at 21:39

## 2017-03-13 RX ADMIN — OXYCODONE HYDROCHLORIDE 10 MG: 10 TABLET ORAL at 00:37

## 2017-03-13 RX ADMIN — PAROXETINE HYDROCHLORIDE 20 MG: 20 TABLET, FILM COATED ORAL at 21:00

## 2017-03-13 RX ADMIN — OXYCODONE HYDROCHLORIDE 20 MG: 20 TABLET, FILM COATED, EXTENDED RELEASE ORAL at 21:39

## 2017-03-13 RX ADMIN — OXYCODONE HYDROCHLORIDE 20 MG: 20 TABLET, FILM COATED, EXTENDED RELEASE ORAL at 07:52

## 2017-03-13 RX ADMIN — ENOXAPARIN SODIUM 40 MG: 40 INJECTION SUBCUTANEOUS at 07:51

## 2017-03-13 RX ADMIN — POLYETHYLENE GLYCOL 3350 1 PACKET: 17 POWDER, FOR SOLUTION ORAL at 07:52

## 2017-03-13 RX ADMIN — STANDARDIZED SENNA CONCENTRATE AND DOCUSATE SODIUM 2 TABLET: 8.6; 5 TABLET, FILM COATED ORAL at 21:39

## 2017-03-13 RX ADMIN — STANDARDIZED SENNA CONCENTRATE AND DOCUSATE SODIUM 2 TABLET: 8.6; 5 TABLET, FILM COATED ORAL at 07:52

## 2017-03-13 RX ADMIN — OXYCODONE HYDROCHLORIDE 10 MG: 10 TABLET ORAL at 06:09

## 2017-03-13 RX ADMIN — OXYCODONE HYDROCHLORIDE 10 MG: 10 TABLET ORAL at 16:23

## 2017-03-13 RX ADMIN — VANCOMYCIN HYDROCHLORIDE 1700 MG: 10 INJECTION, POWDER, LYOPHILIZED, FOR SOLUTION INTRAVENOUS at 16:24

## 2017-03-13 ASSESSMENT — PAIN SCALES - GENERAL
PAINLEVEL_OUTOF10: 5
PAINLEVEL_OUTOF10: 6
PAINLEVEL_OUTOF10: 4
PAINLEVEL_OUTOF10: 3
PAINLEVEL_OUTOF10: 6
PAINLEVEL_OUTOF10: 6
PAINLEVEL_OUTOF10: 3

## 2017-03-13 ASSESSMENT — ENCOUNTER SYMPTOMS
DEPRESSION: 1
CHILLS: 0
SPEECH CHANGE: 0
FEVER: 0
SENSORY CHANGE: 0
ABDOMINAL PAIN: 1
HEADACHES: 0
MYALGIAS: 0
SHORTNESS OF BREATH: 1
SHORTNESS OF BREATH: 0
COUGH: 0
ROS GI COMMENTS: STABLE
NAUSEA: 0
VOMITING: 0

## 2017-03-13 NOTE — PROGRESS NOTES
Assumed care of pt.   Remained on contact isolation  Resting in bed comfortably  Pain tolerable, oxycodone recently given  No pain intervention at this time  KVO infusing to Rt PICC  Call bell within reach  Therapy dog in bed, family at bedside

## 2017-03-13 NOTE — PROGRESS NOTES
"Pharmacy Kinetics 56 y.o. female on vancomycin day # 9 3/13/2017    Currently on Vancomycin 1700 mg iv q24hr    Indication for Treatment: Abdominal wall abscess    Pertinent history per medical record: Admitted on 3/5/2017 for abdominal wall abscess. Surgical I&D performed 3/5 - wound cultures grew MRSA. ID following for antibiotic recommendations.    Other antibiotics: None    Allergies: Tape     List concerns for renal function: Obesity, low albumin    Pertinent cultures to date:     3/5/17 PBC x 2      NGTD  3/5/17 Wound (abdominal)     MRSA (vancomycin KATARZYNA = 1)    Recent Labs      17   0050   WBC  6.8   NEUTSPOLYS  58.60     Recent Labs      17   0050   BUN  8   CREATININE  0.78     Recent Labs      17   1656   VANCOTROUGH  12.3     Intake/Output Summary (Last 24 hours) at 17 0835  Last data filed at 17 0600   Gross per 24 hour   Intake    940 ml   Output      0 ml   Net    940 ml      Blood pressure 125/77, pulse 60, temperature 37.2 °C (98.9 °F), resp. rate 17, height 1.651 m (5' 5\"), weight 104.1 kg (229 lb 8 oz), last menstrual period 2007, SpO2 90 %, not currently breastfeeding. Temp (24hrs), Av.1 °C (98.7 °F), Min:37 °C (98.6 °F), Max:37.2 °C (98.9 °F)      A/P   1. Vancomycin dose change: Continue 1700 mg q24h  2. Next vancomycin level: Trough in ~2 days  3. Goal trough: 12-16 mcg/mL  4. Comments: Therapy with vancomycin continues per ID recommendations. Renal indices stable since last evaluated, last trough level within appropriate range. Will continue current scheduled dosing and follow up on trough level again in a couple days to ensure continued clearance. Pharmacy will continue to follow.     Thomas LepeD, BCPS      "

## 2017-03-13 NOTE — PROGRESS NOTES
"  Trauma/Surgical Progress Note    Author: Sharri Murrell Date & Time created: 3/13/2017   9:50 AM     Interval Events:  No significant interval events  Wound vac functional  Tolerating diet, (+)BM  ABX per ID   Discharge home when wound vac and outpatient ABX infusion arranged     Review of Systems   Constitutional: Negative for fever and chills.   Respiratory: Positive for shortness of breath.    Cardiovascular: Negative for chest pain.   Gastrointestinal: Positive for abdominal pain. Negative for nausea and vomiting.        Incisional   3/12 BM   Genitourinary:        Voiding   Psychiatric/Behavioral: Positive for depression.     Hemodynamics:  Blood pressure 125/77, pulse 60, temperature 37.2 °C (98.9 °F), resp. rate 17, height 1.651 m (5' 5\"), weight 104.1 kg (229 lb 8 oz), last menstrual period 09/17/2007, SpO2 90 %, not currently breastfeeding.     Respiratory:    Respiration: 17, Pulse Oximetry: 90 %     Work Of Breathing / Effort: Mild  RUL Breath Sounds: Clear, RML Breath Sounds: Clear, RLL Breath Sounds: Diminished, MALENA Breath Sounds: Clear, LLL Breath Sounds: Diminished  Fluids:    Intake/Output Summary (Last 24 hours) at 03/13/17 0950  Last data filed at 03/13/17 0600   Gross per 24 hour   Intake    940 ml   Output      0 ml   Net    940 ml     Admit Weight: 100.699 kg (222 lb)  Current      Physical Exam   Constitutional: She is oriented to person, place, and time. She appears well-developed. No distress.   Obese   Cardiovascular: Normal rate.    Pulmonary/Chest: She exhibits no tenderness.   Supplemental oxygen    Abdominal: There is tenderness.   Wound Vac functioning   Musculoskeletal: She exhibits no edema.   Neurological: She is alert and oriented to person, place, and time.   Skin: Skin is warm and dry. She is not diaphoretic.   Nursing note and vitals reviewed.      Medical Decision Making/Problem List:    Active Hospital Problems    Diagnosis   • Abscess of abdominal wall [L02.211]     Priority: " High     3/5 To OR    - I&D of abdominal wall deep subcutaneous abscess and subfascial abscess. Wound culture positive for MRSA.  - Excision of infected mesh portion.  - MicroMatrix veno graft application to complex open abdominal wall for open complex abdominal wall reinforcement  3/9 - Abdominal wound exploration, excision of infected foreign material and abdominal closure with Strattice mesh with negative pressure wound therapy.  Arranging wound vac and IV ABX outpatient  Antibiotics per ID        • Depression [F32.9]     Priority: Medium     Chronic condition treated with Klonopin,trazodone, Paxil, cymbalta .  Resume maintenance medication.        • Tobacco abuse [Z72.0]     Priority: Low   • Low back pain [M54.5]     Priority: Low     Diagnois update 10/1/2016     • S/P thyroidectomy [E89.0]     Priority: Low     Chronic condition treated with synthroid.  Resume maintenance medication.        • Psoriasis [L40.9]     Priority: Low     Core Measures & Quality Metrics:  Labs reviewed, Medications reviewed and Radiology images reviewed  Murrell catheter: No Murrell  Central line in place: Need for access    DVT Prophylaxis: Enoxaparin (Lovenox)    Ulcer prophylaxis: No  Antibiotics: Treating active infection/contamination beyond 24 hours perioperative coverage  Assessed for rehab: Patient returned to prior level of function, rehabilitation not indicated at this time    Total Score: 0    Discussed patient condition with RN, Patient and general surgery. Dr. Figueroa

## 2017-03-13 NOTE — CARE PLAN
Problem: Pain Management  Goal: Pain level will decrease to patient’s comfort goal  Outcome: PROGRESSING AS EXPECTED  Pt has pain in abdomen, receiving PRN pain meds per MAR, repositioned for comfort. Non-pharmacologic options offered.    Problem: Skin Integrity  Goal: Risk for impaired skin integrity will decrease  Outcome: PROGRESSING AS EXPECTED  Woundvac in place to mid abdomen, drsg intact, to suction, minimal output.

## 2017-03-13 NOTE — PROGRESS NOTES
Infectious Disease Progress Note    Author: Olga Duffy M.D.    DOS & Time created: 3/13/2017  9:46 AM        Chief Complaint   Patient presents with   • Abdominal Pain     started on Friday in RUQ.  Pt has palpable hardened mass to abdomen which she states is the origin of the pain. Pt reports this mass moved over to the L upper side of her abdomen yesterday.   • Constipation     x 2 weeks.  Pt reported to EMS she took senokot, stool softener, and a handful of other laxatives and had 1/2 cup watery BM this morning, other wise last BM 2 weeks ago.     F/u mrsa mesh infection    Interval History:  3/10- no fevers. C/o abdominal pain. Wbc 9  3/12- no fevers. Wants to know the plan  3/13 AF, WBC 6.8, feels tired and asking to see Dr. Figueroa, wants to do home IV abx, abd pain stable  Labs Reviewed, Medications Reviewed, Radiology Reviewed and Wound Reviewed.    Review of Systems:  Review of Systems   Constitutional: Positive for malaise/fatigue. Negative for fever.   Respiratory: Negative for cough and shortness of breath.    Cardiovascular: Negative for chest pain and leg swelling.   Gastrointestinal: Positive for abdominal pain. Negative for nausea and vomiting.        Stable   Genitourinary: Negative for dysuria.   Musculoskeletal: Negative for myalgias.   Neurological: Negative for sensory change, speech change and headaches.   All other systems reviewed and are negative.      Hemodynamics:  Temp (24hrs), Av.1 °C (98.7 °F), Min:37 °C (98.6 °F), Max:37.2 °C (98.9 °F)  Temperature: 37.2 °C (98.9 °F)  Pulse  Av.8  Min: 52  Max: 82   Blood Pressure: 125/77 mmHg       Physical Exam:  Physical Exam   Constitutional: She is oriented to person, place, and time. No distress.   HENT:   Mouth/Throat: No oropharyngeal exudate.   Eyes: No scleral icterus.   Neck: Neck supple.   Cardiovascular: Regular rhythm.    No murmur heard.  Pulmonary/Chest: She has no wheezes. She has no rales.   Abdominal: She exhibits no  distension. There is tenderness.   Midline wound vac    TTP improving   Musculoskeletal: She exhibits no edema.   RUE PICC nontender   Neurological: She is alert and oriented to person, place, and time.   Vitals reviewed.      Labs:  Recent Labs      03/13/17   0050   WBC  6.8   RBC  3.95*   HEMOGLOBIN  11.4*   HEMATOCRIT  36.1*   MCV  91.4   MCH  28.9   RDW  49.4   PLATELETCT  248   MPV  8.8*   NEUTSPOLYS  58.60   LYMPHOCYTES  26.90   MONOCYTES  6.90   EOSINOPHILS  6.60   BASOPHILS  0.40     Recent Labs      03/13/17   0050   SODIUM  135   POTASSIUM  3.6   CHLORIDE  99   CO2  33   GLUCOSE  92   BUN  8     Recent Labs      03/13/17   0050   CREATININE  0.78        Imaging:  Ct-abdomen-pelvis With    3/5/2017  3/5/2017 12:45 PM HISTORY/REASON FOR EXAM:  Tender mass midabdomen. Evaluate for hernia TECHNIQUE/EXAM DESCRIPTION:  CT scan of the abdomen and pelvis with contrast. Contrast-enhanced helical scanning was obtained from the diaphragmatic domes through the pubic symphysis following the bolus administration of nonionic contrast without complication. 100 mL of Omnipaque 350 nonionic contrast was administered without complication. Low dose optimization technique was utilized for this CT exam including automated exposure control and adjustment of the mA and/or kV according to patient size. COMPARISON: CT abdomen and pelvis 1/15/2016 FINDINGS: Lung bases: Show mild atelectasis. Osseous structures:  There is facet arthropathy of the lumbar spine. Abdomen: There is a rim-enhancing fluid collection within the abdominal wall musculature within into the left midline. This fluid collection measures 7.8 x 3.9 cm transversely and spans a craniocaudal length of 10.4 cm There is a second slightly more posterior abdominal wall fluid collection measuring 7.8 x 1.8 cm transversely and spanning craniocaudal length of 8.7 cm. There is moderate surrounding inflammation. Both are suspicious for abscess. There is hepatomegaly with  craniocaudal length of 21 cm. There is no biliary dilation. The spleen is normal in appearance. The pancreas is normal in appearance. The splenic vein and portal vein enhance normally. Both adrenal glands are normal in appearance. The right kidney contains a simple cyst. The left kidney is normal in appearance. There is no hydronephrosis. Bowel and mesentery: Within normal limits. Specifically, colonic wall thickening/inflammation has resolved since prior CT scan. The aorta is normal in appearance. The terminal ileum is normal in appearance. The appendix is normal in appearance. Pelvis: Within normal limits. Uterus and adnexa appear normal.     3/5/2017  1.  Two rim enhancing abdominal wall fluid collection that are highly suspicious for abscess 2.  More cranial and superficial measures 10.4 x 7.8 x 3.9 cm 3.  More caudal and deeper measures 8.7 x 7.8 x 1.8 cm 4.  Resolution of colonic inflammation/wall thickening since most recent CT scan    Dx-chest-for Picc Line Perform Procedure In:: Picc Room    3/8/2017  3/8/2017 9:52 AM HISTORY/REASON FOR EXAM:  PICC line placement. TECHNIQUE/EXAM DESCRIPTION AND NUMBER OF VIEWS: Single view of the chest. COMPARISON: 9/14/2015 FINDINGS: A peripherally inserted catheter has been placed.  The tip projects appropriately over the superior vena cava near the cavoatrial juncture. Cardiomegaly. Hypoinflation and diffuse interstitial opacities. No pleural abnormalities are noted.     3/8/2017  1.  Right upper extremity PICC line tip overlies the SVC with tip near the cavoatrial juncture.. 2.  Hypoinflation and diffuse moderate atelectasis/possible interstitial edema.    Ir-picc Line Placement > Age 5    3/8/2017  HISTORY/REASON FOR EXAM:   PICC placement. TECHNIQUE/EXAM DESCRIPTION AND NUMBER OF VIEWS:   PICC line insertion with ultrasound guidance.  The procedure was performed using maximal sterile barrier technique including sterile gown, mask, cap, and donning of sterile gloves  following appropriate hand hygiene and/or sterile scrub. Patient skin site was prepped with 2% Chlorhexidine solution. FINDINGS:  PICC line insertion with Ultrasound Guidance was performed by qualified nursing staff without the assistance of a Radiologist.  A follow up chest x-ray will be performed to determine appropriateness of PICC positioning.     3/8/2017           Ultrasound-guided PICC placement performed by qualified nursing staff as above.     Medications:  Current Facility-Administered Medications   Medication Dose Frequency Provider Last Rate Last Dose   • NS infusion   Continuous MADELEINE OsunaOJordon 30 mL/hr at 03/12/17 0333     • enoxaparin (LOVENOX) inj 40 mg  40 mg DAILY MADELEINE OsunaOJordon   40 mg at 03/13/17 0751   • normal saline PF 10-20 mL  10-20 mL PRN Warner Figueroa D.O.       • polyethylene glycol/lytes (MIRALAX) PACKET 1 Packet  1 Packet DAILY Bala Huntley M.D.   1 Packet at 03/13/17 0752   • vancomycin 1,700 mg in  mL IVPB  1,700 mg Q24HR Angie Esquivel, PHARMD   Stopped at 03/12/17 1922   • trazodone (DESYREL) tablet 200 mg  200 mg QHS MADELEINE OsunaOJordon   200 mg at 03/12/17 2020   • nicotine (NICODERM) 14 MG/24HR 14 mg  14 mg Daily-0600 Bala Huntley M.D.   14 mg at 03/13/17 0609   • oxycodone immediate-release (ROXICODONE) tablet 5 mg  5 mg Q4HRS PRN Warner Figueroa D.O.        Or   • oxycodone immediate release (ROXICODONE) tablet 10 mg  10 mg Q3HRS PRN MADELEINE OsunaOJordon   10 mg at 03/13/17 0609   • senna-docusate (PERICOLACE or SENOKOT S) 8.6-50 MG per tablet 2 Tab  2 Tab BID Kourtney Conway M.D.   2 Tab at 03/13/17 0752    And   • polyethylene glycol/lytes (MIRALAX) PACKET 1 Packet  1 Packet QDAY PRN Kourtney Conway M.D.   1 Packet at 03/08/17 1504    And   • magnesium hydroxide (MILK OF MAGNESIA) suspension 30 mL  30 mL QDAY SWATHI Conway M.D.   30 mL at 03/07/17 0819    And   • bisacodyl (DULCOLAX) suppository 10 mg  10 mg QDAY SWATHI Oconnell  "ZACK Conway       • Respiratory Care per Protocol   Continuous RT Kourtney Conway M.D.       • nicotine polacrilex (NICORETTE) 2 MG piece 2 mg  2 mg Q HOUR PRN Kourtney Conway M.D.       • clonazepam (KLONOPIN) tablet 2 mg  2 mg QHS Kourtney Conway M.D.   2 mg at 03/12/17 2020   • levothyroxine (SYNTHROID) tablet 200 mcg  200 mcg QAM Kourtney Conway M.D.   Stopped at 03/13/17 0900   • paroxetine (PAXIL) tablet 20 mg  20 mg QHS Kourtney Conway M.D.   20 mg at 03/12/17 2023   • oxyCODONE CR (OXYCONTIN) tablet 20 mg  20 mg Q12HRS Kourtney Conway M.D.   20 mg at 03/13/17 0752   • MD ALERT... vancomycin per pharmacy protocol   PRN Kourtney Conway M.D.         Last reviewed on 3/5/2017  2:51 PM by Beau Rod R.N.    Micro:  Results     Procedure Component Value Units Date/Time    BLOOD CULTURE [[id number]] Collected:  03/05/17 2109    Order Status:  Completed Specimen Information:  Blood from Peripheral Updated:  03/10/17 2300     Significant Indicator NEG      Source BLD      Site PERIPHERAL      Blood Culture No growth after 5 days of incubation.     Narrative:      1 of 2 for Blood Culture x 2 sites order. Per Hospital  Policy: Only change Specimen Src: to \"Line\" if specified by  physician order.    BLOOD CULTURE [[id number]] Collected:  03/05/17 2109    Order Status:  Completed Specimen Information:  Blood from Peripheral Updated:  03/10/17 2300     Significant Indicator NEG      Source BLD      Site PERIPHERAL      Blood Culture No growth after 5 days of incubation.     Narrative:      2 of 2 blood culture x2  Sites order. Per Hospital Policy:  Only change Specimen Src: to \"Line\" if specified by physician  order.    CULTURE WOUND W/ GRAM STAIN [[id number]]  (Abnormal)  (Susceptibility) Collected:  03/05/17 1658    Order Status:  Completed Specimen Information:  Wound Updated:  03/09/17 1617     Significant Indicator POS (POS)      Source WND      Site Abdominal Wall Abscess/Mesh      Culture " Result Wound -- (A)      Gram Stain Result --      Result:        Many WBCs.  Moderate Gram positive cocci.       Culture Result Wound -- (A)      Result:        Methicillin Resistant Staphylococcus aureus  Moderate growth      Narrative:      CALL  Rodriguez  141 tel. [phone],  CALLED  141 tel. [phone] 03/07/2017, 08:05, RB PERF. RESULTS CALLED  TO:[id number] RN and faxed to [facility]    Culture & Susceptibility     METHICILLIN RESISTANT STAPHYLOCOCCUS AUREUS     Antibiotic Sensitivity Microscan Unit Status    Ampicillin/sulbactam Resistant 16/8 mcg/mL Final    Clindamycin Sensitive <=0.5 mcg/mL Final    Daptomycin Sensitive <=0.5 mcg/mL Final    Erythromycin Resistant >4 mcg/mL Final    Moxifloxacin Sensitive 2 mcg/mL Final    Oxacillin Resistant >2 mcg/mL Final    Penicillin Resistant >8 mcg/mL Final    Tetracycline Sensitive <=4 mcg/mL Final    Trimeth/Sulfa Sensitive <=0.5/9.5 mcg/mL Final    Vancomycin Sensitive 1 mcg/mL Final                       ANAEROBIC CULTURE [[id number]] Collected:  03/05/17 1658    Order Status:  Completed Specimen Information:  Wound Updated:  03/09/17 1617     Significant Indicator NEG      Source WND      Site Abdominal Wall Abscess/Mesh      Anaerobic Culture, Culture Res No Anaerobes isolated.     Narrative:      CALL  Rodriguez  141 tel. [phone],  CALLED  141 tel. [phone] 03/07/2017, 08:05, RB PERF. RESULTS CALLED  TO:[id number] RN and faxed to [facility]    FUNGAL CULTURE [[id number]] Collected:  03/05/17 1658    Order Status:  Completed Specimen Information:  Wound Updated:  03/09/17 1617     Significant Indicator NEG      Source WND      Site Abdominal Wall Abscess/Mesh      Fungal Culture Culture in progress.     Narrative:      CALL  Rodriguez  141 tel. [phone],  CALLED  141 tel. [phone] 03/07/2017, 08:05, RB PERF. RESULTS CALLED  TO:[id number] RN and faxed to [facility]          Assessment:  Active Hospital Problems    Diagnosis   • Abscess of abdominal wall [L02.211]   • Depression [F32.9]   •  Tobacco abuse [Z72.0]   • Low back pain [M54.5]   • Psoriasis [L40.9]       Plan:  MRSA abdominal wall abscess  S/p drainage 3/5  Another I&D 3/9  Spoke to surgery - not able to remove the entire mesh  C/s are mrsa  Wound vac  Continue vanco  Aim for 4 weeks of IV abx with stop date of 04/06/17 followed by oral abx  Because of the presence of infected mesh complete resolution may be difficult  IV abx orders for daptomycin given to MSW today  Give daptomycin 6mg/kg x 1 prior to discharge    Tobacco abuse  Counseled that it hinders wound healing    Obesity  Risk factor for hernia    Prognosis  guarded    Discussed with internal medicine

## 2017-03-13 NOTE — PROGRESS NOTES
Report received at 1900 by day RN, plan of care discussed, assumed care and assessed pt.  Pt calls for assistance and is a up self no assist. Call light within reach, appropriate signs on door. Hourly rounding in place. Pt denies further needs at this time. Mid line wound vac to suction, R PICC line for abx. Contact iso for MRSA.

## 2017-03-14 VITALS
TEMPERATURE: 97.3 F | BODY MASS INDEX: 38.24 KG/M2 | HEART RATE: 71 BPM | WEIGHT: 229.5 LBS | RESPIRATION RATE: 18 BRPM | SYSTOLIC BLOOD PRESSURE: 157 MMHG | OXYGEN SATURATION: 93 % | DIASTOLIC BLOOD PRESSURE: 83 MMHG | HEIGHT: 65 IN

## 2017-03-14 LAB — CK SERPL-CCNC: 62 U/L (ref 0–154)

## 2017-03-14 PROCEDURE — 700102 HCHG RX REV CODE 250 W/ 637 OVERRIDE(OP): Performed by: NURSE PRACTITIONER

## 2017-03-14 PROCEDURE — 700102 HCHG RX REV CODE 250 W/ 637 OVERRIDE(OP): Performed by: HOSPITALIST

## 2017-03-14 PROCEDURE — A9270 NON-COVERED ITEM OR SERVICE: HCPCS | Performed by: INTERNAL MEDICINE

## 2017-03-14 PROCEDURE — 700111 HCHG RX REV CODE 636 W/ 250 OVERRIDE (IP): Performed by: SURGERY

## 2017-03-14 PROCEDURE — A9270 NON-COVERED ITEM OR SERVICE: HCPCS | Performed by: HOSPITALIST

## 2017-03-14 PROCEDURE — 700111 HCHG RX REV CODE 636 W/ 250 OVERRIDE (IP): Performed by: INTERNAL MEDICINE

## 2017-03-14 PROCEDURE — A9270 NON-COVERED ITEM OR SERVICE: HCPCS | Performed by: NURSE PRACTITIONER

## 2017-03-14 PROCEDURE — 82550 ASSAY OF CK (CPK): CPT

## 2017-03-14 PROCEDURE — 700102 HCHG RX REV CODE 250 W/ 637 OVERRIDE(OP): Performed by: INTERNAL MEDICINE

## 2017-03-14 PROCEDURE — 700105 HCHG RX REV CODE 258: Performed by: INTERNAL MEDICINE

## 2017-03-14 RX ORDER — OXYCODONE HYDROCHLORIDE 10 MG/1
10 TABLET ORAL EVERY 4 HOURS PRN
Status: DISCONTINUED | OUTPATIENT
Start: 2017-03-14 | End: 2017-03-14 | Stop reason: HOSPADM

## 2017-03-14 RX ORDER — CLONAZEPAM 1 MG/1
1 TABLET ORAL
COMMUNITY

## 2017-03-14 RX ORDER — OXYCODONE HCL 10 MG/1
10 TABLET, FILM COATED, EXTENDED RELEASE ORAL EVERY 12 HOURS
Status: DISCONTINUED | OUTPATIENT
Start: 2017-03-14 | End: 2017-03-14 | Stop reason: HOSPADM

## 2017-03-14 RX ORDER — OXYCODONE HYDROCHLORIDE 5 MG/1
5 TABLET ORAL EVERY 4 HOURS PRN
Status: DISCONTINUED | OUTPATIENT
Start: 2017-03-14 | End: 2017-03-14 | Stop reason: HOSPADM

## 2017-03-14 RX ORDER — CLONAZEPAM 0.5 MG/1
.5-1 TABLET ORAL
Status: ON HOLD | COMMUNITY
End: 2017-03-14 | Stop reason: CLARIF

## 2017-03-14 RX ADMIN — ENOXAPARIN SODIUM 40 MG: 40 INJECTION SUBCUTANEOUS at 07:56

## 2017-03-14 RX ADMIN — DAPTOMYCIN 630 MG: 500 INJECTION, POWDER, LYOPHILIZED, FOR SOLUTION INTRAVENOUS at 07:56

## 2017-03-14 RX ADMIN — LEVOTHYROXINE SODIUM 200 MCG: 50 TABLET ORAL at 07:55

## 2017-03-14 RX ADMIN — NICOTINE 14 MG: 14 PATCH TRANSDERMAL at 06:23

## 2017-03-14 RX ADMIN — OXYCODONE HYDROCHLORIDE 20 MG: 20 TABLET, FILM COATED, EXTENDED RELEASE ORAL at 07:55

## 2017-03-14 RX ADMIN — OXYCODONE HYDROCHLORIDE 10 MG: 10 TABLET ORAL at 07:56

## 2017-03-14 RX ADMIN — POLYETHYLENE GLYCOL 3350 1 PACKET: 17 POWDER, FOR SOLUTION ORAL at 07:56

## 2017-03-14 RX ADMIN — STANDARDIZED SENNA CONCENTRATE AND DOCUSATE SODIUM 2 TABLET: 8.6; 5 TABLET, FILM COATED ORAL at 07:55

## 2017-03-14 ASSESSMENT — ENCOUNTER SYMPTOMS
FEVER: 0
ABDOMINAL PAIN: 1
SENSORY CHANGE: 0
SHORTNESS OF BREATH: 1
ROS GI COMMENTS: STABLE
VOMITING: 0
HEADACHES: 0
CHILLS: 0
COUGH: 0
MYALGIAS: 0
DEPRESSION: 1
NAUSEA: 0
SPEECH CHANGE: 0
SHORTNESS OF BREATH: 0

## 2017-03-14 ASSESSMENT — PAIN SCALES - GENERAL: PAINLEVEL_OUTOF10: 6

## 2017-03-14 NOTE — DISCHARGE INSTRUCTIONS
- Call or seek medical attention for questions or concerns  - Follow up with Dr. Figueroa on 3/22/17. Bring wound vac dressing supplies and wound vac with you to this visit.   - Follow up with IV infusion center daily, starting 3/15/17 for Daptomycin infusion. PICC  will be done here. Leave dressing in place. Do not submerge line. Do not inject anything into line.   - Follow up with primary care provider within one weeks time  - Home health to manage woundvac  - Resume regular diet  - Continue daily over the counter stool softener while on narcotics  - No operation of machinery or motorized vehicles while under the influence of narcotics  - No alcohol use while under the influence of narcotics  - No swimming, hot tubs, baths or wound submersion until cleared by outpatient provider. May shower.   - No contact sports, strenuous activities, or heavy lifting until cleared by outpatient provider  - If respiratory decompensation, increase in pain or changes in bowels, or signs or symptoms of infection occur seek medical attention    Discharge Instructions    Discharged to home by car with relative. Discharged via wheelchair, hospital escort: Yes.  Special equipment needed: Oxygen, wound vac    Be sure to schedule a follow-up appointment with your primary care doctor or any specialists as instructed.     Discharge Plan:   Diet Plan: Discussed  Activity Level: Discussed  Smoking Cessation Offered: Patient Counseled  Confirmed Follow up Appointment: Patient to Call and Schedule Appointment  Confirmed Symptoms Management: Discussed  Medication Reconciliation Updated: Yes  Influenza Vaccine Indication: Patient Refuses    I understand that a diet low in cholesterol, fat, and sodium is recommended for good health. Unless I have been given specific instructions below for another diet, I accept this instruction as my diet prescription.   Other diet: Regular Diet    Special Instructions: None    · Is patient  discharged on Warfarin / Coumadin?   No     · Is patient Post Blood Transfusion?  No    Depression / Suicide Risk    As you are discharged from this [facility] facility, it is important to learn how to keep safe from harming yourself.    Recognize the warning signs:  · Abrupt changes in personality, positive or negative- including increase in energy   · Giving away possessions  · Change in eating patterns- significant weight changes-  positive or negative  · Change in sleeping patterns- unable to sleep or sleeping all the time   · Unwillingness or inability to communicate  · Depression  · Unusual sadness, discouragement and loneliness  · Talk of wanting to die  · Neglect of personal appearance   · Rebelliousness- reckless behavior  · Withdrawal from people/activities they love  · Confusion- inability to concentrate     If you or a loved one observes any of these behaviors or has concerns about self-harm, here's what you can do:  · Talk about it- your feelings and reasons for harming yourself  · Remove any means that you might use to hurt yourself (examples: pills, rope, extension cords, firearm)  · Get professional help from the community (Mental Health, Substance Abuse, psychological counseling)  · Do not be alone:Call your Safe Contact- someone whom you trust who will be there for you.  · Call your local CRISIS HOTLINE 861-7258 or 653-405-1464  · Call your local Children's Mobile Crisis Response Team [place] (508) 603-3236 or www.CommitChange  · Call the toll free National Suicide Prevention Hotlines   · National Suicide Prevention Lifeline 602-178-FGFQ (1208)  · National Hope Line Network 800-SUICIDE (617-5416)

## 2017-03-14 NOTE — WOUND TEAM
[facility]  Inpatient Services  Wound and Skin Care Progress Note    Admission Date:    3/5/17  HPI, PMH, SH: Reviewed  Unit where seen by Wound Team:   T4    WOUND CONSULT RELATED TO:  Scheduled npwt drsg change    SUBJECTIVE:  Pleasant/agreeable    Self Report / Pain Level:   maggie well with pre-med    OBJECTIVE:   Prev drsg intact    WOUND TYPE, LOCATION, CHARACTERISTICS (Pressure ulcers: location, stage, POA or date identified)    Location and type of wound: Full thickness surgical wnd midline abdomen; POA        Periwound:      intact  Drainage:      Scant serosanguinous  Tissue Type and %:     Edges 100% red/pink -- base is white mesh  Wound Edges:     intact  Odor:       none  Exposed structure(s):   adipose  S&S of Infection:    none      Measurements: (cm)   Taken 3/11/17  Length:      11.5  Width:       6.5  Depth:     3.5 cm with undermining following mesh      INTERVENTIONS BY WOUND TEAM:   Prev drsg removed -- wnd irrigated with wnd cleanser -- no-sting and drape ruben-wnd -- adaptic over the wnd bed -- black foam to fill the wnd -- sealed with drape and neg pressure applied at 125mmhg/dpc.    Interdisciplinary consultation: nsg; pt    EVALUATION:  wnd remains clean; should make good progress with npwt    Factors affecting wound healing:  Infection?    Goals:  100% granulation in 3 weeks -- decrease wnd size X 5% every 2 weeks    WOUND TEAM PLAN OF CARE (x):   NPWT change 3 x week:      x  Dressing changes by wound team:       Follow up as needed:     x  Other (explain):    Anticipated discharge plans (x):  SNF:           Home Care:           Outpatient Wound Center:            Self Care:            Other:    tbd

## 2017-03-14 NOTE — PROGRESS NOTES
"  Trauma/Surgical Progress Note    Author: Sahrri Murrell Date & Time created: 3/14/2017   11:33 AM     Interval Events:  No significant interval events  Pain adequately controlled  Wound vac functioning  Medically cleared for discharge once home health, wound vac, home O2 and IV infusion are arranged     Review of Systems   Constitutional: Negative for fever and chills.   Respiratory: Positive for shortness of breath.    Cardiovascular: Negative for chest pain.   Gastrointestinal: Positive for abdominal pain. Negative for nausea and vomiting.        Incisional   3/13 BM   Genitourinary:        Voiding   Psychiatric/Behavioral: Positive for depression.     Hemodynamics:  Blood pressure 142/80, pulse 58, temperature 36.4 °C (97.6 °F), resp. rate 18, height 1.651 m (5' 5\"), weight 104.1 kg (229 lb 8 oz), last menstrual period 09/17/2007, SpO2 90 %, not currently breastfeeding.     Respiratory:    Respiration: 18, Pulse Oximetry: 90 %     Work Of Breathing / Effort: Mild  RUL Breath Sounds: Clear, RML Breath Sounds: Clear, RLL Breath Sounds: Diminished, MALENA Breath Sounds: Clear, LLL Breath Sounds: Diminished  Fluids:    Intake/Output Summary (Last 24 hours) at 03/14/17 1133  Last data filed at 03/14/17 0756   Gross per 24 hour   Intake   1018 ml   Output      0 ml   Net   1018 ml     Admit Weight: 100.699 kg (222 lb)  Current      Physical Exam   Constitutional: She is oriented to person, place, and time. She appears well-developed. No distress.   Obese   Cardiovascular: Normal rate.    Pulmonary/Chest: She exhibits no tenderness.   Room air, supplemental O2 with ambulation   Abdominal: There is tenderness.   Wound Vac functioning   Musculoskeletal: She exhibits no edema.   Neurological: She is alert and oriented to person, place, and time.   Skin: Skin is warm and dry. She is not diaphoretic.   Nursing note and vitals reviewed.      Medical Decision Making/Problem List:    Active Hospital Problems    Diagnosis   • " Abscess of abdominal wall [L02.211]     Priority: High     3/5 To OR    - I&D of abdominal wall deep subcutaneous abscess and subfascial abscess. Wound culture positive for MRSA.  - Excision of infected mesh portion.  - MicroMatrix veno graft application to complex open abdominal wall for open complex abdominal wall reinforcement  3/9 - Abdominal wound exploration, excision of infected foreign material and abdominal closure with Strattice mesh with negative pressure wound therapy.  Arranging wound vac and IV Daptomycin infusion outpatient  Antibiotics per ID        • Depression [F32.9]     Priority: Medium     Chronic condition treated with Klonopin,trazodone, Paxil, cymbalta .  Resume maintenance medication.         • Tobacco abuse [Z72.0]     Priority: Low   • Low back pain [M54.5]     Priority: Low     Diagnois update 10/1/2016  Home medications resumed     • S/P thyroidectomy [E89.0]     Priority: Low     Chronic condition treated with synthroid.  Resume maintenance medication.        • Psoriasis [L40.9]     Priority: Low     Core Measures & Quality Metrics:  Labs reviewed, Medications reviewed and Radiology images reviewed  Murrell catheter: No Murrell  Central line in place: Need for access    DVT Prophylaxis: Enoxaparin (Lovenox)    Ulcer prophylaxis: No  Antibiotics: Treating active infection/contamination beyond 24 hours perioperative coverage  Assessed for rehab: Patient returned to prior level of function, rehabilitation not indicated at this time    Total Score: 0    Discussed patient condition with RN, Patient and general surgery. Dr. Figueroa

## 2017-03-14 NOTE — CARE PLAN
Problem: Communication  Goal: The ability to communicate needs accurately and effectively will improve  Outcome: PROGRESSING AS EXPECTED    Problem: Bowel/Gastric:  Goal: Normal bowel function is maintained or improved  Outcome: PROGRESSING AS EXPECTED

## 2017-03-14 NOTE — PROGRESS NOTES
Pt discharged to home with PICC in place for abx outpt, home oxygen, home health care and wound vac. Hospital wound vac switched to Prevena wound vac. Discharge instructions and follow up appointments reviewed with pt and daughter.. All questions answered. Verbalized understanding.

## 2017-03-14 NOTE — PROGRESS NOTES
Infectious Disease Progress Note    Author: Olga Duffy M.D.    DOS & Time created: 3/14/2017  10:13 AM        Chief Complaint   Patient presents with   • Abdominal Pain     started on Friday in [facility].  Pt has palpable hardened mass to abdomen which she states is the origin of the pain. Pt reports this mass moved over to the L upper side of her abdomen yesterday.   • Constipation     x 2 weeks.  Pt reported to EMS she took senokot, stool softener, and a handful of other laxatives and had 1/2 cup watery BM this morning, other wise last BM 2 weeks ago.     F/u MRSA mesh infection    Interval History:  3/10- no fevers. C/o abdominal pain. Wbc 9  3/12- no fevers. Wants to know the plan  3/13 AF, WBC 6.8, feels tired and asking to see Dr. Figueroa, wants to do home IV abx, abd pain stable  3/14 AF, no CBC, feels well, tolerated dose of daptomycin, anxious to go home today, first [facility] appt is tomorrow  Labs Reviewed, Medications Reviewed, Radiology Reviewed and Wound Reviewed.    Review of Systems:  Review of Systems   Constitutional: Positive for malaise/fatigue. Negative for fever.   Respiratory: Negative for cough and shortness of breath.    Cardiovascular: Negative for chest pain and leg swelling.   Gastrointestinal: Positive for abdominal pain. Negative for nausea and vomiting.        Stable   Genitourinary: Negative for dysuria.   Musculoskeletal: Negative for myalgias.   Neurological: Negative for sensory change, speech change and headaches.   All other systems reviewed and are negative.      Hemodynamics:  Temp (24hrs), Av.6 °C (97.8 °F), Min:36.4 °C (97.6 °F), Max:36.8 °C (98.3 °F)  Temperature: 36.4 °C (97.6 °F)  Pulse  Av.4  Min: 52  Max: 82   Blood Pressure: 142/80 mmHg       Physical Exam:  Physical Exam   Constitutional: She is oriented to person, place, and time. No distress.   HENT:   Mouth/Throat: No oropharyngeal exudate.   Eyes: No scleral icterus.   Neck: Neck supple.   Cardiovascular:  Regular rhythm.    No murmur heard.  Pulmonary/Chest: She has no wheezes. She has no rales.   Abdominal: She exhibits no distension. There is tenderness.   Midline wound vac    TTP improving   Musculoskeletal: She exhibits no edema.   RUE PICC nontender   Neurological: She is alert and oriented to person, place, and time.   Vitals reviewed.      Labs:  Recent Labs      03/13/17   0050   WBC  6.8   RBC  3.95*   HEMOGLOBIN  11.4*   HEMATOCRIT  36.1*   MCV  91.4   MCH  28.9   RDW  49.4   PLATELETCT  248   MPV  8.8*   NEUTSPOLYS  58.60   LYMPHOCYTES  26.90   MONOCYTES  6.90   EOSINOPHILS  6.60   BASOPHILS  0.40     Recent Labs      03/13/17   0050  03/14/17   0318   SODIUM  135   --    POTASSIUM  3.6   --    CHLORIDE  99   --    CO2  33   --    GLUCOSE  92   --    BUN  8   --    CPKTOTAL   --   62     Recent Labs      03/13/17   0050   CREATININE  0.78        Imaging:  Ct-abdomen-pelvis With    3/5/2017  3/5/2017 12:45 PM HISTORY/REASON FOR EXAM:  Tender mass midabdomen. Evaluate for hernia TECHNIQUE/EXAM DESCRIPTION:  CT scan of the abdomen and pelvis with contrast. Contrast-enhanced helical scanning was obtained from the diaphragmatic domes through the pubic symphysis following the bolus administration of nonionic contrast without complication. 100 mL of Omnipaque 350 nonionic contrast was administered without complication. Low dose optimization technique was utilized for this CT exam including automated exposure control and adjustment of the mA and/or kV according to patient size. COMPARISON: CT abdomen and pelvis 1/15/2016 FINDINGS: Lung bases: Show mild atelectasis. Osseous structures:  There is facet arthropathy of the lumbar spine. Abdomen: There is a rim-enhancing fluid collection within the abdominal wall musculature within into the left midline. This fluid collection measures 7.8 x 3.9 cm transversely and spans a craniocaudal length of 10.4 cm There is a second slightly more posterior abdominal wall fluid  collection measuring 7.8 x 1.8 cm transversely and spanning craniocaudal length of 8.7 cm. There is moderate surrounding inflammation. Both are suspicious for abscess. There is hepatomegaly with craniocaudal length of 21 cm. There is no biliary dilation. The spleen is normal in appearance. The pancreas is normal in appearance. The splenic vein and portal vein enhance normally. Both adrenal glands are normal in appearance. The right kidney contains a simple cyst. The left kidney is normal in appearance. There is no hydronephrosis. Bowel and mesentery: Within normal limits. Specifically, colonic wall thickening/inflammation has resolved since prior CT scan. The aorta is normal in appearance. The terminal ileum is normal in appearance. The appendix is normal in appearance. Pelvis: Within normal limits. Uterus and adnexa appear normal.     3/5/2017  1.  Two rim enhancing abdominal wall fluid collection that are highly suspicious for abscess 2.  More cranial and superficial measures 10.4 x 7.8 x 3.9 cm 3.  More caudal and deeper measures 8.7 x 7.8 x 1.8 cm 4.  Resolution of colonic inflammation/wall thickening since most recent CT scan    Dx-chest-for Picc Line Perform Procedure In:: Picc Room    3/8/2017  3/8/2017 9:52 AM HISTORY/REASON FOR EXAM:  PICC line placement. TECHNIQUE/EXAM DESCRIPTION AND NUMBER OF VIEWS: Single view of the chest. COMPARISON: 9/14/2015 FINDINGS: A peripherally inserted catheter has been placed.  The tip projects appropriately over the superior vena cava near the cavoatrial juncture. Cardiomegaly. Hypoinflation and diffuse interstitial opacities. No pleural abnormalities are noted.     3/8/2017  1.  Right upper extremity PICC line tip overlies the SVC with tip near the cavoatrial juncture.. 2.  Hypoinflation and diffuse moderate atelectasis/possible interstitial edema.    Ir-picc Line Placement > Age 5    3/8/2017  HISTORY/REASON FOR EXAM:   PICC placement. TECHNIQUE/EXAM DESCRIPTION AND NUMBER  OF VIEWS:   PICC line insertion with ultrasound guidance.  The procedure was performed using maximal sterile barrier technique including sterile gown, mask, cap, and donning of sterile gloves following appropriate hand hygiene and/or sterile scrub. Patient skin site was prepped with 2% Chlorhexidine solution. FINDINGS:  PICC line insertion with Ultrasound Guidance was performed by qualified nursing staff without the assistance of a Radiologist.  A follow up chest x-ray will be performed to determine appropriateness of PICC positioning.     3/8/2017           Ultrasound-guided PICC placement performed by qualified nursing staff as above.     Medications:  Current Facility-Administered Medications   Medication Dose Frequency Provider Last Rate Last Dose   • oxycodone immediate release (ROXICODONE) tablet 10 mg  10 mg Q4HRS PRN Sharri Murrell, A.P.R.N.   10 mg at 03/14/17 0756    Or   • oxycodone immediate-release (ROXICODONE) tablet 5 mg  5 mg Q4HRS PRN Sharri Murrell, A.P.R.N.       • acetaminophen/caffeine/butalbital 325-40-50 mg (FIORICET) -40 MG per tablet 1 Tab  1 Tab Q6HRS PRN Sharri Murrell, A.P.R.N.       • MD ALERT... vancomycin per pharmacy protocol   PRN Olga Duffy M.D.       • NS infusion   Continuous Sharrijuan a Murrell, A.P.R.N. 10 mL/hr at 03/13/17 0955     • enoxaparin (LOVENOX) inj 40 mg  40 mg DAILY Warner Figueroa D.O.   40 mg at 03/14/17 0756   • normal saline PF 10-20 mL  10-20 mL PRN Warner Figueroa D.O.       • polyethylene glycol/lytes (MIRALAX) PACKET 1 Packet  1 Packet DAILY Bala Huntley M.D.   1 Packet at 03/14/17 0756   • vancomycin 1,700 mg in  mL IVPB  1,700 mg Q24HR Angie Esquivel, PHARMD   Stopped at 03/13/17 1824   • trazodone (DESYREL) tablet 200 mg  200 mg QHS Warner Figueroa D.O.   200 mg at 03/13/17 5319   • nicotine (NICODERM) 14 MG/24HR 14 mg  14 mg Daily-0600 Bala Huntley M.D.   14 mg at 03/14/17 0623   • senna-docusate (PERICOLACE or SENOKOT S) 8.6-50 MG per  "tablet 2 Tab  2 Tab BID Kourtney Conway M.D.   2 Tab at 03/14/17 0755    And   • polyethylene glycol/lytes (MIRALAX) PACKET 1 Packet  1 Packet QDAY PRGRISELDA Conway M.D.   1 Packet at 03/08/17 1504    And   • magnesium hydroxide (MILK OF MAGNESIA) suspension 30 mL  30 mL QDAY PRN Kourtney Conway M.D.   30 mL at 03/07/17 0819    And   • bisacodyl (DULCOLAX) suppository 10 mg  10 mg QDAY PRN Kourtney Conway M.D.       • Respiratory Care per Protocol   Continuous RT Kourteny Conway M.D.       • nicotine polacrilex (NICORETTE) 2 MG piece 2 mg  2 mg Q HOUR PRN Kourtney Conway M.D.       • clonazepam (KLONOPIN) tablet 2 mg  2 mg QHS Kourtney Conway M.D.   2 mg at 03/13/17 2139   • levothyroxine (SYNTHROID) tablet 200 mcg  200 mcg QAM Kourtney Conway M.D.   200 mcg at 03/14/17 0755   • paroxetine (PAXIL) tablet 20 mg  20 mg QHS Kourtney Conway M.D.   20 mg at 03/13/17 2100   • oxyCODONE CR (OXYCONTIN) tablet 20 mg  20 mg Q12HRS Kourtney Conway M.D.   20 mg at 03/14/17 0755     Last reviewed on 3/5/2017  2:51 PM by Beau Dan R.N.    Micro:  Results     Procedure Component Value Units Date/Time    BLOOD CULTURE [[id number]] Collected:  03/05/17 2109    Order Status:  Completed Specimen Information:  Blood from Peripheral Updated:  03/10/17 2300     Significant Indicator NEG      Source BLD      Site PERIPHERAL      Blood Culture No growth after 5 days of incubation.     Narrative:      1 of 2 for Blood Culture x 2 sites order. Per Hospital  Policy: Only change Specimen Src: to \"Line\" if specified by  physician order.    BLOOD CULTURE [[id number]] Collected:  03/05/17 2109    Order Status:  Completed Specimen Information:  Blood from Peripheral Updated:  03/10/17 2300     Significant Indicator NEG      Source BLD      Site PERIPHERAL      Blood Culture No growth after 5 days of incubation.     Narrative:      2 of 2 blood culture x2  Sites order. Per Hospital Policy:  Only change Specimen Src: to " "\"Line\" if specified by physician  order.    CULTURE WOUND W/ GRAM STAIN [[id number]]  (Abnormal)  (Susceptibility) Collected:  03/05/17 1658    Order Status:  Completed Specimen Information:  Wound Updated:  03/09/17 1617     Significant Indicator POS (POS)      Source WND      Site Abdominal Wall Abscess/Mesh      Culture Result Wound -- (A)      Gram Stain Result --      Result:        Many WBCs.  Moderate Gram positive cocci.       Culture Result Wound -- (A)      Result:        Methicillin Resistant Staphylococcus aureus  Moderate growth      Narrative:      CALL  Rodriguez  141 tel. [phone],  CALLED  141 tel. [phone] 03/07/2017, 08:05, RB PERF. RESULTS CALLED  TO:[id number] RN and faxed to [facility]    Culture & Susceptibility     METHICILLIN RESISTANT STAPHYLOCOCCUS AUREUS     Antibiotic Sensitivity Microscan Unit Status    Ampicillin/sulbactam Resistant 16/8 mcg/mL Final    Clindamycin Sensitive <=0.5 mcg/mL Final    Daptomycin Sensitive <=0.5 mcg/mL Final    Erythromycin Resistant >4 mcg/mL Final    Moxifloxacin Sensitive 2 mcg/mL Final    Oxacillin Resistant >2 mcg/mL Final    Penicillin Resistant >8 mcg/mL Final    Tetracycline Sensitive <=4 mcg/mL Final    Trimeth/Sulfa Sensitive <=0.5/9.5 mcg/mL Final    Vancomycin Sensitive 1 mcg/mL Final                       ANAEROBIC CULTURE [[id number]] Collected:  03/05/17 1658    Order Status:  Completed Specimen Information:  Wound Updated:  03/09/17 1617     Significant Indicator NEG      Source WND      Site Abdominal Wall Abscess/Mesh      Anaerobic Culture, Culture Res No Anaerobes isolated.     Narrative:      CALL  Rodriguez  141 tel. [phone],  CALLED  141 tel. [phone] 03/07/2017, 08:05, RB PERF. RESULTS CALLED  TO:[id number] RN and faxed to [facility]    FUNGAL CULTURE [[id number]] Collected:  03/05/17 1658    Order Status:  Completed Specimen Information:  Wound Updated:  03/09/17 1617     Significant Indicator NEG      Source WND      Site Abdominal Wall Abscess/Mesh      " Fungal Culture Culture in progress.     Narrative:      CALL  Rodriguez  141 tel. [phone],  CALLED  141 tel. [phone] 03/07/2017, 08:05, RB PERF. RESULTS CALLED  TO:[id number] RN and faxed to [facility]          Assessment:  Active Hospital Problems    Diagnosis   • Abscess of abdominal wall [L02.211]   • Depression [F32.9]   • Tobacco abuse [Z72.0]   • Low back pain [M54.5]   • Psoriasis [L40.9]       Plan:  MRSA abdominal wall abscess  S/p drainage 3/5  Another I&D 3/9  Spoke to surgery - not able to remove the entire mesh  C/s are mrsa  Wound vac  Continue vanco  Aim for 4 weeks of IV abx with stop date of 04/06/17 followed by oral abx (doxy) given retained mesh  Because of the presence of infected mesh complete resolution may be difficult  IV abx orders for daptomycin given to MSW 3/13  Give daptomycin 6mg/kg x 1 prior to discharge  First [facility] appt is 3/15/17    Tobacco abuse  Counseled that it hinders wound healing    Obesity  Risk factor for hernia    Prognosis  guarded    OK to d/c patient home today from ID perspective    [facility] ID clinic    ID will sign off. Please re-consult if needed.

## 2017-03-14 NOTE — CONSULTS
DATE OF SERVICE:  03/13/2017    CHIEF COMPLAINT:  Infection of the abdominal wall.    HISTORY OF PRESENT ILLNESS:  Patient is a 56-year-old who is well known to me,   had had a previous abscesses found in her abdomen.  I had operated on her in   the past, had removed any foreign material that I could find at that time, she   had gone on to heal, but evidently she came in to the emergency room last   week, the patient had evidently new wounds on her abdomen.  She had requested   Dr. Figueroa take care of her, who had taken her back to the operating room and   excised any foreign material that he could have.    PAST MEDICAL HISTORY:  Noted for diverticulitis.  She has had a history of   thyroid cancer, hypertension, history of depression.  She has had multiple   surgeries on her abdomen in the past.  The patient has a history of substance   abuse as well.    MEDICATIONS:  Reviewed.    ALLERGIES:  SHE HAS ALLERGIES TO TAPE.    PHYSICAL EXAMINATION:  GENERAL:  She is an obese white female in no apparent distress.  HEENT:  Normocephalic, atraumatic.  NECK:  Supple.  CHEST:  Clear.  ABDOMEN:  Soft at this time.  EXTREMITIES:  Negative pressure dressing over the wound where she just had   this drained by Dr. Figueroa.  EXTREMITIES:  Within normal limits.    At this point, I think the care has been appropriate, certainly excision of   any foreign material is indicated as has been done, there had been some   incorporated mesh in the past.  When I had seen the patient _____ evidently   per Dr. Figueroa's note as well.    ASSESSMENT AND PLAN:  I think this patient may have potential for recurrence   of abscesses in the future as well, but at this time, I would continue with   the negative pressure dressing.  I will be available for any further   consultation in the future.       ____________________________________     MD VIPUL TURK / LUKAS    DD:  03/13/2017 13:50:56  DT:  03/13/2017 18:14:25    D#:  [id number]  Job#:   [id number]

## 2017-03-14 NOTE — PROGRESS NOTES
Received report from GENE Zhang and assumed care of pt at 1900. Pt A&0x4, VSS on 3L o2 per NC, tolerating regular diet, up independently to bathroom, contact precautions, IVF infusing to PIV, midline abdominal incision covered with wound vac to suction, POC discussed, bed in lowest locked position, call light within reach, hourly rounding in place.

## 2017-03-14 NOTE — PROGRESS NOTES
Med rec updated per Dr and Pharmacist request to clarify Pt's medication strengths  Pt fills at [facility], med rec updated to reflect this information   Only medication that has changed is Klonopin from .5 mg 1-2 tab at bedtime   All other medication are the same

## 2017-03-14 NOTE — FACE TO FACE
Face to Face Note  -  Durable Medical Equipment    JUSTICE Maldonado - NPI: [id number]  I certify that this patient is under my care and that they had a durable medical equipment(DME)face to face encounter by the nurse practitioner working collaboratively with me that meets the physician DME face-to-face encounter requirements with this patient on:    Date of encounter:   Patient:                    MRN:                       YOB: 2015  Thelma Abbott  [id number]  1960     The encounter with the patient was in whole, or in part, for the following medical condition, which is the primary reason for durable medical equipment:  Other - hypoxia following surgical procedure    I certify that, based on my findings, the following durable medical equipment is medically necessary:  Oxygen.    HOME O2 Saturation Measurements:(Values must be present for Home Oxygen orders)  Room air sat at rest: 90  Room air sat with amb: 78  With liters of O2: 2, O2 sat at rest with O2: 92  With Liters of O2: 3, O2 sat with amb with O2 : 91  Is the patient mobile?: Yes    My Clinical findings support the need for the above equipment due to:  Hypoxia    Supporting Symptoms: See above O2 saturations. Patient requires O2 for safe discharge

## 2017-03-14 NOTE — DISCHARGE SUMMARY
DATE OF ADMISSION:  03/05/2017    DATE OF DISCHARGE:  03/14/2017    ATTENDING PHYSICIAN:  Warner Figueroa DO, general surgery    CONSULTING PHYSICIANS:  1.  Jamaal Curran MD, surgery  2.  Joleen Joshua MD, infectious disease  3.  Miki Samuel MD, plastic surgery    DISCHARGE DIAGNOSES:  1.  Abscess of the abdominal wall.  2.  Depression.  3.  Psoriasis.  4.  History of thyroidectomy.  5.  Low back pain.  6.  Tobacco use.    PROCEDURES:  1.  Procedure completed by Dr. Curran on 03/05/2017, incision and drainage with   sharp debridement of abdominal wall deep subcutaneous abscess, incision and   drainage of subfascial abscess, excision of infected mesh portion, and   MicroMatrix xenograft application to complex open abdominal wound for open   complex abdominal wall reinforcement.  2.  Procedure completed by Dr. Figueroa on 03/09/2017, abdominal wound   exploration, excision of infected foreign material and abdominal closure with   Strattice mesh with negative pressure wound therapy.    HISTORY OF PRESENT ILLNESS:  This is a 56-year-old female with a complex past   medical history who presented to the hospital due to malaise, illness, pain,   tenderness and swelling over the abdominal wall, which also was red and warm   to the touch.  She had a history of a previous perforated diverticulitis,   previous multiple operations for abdominal wall infection including   necrotizing fasciitis, large incisional hernia repair.    HOSPITAL COURSE:  The patient was taken to the operating room initially with   Dr. Curran for incision and drainage with sharp debridement and abdominal wall   deep subcutaneous abscess, incision and drainage of subfascial abscess,   excision of infected mesh portion and MicroMatrix xenograft application to   complex open abdominal wall for open complex abdominal wall reinforcement.    She tolerated this procedure well.  She was placed on IV antibiotics and was   taken to the general surgical  floor for further wound management and medical   monitoring.  The patient was taken back to the operating room on 03/09/2017   with Dr. Figueroa for abdominal wound exploration, excision of infected foreign   material, and abdominal closure with Strattice mesh with negative pressure   wound therapy.  She returned to the general surgical floor in good condition.    She continued to medically progress.  Infectious disease continued to manage   her IV antibiotics throughout her hospitalization.    The patient remained in the hospital while a safe and appropriate discharge was   arranged. Outpatient IV infusion therapy was ordered as the patient will require 4   weeks of IV antibiotics followed by oral antibiotics given the retained mesh.  In addition,   arrangements were made for the patient to have home health and wound VAC   changes.  The patient also required supplemental oxygen on day of discharge and   home oxygen was set up for the patient.    On day of discharge, the patient's incision is healing well with the wound VAC   in place. She reports adequate pain control.  She is tolerating a regular oral diet   without nausea or vomiting.  She reports regular bowel movements and having flatus.     DISCHARGE PHYSICAL EXAM:  See EPIC physical exam dated 03/14/2017.    DISCHARGE MEDICATIONS:  The patient is instructed to resume all previously   prescribed home medications.  The patient is also to follow up with the IV   infusion center for IV daptomycin to be managed by the infectious disease   team.    DISPOSITION:  The patient will be discharged home in good condition under the   care and supervision of her family on 03/14/2017.  She will follow up with Dr. Figueroa on 03/22/2017.  She will also follow up with the IV infusion center   on 03/15/2017.  She will be seen by home health on 03/15/2017 for wound VAC   change.  Her PICC line has remained in place for IV infusion of IV antibiotics   and special instructions  for management of her PICC line has been provided.    The patient has been extensively counseled and all questions have been   answered.  Special attention is paid to signs and symptoms of infection,   respiratory decompensation, and uncontrolled pain and to seek immediate   medical attention if these do develop.  The patient and family demonstrated   understanding and good verbal compliance with these discharge instructions.    TIME SPENT ON DISCHARGE:  60 minutes.       ____________________________________     RANDALL Maldonado / LUKAS    DD:  03/14/2017 15:31:30  DT:  03/14/2017 16:16:26    D#:  [id number]  Job#:  [id number]    cc: BRIANNE LONG MD, EWELINA CAMARENA MD, CHRIS RUIZ DO, MD

## 2017-03-15 ENCOUNTER — OUTPATIENT INFUSION SERVICES (OUTPATIENT)
Dept: ONCOLOGY | Facility: MEDICAL CENTER | Age: 57
End: 2017-03-15
Attending: INTERNAL MEDICINE
Payer: MEDICARE

## 2017-03-15 VITALS
HEIGHT: 64 IN | BODY MASS INDEX: 38.09 KG/M2 | DIASTOLIC BLOOD PRESSURE: 93 MMHG | SYSTOLIC BLOOD PRESSURE: 171 MMHG | OXYGEN SATURATION: 95 % | HEART RATE: 62 BPM | WEIGHT: 223.11 LBS | TEMPERATURE: 97.4 F | RESPIRATION RATE: 18 BRPM

## 2017-03-15 PROCEDURE — 700111 HCHG RX REV CODE 636 W/ 250 OVERRIDE (IP): Performed by: INTERNAL MEDICINE

## 2017-03-15 PROCEDURE — 700105 HCHG RX REV CODE 258: Performed by: INTERNAL MEDICINE

## 2017-03-15 PROCEDURE — 96365 THER/PROPH/DIAG IV INF INIT: CPT

## 2017-03-15 RX ADMIN — DAPTOMYCIN 600 MG: 500 INJECTION, POWDER, LYOPHILIZED, FOR SOLUTION INTRAVENOUS at 17:13

## 2017-03-15 ASSESSMENT — PAIN SCALES - GENERAL: PAINLEVEL: 4=SLIGHT-MODERATE PAIN

## 2017-03-16 ENCOUNTER — OUTPATIENT INFUSION SERVICES (OUTPATIENT)
Dept: ONCOLOGY | Facility: MEDICAL CENTER | Age: 57
End: 2017-03-16
Attending: INTERNAL MEDICINE
Payer: MEDICARE

## 2017-03-16 ENCOUNTER — PATIENT OUTREACH (OUTPATIENT)
Dept: HEALTH INFORMATION MANAGEMENT | Facility: OTHER | Age: 57
End: 2017-03-16

## 2017-03-16 VITALS
HEART RATE: 58 BPM | SYSTOLIC BLOOD PRESSURE: 178 MMHG | RESPIRATION RATE: 18 BRPM | WEIGHT: 223.11 LBS | OXYGEN SATURATION: 93 % | TEMPERATURE: 99 F | BODY MASS INDEX: 38.28 KG/M2 | DIASTOLIC BLOOD PRESSURE: 88 MMHG

## 2017-03-16 PROCEDURE — 96365 THER/PROPH/DIAG IV INF INIT: CPT

## 2017-03-16 PROCEDURE — 700105 HCHG RX REV CODE 258: Performed by: PHARMACIST

## 2017-03-16 PROCEDURE — 700111 HCHG RX REV CODE 636 W/ 250 OVERRIDE (IP): Performed by: PHARMACIST

## 2017-03-16 RX ADMIN — DAPTOMYCIN 600 MG: 500 INJECTION, POWDER, LYOPHILIZED, FOR SOLUTION INTRAVENOUS at 16:15

## 2017-03-16 ASSESSMENT — PAIN SCALES - GENERAL: PAINLEVEL: 5=MODERATE PAIN

## 2017-03-16 NOTE — PROGRESS NOTES
Thelma for IVABX. Thelma oriented to infusion services and routine. Side effects of daptomycin discussed and Thelma given informational handout on the medication. Daptomycin infused as ordered. Thelma tolerated well and without incident. PICC line in good condition and has positive blood return. PICC line flushed with saline per protocol. Thelma NOLASCO'd home in good  condition. Returns daily.

## 2017-03-17 ENCOUNTER — OUTPATIENT INFUSION SERVICES (OUTPATIENT)
Dept: ONCOLOGY | Facility: MEDICAL CENTER | Age: 57
End: 2017-03-17
Attending: INTERNAL MEDICINE
Payer: MEDICARE

## 2017-03-17 VITALS
TEMPERATURE: 99.6 F | WEIGHT: 221.34 LBS | DIASTOLIC BLOOD PRESSURE: 100 MMHG | RESPIRATION RATE: 16 BRPM | BODY MASS INDEX: 37.97 KG/M2 | SYSTOLIC BLOOD PRESSURE: 172 MMHG | HEART RATE: 58 BPM | OXYGEN SATURATION: 93 %

## 2017-03-17 PROCEDURE — 700111 HCHG RX REV CODE 636 W/ 250 OVERRIDE (IP): Performed by: PHARMACIST

## 2017-03-17 PROCEDURE — 700105 HCHG RX REV CODE 258: Performed by: PHARMACIST

## 2017-03-17 PROCEDURE — 96365 THER/PROPH/DIAG IV INF INIT: CPT

## 2017-03-17 RX ADMIN — DAPTOMYCIN 600 MG: 500 INJECTION, POWDER, LYOPHILIZED, FOR SOLUTION INTRAVENOUS at 16:55

## 2017-03-17 ASSESSMENT — PAIN SCALES - GENERAL: PAINLEVEL: 6=MODERATE PAIN

## 2017-03-18 ENCOUNTER — OUTPATIENT INFUSION SERVICES (OUTPATIENT)
Dept: ONCOLOGY | Facility: MEDICAL CENTER | Age: 57
End: 2017-03-18
Attending: INTERNAL MEDICINE
Payer: MEDICARE

## 2017-03-18 VITALS
HEART RATE: 65 BPM | SYSTOLIC BLOOD PRESSURE: 112 MMHG | BODY MASS INDEX: 37.79 KG/M2 | WEIGHT: 221.34 LBS | HEIGHT: 64 IN | OXYGEN SATURATION: 100 % | RESPIRATION RATE: 18 BRPM | DIASTOLIC BLOOD PRESSURE: 80 MMHG | TEMPERATURE: 99.3 F

## 2017-03-18 PROCEDURE — 96365 THER/PROPH/DIAG IV INF INIT: CPT

## 2017-03-18 PROCEDURE — 700105 HCHG RX REV CODE 258: Performed by: INTERNAL MEDICINE

## 2017-03-18 PROCEDURE — 700111 HCHG RX REV CODE 636 W/ 250 OVERRIDE (IP): Performed by: INTERNAL MEDICINE

## 2017-03-18 RX ADMIN — DAPTOMYCIN 600 MG: 500 INJECTION, POWDER, LYOPHILIZED, FOR SOLUTION INTRAVENOUS at 16:28

## 2017-03-18 ASSESSMENT — PAIN SCALES - GENERAL: PAINLEVEL: 4=SLIGHT-MODERATE PAIN

## 2017-03-18 NOTE — PROGRESS NOTES
Pt is here for her scheduled IVABX. Blood pressure taken by RN using larger cuff - prefers RN to take her BP. PICC line care (cap/drsg) completed - hypoallergenic drsg now in use r/t skin irritation. Wound vac to abdomen - suction intact. Fatigued. Family present. Treatment completed without an incident. Discharged home to self care. Returns tomorrow.

## 2017-03-18 NOTE — PROGRESS NOTES
"Pt arrives ambulatory for daily IV dapto. Adeline COREA reports pt refused vitals. Pt reports she would like her BP to be performed manually due to the pain the machine causes and would like RN to take her vitals. Vitals taken. Manual BP reveals /100, asymptomatic. Pt reports she has BP machine at home, prn BP medication, and will assess BP at home and present to ER if symptomatic. Reports she is \"worried about surgery\" as she is having another procedure soon and is under a lot of \"stress\" at home. Emotional support provided. Encouraged pt to go to ER, pt refuses. Discussed risk of injury w/ HTN, s/sx of stroke, and when to present to ER/call 911. Pt verbalizes understanding of education.  Assessment complete. POC reviewed. R PICC w/ brisk blood return, abx administered as ordered & w/o adverse s/sx reported or observed. PICC flushed per policy, new swab cap & gauze applied, secured w/ pt's elastic sleeve. Pt d/c'd in good condition no distress observed.   "

## 2017-03-19 ENCOUNTER — OUTPATIENT INFUSION SERVICES (OUTPATIENT)
Dept: ONCOLOGY | Facility: MEDICAL CENTER | Age: 57
End: 2017-03-19
Attending: INTERNAL MEDICINE
Payer: MEDICARE

## 2017-03-19 VITALS
TEMPERATURE: 99.7 F | SYSTOLIC BLOOD PRESSURE: 142 MMHG | DIASTOLIC BLOOD PRESSURE: 75 MMHG | WEIGHT: 221.34 LBS | RESPIRATION RATE: 18 BRPM | BODY MASS INDEX: 37.79 KG/M2 | OXYGEN SATURATION: 98 % | HEART RATE: 62 BPM | HEIGHT: 64 IN

## 2017-03-19 PROCEDURE — 700111 HCHG RX REV CODE 636 W/ 250 OVERRIDE (IP): Performed by: INTERNAL MEDICINE

## 2017-03-19 PROCEDURE — 700105 HCHG RX REV CODE 258: Performed by: INTERNAL MEDICINE

## 2017-03-19 PROCEDURE — 96365 THER/PROPH/DIAG IV INF INIT: CPT

## 2017-03-19 RX ADMIN — DAPTOMYCIN 600 MG: 500 INJECTION, POWDER, LYOPHILIZED, FOR SOLUTION INTRAVENOUS at 16:30

## 2017-03-19 ASSESSMENT — PAIN SCALES - GENERAL: PAINLEVEL: 4=SLIGHT-MODERATE PAIN

## 2017-03-19 NOTE — PROGRESS NOTES
Pt is here for her scheduled IVABX. Blood pressure taken by RN using larger cuff - prefers RN to take her BP. Wound vac to abdomen - suction intact. Fatigued. Family present. Treatment completed without an incident. Discharged home to self care. Returns tomorrow.

## 2017-03-20 ENCOUNTER — OUTPATIENT INFUSION SERVICES (OUTPATIENT)
Dept: ONCOLOGY | Facility: MEDICAL CENTER | Age: 57
End: 2017-03-20
Attending: INTERNAL MEDICINE
Payer: MEDICARE

## 2017-03-20 VITALS
HEART RATE: 47 BPM | DIASTOLIC BLOOD PRESSURE: 94 MMHG | HEIGHT: 64 IN | OXYGEN SATURATION: 93 % | SYSTOLIC BLOOD PRESSURE: 156 MMHG | RESPIRATION RATE: 18 BRPM | WEIGHT: 218.26 LBS | BODY MASS INDEX: 37.26 KG/M2 | TEMPERATURE: 99 F

## 2017-03-20 DIAGNOSIS — L02.211 ABSCESS OF ABDOMINAL WALL: ICD-10-CM

## 2017-03-20 LAB
ALBUMIN SERPL BCP-MCNC: 3.9 G/DL (ref 3.2–4.9)
ALP SERPL-CCNC: 48 U/L (ref 30–99)
ALT SERPL-CCNC: 11 U/L (ref 2–50)
AST SERPL-CCNC: 15 U/L (ref 12–45)
BASOPHILS # BLD AUTO: 1.4 % (ref 0–1.8)
BASOPHILS # BLD: 0.11 K/UL (ref 0–0.12)
BILIRUB CONJ SERPL-MCNC: <0.1 MG/DL (ref 0.1–0.5)
BILIRUB INDIRECT SERPL-MCNC: NORMAL MG/DL (ref 0–1)
BILIRUB SERPL-MCNC: 0.3 MG/DL (ref 0.1–1.5)
BUN SERPL-MCNC: 22 MG/DL (ref 8–22)
CALCIUM SERPL-MCNC: 9.3 MG/DL (ref 8.5–10.5)
CHLORIDE SERPL-SCNC: 105 MMOL/L (ref 96–112)
CK SERPL-CCNC: 180 U/L (ref 0–154)
CO2 SERPL-SCNC: 27 MMOL/L (ref 20–33)
CREAT SERPL-MCNC: 0.84 MG/DL (ref 0.5–1.4)
EOSINOPHIL # BLD AUTO: 0.35 K/UL (ref 0–0.51)
EOSINOPHIL NFR BLD: 4.6 % (ref 0–6.9)
ERYTHROCYTE [DISTWIDTH] IN BLOOD BY AUTOMATED COUNT: 50.8 FL (ref 35.9–50)
GFR SERPL CREATININE-BSD FRML MDRD: >60 ML/MIN/1.73 M 2
GLUCOSE SERPL-MCNC: 93 MG/DL (ref 65–99)
HCT VFR BLD AUTO: 40.2 % (ref 37–47)
HGB BLD-MCNC: 13.1 G/DL (ref 12–16)
IMM GRANULOCYTES # BLD AUTO: 0.04 K/UL (ref 0–0.11)
IMM GRANULOCYTES NFR BLD AUTO: 0.5 % (ref 0–0.9)
LYMPHOCYTES # BLD AUTO: 3.12 K/UL (ref 1–4.8)
LYMPHOCYTES NFR BLD: 40.9 % (ref 22–41)
MCH RBC QN AUTO: 29.3 PG (ref 27–33)
MCHC RBC AUTO-ENTMCNC: 32.6 G/DL (ref 33.6–35)
MCV RBC AUTO: 89.9 FL (ref 81.4–97.8)
MONOCYTES # BLD AUTO: 0.55 K/UL (ref 0–0.85)
MONOCYTES NFR BLD AUTO: 7.2 % (ref 0–13.4)
NEUTROPHILS # BLD AUTO: 3.45 K/UL (ref 2–7.15)
NEUTROPHILS NFR BLD: 45.4 % (ref 44–72)
NRBC # BLD AUTO: 0 K/UL
NRBC BLD AUTO-RTO: 0 /100 WBC
PHOSPHATE SERPL-MCNC: 3.5 MG/DL (ref 2.5–4.5)
PLATELET # BLD AUTO: 225 K/UL (ref 164–446)
PMV BLD AUTO: 9.4 FL (ref 9–12.9)
POTASSIUM SERPL-SCNC: 4.1 MMOL/L (ref 3.6–5.5)
PROT SERPL-MCNC: 7.2 G/DL (ref 6–8.2)
RBC # BLD AUTO: 4.47 M/UL (ref 4.2–5.4)
SODIUM SERPL-SCNC: 137 MMOL/L (ref 135–145)
WBC # BLD AUTO: 7.6 K/UL (ref 4.8–10.8)

## 2017-03-20 PROCEDURE — 82550 ASSAY OF CK (CPK): CPT

## 2017-03-20 PROCEDURE — 700111 HCHG RX REV CODE 636 W/ 250 OVERRIDE (IP): Performed by: INTERNAL MEDICINE

## 2017-03-20 PROCEDURE — 96365 THER/PROPH/DIAG IV INF INIT: CPT

## 2017-03-20 PROCEDURE — 80076 HEPATIC FUNCTION PANEL: CPT

## 2017-03-20 PROCEDURE — 700105 HCHG RX REV CODE 258: Performed by: INTERNAL MEDICINE

## 2017-03-20 PROCEDURE — 36592 COLLECT BLOOD FROM PICC: CPT

## 2017-03-20 PROCEDURE — 80069 RENAL FUNCTION PANEL: CPT

## 2017-03-20 PROCEDURE — 85025 COMPLETE CBC W/AUTO DIFF WBC: CPT

## 2017-03-20 RX ADMIN — DAPTOMYCIN 600 MG: 500 INJECTION, POWDER, LYOPHILIZED, FOR SOLUTION INTRAVENOUS at 16:43

## 2017-03-20 ASSESSMENT — PAIN SCALES - GENERAL: PAINLEVEL: 4=SLIGHT-MODERATE PAIN

## 2017-03-21 ENCOUNTER — OUTPATIENT INFUSION SERVICES (OUTPATIENT)
Dept: ONCOLOGY | Facility: MEDICAL CENTER | Age: 57
End: 2017-03-21
Attending: INTERNAL MEDICINE
Payer: MEDICARE

## 2017-03-21 ENCOUNTER — TELEPHONE (OUTPATIENT)
Dept: INFECTIOUS DISEASES | Facility: MEDICAL CENTER | Age: 57
End: 2017-03-21

## 2017-03-21 VITALS
RESPIRATION RATE: 18 BRPM | DIASTOLIC BLOOD PRESSURE: 76 MMHG | HEART RATE: 63 BPM | TEMPERATURE: 98 F | OXYGEN SATURATION: 93 % | SYSTOLIC BLOOD PRESSURE: 145 MMHG

## 2017-03-21 DIAGNOSIS — R74.8 ELEVATED CPK: ICD-10-CM

## 2017-03-21 LAB — CK SERPL-CCNC: 247 U/L (ref 0–154)

## 2017-03-21 PROCEDURE — 96365 THER/PROPH/DIAG IV INF INIT: CPT

## 2017-03-21 PROCEDURE — 700105 HCHG RX REV CODE 258: Performed by: INTERNAL MEDICINE

## 2017-03-21 PROCEDURE — 700111 HCHG RX REV CODE 636 W/ 250 OVERRIDE (IP): Performed by: INTERNAL MEDICINE

## 2017-03-21 PROCEDURE — 82550 ASSAY OF CK (CPK): CPT

## 2017-03-21 PROCEDURE — 36592 COLLECT BLOOD FROM PICC: CPT

## 2017-03-21 RX ORDER — GABAPENTIN 100 MG/1
100 CAPSULE ORAL 3 TIMES DAILY PRN
Refills: 2 | COMMUNITY
Start: 2017-02-14

## 2017-03-21 RX ORDER — BUDESONIDE AND FORMOTEROL FUMARATE DIHYDRATE 160; 4.5 UG/1; UG/1
AEROSOL RESPIRATORY (INHALATION)
Refills: 5 | COMMUNITY
Start: 2017-01-24

## 2017-03-21 RX ADMIN — DAPTOMYCIN 600 MG: 500 INJECTION, POWDER, LYOPHILIZED, FOR SOLUTION INTRAVENOUS at 17:19

## 2017-03-21 ASSESSMENT — PAIN SCALES - GENERAL: PAINLEVEL: 4=SLIGHT-MODERATE PAIN

## 2017-03-21 NOTE — PROGRESS NOTES
Pt arrived ambulatory, here for daily IV abx for abd abscess. Wound vac in place to abd. Right PICC present, line flushed and brisk blood return observed. Labs drawn as ordered. Dapto infused. Pt maggie well. Line flushed clear. PICC flushed and new alcohol cap placed. Line re-wrapped. Pt knows to return daily for cont treatment.

## 2017-03-21 NOTE — TELEPHONE ENCOUNTER
----- Message from Olga Duffy M.D. sent at 3/20/2017  6:04 PM PDT -----  CPK elevated. Please call pt and see if she is symptomatic. If not, repeat labs next week    ----- Message -----     From: [facility]     Sent: 3/20/2017   4:56 PM       To: Olga Duffy M.D.

## 2017-03-21 NOTE — TELEPHONE ENCOUNTER
Informed patient that another set of blood work will be done at the infusion center today.  Our doctors will keep an eye out on this and when received, someone from this office will call to let her know what the results are.

## 2017-03-21 NOTE — TELEPHONE ENCOUNTER
Pt states is having quad and lower back pains as well as being weak.  Has apt with infusion today at 4:30 pm.

## 2017-03-22 ENCOUNTER — OUTPATIENT INFUSION SERVICES (OUTPATIENT)
Dept: ONCOLOGY | Facility: MEDICAL CENTER | Age: 57
End: 2017-03-22
Attending: INTERNAL MEDICINE
Payer: MEDICARE

## 2017-03-22 ENCOUNTER — TELEPHONE (OUTPATIENT)
Dept: INFECTIOUS DISEASES | Facility: MEDICAL CENTER | Age: 57
End: 2017-03-22

## 2017-03-22 VITALS
SYSTOLIC BLOOD PRESSURE: 145 MMHG | RESPIRATION RATE: 18 BRPM | DIASTOLIC BLOOD PRESSURE: 71 MMHG | HEART RATE: 58 BPM | TEMPERATURE: 97.6 F | OXYGEN SATURATION: 93 %

## 2017-03-22 PROCEDURE — 700111 HCHG RX REV CODE 636 W/ 250 OVERRIDE (IP): Performed by: NURSE PRACTITIONER

## 2017-03-22 PROCEDURE — 96365 THER/PROPH/DIAG IV INF INIT: CPT

## 2017-03-22 PROCEDURE — 700105 HCHG RX REV CODE 258: Performed by: NURSE PRACTITIONER

## 2017-03-22 RX ADMIN — CEFTAROLINE FOSAMIL 600 MG: 600 POWDER, FOR SOLUTION INTRAVENOUS at 16:06

## 2017-03-22 ASSESSMENT — PAIN SCALES - GENERAL: PAINLEVEL: 4=SLIGHT-MODERATE PAIN

## 2017-03-22 NOTE — PROGRESS NOTES
Pt arrived ambulatory, here for daily IV abx for abd abscess. Wound vac in place to abd. Right PICC present, line flushed and brisk blood return observed. Orders to redraw CPK received. Pt states she has been having muscle aches/cramps. Labs drawn as ordered. Dapto infused. Pt maggie well. Line flushed clear. PICC flushed and new alcohol cap placed. Line re-wrapped. Pt knows to return daily for cont treatment.

## 2017-03-22 NOTE — TELEPHONE ENCOUNTER
LM for pt to call back regarding changes we will be making to her abx regiment.  Elevated CPK, was 180 on Monday and 247 yesterday with reports of quad and lower back pain.  Pt has MRSA abd wall abscess.  Will transition over to IV Ceftaroline 600mg IV Q12h.  Orders faxed to [facility].

## 2017-03-22 NOTE — TELEPHONE ENCOUNTER
"Pt called back, updated her on change from Daptomycin to Ceftaroline BID.  Pt not happy, stating \"I'm not sure this is going to work.\"  Explained that it is too early in her tx course to switch her to PO abx and there are not other once daily IV options.  Pt states her understanding.  FU with ID on 4/6 @ 0549.  "

## 2017-03-23 ENCOUNTER — OUTPATIENT INFUSION SERVICES (OUTPATIENT)
Dept: ONCOLOGY | Facility: MEDICAL CENTER | Age: 57
End: 2017-03-23
Attending: INTERNAL MEDICINE
Payer: MEDICARE

## 2017-03-23 ENCOUNTER — TELEPHONE (OUTPATIENT)
Dept: INFECTIOUS DISEASES | Facility: MEDICAL CENTER | Age: 57
End: 2017-03-23

## 2017-03-23 VITALS
DIASTOLIC BLOOD PRESSURE: 63 MMHG | HEART RATE: 74 BPM | TEMPERATURE: 98.1 F | OXYGEN SATURATION: 93 % | SYSTOLIC BLOOD PRESSURE: 122 MMHG | RESPIRATION RATE: 18 BRPM

## 2017-03-23 DIAGNOSIS — R19.7 DIARRHEA, UNSPECIFIED TYPE: ICD-10-CM

## 2017-03-23 PROCEDURE — 700111 HCHG RX REV CODE 636 W/ 250 OVERRIDE (IP): Performed by: NURSE PRACTITIONER

## 2017-03-23 PROCEDURE — 96365 THER/PROPH/DIAG IV INF INIT: CPT

## 2017-03-23 PROCEDURE — 700105 HCHG RX REV CODE 258: Performed by: NURSE PRACTITIONER

## 2017-03-23 RX ADMIN — CEFTAROLINE FOSAMIL 600 MG: 600 POWDER, FOR SOLUTION INTRAVENOUS at 08:32

## 2017-03-23 ASSESSMENT — PAIN SCALES - GENERAL: PAINLEVEL: 5=MODERATE PAIN

## 2017-03-23 NOTE — TELEPHONE ENCOUNTER
Pt started having emesis with diarrhea today.  Diarrhea is watery and pungent in odor, she has had about 3 bouts of diarrhea today.  Will order Cdiff by PCR.  Pt unsure if she will make it to infusion tonight.

## 2017-03-23 NOTE — PROGRESS NOTES
Pt arrived to IS, ambulatory, for q12h Teflaro infusion. Pt voices no new complaints. PICC line flushed per policy, positive blood return noted. Teflaro infused with no s/sx of adverse reaction. PICC line flushed per policy. Pt left IS with no s/sx of distress. Follow up appointment confirmed for this evening.

## 2017-03-23 NOTE — PROGRESS NOTES
Patient arrived to Infusion for IV antibiotics; received new order today for Ceftaroline, BID.  Pt tearful about having to come twice a day, and became more upset upon realizing new infusion runs for an hour each time.  Emotional support provided.  Pt's mom at chairside.  PICC flushed, with brisk blood return.  Ceftaroline infused as ordered. PICC flushed, saline capped and new gauze dressing applied.  Print out of all scheduled appts given to patient.  Pt appeared to be in better spirits, moved to chair by window at beginning of treatment, which appeared to lessen anxiety.  Pt discharged in no apparent distress.

## 2017-03-24 ENCOUNTER — APPOINTMENT (OUTPATIENT)
Dept: ONCOLOGY | Facility: MEDICAL CENTER | Age: 57
End: 2017-03-24
Attending: INTERNAL MEDICINE
Payer: MEDICARE

## 2017-03-24 VITALS
SYSTOLIC BLOOD PRESSURE: 125 MMHG | OXYGEN SATURATION: 94 % | DIASTOLIC BLOOD PRESSURE: 80 MMHG | TEMPERATURE: 98.6 F | RESPIRATION RATE: 18 BRPM | HEART RATE: 68 BPM

## 2017-03-24 VITALS
DIASTOLIC BLOOD PRESSURE: 73 MMHG | HEART RATE: 77 BPM | TEMPERATURE: 99 F | SYSTOLIC BLOOD PRESSURE: 128 MMHG | RESPIRATION RATE: 18 BRPM | OXYGEN SATURATION: 96 %

## 2017-03-24 PROCEDURE — 700111 HCHG RX REV CODE 636 W/ 250 OVERRIDE (IP)

## 2017-03-24 PROCEDURE — 96365 THER/PROPH/DIAG IV INF INIT: CPT

## 2017-03-24 PROCEDURE — 700105 HCHG RX REV CODE 258

## 2017-03-24 RX ADMIN — CEFTAROLINE FOSAMIL 600 MG: 600 POWDER, FOR SOLUTION INTRAVENOUS at 08:08

## 2017-03-24 RX ADMIN — CEFTAROLINE FOSAMIL 600 MG: 600 POWDER, FOR SOLUTION INTRAVENOUS at 18:05

## 2017-03-24 ASSESSMENT — PAIN SCALES - GENERAL
PAINLEVEL: 5=MODERATE PAIN
PAINLEVEL: 5=MODERATE PAIN

## 2017-03-24 NOTE — PROGRESS NOTES
Pt arrived to IS, ambulatory, for q12h Teflaro infusion. Pt unable to make p.m. appointment last night due to nausea/vomiting/diarrhea. Pt informed MD of symptoms, stool sample ordered for cdiff. Pt states she is no longer having any diarrhea or vomiting. PICC line flushed per policy, positive blood return noted. Teflaro infused with no s/sx of adverse reaction. PICC line flushed per policy. PICC line dressing changed with sterile field due to peeling. Pt left IS with no s/sx of distress. Follow up appointment confirmed for this evening.

## 2017-03-25 ENCOUNTER — APPOINTMENT (OUTPATIENT)
Dept: ONCOLOGY | Facility: MEDICAL CENTER | Age: 57
End: 2017-03-25
Attending: INTERNAL MEDICINE
Payer: MEDICARE

## 2017-03-25 VITALS
SYSTOLIC BLOOD PRESSURE: 147 MMHG | HEIGHT: 64 IN | DIASTOLIC BLOOD PRESSURE: 83 MMHG | RESPIRATION RATE: 18 BRPM | HEART RATE: 61 BPM | WEIGHT: 221.12 LBS | TEMPERATURE: 97.7 F | BODY MASS INDEX: 37.75 KG/M2 | OXYGEN SATURATION: 94 %

## 2017-03-25 VITALS
DIASTOLIC BLOOD PRESSURE: 74 MMHG | WEIGHT: 221.12 LBS | HEART RATE: 56 BPM | BODY MASS INDEX: 37.75 KG/M2 | TEMPERATURE: 98.9 F | RESPIRATION RATE: 18 BRPM | OXYGEN SATURATION: 92 % | HEIGHT: 64 IN | SYSTOLIC BLOOD PRESSURE: 130 MMHG

## 2017-03-25 PROCEDURE — 700105 HCHG RX REV CODE 258: Performed by: PHARMACIST

## 2017-03-25 PROCEDURE — 700111 HCHG RX REV CODE 636 W/ 250 OVERRIDE (IP): Performed by: PHARMACIST

## 2017-03-25 PROCEDURE — 96365 THER/PROPH/DIAG IV INF INIT: CPT

## 2017-03-25 RX ADMIN — CEFTAROLINE FOSAMIL 600 MG: 600 POWDER, FOR SOLUTION INTRAVENOUS at 08:15

## 2017-03-25 RX ADMIN — CEFTAROLINE FOSAMIL 600 MG: 600 POWDER, FOR SOLUTION INTRAVENOUS at 17:10

## 2017-03-25 ASSESSMENT — PAIN SCALES - GENERAL
PAINLEVEL: NO PAIN
PAINLEVEL: NO PAIN

## 2017-03-25 NOTE — PROGRESS NOTES
Pt arrived to IS, ambulatory, for q12h Teflaro infusion. Pt voices no new complaints since this morning. PICC line flushed per policy, positive blood return noted. Teflaro infused with no s/sx of adverse reaction. PICC line flushed per policy. Pt left IS with no s/sx of distress. Follow up appointment confirmed for tomorrow morning.

## 2017-03-25 NOTE — PROGRESS NOTES
Returns for IV Teflaro AM dose. Reports vaginal itching and burning, ID physician called, order received for Diflucan 100 mg PO x 3 days ( order received by Ilana MILLS ). Denies nauseas, vomiting, diarrhea, constipation or muscle pain ( from prior Dapto ). Teaching and education reinforcement provided, also emotional support. Treatment well tolerated without complications. Discharge home to self care. Will return for PM tx. Appointment given.

## 2017-03-26 ENCOUNTER — OUTPATIENT INFUSION SERVICES (OUTPATIENT)
Dept: ONCOLOGY | Facility: MEDICAL CENTER | Age: 57
End: 2017-03-26
Attending: INTERNAL MEDICINE
Payer: MEDICARE

## 2017-03-26 VITALS
OXYGEN SATURATION: 91 % | RESPIRATION RATE: 18 BRPM | DIASTOLIC BLOOD PRESSURE: 50 MMHG | HEART RATE: 48 BPM | BODY MASS INDEX: 37.98 KG/M2 | TEMPERATURE: 98.3 F | SYSTOLIC BLOOD PRESSURE: 118 MMHG | WEIGHT: 221.12 LBS

## 2017-03-26 VITALS
TEMPERATURE: 98.3 F | RESPIRATION RATE: 18 BRPM | DIASTOLIC BLOOD PRESSURE: 76 MMHG | OXYGEN SATURATION: 90 % | SYSTOLIC BLOOD PRESSURE: 121 MMHG | HEART RATE: 54 BPM

## 2017-03-26 PROCEDURE — 700105 HCHG RX REV CODE 258: Performed by: PHARMACIST

## 2017-03-26 PROCEDURE — 700111 HCHG RX REV CODE 636 W/ 250 OVERRIDE (IP): Performed by: PHARMACIST

## 2017-03-26 PROCEDURE — 96365 THER/PROPH/DIAG IV INF INIT: CPT

## 2017-03-26 RX ORDER — LISINOPRIL AND HYDROCHLOROTHIAZIDE 12.5; 1 MG/1; MG/1
1 TABLET ORAL DAILY
COMMUNITY
End: 2017-05-19

## 2017-03-26 RX ORDER — FLUCONAZOLE 100 MG/1
100 TABLET ORAL DAILY
COMMUNITY
Start: 2017-03-25 | End: 2017-03-27

## 2017-03-26 RX ORDER — LUBIPROSTONE 24 UG/1
24 CAPSULE ORAL 2 TIMES DAILY WITH MEALS
COMMUNITY
End: 2017-05-19

## 2017-03-26 RX ORDER — DEXTROAMPHETAMINE SACCHARATE, AMPHETAMINE ASPARTATE, DEXTROAMPHETAMINE SULFATE AND AMPHETAMINE SULFATE 2.5; 2.5; 2.5; 2.5 MG/1; MG/1; MG/1; MG/1
10 TABLET ORAL 2 TIMES DAILY
COMMUNITY

## 2017-03-26 RX ADMIN — CEFTAROLINE FOSAMIL 600 MG: 600 POWDER, FOR SOLUTION INTRAVENOUS at 08:08

## 2017-03-26 RX ADMIN — CEFTAROLINE FOSAMIL 600 MG: 600 POWDER, FOR SOLUTION INTRAVENOUS at 17:31

## 2017-03-26 ASSESSMENT — PAIN SCALES - GENERAL
PAINLEVEL: 6=MODERATE PAIN
PAINLEVEL: 4=SLIGHT-MODERATE PAIN

## 2017-03-26 NOTE — PROGRESS NOTES
Returns for IV Teflaro PM dose. Did start first dose of Diflucan and also probiotics. Voices no new complaints. Teaching and education reinforcement provided, also emotional support. Treatment well tolerated without complications. Discharge home with mom to self care. Will return tomorrow. Appointment given.

## 2017-03-26 NOTE — PROGRESS NOTES
Pt arrived for morning dose of BID IV Teflaro. Pt was bradycardic on arrival, pt is asymptomatic and reports just having taken BP med and pain med. Pt also reports yeast infection symptoms are improved after starting diflucan yesterday. Right arm PICC line in place, flushed and brisk blood return observed. Teflaro infused over 1 hour with no s/s of adverse reaction. Line flushed, PICC flushed, brisk blood return again observed. Swab cap placed, line wrapped in gauze and protective sleeve. Upon getting up from chair, pt's wound vac tubing disconnected from foam dressing on her abdomen. Several tegaderms placed around disk and suction re-established within several minutes. Pt reports she has extra drape at home and is familiar and comfortable with placing more and troubleshooting vac if need be. She reports she will see wound care tomorrow as well. No other questions or concerns. Has next appt for 1800. Left on foot with friend in good spirits.

## 2017-03-27 ENCOUNTER — APPOINTMENT (OUTPATIENT)
Dept: ONCOLOGY | Facility: MEDICAL CENTER | Age: 57
End: 2017-03-27
Attending: INTERNAL MEDICINE
Payer: MEDICARE

## 2017-03-27 VITALS
DIASTOLIC BLOOD PRESSURE: 71 MMHG | SYSTOLIC BLOOD PRESSURE: 130 MMHG | HEIGHT: 64 IN | HEART RATE: 47 BPM | BODY MASS INDEX: 36.92 KG/M2 | WEIGHT: 216.27 LBS | RESPIRATION RATE: 18 BRPM | TEMPERATURE: 98.3 F | OXYGEN SATURATION: 91 %

## 2017-03-27 VITALS
OXYGEN SATURATION: 93 % | DIASTOLIC BLOOD PRESSURE: 82 MMHG | TEMPERATURE: 98.2 F | HEART RATE: 60 BPM | SYSTOLIC BLOOD PRESSURE: 128 MMHG | RESPIRATION RATE: 18 BRPM

## 2017-03-27 DIAGNOSIS — L02.211 ABSCESS OF ABDOMINAL WALL: ICD-10-CM

## 2017-03-27 LAB
ALBUMIN SERPL BCP-MCNC: 4.2 G/DL (ref 3.2–4.9)
ALBUMIN/GLOB SERPL: 1.1 G/DL
ALP SERPL-CCNC: 54 U/L (ref 30–99)
ALT SERPL-CCNC: 11 U/L (ref 2–50)
ANION GAP SERPL CALC-SCNC: 4 MMOL/L (ref 0–11.9)
AST SERPL-CCNC: 18 U/L (ref 12–45)
BASOPHILS # BLD AUTO: 1.5 % (ref 0–1.8)
BASOPHILS # BLD: 0.13 K/UL (ref 0–0.12)
BILIRUB SERPL-MCNC: 0.4 MG/DL (ref 0.1–1.5)
BUN SERPL-MCNC: 23 MG/DL (ref 8–22)
CALCIUM SERPL-MCNC: 9.8 MG/DL (ref 8.5–10.5)
CHLORIDE SERPL-SCNC: 102 MMOL/L (ref 96–112)
CO2 SERPL-SCNC: 26 MMOL/L (ref 20–33)
CREAT SERPL-MCNC: 1 MG/DL (ref 0.5–1.4)
EOSINOPHIL # BLD AUTO: 0.55 K/UL (ref 0–0.51)
EOSINOPHIL NFR BLD: 6.1 % (ref 0–6.9)
ERYTHROCYTE [DISTWIDTH] IN BLOOD BY AUTOMATED COUNT: 52.4 FL (ref 35.9–50)
GFR SERPL CREATININE-BSD FRML MDRD: 58 ML/MIN/1.73 M 2
GLOBULIN SER CALC-MCNC: 3.7 G/DL (ref 1.9–3.5)
GLUCOSE SERPL-MCNC: 139 MG/DL (ref 65–99)
HCT VFR BLD AUTO: 48.8 % (ref 37–47)
HGB BLD-MCNC: 15.4 G/DL (ref 12–16)
IMM GRANULOCYTES # BLD AUTO: 0.06 K/UL (ref 0–0.11)
IMM GRANULOCYTES NFR BLD AUTO: 0.7 % (ref 0–0.9)
LYMPHOCYTES # BLD AUTO: 2.16 K/UL (ref 1–4.8)
LYMPHOCYTES NFR BLD: 24.1 % (ref 22–41)
MCH RBC QN AUTO: 28.8 PG (ref 27–33)
MCHC RBC AUTO-ENTMCNC: 31.6 G/DL (ref 33.6–35)
MCV RBC AUTO: 91.2 FL (ref 81.4–97.8)
MONOCYTES # BLD AUTO: 0.46 K/UL (ref 0–0.85)
MONOCYTES NFR BLD AUTO: 5.1 % (ref 0–13.4)
NEUTROPHILS # BLD AUTO: 5.59 K/UL (ref 2–7.15)
NEUTROPHILS NFR BLD: 62.5 % (ref 44–72)
NRBC # BLD AUTO: 0 K/UL
NRBC BLD AUTO-RTO: 0 /100 WBC
PLATELET # BLD AUTO: 292 K/UL (ref 164–446)
PMV BLD AUTO: 9 FL (ref 9–12.9)
POTASSIUM SERPL-SCNC: 4.9 MMOL/L (ref 3.6–5.5)
PROT SERPL-MCNC: 7.9 G/DL (ref 6–8.2)
RBC # BLD AUTO: 5.35 M/UL (ref 4.2–5.4)
SODIUM SERPL-SCNC: 132 MMOL/L (ref 135–145)
WBC # BLD AUTO: 9 K/UL (ref 4.8–10.8)

## 2017-03-27 PROCEDURE — 700111 HCHG RX REV CODE 636 W/ 250 OVERRIDE (IP): Performed by: PHARMACIST

## 2017-03-27 PROCEDURE — 80053 COMPREHEN METABOLIC PANEL: CPT

## 2017-03-27 PROCEDURE — 96365 THER/PROPH/DIAG IV INF INIT: CPT

## 2017-03-27 PROCEDURE — 700105 HCHG RX REV CODE 258: Performed by: PHARMACIST

## 2017-03-27 PROCEDURE — 36592 COLLECT BLOOD FROM PICC: CPT

## 2017-03-27 PROCEDURE — 85025 COMPLETE CBC W/AUTO DIFF WBC: CPT

## 2017-03-27 RX ADMIN — CEFTAROLINE FOSAMIL 600 MG: 600 POWDER, FOR SOLUTION INTRAVENOUS at 08:31

## 2017-03-27 RX ADMIN — CEFTAROLINE FOSAMIL 600 MG: 600 POWDER, FOR SOLUTION INTRAVENOUS at 17:37

## 2017-03-27 ASSESSMENT — PAIN SCALES - GENERAL
PAINLEVEL: 4=SLIGHT-MODERATE PAIN
PAINLEVEL: 4=SLIGHT-MODERATE PAIN

## 2017-03-27 NOTE — PROGRESS NOTES
Pt presented to infusion center for evening dose of BID Teflaro. Right arm PICC flushed, brisk blood return observed. Teflaro infused with no s/s of adverse reaction. Line flushed, PICC flushed and blood return again observed. Swab cap placed, pt declined gauze wrap, protective sleeve used. Confirmed appts tomorrow. Left on foot with mom in good spirits.

## 2017-03-28 ENCOUNTER — APPOINTMENT (OUTPATIENT)
Dept: ONCOLOGY | Facility: MEDICAL CENTER | Age: 57
End: 2017-03-28
Attending: INTERNAL MEDICINE
Payer: MEDICARE

## 2017-03-28 ENCOUNTER — TELEPHONE (OUTPATIENT)
Dept: INFECTIOUS DISEASES | Facility: MEDICAL CENTER | Age: 57
End: 2017-03-28

## 2017-03-28 VITALS
HEART RATE: 70 BPM | TEMPERATURE: 98.1 F | DIASTOLIC BLOOD PRESSURE: 75 MMHG | SYSTOLIC BLOOD PRESSURE: 131 MMHG | RESPIRATION RATE: 18 BRPM | OXYGEN SATURATION: 91 %

## 2017-03-28 VITALS
BODY MASS INDEX: 37.15 KG/M2 | OXYGEN SATURATION: 95 % | DIASTOLIC BLOOD PRESSURE: 84 MMHG | RESPIRATION RATE: 18 BRPM | SYSTOLIC BLOOD PRESSURE: 121 MMHG | TEMPERATURE: 99.1 F | HEART RATE: 66 BPM | WEIGHT: 216.27 LBS

## 2017-03-28 PROCEDURE — 96365 THER/PROPH/DIAG IV INF INIT: CPT

## 2017-03-28 PROCEDURE — 700105 HCHG RX REV CODE 258: Performed by: PHARMACIST

## 2017-03-28 PROCEDURE — 700111 HCHG RX REV CODE 636 W/ 250 OVERRIDE (IP): Performed by: PHARMACIST

## 2017-03-28 RX ADMIN — CEFTAROLINE FOSAMIL 600 MG: 600 POWDER, FOR SOLUTION INTRAVENOUS at 08:30

## 2017-03-28 RX ADMIN — CEFTAROLINE FOSAMIL 600 MG: 600 POWDER, FOR SOLUTION INTRAVENOUS at 18:06

## 2017-03-28 ASSESSMENT — PAIN SCALES - GENERAL
PAINLEVEL: 3=SLIGHT PAIN
PAINLEVEL: 4=SLIGHT-MODERATE PAIN

## 2017-03-28 NOTE — TELEPHONE ENCOUNTER
Pt states is having IV infusion twice a day and because has to go to court Thursday AM thus will be unable to get that dose of abx.

## 2017-03-28 NOTE — PROGRESS NOTES
Pt arrives ambulatory to IS accompanied by self.  She is here for daily IV ABX.  Pt denies gastric upset.  IV ABX infused as ordered, pt tolerated well, no evidence of adverse effects noted or expressed.  PICC flushes well, brisk blood return noted.  Pt dc'd to self care in no apparent distress.  Returns today for PM dose.

## 2017-03-28 NOTE — PROGRESS NOTES
Patient presents for evening dose of Ceftaroline. Reviewed plan of care, patient verbalizes understanding. Infusion completed with no new patient complaints, line flushed clear. PICC flushed per protocol and site secured. Patient returns tomorrow and released in no acute distress.

## 2017-03-29 ENCOUNTER — APPOINTMENT (OUTPATIENT)
Dept: ONCOLOGY | Facility: MEDICAL CENTER | Age: 57
End: 2017-03-29
Attending: INTERNAL MEDICINE
Payer: MEDICARE

## 2017-03-29 VITALS
RESPIRATION RATE: 18 BRPM | HEART RATE: 60 BPM | SYSTOLIC BLOOD PRESSURE: 103 MMHG | TEMPERATURE: 98.8 F | OXYGEN SATURATION: 93 % | DIASTOLIC BLOOD PRESSURE: 63 MMHG

## 2017-03-29 VITALS
OXYGEN SATURATION: 93 % | RESPIRATION RATE: 18 BRPM | SYSTOLIC BLOOD PRESSURE: 114 MMHG | DIASTOLIC BLOOD PRESSURE: 68 MMHG | HEART RATE: 52 BPM | BODY MASS INDEX: 37.15 KG/M2 | WEIGHT: 216.27 LBS | TEMPERATURE: 98.1 F

## 2017-03-29 PROCEDURE — 96365 THER/PROPH/DIAG IV INF INIT: CPT

## 2017-03-29 PROCEDURE — 700105 HCHG RX REV CODE 258: Performed by: PHARMACIST

## 2017-03-29 PROCEDURE — 700111 HCHG RX REV CODE 636 W/ 250 OVERRIDE (IP): Performed by: PHARMACIST

## 2017-03-29 RX ADMIN — CEFTAROLINE FOSAMIL 600 MG: 600 POWDER, FOR SOLUTION INTRAVENOUS at 18:14

## 2017-03-29 RX ADMIN — CEFTAROLINE FOSAMIL 600 MG: 600 POWDER, FOR SOLUTION INTRAVENOUS at 09:08

## 2017-03-29 ASSESSMENT — PAIN SCALES - GENERAL
PAINLEVEL: 4=SLIGHT-MODERATE PAIN
PAINLEVEL: 4=SLIGHT-MODERATE PAIN

## 2017-03-29 NOTE — PROGRESS NOTES
Patient presents for evening dose of Teflaro. Reviewed plan of care, patient verbalizes understanding. PICC flushes well with brisk blood return. Teflaro infused as ordered with no patient complaints, line flushed clear. PICC flushed per protocol and site secured. Patient returns tomorrow and released in no acute distress with her friend.

## 2017-03-29 NOTE — PROGRESS NOTES
Patient here for AM dose of Teflaro. Brisk blood return noted from PICC line. Teflaro given per MAR; no adverse reaction noted. Dressing peeling and patient stated getting dressing wet. Dressing changed using sterile technique. Patient to return tonight for PM dose of Teflaro. Appointment confirmed. Discharged to self care; no apparent distress noted.

## 2017-03-30 ENCOUNTER — APPOINTMENT (OUTPATIENT)
Dept: ONCOLOGY | Facility: MEDICAL CENTER | Age: 57
End: 2017-03-30
Attending: INTERNAL MEDICINE
Payer: MEDICARE

## 2017-03-30 NOTE — PROGRESS NOTES
Pt ambulated into department for her PM dose of Ceftaroline for an abdominal abscess. Pt reported that she continues to have fatigue and intermittent nausea, plans to contact her doctor regarding nausea. PICC had + blood return prior, flushed briskly. Ceftaroline given as prescribed, Pt tolerated well. PICC had + blood return after, flushed per [facility] policy, swab cap applied. Line secured to arm with Pt's personal securement sock. Pt told RN that she has to go to court tomorrow morning, so she will miss her AM dose of Ceftaroline. Pt attempted to call her Infectious Disease doctor regarding her missing her morning dose, but was unable to reach there office. PM appointment confirmed with Pt prior to leaving, left department with friend appearing in good spirits and NAD.

## 2017-03-31 ENCOUNTER — OUTPATIENT INFUSION SERVICES (OUTPATIENT)
Dept: ONCOLOGY | Facility: MEDICAL CENTER | Age: 57
End: 2017-03-31
Attending: INTERNAL MEDICINE
Payer: MEDICARE

## 2017-03-31 VITALS
SYSTOLIC BLOOD PRESSURE: 140 MMHG | TEMPERATURE: 98.3 F | BODY MASS INDEX: 37.15 KG/M2 | DIASTOLIC BLOOD PRESSURE: 84 MMHG | OXYGEN SATURATION: 92 % | RESPIRATION RATE: 18 BRPM | WEIGHT: 216.27 LBS | HEART RATE: 75 BPM

## 2017-03-31 VITALS
TEMPERATURE: 97.3 F | RESPIRATION RATE: 18 BRPM | DIASTOLIC BLOOD PRESSURE: 57 MMHG | OXYGEN SATURATION: 93 % | HEART RATE: 71 BPM | SYSTOLIC BLOOD PRESSURE: 107 MMHG

## 2017-03-31 PROCEDURE — 700111 HCHG RX REV CODE 636 W/ 250 OVERRIDE (IP): Performed by: PHARMACIST

## 2017-03-31 PROCEDURE — 700105 HCHG RX REV CODE 258: Performed by: PHARMACIST

## 2017-03-31 PROCEDURE — 96365 THER/PROPH/DIAG IV INF INIT: CPT

## 2017-03-31 RX ADMIN — CEFTAROLINE FOSAMIL 600 MG: 600 POWDER, FOR SOLUTION INTRAVENOUS at 18:12

## 2017-03-31 RX ADMIN — CEFTAROLINE FOSAMIL 600 MG: 600 POWDER, FOR SOLUTION INTRAVENOUS at 08:06

## 2017-03-31 ASSESSMENT — PAIN SCALES - GENERAL
PAINLEVEL: 4=SLIGHT-MODERATE PAIN
PAINLEVEL: 4=SLIGHT-MODERATE PAIN

## 2017-03-31 NOTE — PROGRESS NOTES
"Patient here for AM dose of BID Ceftaroline. Patient missed her appointments yesterday. Patient stated that she called 3 times and left a message. Patient stated that she had some \"family issues\" yesterday to take care of and was very fatigued afterwards. Patients states that she has nausea at times. Right single lumen PICC in place; brisk blood return noted. Clave changed. Ceftaroline given per MAR; no adverse reaction noted. PICC line flushed with saline per protocol. Returns tonight for PM dose of Ceftaroline. Next appointment scheduled. Discharged to self care; no apparent distress noted.   "

## 2017-04-01 ENCOUNTER — OUTPATIENT INFUSION SERVICES (OUTPATIENT)
Dept: ONCOLOGY | Facility: MEDICAL CENTER | Age: 57
End: 2017-04-01
Attending: INTERNAL MEDICINE
Payer: MEDICARE

## 2017-04-01 VITALS
HEIGHT: 64 IN | BODY MASS INDEX: 36.92 KG/M2 | HEART RATE: 73 BPM | TEMPERATURE: 97.1 F | OXYGEN SATURATION: 94 % | WEIGHT: 216.27 LBS | RESPIRATION RATE: 18 BRPM | DIASTOLIC BLOOD PRESSURE: 79 MMHG | SYSTOLIC BLOOD PRESSURE: 124 MMHG

## 2017-04-01 VITALS
WEIGHT: 216.27 LBS | SYSTOLIC BLOOD PRESSURE: 105 MMHG | TEMPERATURE: 98.8 F | HEIGHT: 64 IN | OXYGEN SATURATION: 97 % | BODY MASS INDEX: 36.92 KG/M2 | DIASTOLIC BLOOD PRESSURE: 83 MMHG | RESPIRATION RATE: 18 BRPM | HEART RATE: 66 BPM

## 2017-04-01 PROCEDURE — 700105 HCHG RX REV CODE 258

## 2017-04-01 PROCEDURE — 96365 THER/PROPH/DIAG IV INF INIT: CPT

## 2017-04-01 PROCEDURE — 700111 HCHG RX REV CODE 636 W/ 250 OVERRIDE (IP)

## 2017-04-01 RX ADMIN — CEFTAROLINE FOSAMIL 600 MG: 600 POWDER, FOR SOLUTION INTRAVENOUS at 09:08

## 2017-04-01 RX ADMIN — CEFTAROLINE FOSAMIL 600 MG: 600 POWDER, FOR SOLUTION INTRAVENOUS at 18:09

## 2017-04-01 ASSESSMENT — PAIN SCALES - GENERAL
PAINLEVEL: 4=SLIGHT-MODERATE PAIN
PAINLEVEL: 4=SLIGHT-MODERATE PAIN

## 2017-04-01 NOTE — PROGRESS NOTES
"Pt called and reported that she would be 15 mins late for appt, pt educated and importance of BID dosing and verbalized understanding. Pt presents ambulatory with friend for am dose of Ceftaroline. PICC accessed and blood return noted. Pt reported getting \"accidently wet\" with shower last noc, drsg noted to be peeling back, however, site no compromised or exposed. Due to peeling of drsg on all sides, drsg changed with sterile technique. Medication infused per orders without complications or adverse reactions. PICC flushed with saline and gauze with arm wrap applied. Pt to return this evening for pm dose, appt confirmed with pt. Pt left ambulatory with friend in no distress.   "

## 2017-04-02 ENCOUNTER — OUTPATIENT INFUSION SERVICES (OUTPATIENT)
Dept: ONCOLOGY | Facility: MEDICAL CENTER | Age: 57
End: 2017-04-02
Attending: INTERNAL MEDICINE
Payer: MEDICARE

## 2017-04-02 VITALS
HEIGHT: 64 IN | DIASTOLIC BLOOD PRESSURE: 95 MMHG | HEART RATE: 79 BPM | RESPIRATION RATE: 18 BRPM | TEMPERATURE: 99.2 F | WEIGHT: 216.27 LBS | BODY MASS INDEX: 36.92 KG/M2 | SYSTOLIC BLOOD PRESSURE: 123 MMHG | OXYGEN SATURATION: 94 %

## 2017-04-02 VITALS
HEIGHT: 64 IN | SYSTOLIC BLOOD PRESSURE: 129 MMHG | DIASTOLIC BLOOD PRESSURE: 77 MMHG | OXYGEN SATURATION: 94 % | TEMPERATURE: 98.6 F | BODY MASS INDEX: 36.92 KG/M2 | HEART RATE: 77 BPM | WEIGHT: 216.27 LBS | RESPIRATION RATE: 18 BRPM

## 2017-04-02 PROCEDURE — 700111 HCHG RX REV CODE 636 W/ 250 OVERRIDE (IP): Performed by: NURSE PRACTITIONER

## 2017-04-02 PROCEDURE — 700105 HCHG RX REV CODE 258: Performed by: NURSE PRACTITIONER

## 2017-04-02 PROCEDURE — 96365 THER/PROPH/DIAG IV INF INIT: CPT

## 2017-04-02 RX ADMIN — CEFTAROLINE FOSAMIL 600 MG: 600 POWDER, FOR SOLUTION INTRAVENOUS at 17:34

## 2017-04-02 RX ADMIN — CEFTAROLINE FOSAMIL 600 MG: 600 POWDER, FOR SOLUTION INTRAVENOUS at 09:08

## 2017-04-02 ASSESSMENT — PAIN SCALES - GENERAL
PAINLEVEL: 4=SLIGHT-MODERATE PAIN
PAINLEVEL: 4=SLIGHT-MODERATE PAIN

## 2017-04-02 NOTE — PROGRESS NOTES
Pt presents ambulatory for am Ceftaroline infusion. Pt reports mild nausea this morning, ginger ale and saltines provided. PICC accessed and blood return noted. Medication currently infusing, pt resting in chair with call light within reach.

## 2017-04-02 NOTE — PROGRESS NOTES
Pt presents ambulatory for pm Ceftaroline infusion. PICC accessed and blood return noted. Medication infused per orders without complications or adverse reactions. PICC flushed with saline and gauze with arm wrap applied. Pt to return tomorrow, appt confirmed with pt. Pt left ambulatory in no distress.

## 2017-04-02 NOTE — PROGRESS NOTES
Infusion completed without complications or adverse reactions, PICC flushed with saline and arm wrap applied. Pt to return this evening, appt confirmed with pt. Pt left ambulatory at 1015 in no distress.

## 2017-04-03 ENCOUNTER — OUTPATIENT INFUSION SERVICES (OUTPATIENT)
Dept: ONCOLOGY | Facility: MEDICAL CENTER | Age: 57
End: 2017-04-03
Attending: INTERNAL MEDICINE
Payer: MEDICARE

## 2017-04-03 VITALS
HEART RATE: 75 BPM | OXYGEN SATURATION: 94 % | BODY MASS INDEX: 36.85 KG/M2 | SYSTOLIC BLOOD PRESSURE: 132 MMHG | TEMPERATURE: 98.7 F | WEIGHT: 215.83 LBS | DIASTOLIC BLOOD PRESSURE: 66 MMHG | HEIGHT: 64 IN | RESPIRATION RATE: 18 BRPM

## 2017-04-03 VITALS
DIASTOLIC BLOOD PRESSURE: 90 MMHG | OXYGEN SATURATION: 95 % | SYSTOLIC BLOOD PRESSURE: 113 MMHG | RESPIRATION RATE: 18 BRPM | TEMPERATURE: 98.8 F | HEART RATE: 72 BPM

## 2017-04-03 DIAGNOSIS — S31.109S: ICD-10-CM

## 2017-04-03 LAB
ALBUMIN SERPL BCP-MCNC: 4.2 G/DL (ref 3.2–4.9)
ALP SERPL-CCNC: 59 U/L (ref 30–99)
ALT SERPL-CCNC: 12 U/L (ref 2–50)
AST SERPL-CCNC: 18 U/L (ref 12–45)
BASOPHILS # BLD AUTO: 1 % (ref 0–1.8)
BASOPHILS # BLD: 0.08 K/UL (ref 0–0.12)
BILIRUB CONJ SERPL-MCNC: <0.1 MG/DL (ref 0.1–0.5)
BILIRUB INDIRECT SERPL-MCNC: NORMAL MG/DL (ref 0–1)
BILIRUB SERPL-MCNC: 0.3 MG/DL (ref 0.1–1.5)
BUN SERPL-MCNC: 24 MG/DL (ref 8–22)
CALCIUM SERPL-MCNC: 9.3 MG/DL (ref 8.5–10.5)
CHLORIDE SERPL-SCNC: 104 MMOL/L (ref 96–112)
CO2 SERPL-SCNC: 25 MMOL/L (ref 20–33)
CREAT SERPL-MCNC: 1.03 MG/DL (ref 0.5–1.4)
EOSINOPHIL # BLD AUTO: 0.95 K/UL (ref 0–0.51)
EOSINOPHIL NFR BLD: 11.7 % (ref 0–6.9)
ERYTHROCYTE [DISTWIDTH] IN BLOOD BY AUTOMATED COUNT: 52 FL (ref 35.9–50)
GFR SERPL CREATININE-BSD FRML MDRD: 56 ML/MIN/1.73 M 2
GLUCOSE SERPL-MCNC: 147 MG/DL (ref 65–99)
HCT VFR BLD AUTO: 45.2 % (ref 37–47)
HGB BLD-MCNC: 14.8 G/DL (ref 12–16)
IMM GRANULOCYTES # BLD AUTO: 0.02 K/UL (ref 0–0.11)
IMM GRANULOCYTES NFR BLD AUTO: 0.2 % (ref 0–0.9)
LYMPHOCYTES # BLD AUTO: 1.98 K/UL (ref 1–4.8)
LYMPHOCYTES NFR BLD: 24.4 % (ref 22–41)
MCH RBC QN AUTO: 30 PG (ref 27–33)
MCHC RBC AUTO-ENTMCNC: 32.7 G/DL (ref 33.6–35)
MCV RBC AUTO: 91.7 FL (ref 81.4–97.8)
MONOCYTES # BLD AUTO: 0.47 K/UL (ref 0–0.85)
MONOCYTES NFR BLD AUTO: 5.8 % (ref 0–13.4)
NEUTROPHILS # BLD AUTO: 4.6 K/UL (ref 2–7.15)
NEUTROPHILS NFR BLD: 56.9 % (ref 44–72)
NRBC # BLD AUTO: 0 K/UL
NRBC BLD AUTO-RTO: 0 /100 WBC
PHOSPHATE SERPL-MCNC: 2.7 MG/DL (ref 2.5–4.5)
PLATELET # BLD AUTO: 247 K/UL (ref 164–446)
PMV BLD AUTO: 9.2 FL (ref 9–12.9)
POTASSIUM SERPL-SCNC: 4.4 MMOL/L (ref 3.6–5.5)
PROT SERPL-MCNC: 8.1 G/DL (ref 6–8.2)
RBC # BLD AUTO: 4.93 M/UL (ref 4.2–5.4)
SODIUM SERPL-SCNC: 138 MMOL/L (ref 135–145)
WBC # BLD AUTO: 8.1 K/UL (ref 4.8–10.8)

## 2017-04-03 PROCEDURE — 80069 RENAL FUNCTION PANEL: CPT

## 2017-04-03 PROCEDURE — 700105 HCHG RX REV CODE 258: Performed by: NURSE PRACTITIONER

## 2017-04-03 PROCEDURE — 700111 HCHG RX REV CODE 636 W/ 250 OVERRIDE (IP): Performed by: NURSE PRACTITIONER

## 2017-04-03 PROCEDURE — 96365 THER/PROPH/DIAG IV INF INIT: CPT

## 2017-04-03 PROCEDURE — 80076 HEPATIC FUNCTION PANEL: CPT | Mod: 59

## 2017-04-03 PROCEDURE — 85025 COMPLETE CBC W/AUTO DIFF WBC: CPT

## 2017-04-03 RX ADMIN — CEFTAROLINE FOSAMIL 600 MG: 600 POWDER, FOR SOLUTION INTRAVENOUS at 08:41

## 2017-04-03 RX ADMIN — CEFTAROLINE FOSAMIL 600 MG: 600 POWDER, FOR SOLUTION INTRAVENOUS at 17:41

## 2017-04-03 ASSESSMENT — PAIN SCALES - GENERAL
PAINLEVEL: 4=SLIGHT-MODERATE PAIN
PAINLEVEL: 4=SLIGHT-MODERATE PAIN

## 2017-04-03 NOTE — PROGRESS NOTES
Infusion completed without complications or adverse side effects. PICC flushed with saline per protocol, arm wrap applied. Pt to return tomorrow, appt confirmed with pt. Pt left ambulatory in no distress at 1835.

## 2017-04-03 NOTE — PROGRESS NOTES
LATE ENTRY:    Patient here for PM dose of BID Ceftaroline. Brisk blood return noted on PICC. Ceftaroline given per MAR; no adverse reaction noted. Next appointment scheduled. Discharged to self care; no apparent distress noted.

## 2017-04-03 NOTE — PROGRESS NOTES
Pt arrived to infusion for IV abx and labs. PICC line flushed, brisk blood return. Labs drawn, IV abx infused per order. Pt tolerated well. PICC line flushed, pt dc'd home self care in no acute distress. Future appointment confirmed.

## 2017-04-03 NOTE — PROGRESS NOTES
Pt presents ambulatory for pm BID Ceftaroline infusion. PICC accessed and blood return noted. Medication currently infusing and pt resting in chair with call light within reach.

## 2017-04-04 ENCOUNTER — OFFICE VISIT (OUTPATIENT)
Dept: INFECTIOUS DISEASES | Facility: MEDICAL CENTER | Age: 57
End: 2017-04-04
Payer: MEDICARE

## 2017-04-04 ENCOUNTER — TELEPHONE (OUTPATIENT)
Dept: ONCOLOGY | Facility: MEDICAL CENTER | Age: 57
End: 2017-04-04

## 2017-04-04 ENCOUNTER — APPOINTMENT (OUTPATIENT)
Dept: ONCOLOGY | Facility: MEDICAL CENTER | Age: 57
End: 2017-04-04
Attending: INTERNAL MEDICINE
Payer: MEDICARE

## 2017-04-04 VITALS
TEMPERATURE: 98.3 F | DIASTOLIC BLOOD PRESSURE: 92 MMHG | OXYGEN SATURATION: 90 % | WEIGHT: 213.4 LBS | RESPIRATION RATE: 16 BRPM | HEIGHT: 64 IN | BODY MASS INDEX: 36.43 KG/M2 | HEART RATE: 85 BPM | SYSTOLIC BLOOD PRESSURE: 128 MMHG

## 2017-04-04 DIAGNOSIS — A49.02 MRSA (METHICILLIN RESISTANT STAPHYLOCOCCUS AUREUS) INFECTION: ICD-10-CM

## 2017-04-04 DIAGNOSIS — Z72.0 TOBACCO ABUSE: ICD-10-CM

## 2017-04-04 DIAGNOSIS — E66.9 OBESITY (BMI 30-39.9): ICD-10-CM

## 2017-04-04 DIAGNOSIS — L02.211 ABSCESS OF ABDOMINAL WALL: ICD-10-CM

## 2017-04-04 LAB
FUNGUS SPEC CULT: NORMAL
SIGNIFICANT IND 70042: NORMAL
SITE SITE: NORMAL
SOURCE SOURCE: NORMAL

## 2017-04-04 PROCEDURE — G8419 CALC BMI OUT NRM PARAM NOF/U: HCPCS | Performed by: NURSE PRACTITIONER

## 2017-04-04 PROCEDURE — 99214 OFFICE O/P EST MOD 30 MIN: CPT | Mod: 25 | Performed by: NURSE PRACTITIONER

## 2017-04-04 PROCEDURE — 1111F DSCHRG MED/CURRENT MED MERGE: CPT | Performed by: NURSE PRACTITIONER

## 2017-04-04 PROCEDURE — 4004F PT TOBACCO SCREEN RCVD TLK: CPT | Mod: 8P | Performed by: NURSE PRACTITIONER

## 2017-04-04 PROCEDURE — 3014F SCREEN MAMMO DOC REV: CPT | Mod: 8P | Performed by: NURSE PRACTITIONER

## 2017-04-04 PROCEDURE — G8432 DEP SCR NOT DOC, RNG: HCPCS | Performed by: NURSE PRACTITIONER

## 2017-04-04 RX ORDER — SULFAMETHOXAZOLE AND TRIMETHOPRIM 800; 160 MG/1; MG/1
1 TABLET ORAL 2 TIMES DAILY
Qty: 28 TAB | Refills: 0 | Status: SHIPPED | OUTPATIENT
Start: 2017-04-07 | End: 2017-04-04

## 2017-04-04 RX ORDER — DOXYCYCLINE HYCLATE 100 MG
100 TABLET ORAL 2 TIMES DAILY
Qty: 60 TAB | Refills: 1 | Status: SHIPPED | OUTPATIENT
Start: 2017-04-07 | End: 2017-05-07

## 2017-04-04 NOTE — PROGRESS NOTES
Pt arrived to IS ambulatory, here for evening dose of Ceftaroline for abdominal abscess. Wound vac in place. Right PICC in place, patent with brisk blood return observed. Abx infused over one hour as ordered. Line flushed clear. PICC flushed and site wrapped. Pt stated she would not be in in the AM as she is going to see ID to see if she can stop abx earlier. Pt discharged home under self care in no apparent distress. Pt had stated she had an appt at 8 am however, after pt left, reviewed appts and appt with ID is at 1300. Unclear if pt will be present for AM dose.

## 2017-04-04 NOTE — TELEPHONE ENCOUNTER
Pt no show this AM. Pt had stated she would miss the appt this AM as she was seeing ID this AM. Pt's appt with ID isn't until 1pm. Message sent to MD to inform them of missed dose.

## 2017-04-04 NOTE — PROGRESS NOTES
[facility]    Subjective:     Chief Complaint   Patient presents with   • Hospital Follow-up     MRSA abd wall abscess     This is my first time meeting Ms. Abbott.  She is accompanied by her mother, Jody.    Interval History: 56 y.o. white female who has history of obesity, tobacco dependence, and repeated episodes of    diverticulitis in 2012.  On 12/05/2012, she underwent low anterior resection of the colon with EEA anastomosis.  She had multiple issues with nonhealing of the wound after that.  On 12/11/15, she again underwent wound exploration and closure of the dehiscence, and on 12/15/2012, she underwent I&D of the abdominal wall, necrotizing fasciitis, drainage of the abdominal abscess, colostomy, and placement of a wound VAC.  In May 2013 she underwent laparoscopic reversal of the colostomy.  She developed multiple incisional hernias at the old colostomy site.  She underwent incisional hernia repair in May of 2014.  She continued to have draining wound and on 02/05/2015, she underwent excision of the infected abdominal wound and removal of infected tissues and foreign bodies.  Ultimately, her wound healed and she had been doing fairly well until she developed abdominal pain and a hardened mass, hence she came back into the emergency room.  Pt hospitalized from 3/5- 3/14/17 for a MRSA abdominal wall abscess.  The pt underwent a CT scan on 03/05/2017, which showed two abscesses.  She was taken to the OR by Dr. Curran and she underwent an I&D on 3/5/17 of both the subcutaneous and subfascial abscesses, as well as an excision of the infected mesh.  Unfortunately, not all of the infected mesh was able to be removed.  OR cultures + MRSA.  Pt was discharged to [facility] on IV Daptomycin, end date 4/6/17 to be followed with PO Doxycycline.  The pt was transitioned to IV Ceftaroline 600mg BID on 3/22/17 d/t elevated CPK and quad pain/cramping that had developed on the IV Daptomycin.     Hospital  "records reviewed    Today, 4/4/2017: Patient reports feeling a little better since she was taken off of the Daptomycin, quad pain has resolved.  Pt is currently on IV Ceftarolin, which she is tolerating for the most part.  She continues to be fatigued and has had nausea, but not vomiting.  Denies feeling generally ill, fevers/chills, general malaise, d, abdominal pain, chest pain or shortness of breath.  She has had headaches more frequently, but nothing that is unbearable.   The pt states the wound is healing well.  She has a wound vac in placed and estimates the size to be about 6 x 4 x 3 cm.  She is being followed by home health and they are changing the dressing three times a week.  Minimal drainage, no odor, minimal redness, minimal pain/tenderness and no swelling.  Pt also states that she has a yeast infection to her under breasts and groin, but has been using Nystatin powder to treat the areas, which seems to be helping.  She is scheduled to see Dr. Figueroa this Friday, at which time they will discuss the possibility of another surgery in the near future to remove the mesh.     ROS  As documented above in my HPI.    Past Medical History   Diagnosis Date   • Diverticulitis    • Diverticulosis    • Hypothyroid 6/24/2010   • Thyroid cancer (CMS-HCC) 1990's   • HTN (hypertension) 6/24/2010   • Psoriasis 6/24/2010   • GERD (gastroesophageal reflux disease)    • Palpitations    • Unspecified urinary incontinence    • Psychiatric disorder      depression/anxiety   • MRSA infection      MRSA   • Pain 02-03-05     abd, 5/10       Social History   Substance Use Topics   • Smoking status: Current Some Day Smoker -- 0.50 packs/day     Types: Cigarettes   • Smokeless tobacco: Never Used   • Alcohol Use: No       Allergies: Tape    Pt's medication and problem list reviewed.     Objective:     PE:  /92 mmHg  Pulse 85  Temp(Src) 36.8 °C (98.3 °F)  Resp 16  Ht 1.626 m (5' 4\")  Wt 96.798 kg (213 lb 6.4 oz)  BMI " 36.61 kg/m2  SpO2 90%  LMP 09/17/2007    Vital signs reviewed    Constitutional: Appears well-developed and well-nourished. No acute distress.  Obese.    Eyes: Conjunctivae normal and EOM are normal. Pupils are equal, round, and reactive to light.   Neck: Trachea midline. Normal range of motion.   Respiratory: No respiratory distress, unlabored respiratory effort.  Lungs clear to auscultation bilaterally. No wheezes or rales.   Cardiovascular: Normal rate, regular rhythm, normal heart sounds. No murmur, gallop, or friction rub. No edema.  Abdomen: Soft, tender to touch, non-distended. BS + x 4. Midline WV in place, minimal surrounding erythema, minimal serosanguineous drainage in canister.   Musculoskeletal: Steady gait.  Normal range of motion.  No joint or bone tenderness, swelling, erythema or deformity.  RUE PICC- non tender, no erythema.  Skin: Warm and dry. No visible rashes or lesions.  Neurological: No cranial nerve deficit. Coordination normal.    Psychiatric: Alert and oriented to person, place, and time.     WBC   Date/Time Value Ref Range Status   04/03/2017 08:47 AM 8.1 4.8 - 10.8 K/uL Final     RBC   Date/Time Value Ref Range Status   04/03/2017 08:47 AM 4.93 4.20 - 5.40 M/uL Final     HEMOGLOBIN   Date/Time Value Ref Range Status   04/03/2017 08:47 AM 14.8 12.0 - 16.0 g/dL Final     HEMATOCRIT   Date/Time Value Ref Range Status   04/03/2017 08:47 AM 45.2 37.0 - 47.0 % Final     MCV   Date/Time Value Ref Range Status   04/03/2017 08:47 AM 91.7 81.4 - 97.8 fL Final     MCH   Date/Time Value Ref Range Status   04/03/2017 08:47 AM 30.0 27.0 - 33.0 pg Final     MCHC   Date/Time Value Ref Range Status   04/03/2017 08:47 AM 32.7* 33.6 - 35.0 g/dL Final     MPV   Date/Time Value Ref Range Status   04/03/2017 08:47 AM 9.2 9.0 - 12.9 fL Final        SODIUM   Date/Time Value Ref Range Status   04/03/2017 08:47  135 - 145 mmol/L Final     POTASSIUM   Date/Time Value Ref Range Status   04/03/2017 08:47 AM  4.4 3.6 - 5.5 mmol/L Final     CHLORIDE   Date/Time Value Ref Range Status   04/03/2017 08:47  96 - 112 mmol/L Final     CO2   Date/Time Value Ref Range Status   04/03/2017 08:47 AM 25 20 - 33 mmol/L Final     GLUCOSE   Date/Time Value Ref Range Status   04/03/2017 08:47 * 65 - 99 mg/dL Final     BUN   Date/Time Value Ref Range Status   04/03/2017 08:47 AM 24* 8 - 22 mg/dL Final     CREATININE   Date/Time Value Ref Range Status   04/03/2017 08:47 AM 1.03 0.50 - 1.40 mg/dL Final   01/12/2008 03:40 PM 0.7 0.5 - 1.4 mg/dL Final       ALKALINE PHOSPHATASE   Date/Time Value Ref Range Status   04/03/2017 08:47 AM 59 30 - 99 U/L Final     AST(SGOT)   Date/Time Value Ref Range Status   04/03/2017 08:47 AM 18 12 - 45 U/L Final     ALT(SGPT)   Date/Time Value Ref Range Status   04/03/2017 08:47 AM 12 2 - 50 U/L Final     TOTAL BILIRUBIN   Date/Time Value Ref Range Status   04/03/2017 08:47 AM 0.3 0.1 - 1.5 mg/dL Final        Assessment and Plan:   The following treatment plan was discussed with patient at length:    1. Abscess of abdominal wall  doxycycline (VIBRAMYCIN) 100 MG Tab    -Finish IV Ceftaroline BID on 4/6/17.  Pt has missed approximately 4 doses, which is why we will not end IV abx early.    -Transition to PO Doxycyline 100 mg BID on 4/7/17.  Pt will need to remain on PO Doxycycline until retained mesh is removed.  Discussed dosing and side effects of Doxycycline being prescribed.  Pt instructed to call clinic with any adverse effects or to discontinue the medication and go to the ER should difficulty breathing, SOB, CP, facial swelling and/or gross rash/itching occurs.   -Will hold on d/cing PICC until pt talks with Dr. Figueroa.  If he plans to take her back to surgery next week, then will leave PICC in.  However, if the surgery is any later than next week, then we will d/c the PICC.  Pt to call and update me on his plan after she has seen him this Friday.   2. MRSA (methicillin resistant  Staphylococcus aureus) infection  doxycycline (VIBRAMYCIN) 100 MG Tab    As above.   3. Tobacco abuse      +tobacco screen. Spent 3 minutes on smoking cessation education provided. Discussed the negative consequences of smoking. The patient was educated about quitting strategies: use distraction, use oral substitutes, such as cinnamon sticks, gum, toothpicks, etc., breathe deeply, reconsider necessity of cigarette, take a break, schedule enjoyable activities. Avoid boredom. Avoid being around other smokers and situations that trigger smoking (bar, casino). FU with PCP.    4. Obesity (BMI 30-39.9)      Encouraged health diet and exercise as tolerated to aid in wt loss.     Follow up: 2 weeks, RTC sooner if needed. FU with PCP for ongoing chronic medical conditions.     NATAN Schmidt.    Case discussed with Dr. Duffy.        **Had originally planned on placing pt on PO Bactrim and checking BMP in one week; however, after discussion with Dr. Duffy, change the plan to PO Doxy as pt will have to remain on PO abx until retained mesh is removed.  Pt to be evaluated by Dr. Figueroa this Friday to determine if/when he may remove retained mesh.    >25 min spent face to face with patient, >50% of time spent counseling, coordinating care, reviewing records, discussing POC, educating patient.

## 2017-04-04 NOTE — MR AVS SNAPSHOT
"        Thelma Villafana Scar   2017 1:00 PM   Office Visit   MRN: [id number]    Department:  [facility]   Dept Phone:  [phone]    Description:  Female : 1960   Provider:  JUSTICE Schmidt           Reason for Visit     Hospital Follow-up MRSA abd wall abscess      Allergies as of 2017     Allergen Noted Reactions    Tape 10/24/2014   Rash      You were diagnosed with     Abscess of abdominal wall   [394376]       MRSA (methicillin resistant Staphylococcus aureus) infection   [303562]         Vital Signs     Blood Pressure Pulse Temperature Respirations Height Weight    128/92 mmHg 85 36.8 °C (98.3 °F) 16 1.626 m (5' 4\") 96.798 kg (213 lb 6.4 oz)    Body Mass Index Oxygen Saturation Last Menstrual Period Smoking Status          36.61 kg/m2 90% 2007 Current Some Day Smoker        Basic Information     Date Of Birth Sex Race Ethnicity Preferred Language    1960 Female White Non- English      Your appointments     2017  6:00 PM   Est Antibiotics with RN 1   [facility] ([facility])    [address] [place] [address] [address]  [facility] [id number] [phone]            2017  1:00 PM   Follow Up Visit with JUSTICE Schmidt   [facility] ([address] [place] [address])    [address] [place] [id number] [phone]           You will be receiving a confirmation call a few days before your appointment from our automated call confirmation system.              Problem List              ICD-10-CM Priority Class Noted - Resolved    Hypothyroid E03.9 Low  2010 - Present    Thyroid cancer (CMS-HCC) C73   2010 - Present    Psoriasis L40.9 Low  2010 - Present    S/P thyroidectomy E89.0 Low  2011 - Present    Low back pain M54.5 Low  2011 - Present    Diverticulitis K57.92   2012 - Present    Chronic pain G89.29 Medium  7/10/2012 - Present    CHEST PAIN  High  2012 - Present    Diverticulitis large intestine K57.32   " 10/22/2012 - Present    Diverticulitis of colon K57.32 High  11/8/2012 - Present    Phlegmon L02.91 High  11/9/2012 - Present    Tobacco abuse Z72.0 Low  11/12/2012 - Present    Surgical wound infection T81.4XXA   12/10/2012 - Present    Abdominal wall abscess at site of surgical wound L03.311 Medium  12/15/2012 - Present    Depression (Chronic) F32.9 Medium  12/17/2012 - Present    Wound, open, abdominal wall, anterior S31.109A High  12/19/2012 - Present    Anemia D64.9 Medium  12/23/2012 - Present    Diaphragmatic hernia K44.9   5/22/2013 - Present    Incisional hernia K43.2   5/1/2014 - Present    Dehiscence of incision T81.31XA   6/20/2014 - Present    Non-healing surgical wound T81.89XA   12/5/2014 - Present    Open wound of abdominal wall, anterior, complicated S31.109A   2/5/2015 - Present    Abdominal pain R10.9   1/15/2016 - Present    Abscess of abdominal wall L02.211 High  3/5/2017 - Present      Health Maintenance        Date Due Completion Dates    IMM DTaP/Tdap/Td Vaccine (1 - Tdap) 8/30/1981 ---    IMM PNEUMOCOCCAL 19-64 (ADULT) MEDIUM RISK SERIES (1 of 1 - PPSV23) 8/30/1981 ---    PAP SMEAR 8/30/1983 ---    MAMMOGRAM 8/30/2002 ---    COLONOSCOPY 1/18/2026 1/18/2016            Current Immunizations     Influenza TIV (IM) 10/15/2013, 10/24/2012  4:53 AM, 10/25/2011  4:10 PM    Influenza Vaccine Quad Inj (Pf) 10/1/2015    Pneumococcal Vaccine (UF)Historical Data 11/9/2010      Below and/or attached are the medications your provider expects you to take. Review all of your home medications and newly ordered medications with your provider and/or pharmacist. Follow medication instructions as directed by your provider and/or pharmacist. Please keep your medication list with you and share with your provider. Update the information when medications are discontinued, doses are changed, or new medications (including over-the-counter products) are added; and carry medication information at all times in the event  of emergency situations     Allergies:  TAPE - Rash               Medications  Valid as of: April 04, 2017 -  2:00 PM    Generic Name Brand Name Tablet Size Instructions for use    Albuterol Sulfate   Inhale  by mouth as needed.        Amphetamine-Dextroamphetamine (Tab) ADDERALL 10 MG Take 5 mg by mouth 2 times a day.        Budesonide-Formoterol Fumarate (Aerosol) SYMBICORT 160-4.5 MCG/ACT USE 2 PUFF BY MOUTH TWICE PER DAY        ClonazePAM (Tab) KLONOPIN 1 MG Take 1 mg by mouth every bedtime.        Diclofenac Epolamine (Patch) FLECTOR 1.3 % Apply 1 Patch to skin as directed 2 Times a Day.        Gabapentin (Cap) NEURONTIN 100 MG Take 100 mg by mouth 3 times a day.        Hydrocodone-Acetaminophen (Tab) NORCO  MG Take 1 Tab by mouth every four hours as needed (breakthrough pain).        Levothyroxine Sodium (Tab) SYNTHROID 200 MCG Take 1 Tab by mouth every morning. 30 mins before breakfast        Lisinopril-Hydrochlorothiazide (Tab) PRINZIDE, ZESTORETIC 10-12.5 MG Take 1 Tab by mouth every day.        Lubiprostone (Cap) AMITIZA 24 MCG Take 24 mcg by mouth 2 times a day, with meals.        OxyCODONE HCl (Tablet Extended Release 12 hour Abuse-Deterrent) OXYCONTIN 20 MG Take 20 mg by mouth every 12 hours. Indications: Moderate to Severe Chronic Pain        PARoxetine HCl (Tab) PAXIL 20 MG Take 20 mg by mouth every bedtime.        Probiotic Product   Take  by mouth 2 Times a Day.        Sulfamethoxazole-Trimethoprim (Tab) BACTRIM -160 MG Take 1 Tab by mouth 2 times a day for 14 days.        TraZODone HCl (Tab) DESYREL 100 MG Take 200 mg by mouth every bedtime.        .                 Medicines prescribed today were sent to:     [id number] - [place], [place] - [address]    [address] [address]    Phone: [phone] Fax: [phone]    Open 24 Hours?: No      Medication refill instructions:       If your prescription bottle indicates you have medication refills left,  it is not necessary to call your provider’s office. Please contact your pharmacy and they will refill your medication.    If your prescription bottle indicates you do not have any refills left, you may request refills at any time through one of the following ways: The online [employer] system (except Urgent Care), by calling your provider’s office, or by asking your pharmacy to contact your provider’s office with a refill request. Medication refills are processed only during regular business hours and may not be available until the next business day. Your provider may request additional information or to have a follow-up visit with you prior to refilling your medication.   *Please Note: Medication refills are assigned a new Rx number when refilled electronically. Your pharmacy may indicate that no refills were authorized even though a new prescription for the same medication is available at the pharmacy. Please request the medicine by name with the pharmacy before contacting your provider for a refill.        Your To Do List     Future Labs/Procedures Complete By Appstarter    BASIC METABOLIC PANEL  As directed 4/4/2018         [employer] Access Code: [email or web address]  Expires: 4/13/2017  1:14 PM    [employer]  A secure, online tool to manage your health information     [employer]’s [employer]® is a secure, online tool that connects you to your personalized health information from the privacy of your home -- day or night - making it very easy for you to manage your healthcare. Once the activation process is completed, you can even access your medical information using the [employer] francisca, which is available for free in the Apple Francisca store or Google Play store.     [employer] provides the following levels of access (as shown below):   My Chart Features   [facility] Doctor [facility]  Specialists [facility]  Urgent  Care Non-[facility]  Primary Care  Doctor   Email your healthcare team securely and privately 24/7 X X X    Manage  appointments: schedule your next appointment; view details of past/upcoming appointments X      Request prescription refills. X      View recent personal medical records, including lab and immunizations X X X X   View health record, including health history, allergies, medications X X X X   Read reports about your outpatient visits, procedures, consult and ER notes X X X X   See your discharge summary, which is a recap of your hospital and/or ER visit that includes your diagnosis, lab results, and care plan. X X       How to register for [employer]:  1. Go to  [email or web address].  2. Click on the Sign Up Now box, which takes you to the New Member Sign Up page. You will need to provide the following information:  a. Enter your [employer] Access Code exactly as it appears at the top of this page. (You will not need to use this code after you’ve completed the sign-up process. If you do not sign up before the expiration date, you must request a new code.)   b. Enter your date of birth.   c. Enter your home email address.   d. Click Submit, and follow the next screen’s instructions.  3. Create a [employer] ID. This will be your [employer] login ID and cannot be changed, so think of one that is secure and easy to remember.  4. Create a [employer] password. You can change your password at any time.  5. Enter your Password Reset Question and Answer. This can be used at a later time if you forget your password.   6. Enter your e-mail address. This allows you to receive e-mail notifications when new information is available in [employer].  7. Click Sign Up. You can now view your health information.    For assistance activating your [employer] account, call [phone]        Quit Tobacco Information     Do you want to quit using tobacco?    Quitting tobacco decreases risks of cancer, heart and lung disease, increases life expectancy, improves sense of taste and smell, and increases spending money, among other benefits.    If you are  thinking about quitting, we can help.  • [facility] Quit Tobacco Program: 861-045-6034  o Program occurs weekly for four weeks and includes pharmacist consultation on products to support quitting smoking or chewing tobacco. A provider referral is needed for pharmacist consultation.  • Tobacco Users Help Hotline: -800-QUIT-NOW (527-4630) or https://[place].quitlogix.org/  o Free, confidential telephone and online coaching for [place] residents. Sessions are designed on a schedule that is convenient for you. Eligible clients receive free nicotine replacement therapy.  • Nationally: www.smokefree.gov  o Information and professional assistance to support both immediate and long-term needs as you become, and remain, a non-smoker. Smokefree.gov allows you to choose the help that best fits your needs.

## 2017-04-05 ENCOUNTER — OUTPATIENT INFUSION SERVICES (OUTPATIENT)
Dept: ONCOLOGY | Facility: MEDICAL CENTER | Age: 57
End: 2017-04-05
Attending: INTERNAL MEDICINE
Payer: MEDICARE

## 2017-04-05 NOTE — PROGRESS NOTES
Pt called RN and explained to nurse that she saw her nurse practitioner yesterday, and understood that her Infectious Disease doctor wanted her to continue with BID Ceftaroline today and tomorrow (4/5 and 4/6). Pt told nurse that she disagreed with the nurse practitioner, and does not want to continue with her IV antibiotics. Pt also told RN that she has started taking her prescribed PO antibiotics, and plans to follow-up with her Surgeon as scheduled. Pt has PICC line in place, per Ginger VERDIN's note on 4/4/17 their office is aware of this and planned for Pt to continue to keep her PICC line until she has met with her surgeon. Pt's Infectious Disease doctor's office called and updated about situation.

## 2017-04-06 ENCOUNTER — APPOINTMENT (OUTPATIENT)
Dept: ONCOLOGY | Facility: MEDICAL CENTER | Age: 57
End: 2017-04-06
Attending: INTERNAL MEDICINE
Payer: MEDICARE

## 2017-04-07 ENCOUNTER — TELEPHONE (OUTPATIENT)
Dept: INFECTIOUS DISEASES | Facility: MEDICAL CENTER | Age: 57
End: 2017-04-07

## 2017-04-07 NOTE — TELEPHONE ENCOUNTER
Spoke to pt, she was seen by Dr. Figueroa who is happy with the pt's progress. He removed her PICC in his office.  Pt to FU with Dr. Figueroa again in 2 weeks and with ID on 4/20.  Pt has started PO abx without issue.

## 2017-05-04 ENCOUNTER — HOSPITAL ENCOUNTER (OUTPATIENT)
Dept: RADIOLOGY | Facility: MEDICAL CENTER | Age: 57
End: 2017-05-04
Attending: SURGERY
Payer: MEDICARE

## 2017-05-04 DIAGNOSIS — S31.102D: ICD-10-CM

## 2017-05-04 PROCEDURE — 74177 CT ABD & PELVIS W/CONTRAST: CPT

## 2017-05-04 PROCEDURE — 700117 HCHG RX CONTRAST REV CODE 255: Performed by: SURGERY

## 2017-05-04 RX ADMIN — IOHEXOL 100 ML: 350 INJECTION, SOLUTION INTRAVENOUS at 15:56

## 2017-05-19 DIAGNOSIS — Z01.810 PRE-OPERATIVE CARDIOVASCULAR EXAMINATION: ICD-10-CM

## 2017-05-19 DIAGNOSIS — Z01.812 PRE-OPERATIVE LABORATORY EXAMINATION: ICD-10-CM

## 2017-05-19 LAB
ANION GAP SERPL CALC-SCNC: 6 MMOL/L (ref 0–11.9)
BUN SERPL-MCNC: 25 MG/DL (ref 8–22)
CALCIUM SERPL-MCNC: 9.1 MG/DL (ref 8.5–10.5)
CHLORIDE SERPL-SCNC: 103 MMOL/L (ref 96–112)
CO2 SERPL-SCNC: 27 MMOL/L (ref 20–33)
CREAT SERPL-MCNC: 0.79 MG/DL (ref 0.5–1.4)
EKG IMPRESSION: NORMAL
ERYTHROCYTE [DISTWIDTH] IN BLOOD BY AUTOMATED COUNT: 49.1 FL (ref 35.9–50)
GFR SERPL CREATININE-BSD FRML MDRD: >60 ML/MIN/1.73 M 2
GLUCOSE SERPL-MCNC: 93 MG/DL (ref 65–99)
HCT VFR BLD AUTO: 44.4 % (ref 37–47)
HGB BLD-MCNC: 14.1 G/DL (ref 12–16)
MCH RBC QN AUTO: 29 PG (ref 27–33)
MCHC RBC AUTO-ENTMCNC: 31.8 G/DL (ref 33.6–35)
MCV RBC AUTO: 91.4 FL (ref 81.4–97.8)
PLATELET # BLD AUTO: 262 K/UL (ref 164–446)
PMV BLD AUTO: 9.3 FL (ref 9–12.9)
POTASSIUM SERPL-SCNC: 4.2 MMOL/L (ref 3.6–5.5)
RBC # BLD AUTO: 4.86 M/UL (ref 4.2–5.4)
SODIUM SERPL-SCNC: 136 MMOL/L (ref 135–145)
WBC # BLD AUTO: 8.3 K/UL (ref 4.8–10.8)

## 2017-05-19 PROCEDURE — 80048 BASIC METABOLIC PNL TOTAL CA: CPT

## 2017-05-19 PROCEDURE — 93010 ELECTROCARDIOGRAM REPORT: CPT | Performed by: INTERNAL MEDICINE

## 2017-05-19 PROCEDURE — 85027 COMPLETE CBC AUTOMATED: CPT

## 2017-05-19 PROCEDURE — 36415 COLL VENOUS BLD VENIPUNCTURE: CPT

## 2017-05-19 PROCEDURE — 93005 ELECTROCARDIOGRAM TRACING: CPT

## 2017-05-19 RX ORDER — LUBIPROSTONE 24 UG/1
24 CAPSULE ORAL 2 TIMES DAILY WITH MEALS
COMMUNITY

## 2017-05-23 ENCOUNTER — HOSPITAL ENCOUNTER (INPATIENT)
Facility: MEDICAL CENTER | Age: 57
LOS: 5 days | DRG: 908 | End: 2017-05-28
Attending: SURGERY | Admitting: SURGERY
Payer: MEDICARE

## 2017-05-23 ENCOUNTER — APPOINTMENT (OUTPATIENT)
Dept: RADIOLOGY | Facility: MEDICAL CENTER | Age: 57
DRG: 908 | End: 2017-05-23
Attending: SURGERY
Payer: MEDICARE

## 2017-05-23 PROBLEM — S31.102A: Status: ACTIVE | Noted: 2017-05-23

## 2017-05-23 PROBLEM — T85.79XA INFECTION AND INFLAMMATORY REACTION DUE TO DEVICE, IMPLANT, AND GRAFT: Status: ACTIVE | Noted: 2017-05-23

## 2017-05-23 LAB
ABO GROUP BLD: NORMAL
ABO GROUP BLD: NORMAL
BARCODED ABORH UBTYP: 5100
BARCODED PRD CODE UBPRD: NORMAL
BARCODED UNIT NUM UBUNT: NORMAL
BLD GP AB SCN SERPL QL: NORMAL
CFT BLD TEG: 3.8 MIN (ref 5–10)
CLOT ANGLE BLD TEG: 65.4 DEGREES (ref 53–72)
CLOT LYSIS 30M P MA LENFR BLD TEG: 0 % (ref 0–8)
COMPONENT P 8504P: NORMAL
CT.EXTRINSIC BLD ROTEM: 1.8 MIN (ref 1–3)
ERYTHROCYTE [DISTWIDTH] IN BLOOD BY AUTOMATED COUNT: 49.7 FL (ref 35.9–50)
ERYTHROCYTE [DISTWIDTH] IN BLOOD BY AUTOMATED COUNT: 50.4 FL (ref 35.9–50)
GRAM STN SPEC: NORMAL
GRAM STN SPEC: NORMAL
HCT VFR BLD AUTO: 33.7 % (ref 37–47)
HCT VFR BLD AUTO: 38.6 % (ref 37–47)
HGB BLD-MCNC: 10.6 G/DL (ref 12–16)
HGB BLD-MCNC: 12.3 G/DL (ref 12–16)
MCF BLD TEG: 62.6 MM (ref 50–70)
MCH RBC QN AUTO: 29 PG (ref 27–33)
MCH RBC QN AUTO: 29.1 PG (ref 27–33)
MCHC RBC AUTO-ENTMCNC: 31.5 G/DL (ref 33.6–35)
MCHC RBC AUTO-ENTMCNC: 31.9 G/DL (ref 33.6–35)
MCV RBC AUTO: 91 FL (ref 81.4–97.8)
MCV RBC AUTO: 92.6 FL (ref 81.4–97.8)
PA AA BLD-ACNC: 94.4 %
PA ADP BLD-ACNC: 57.8 %
PLATELET # BLD AUTO: 201 K/UL (ref 164–446)
PLATELET # BLD AUTO: 230 K/UL (ref 164–446)
PMV BLD AUTO: 8.9 FL (ref 9–12.9)
PMV BLD AUTO: 9 FL (ref 9–12.9)
PRODUCT TYPE UPROD: NORMAL
RBC # BLD AUTO: 3.64 M/UL (ref 4.2–5.4)
RBC # BLD AUTO: 4.24 M/UL (ref 4.2–5.4)
RH BLD: NORMAL
SIGNIFICANT IND 70042: NORMAL
SIGNIFICANT IND 70042: NORMAL
SITE SITE: NORMAL
SITE SITE: NORMAL
SOURCE SOURCE: NORMAL
SOURCE SOURCE: NORMAL
TEG ALGORITHM TGALG: ABNORMAL
UNIT STATUS USTAT: NORMAL
WBC # BLD AUTO: 12.4 K/UL (ref 4.8–10.8)
WBC # BLD AUTO: 15.6 K/UL (ref 4.8–10.8)

## 2017-05-23 PROCEDURE — 160009 HCHG ANES TIME/MIN: Performed by: SURGERY

## 2017-05-23 PROCEDURE — 500122 HCHG BOVIE, BLADE: Performed by: SURGERY

## 2017-05-23 PROCEDURE — 500446 HCHG HEMOSTAT, SURGICAL 6X9: Performed by: SURGERY

## 2017-05-23 PROCEDURE — 160036 HCHG PACU - EA ADDL 30 MINS PHASE I: Performed by: SURGERY

## 2017-05-23 PROCEDURE — 86900 BLOOD TYPING SEROLOGIC ABO: CPT

## 2017-05-23 PROCEDURE — 87015 SPECIMEN INFECT AGNT CONCNTJ: CPT

## 2017-05-23 PROCEDURE — A9270 NON-COVERED ITEM OR SERVICE: HCPCS | Performed by: SURGERY

## 2017-05-23 PROCEDURE — 500389 HCHG DRAIN, RESERVOIR SUCT JP 100CC: Performed by: SURGERY

## 2017-05-23 PROCEDURE — 700102 HCHG RX REV CODE 250 W/ 637 OVERRIDE(OP)

## 2017-05-23 PROCEDURE — 88304 TISSUE EXAM BY PATHOLOGIST: CPT

## 2017-05-23 PROCEDURE — 160035 HCHG PACU - 1ST 60 MINS PHASE I: Performed by: SURGERY

## 2017-05-23 PROCEDURE — 0WPF0JZ REMOVAL OF SYNTHETIC SUBSTITUTE FROM ABDOMINAL WALL, OPEN APPROACH: ICD-10-PCS | Performed by: SURGERY

## 2017-05-23 PROCEDURE — 500371 HCHG DRAIN, BLAKE 10MM: Performed by: SURGERY

## 2017-05-23 PROCEDURE — 160048 HCHG OR STATISTICAL LEVEL 1-5: Performed by: SURGERY

## 2017-05-23 PROCEDURE — 94760 N-INVAS EAR/PLS OXIMETRY 1: CPT

## 2017-05-23 PROCEDURE — 700105 HCHG RX REV CODE 258

## 2017-05-23 PROCEDURE — 87205 SMEAR GRAM STAIN: CPT | Mod: 91

## 2017-05-23 PROCEDURE — 86850 RBC ANTIBODY SCREEN: CPT

## 2017-05-23 PROCEDURE — 0KBK0ZZ EXCISION OF RIGHT ABDOMEN MUSCLE, OPEN APPROACH: ICD-10-PCS | Performed by: SURGERY

## 2017-05-23 PROCEDURE — 0KBL0ZZ EXCISION OF LEFT ABDOMEN MUSCLE, OPEN APPROACH: ICD-10-PCS | Performed by: SURGERY

## 2017-05-23 PROCEDURE — 501433 HCHG STAPLER, GIA MULTIFIRE 60/80: Performed by: SURGERY

## 2017-05-23 PROCEDURE — A9270 NON-COVERED ITEM OR SERVICE: HCPCS

## 2017-05-23 PROCEDURE — 87077 CULTURE AEROBIC IDENTIFY: CPT | Mod: 91

## 2017-05-23 PROCEDURE — 502240 HCHG MISC OR SUPPLY RC 0272: Performed by: SURGERY

## 2017-05-23 PROCEDURE — 160041 HCHG SURGERY MINUTES - EA ADDL 1 MIN LEVEL 4: Performed by: SURGERY

## 2017-05-23 PROCEDURE — 700111 HCHG RX REV CODE 636 W/ 250 OVERRIDE (IP): Performed by: SURGERY

## 2017-05-23 PROCEDURE — 85576 BLOOD PLATELET AGGREGATION: CPT

## 2017-05-23 PROCEDURE — 87070 CULTURE OTHR SPECIMN AEROBIC: CPT | Mod: 91

## 2017-05-23 PROCEDURE — 700105 HCHG RX REV CODE 258: Performed by: SURGERY

## 2017-05-23 PROCEDURE — 94667 MNPJ CHEST WALL 1ST: CPT

## 2017-05-23 PROCEDURE — 700111 HCHG RX REV CODE 636 W/ 250 OVERRIDE (IP): Performed by: ANESTHESIOLOGY

## 2017-05-23 PROCEDURE — 770006 HCHG ROOM/CARE - MED/SURG/GYN SEMI*

## 2017-05-23 PROCEDURE — 500064 HCHG BINDER, 4-PANEL MED/LG: Performed by: SURGERY

## 2017-05-23 PROCEDURE — 88300 SURGICAL PATH GROSS: CPT

## 2017-05-23 PROCEDURE — 700101 HCHG RX REV CODE 250

## 2017-05-23 PROCEDURE — 87186 SC STD MICRODIL/AGAR DIL: CPT

## 2017-05-23 PROCEDURE — 87075 CULTR BACTERIA EXCEPT BLOOD: CPT | Mod: 91

## 2017-05-23 PROCEDURE — 502704 HCHG DEVICE, LIGASURE IMPACT: Performed by: SURGERY

## 2017-05-23 PROCEDURE — 501838 HCHG SUTURE GENERAL: Performed by: SURGERY

## 2017-05-23 PROCEDURE — 160002 HCHG RECOVERY MINUTES (STAT): Performed by: SURGERY

## 2017-05-23 PROCEDURE — 85347 COAGULATION TIME ACTIVATED: CPT

## 2017-05-23 PROCEDURE — 86901 BLOOD TYPING SEROLOGIC RH(D): CPT

## 2017-05-23 PROCEDURE — 700102 HCHG RX REV CODE 250 W/ 637 OVERRIDE(OP): Performed by: SURGERY

## 2017-05-23 PROCEDURE — 700111 HCHG RX REV CODE 636 W/ 250 OVERRIDE (IP)

## 2017-05-23 PROCEDURE — 502000 HCHG MISC OR IMPLANTS RC 0278: Performed by: SURGERY

## 2017-05-23 PROCEDURE — 500698 HCHG HEMOCLIP, MEDIUM: Performed by: SURGERY

## 2017-05-23 PROCEDURE — 82962 GLUCOSE BLOOD TEST: CPT

## 2017-05-23 PROCEDURE — 500697 HCHG HEMOCLIP, LARGE (ORANGE): Performed by: SURGERY

## 2017-05-23 PROCEDURE — 160029 HCHG SURGERY MINUTES - 1ST 30 MINS LEVEL 4: Performed by: SURGERY

## 2017-05-23 PROCEDURE — 85027 COMPLETE CBC AUTOMATED: CPT

## 2017-05-23 PROCEDURE — 74000 DX-ABDOMEN-1 VIEW: CPT

## 2017-05-23 PROCEDURE — 500423 HCHG DRESSING, ABD COMBINE: Performed by: SURGERY

## 2017-05-23 PROCEDURE — 85384 FIBRINOGEN ACTIVITY: CPT

## 2017-05-23 PROCEDURE — 501452 HCHG STAPLES, GIA MULTIFIRE 60/80: Performed by: SURGERY

## 2017-05-23 PROCEDURE — 700112 HCHG RX REV CODE 229: Performed by: SURGERY

## 2017-05-23 PROCEDURE — C1725 CATH, TRANSLUMIN NON-LASER: HCPCS | Performed by: SURGERY

## 2017-05-23 PROCEDURE — 0WUF0KZ SUPPLEMENT ABDOMINAL WALL WITH NONAUTOLOGOUS TISSUE SUBSTITUTE, OPEN APPROACH: ICD-10-PCS | Performed by: SURGERY

## 2017-05-23 PROCEDURE — P9034 PLATELETS, PHERESIS: HCPCS

## 2017-05-23 PROCEDURE — 501445 HCHG STAPLER, SKIN DISP: Performed by: SURGERY

## 2017-05-23 PROCEDURE — 0KXL0ZZ TRANSFER LEFT ABDOMEN MUSCLE, OPEN APPROACH: ICD-10-PCS | Performed by: SURGERY

## 2017-05-23 PROCEDURE — 36430 TRANSFUSION BLD/BLD COMPNT: CPT

## 2017-05-23 DEVICE — IMPLANTABLE DEVICE: Type: IMPLANTABLE DEVICE | Status: FUNCTIONAL

## 2017-05-23 RX ORDER — AMOXICILLIN 250 MG
1 CAPSULE ORAL NIGHTLY
Status: DISCONTINUED | OUTPATIENT
Start: 2017-05-23 | End: 2017-05-28 | Stop reason: HOSPADM

## 2017-05-23 RX ORDER — IBUPROFEN 200 MG
600 TABLET ORAL
Status: ON HOLD | COMMUNITY
End: 2017-05-28

## 2017-05-23 RX ORDER — POLYETHYLENE GLYCOL 3350 17 G/17G
1 POWDER, FOR SOLUTION ORAL 2 TIMES DAILY
Status: DISCONTINUED | OUTPATIENT
Start: 2017-05-23 | End: 2017-05-28 | Stop reason: HOSPADM

## 2017-05-23 RX ORDER — AMOXICILLIN 250 MG
1 CAPSULE ORAL
Status: DISCONTINUED | OUTPATIENT
Start: 2017-05-23 | End: 2017-05-28 | Stop reason: HOSPADM

## 2017-05-23 RX ORDER — ONDANSETRON 2 MG/ML
4 INJECTION INTRAMUSCULAR; INTRAVENOUS EVERY 4 HOURS PRN
Status: DISCONTINUED | OUTPATIENT
Start: 2017-05-23 | End: 2017-05-28 | Stop reason: HOSPADM

## 2017-05-23 RX ORDER — SODIUM CHLORIDE, SODIUM LACTATE, POTASSIUM CHLORIDE, CALCIUM CHLORIDE 600; 310; 30; 20 MG/100ML; MG/100ML; MG/100ML; MG/100ML
INJECTION, SOLUTION INTRAVENOUS CONTINUOUS
Status: DISCONTINUED | OUTPATIENT
Start: 2017-05-23 | End: 2017-05-24

## 2017-05-23 RX ORDER — ONDANSETRON 2 MG/ML
INJECTION INTRAMUSCULAR; INTRAVENOUS
Status: COMPLETED
Start: 2017-05-23 | End: 2017-05-23

## 2017-05-23 RX ORDER — DOXYCYCLINE HYCLATE 100 MG
100 TABLET ORAL 2 TIMES DAILY
COMMUNITY

## 2017-05-23 RX ORDER — SODIUM CHLORIDE, SODIUM LACTATE, POTASSIUM CHLORIDE, CALCIUM CHLORIDE 600; 310; 30; 20 MG/100ML; MG/100ML; MG/100ML; MG/100ML
INJECTION, SOLUTION INTRAVENOUS CONTINUOUS
Status: DISCONTINUED | OUTPATIENT
Start: 2017-05-23 | End: 2017-05-26

## 2017-05-23 RX ORDER — FAMOTIDINE 20 MG/1
20 TABLET, FILM COATED ORAL 2 TIMES DAILY
Status: DISCONTINUED | OUTPATIENT
Start: 2017-05-23 | End: 2017-05-24

## 2017-05-23 RX ORDER — BISACODYL 10 MG
10 SUPPOSITORY, RECTAL RECTAL
Status: DISCONTINUED | OUTPATIENT
Start: 2017-05-23 | End: 2017-05-28 | Stop reason: HOSPADM

## 2017-05-23 RX ORDER — ASPIRIN 325 MG
325 TABLET ORAL
Status: ON HOLD | COMMUNITY
End: 2017-05-28

## 2017-05-23 RX ORDER — SODIUM CHLORIDE 9 MG/ML
INJECTION, SOLUTION INTRAVENOUS
Status: COMPLETED
Start: 2017-05-23 | End: 2017-05-23

## 2017-05-23 RX ORDER — ENEMA 19; 7 G/133ML; G/133ML
1 ENEMA RECTAL
Status: DISCONTINUED | OUTPATIENT
Start: 2017-05-23 | End: 2017-05-28 | Stop reason: HOSPADM

## 2017-05-23 RX ORDER — DEXTROSE MONOHYDRATE 25 G/50ML
25 INJECTION, SOLUTION INTRAVENOUS
Status: DISCONTINUED | OUTPATIENT
Start: 2017-05-23 | End: 2017-05-25

## 2017-05-23 RX ORDER — OXYCODONE HCL 5 MG/5 ML
SOLUTION, ORAL ORAL
Status: COMPLETED
Start: 2017-05-23 | End: 2017-05-23

## 2017-05-23 RX ORDER — CHLORHEXIDINE GLUCONATE ORAL RINSE 1.2 MG/ML
15 SOLUTION DENTAL EVERY 12 HOURS
Status: DISCONTINUED | OUTPATIENT
Start: 2017-05-23 | End: 2017-05-23

## 2017-05-23 RX ORDER — PROMETHAZINE HYDROCHLORIDE 25 MG/ML
6.25 INJECTION, SOLUTION INTRAMUSCULAR; INTRAVENOUS
Status: DISCONTINUED | OUTPATIENT
Start: 2017-05-23 | End: 2017-05-28 | Stop reason: HOSPADM

## 2017-05-23 RX ORDER — DOCUSATE SODIUM 100 MG/1
100 CAPSULE, LIQUID FILLED ORAL 2 TIMES DAILY
Status: DISCONTINUED | OUTPATIENT
Start: 2017-05-23 | End: 2017-05-28 | Stop reason: HOSPADM

## 2017-05-23 RX ADMIN — ONDANSETRON 4 MG: 2 INJECTION, SOLUTION INTRAMUSCULAR; INTRAVENOUS at 16:40

## 2017-05-23 RX ADMIN — FENTANYL CITRATE 50 MCG: 50 INJECTION, SOLUTION INTRAMUSCULAR; INTRAVENOUS at 17:31

## 2017-05-23 RX ADMIN — FENTANYL CITRATE 50 MCG: 50 INJECTION, SOLUTION INTRAMUSCULAR; INTRAVENOUS at 17:07

## 2017-05-23 RX ADMIN — DOCUSATE SODIUM 100 MG: 100 CAPSULE ORAL at 20:14

## 2017-05-23 RX ADMIN — PROMETHAZINE HYDROCHLORIDE 6.25 MG: 25 INJECTION INTRAMUSCULAR; INTRAVENOUS at 18:51

## 2017-05-23 RX ADMIN — INSULIN LISPRO 2 UNITS: 100 INJECTION, SOLUTION INTRAVENOUS; SUBCUTANEOUS at 21:23

## 2017-05-23 RX ADMIN — OXYCODONE HYDROCHLORIDE 10 MG: 5 SOLUTION ORAL at 16:38

## 2017-05-23 RX ADMIN — SODIUM CHLORIDE, SODIUM LACTATE, POTASSIUM CHLORIDE, CALCIUM CHLORIDE: 600; 310; 30; 20 INJECTION, SOLUTION INTRAVENOUS at 16:50

## 2017-05-23 RX ADMIN — STANDARDIZED SENNA CONCENTRATE AND DOCUSATE SODIUM 1 TABLET: 8.6; 5 TABLET, FILM COATED ORAL at 20:16

## 2017-05-23 RX ADMIN — ONDANSETRON 4 MG: 2 INJECTION INTRAMUSCULAR; INTRAVENOUS at 16:40

## 2017-05-23 RX ADMIN — FAMOTIDINE 20 MG: 20 TABLET, FILM COATED ORAL at 20:14

## 2017-05-23 RX ADMIN — VANCOMYCIN HYDROCHLORIDE 2500 MG: 100 INJECTION, POWDER, LYOPHILIZED, FOR SOLUTION INTRAVENOUS at 21:51

## 2017-05-23 RX ADMIN — HYDROMORPHONE HYDROCHLORIDE: 2 INJECTION INTRAMUSCULAR; INTRAVENOUS; SUBCUTANEOUS at 17:45

## 2017-05-23 RX ADMIN — POLYETHYLENE GLYCOL 3350 1 PACKET: 17 POWDER, FOR SOLUTION ORAL at 20:14

## 2017-05-23 ASSESSMENT — PAIN SCALES - GENERAL
PAINLEVEL_OUTOF10: 5
PAINLEVEL_OUTOF10: 5
PAINLEVEL_OUTOF10: 0
PAINLEVEL_OUTOF10: 5
PAINLEVEL_OUTOF10: 5
PAINLEVEL_OUTOF10: 0
PAINLEVEL_OUTOF10: 0
PAINLEVEL_OUTOF10: 8
PAINLEVEL_OUTOF10: 0
PAINLEVEL_OUTOF10: 8
PAINLEVEL_OUTOF10: 5
PAINLEVEL_OUTOF10: 5
PAINLEVEL_OUTOF10: 8
PAINLEVEL_OUTOF10: 0
PAINLEVEL_OUTOF10: 8
PAINLEVEL_OUTOF10: 5
PAINLEVEL_OUTOF10: 8
PAINLEVEL_OUTOF10: 5
PAINLEVEL_OUTOF10: 8

## 2017-05-23 ASSESSMENT — LIFESTYLE VARIABLES
ALCOHOL_USE: NO
EVER_SMOKED: YES

## 2017-05-23 NOTE — IP AVS SNAPSHOT
Sopsy.[email or web address] Access Code: [email or web address]  Expires: 6/2/2017 12:57 PM    Your email address is not on file at [facility].  Email Addresses are required for you to sign up for [email or web address], please contact [phone] to verify your personal information and to provide your email address prior to attempting to register for Sopsy.com.    Thelma Abbott  [address] [place] [address]    Sopsy.com  A secure, online tool to manage your health information     [facility]’s Sopsy.com® is a secure, online tool that connects you to your personalized health information from the privacy of your home -- day or night - making it very easy for you to manage your healthcare. Once the activation process is completed, you can even access your medical information using the Sopsy.com francisca, which is available for free in the Apple Francisca store or Google Play store.     To learn more about [email or web address], visit [email or web address]    There are two levels of access available (as shown below):   My Chart Features  [facility] Primary Care Doctor [facility]  Specialists [facility]  Urgent  Care Non-[facility] Primary Care Doctor   Email your healthcare team securely and privately 24/7 X X X    Manage appointments: schedule your next appointment; view details of past/upcoming appointments X      Request prescription refills. X      View recent personal medical records, including lab and immunizations X X X X   View health record, including health history, allergies, medications X X X X   Read reports about your outpatient visits, procedures, consult and ER notes X X X X   See your discharge summary, which is a recap of your hospital and/or ER visit that includes your diagnosis, lab results, and care plan X X  X     How to register for Sopsy.com:  Once your e-mail address has been verified, follow the following steps to sign up for Sopsy.com.     1. Go to  [email or web address]  2. Click on the Sign Up Now box, which takes you to the New Member Sign Up page. You  will need to provide the following information:  a. Enter your Talkwheel Access Code exactly as it appears at the top of this page. (You will not need to use this code after you’ve completed the sign-up process. If you do not sign up before the expiration date, you must request a new code.)   b. Enter your date of birth.   c. Enter your home email address.   d. Click Submit, and follow the next screen’s instructions.  3. Create a Pumodot ID. This will be your Talkwheel login ID and cannot be changed, so think of one that is secure and easy to remember.  4. Create a Talkwheel password. You can change your password at any time.  5. Enter your Password Reset Question and Answer. This can be used at a later time if you forget your password.   6. Enter your e-mail address. This allows you to receive e-mail notifications when new information is available in Talkwheel.  7. Click Sign Up. You can now view your health information.    For assistance activating your Talkwheel account, call [phone]

## 2017-05-23 NOTE — IP AVS SNAPSHOT
" Home Care Instructions                                                                                                                  Name:Thelma Abbott  Medical Record Number:[id number]  CSN: [id number]    YOB: 1960   Age: 56 y.o.  Sex: female  HT:1.651 m (5' 5\") WT: 103.9 kg (229 lb 0.9 oz)          Admit Date: 5/23/2017     Discharge Date:   Today's Date: 5/28/2017  Attending Doctor:  Warner Figueroa D.O.                  Allergies:  Codeine and Tape            Discharge Instructions       Discharge Instructions    Discharged to home by car with relative. Discharged via wheelchair, hospital escort: Yes.  Special equipment needed: Not Applicable    Be sure to schedule a follow-up appointment with your primary care doctor or any specialists as instructed.     Discharge Plan:   Diet Plan: Discussed  Activity Level: Discussed  Confirmed Follow up Appointment: Patient to Call and Schedule Appointment  Confirmed Symptoms Management: Discussed  Medication Reconciliation Updated: Yes  Influenza Vaccine Indication: Indicated: Not available from distributor/    I understand that a diet low in cholesterol, fat, and sodium is recommended for good health. Unless I have been given specific instructions below for another diet, I accept this instruction as my diet prescription.   Other diet: diet as tolerated    Special Instructions:   1.  Call or seek medical attention if questions or concerns arise   2.  Follow up with Dr. Warner Figueroa, surgery, as scheduled 5/30/2017 at 1100 am, as needed, if symptoms worsen and for wound check   3.  Follow up with primary care provider within one weeks time, as needed, if symptoms worsen and wound check   4.  No contact sports, heavy lifting, or strenuous activities until cleared by Dr. Warner Figueroa   5.  No swimming, hot tubs, baths, shower or wound submersion until cleared by Dr. Warner Figueroa   6.  No operation of machinery or motorized vehicles under " the influence of narcotics   7.  No alcohol use under the influence of narcotics   8.  Drains to remain clean and in place. Change dry dressings as needed. Empty as needed and record 24 hr puts.   9.  Abdominal binder to be in place when ambulating   10. Seek immediate medical attention for signs and symptoms of infection and/or new or worsening abdominal pain    · Is patient discharged on Warfarin / Coumadin?   No     · Is patient Post Blood Transfusion?  No    Depression / Suicide Risk    As you are discharged from this [facility] facility, it is important to learn how to keep safe from harming yourself.    Recognize the warning signs:  · Abrupt changes in personality, positive or negative- including increase in energy   · Giving away possessions  · Change in eating patterns- significant weight changes-  positive or negative  · Change in sleeping patterns- unable to sleep or sleeping all the time   · Unwillingness or inability to communicate  · Depression  · Unusual sadness, discouragement and loneliness  · Talk of wanting to die  · Neglect of personal appearance   · Rebelliousness- reckless behavior  · Withdrawal from people/activities they love  · Confusion- inability to concentrate     If you or a loved one observes any of these behaviors or has concerns about self-harm, here's what you can do:  · Talk about it- your feelings and reasons for harming yourself  · Remove any means that you might use to hurt yourself (examples: pills, rope, extension cords, firearm)  · Get professional help from the community (Mental Health, Substance Abuse, psychological counseling)  · Do not be alone:Call your Safe Contact- someone whom you trust who will be there for you.  · Call your local CRISIS HOTLINE 034-2205 or 542-643-5584  · Call your local Children's Mobile Crisis Response Team [place] (878) 542-1447 or www.D[employer] Expert  · Call the toll free National Suicide Prevention Hotlines   · National Suicide  Prevention Lifeline 233-114-INMA (1847)  · [facility] Line Network 800-SUICIDE (331-8966)  Exploratory Laparotomy, Adult, Care After  Refer to this sheet in the next few weeks. These instructions provide you with information about caring for yourself after your procedure. Your health care provider may also give you more specific instructions. Your treatment has been planned according to current medical practices, but problems sometimes occur. Call your health care provider if you have any problems or questions after your procedure.  WHAT TO EXPECT AFTER THE PROCEDURE  After your procedure, it is typical to have:  · Abdominal soreness.  · Fatigue.  · A sore throat from tubes in your throat.  · A lack of appetite.  HOME CARE INSTRUCTIONS  Medicines  · Take medicines only as directed by your health care provider.  · Do not drive or operate heavy machinery while taking pain medicine.  Incision Care  · There are many different ways to close and cover an incision, including stitches (sutures), skin glue, and adhesive strips. Follow your health care provider's instructions about:  · Incision care.  · Bandage (dressing) changes and removal.  · Incision closure removal.  · Do not take showers or baths until your health care provider says that you can.  · Check your incision area daily for signs of infection. Watch for:  · Redness.  · Tenderness.  · Swelling.  · Drainage.  Activities  · Do not lift anything that is heavier than 10 pounds (4.5 kg) until your health care provider says that it is safe.  · Try to walk a little bit each day if your health care provider says that it is okay.  · Ask your health care provider when you can start to do your usual activities again, such as driving, going back to work, and having sex.  Eating and Drinking  · You may eat what you usually eat. Include lots of whole grains, fruits, and vegetables in your diet. This will help to prevent constipation.  · Drink enough fluid to keep your  urine clear or pale yellow.  General Instructions  · Keep all follow-up visits as directed by your health care provider. This is important.  SEEK MEDICAL CARE IF:   · You have a fever.  · You have chills.  · Your pain medicine is not helping.  · You have constipation or diarrhea.  · You have nausea or vomiting.  · You have drainage, redness, swelling, or pain at your incision site.  SEEK IMMEDIATE MEDICAL CARE IF:  · Your pain is getting worse.  · It has been more than 3 days since you been able to have a bowel movement.  · You have ongoing (persistent) vomiting.  · The edges of your incision open up.  · You have warmth, tenderness, and swelling in your calf.  · You have trouble breathing.  · You have chest pain.     This information is not intended to replace advice given to you by your health care provider. Make sure you discuss any questions you have with your health care provider.     Document Released: 08/01/2005 Document Revised: 01/08/2016 Document Reviewed: 08/05/2015  [employer] Interactive Patient Education ©2016 [employer].    Bulb Drain Home Care  A bulb drain consists of a thin rubber tube and a soft, round bulb that creates a gentle suction. The rubber tube is placed in the area where you had surgery. A bulb is attached to the end of the tube that is outside the body. The bulb drain removes excess fluid that normally builds up in a surgical wound after surgery. The color and amount of fluid will vary. Immediately after surgery, the fluid is bright red and is a little thicker than water. It may gradually change to a yellow or pink color and become more thin and water-like. When the amount decreases to about 1 or 2 tbsp in 24 hours, your health care provider will usually remove it.  DAILY CARE  · Keep the bulb flat (compressed) at all times, except while emptying it. The flatness creates suction. You can flatten the bulb by squeezing it firmly in the middle and then closing the cap.  · Keep sites where  the tube enters the skin dry and covered with a bandage (dressing).  · Secure the tube 1-2 in (2.5-5.1 cm) below the insertion sites to keep it from pulling on your stitches. The tube is stitched in place and will not slip out.  · Secure the bulb as directed by your health care provider.  · For the first 3 days after surgery, there usually is more fluid in the bulb. Empty the bulb whenever it becomes half full because the bulb does not create enough suction if it is too full. The bulb could also overflow. Write down how much fluid you remove each time you empty your drain. Add up the amount removed in 24 hours.  · Empty the bulb at the same time every day once the amount of fluid decreases and you only need to empty it once a day. Write down the amounts and the 24-hour totals to give to your health care provider. This helps your health care provider know when the tubes can be removed.  EMPTYING THE BULB DRAIN  Before emptying the bulb, get a measuring cup, a piece of paper and a pen, and wash your hands.  · Gently run your fingers down the tube (stripping) to empty any drainage from the tubing into the bulb. This may need to be done several times a day to clear the tubing of clots and tissue.  · Open the bulb cap to release suction, which causes it to inflate. Do not touch the inside of the cap.  · Gently run your fingers down the tube (stripping) to empty any drainage from the tubing into the bulb.  · Hold the cap out of the way, and pour fluid into the measuring cup.    · Squeeze the bulb to provide suction.   · Replace the cap.    · Check the tape that holds the tube to your skin. If it is becoming loose, you can remove the loose piece of tape and apply a new one. Then, pin the bulb to your shirt.    · Write down the amount of fluid you emptied out. Write down the date and each time you emptied your bulb drain. (If there are 2 bulbs, note the amount of drainage from each bulb and keep the totals separate. Your  health care provider will want to know the total amounts for each drain and which tube is draining more.)    · Flush the fluid down the toilet and wash your hands.    · Call your health care provider once you have less than 2 tbsp of fluid collecting in the bulb drain every 24 hours.  If there is drainage around the tube site, change dressings and keep the area dry. Cleanse around tube with sterile saline and place dry gauze around site. This gauze should be changed when it is soiled. If it stays clean and unsoiled, it should still be changed daily.   SEEK MEDICAL CARE IF:  · Your drainage has a bad smell or is cloudy.    · You have a fever.    · Your drainage is increasing instead of decreasing.    · Your tube fell out.    · You have redness or swelling around the tube site.    · You have drainage from a surgical wound.    · Your bulb drain will not stay flat after you empty it.    MAKE SURE YOU:   · Understand these instructions.  · Will watch your condition.  · Will get help right away if you are not doing well or get worse.     This information is not intended to replace advice given to you by your health care provider. Make sure you discuss any questions you have with your health care provider.     Document Released: 12/15/2001 Document Revised: 01/08/2016 Document Reviewed: 05/22/2013  [employer] Interactive Patient Education ©2016 [employer].      Follow-up Information     1. Follow up with Warner Figueroa D.O..    Specialty:  Surgery    Why:  as scheduled 5/30/2017 at 1100, , As needed, If symptoms worsen, For wound re-check    Contact information    [address] [facility] [address]  [facility] [id number] [phone]          2. Follow up with JUSTICE Pedersen. Schedule an appointment as soon as possible for a visit in 1 week.    Specialty:  Family Medicine    Why:  As needed, If symptoms worsen, For wound re-check    Contact information    [address]  [address]  [facility]  [address]  [phone]           Discharge Medication Instructions:    Below are the medications your physician expects you to take upon discharge:    Review all your home medications and newly ordered medications with your doctor and/or pharmacist. Follow medication instructions as directed by your doctor and/or pharmacist.    Please keep your medication list with you and share with your physician.               Medication List      CONTINUE taking these medications        Instructions    Morning Afternoon Evening Bedtime    AMITIZA 24 MCG capsule   Generic drug:  lubiprostone        Take 24 mcg by mouth 2 times a day, with meals.   Dose:  24 mcg                        amphetamine-dextroamphetamine 10 MG Tabs   Commonly known as:  ADDERALL        Take 10 mg by mouth 2 times a day.   Dose:  10 mg                        asa/apap/caffeine 250-250-65 MG Tabs   Commonly known as:  EXCEDRIN        Take 1 Tab by mouth every 6 hours as needed for Headache.   Dose:  1 Tab                        clonazepam 1 MG Tabs   Last time this was given:  1 mg on 5/27/2017  9:53 PM   Commonly known as:  KLONOPIN        Take 1 mg by mouth every bedtime.   Dose:  1 mg                        doxycycline 100 MG Tabs   Commonly known as:  VIBRAMYCIN        Take 100 mg by mouth 2 times a day.   Dose:  100 mg                        FLECTOR 1.3 % Ptch   Generic drug:  diclofenac epolamine        Apply 1 Patch to skin as directed 2 Times a Day.   Dose:  1 Patch                        gabapentin 100 MG Caps   Commonly known as:  NEURONTIN        Take 100 mg by mouth 3 times a day as needed.   Dose:  100 mg                        hydrocodone/acetaminophen  MG Tabs   Last time this was given:  2 Tabs on 5/28/2017  9:25 AM   Commonly known as:  NORCO        Take 1 Tab by mouth every four hours as needed (breakthrough pain).   Dose:  1 Tab                        levothyroxine 200 MCG Tabs   Last time this was given:  200 mcg on 5/28/2017  5:08 AM    Commonly known as:  SYNTHROID        Take 1 Tab by mouth every morning. 30 mins before breakfast   Dose:  200 mcg                        OXYCONTIN 20 MG T12a   Generic drug:  oxyCODONE CR        Take 20 mg by mouth every 12 hours. Indications: Moderate to Severe Chronic Pain   Dose:  20 mg                        paroxetine 20 MG Tabs   Last time this was given:  20 mg on 5/27/2017  9:53 PM   Commonly known as:  PAXIL        Take 20 mg by mouth every bedtime.   Dose:  20 mg                        PROBIOTIC DAILY PO        Take 1 Tab by mouth 2 Times a Day.   Dose:  1 Tab                        PROVENTIL HFA INH        Inhale 2 Puffs by mouth as needed.   Dose:  2 Puff                        SYMBICORT 160-4.5 MCG/ACT Aero   Generic drug:  budesonide-formoterol        USE 2 PUFF BY MOUTH TWICE PER DAY                        trazodone 100 MG Tabs   Last time this was given:  200 mg on 5/27/2017  9:53 PM   Commonly known as:  DESYREL        Take 200 mg by mouth every bedtime.   Dose:  200 mg                          STOP taking these medications     aspirin 325 MG Tabs   Commonly known as:  ASA               ibuprofen 200 MG Tabs   Commonly known as:  MOTRIN                       Instructions           Diet / Nutrition:    Follow any diet instructions given to you by your doctor or the dietician, including how much salt (sodium) you are allowed each day.    If you are overweight, talk to your doctor about a weight reduction plan.    Activity:    Remain physically active following your doctor's instructions about exercise and activity.    Rest often.     Any time you become even a little tired or short of breath, SIT DOWN and rest.    Worsening Symptoms:    Report any of the following signs and symptoms to the doctor's office immediately:    *Pain of jaw, arm, or neck  *Chest pain not relieved by medication                               *Dizziness or loss of consciousness  *Difficulty breathing even when at rest   *More  tired than usual                                       *Bleeding drainage or swelling of surgical site  *Swelling of feet, ankles, legs or stomach                 *Fever (>100ºF)  *Pink or blood tinged sputum  *Weight gain (3lbs/day or 5lbs /week)           *Shock from internal defibrillator (if applicable)  *Palpitations or irregular heartbeats                *Cool and/or numb extremities    Stroke Awareness    Common Risk Factors for Stroke include:    Age  Atrial Fibrillation  Carotid Artery Stenosis  Diabetes Mellitus  Excessive alcohol consumption  High blood pressure  Overweight   Physical inactivity  Smoking    Warning signs and symptoms of a stroke include:    *Sudden numbness or weakness of the face, arm or leg (especially on one side of the body).  *Sudden confusion, trouble speaking or understanding.  *Sudden trouble seeing in one or both eyes.  *Sudden trouble walking, dizziness, loss of balance or coordination.Sudden severe headache with no known cause.    It is very important to get treatment quickly when a stroke occurs. If you experience any of the above warning signs, call 911 immediately.                   Disclaimer         Quit Smoking / Tobacco Use:    I understand the use of any tobacco products increases my chance of suffering from future heart disease or stroke and could cause other illnesses which may shorten my life. Quitting the use of tobacco products is the single most important thing I can do to improve my health. For further information on smoking / tobacco cessation call a Toll Free Quit Line at 1-402.479.8064 (*[employer] [place]) or 1-386.786.7971 (American Lung Association) or you can access the web based program at www.lungusa.org.    [place] Tobacco Users Help Line:  (247) 538-3846       Toll Free: 1-572.291.3938  Quit Tobacco Program [facility] [phone]    Crisis Hotline:    [place] [employer] Crisis Hotline:  2-076-WGXYNCX or 1-424.327.8126    [place]  Crisis Hotline:    1-118.560.1622 or 106-129-5385    Discharge Survey:   Thank you for choosing [facility]. We hope we did everything we could to make your hospital stay a pleasant one. You may be receiving a phone survey and we would appreciate your time and participation in answering the questions. Your input is very valuable to us in our efforts to improve our service to our patients and their families.        My signature on this form indicates that:    1. I have reviewed and understand the above information.  2. My questions regarding this information have been answered to my satisfaction.  3. I have formulated a plan with my discharge nurse to obtain my prescribed medications for home.                  Disclaimer         __________________________________                     __________       ________                       Patient Signature                                                 Date                    Time

## 2017-05-23 NOTE — IP AVS SNAPSHOT
" <p align=\"LEFT\"><IMG SRC=\"[email or web address]\" alt=\"Image\" WIDTH=\"50%\" HEIGHT=\"200\" BORDER=\"\"></p>                   Name:Thelma Abbott  Medical Record Number:[id number]  CSN: [id number]    YOB: 1960   Age: 56 y.o.  Sex: female  HT:1.651 m (5' 5\") WT: 103.9 kg (229 lb 0.9 oz)          Admit Date: 5/23/2017     Discharge Date:   Today's Date: 5/28/2017  Attending Doctor:  Warner Figueroa D.O.                  Allergies:  Codeine and Tape          Follow-up Information     1. Follow up with Warner Figueroa D.O..    Specialty:  Surgery    Why:  as scheduled 5/30/2017 at 1100, , As needed, If symptoms worsen, For wound re-check    Contact information    [address]  [address]  [facility] [id number] [phone]          2. Follow up with JUSTICE Pedersen. Schedule an appointment as soon as possible for a visit in 1 week.    Specialty:  Family Medicine    Why:  As needed, If symptoms worsen, For wound re-check    Contact information    [address]  [address]  [facility] [address]  [phone]           Medication List      Take these Medications        Instructions    AMITIZA 24 MCG capsule   Generic drug:  lubiprostone    Take 24 mcg by mouth 2 times a day, with meals.   Dose:  24 mcg       amphetamine-dextroamphetamine 10 MG Tabs   Commonly known as:  ADDERALL    Take 10 mg by mouth 2 times a day.   Dose:  10 mg       asa/apap/caffeine 250-250-65 MG Tabs   Commonly known as:  EXCEDRIN    Take 1 Tab by mouth every 6 hours as needed for Headache.   Dose:  1 Tab       clonazepam 1 MG Tabs   Commonly known as:  KLONOPIN    Take 1 mg by mouth every bedtime.   Dose:  1 mg       doxycycline 100 MG Tabs   Commonly known as:  VIBRAMYCIN    Take 100 mg by mouth 2 times a day.   Dose:  100 mg       FLECTOR 1.3 % Ptch   Generic drug:  diclofenac epolamine    Apply 1 Patch to skin as directed 2 Times a Day.   Dose:  1 Patch       gabapentin 100 MG Caps   Commonly known as:  " NEURONTIN    Take 100 mg by mouth 3 times a day as needed.   Dose:  100 mg       hydrocodone/acetaminophen  MG Tabs   Commonly known as:  NORCO    Take 1 Tab by mouth every four hours as needed (breakthrough pain).   Dose:  1 Tab       levothyroxine 200 MCG Tabs   Commonly known as:  SYNTHROID    Take 1 Tab by mouth every morning. 30 mins before breakfast   Dose:  200 mcg       OXYCONTIN 20 MG T12a   Generic drug:  oxyCODONE CR    Take 20 mg by mouth every 12 hours. Indications: Moderate to Severe Chronic Pain   Dose:  20 mg       paroxetine 20 MG Tabs   Commonly known as:  PAXIL    Take 20 mg by mouth every bedtime.   Dose:  20 mg       PROBIOTIC DAILY PO    Take 1 Tab by mouth 2 Times a Day.   Dose:  1 Tab       PROVENTIL HFA INH    Inhale 2 Puffs by mouth as needed.   Dose:  2 Puff       SYMBICORT 160-4.5 MCG/ACT Aero   Generic drug:  budesonide-formoterol    USE 2 PUFF BY MOUTH TWICE PER DAY       trazodone 100 MG Tabs   Commonly known as:  DESYREL    Take 200 mg by mouth every bedtime.   Dose:  200 mg

## 2017-05-23 NOTE — IP AVS SNAPSHOT
5/28/2017    Thelma Abbott  [address] [place] [address]    Dear Thelma:    [facility] wants to ensure your discharge home is safe and you or your loved ones have had all of your questions answered regarding your care after you leave the hospital.    Below is a list of resources and contact information should you have any questions regarding your hospital stay, follow-up instructions, or active medical symptoms.    Questions or Concerns Regarding… Contact   Medical Questions Related to Your Discharge  (7 days a week, 8am-5pm) Contact a Nurse Care Coordinator   [phone]   Medical Questions Not Related to Your Discharge  (24 hours a day / 7 days a week)  Contact the Nurse Health Line   [phone]    Medications or Discharge Instructions Refer to your discharge packet   or contact your [facility] Primary Care Provider   [phone]   Follow-up Appointment(s) Schedule your appointment via Music[employer]   or contact Scheduling [phone]   Billing Review your statement via [employer]  or contact Billing [phone]   Medical Records Review your records via Music[employer]   or contact Medical Records [phone]     You may receive a telephone call within two days of discharge. This call is to make certain you understand your discharge instructions and have the opportunity to have any questions answered. You can also easily access your medical information, test results and upcoming appointments via the Music[employer] free online health management tool. You can learn more and sign up at [employer]/Music[employer]. For assistance setting up your Musicnotes account, please call [phone].    Once again, we want to ensure your discharge home is safe and that you have a clear understanding of any next steps in your care. If you have any questions or concerns, please do not hesitate to contact us, we are here for you. Thank you for choosing [facility] for your healthcare needs.    Sincerely,    Your [facility] Healthcare Team

## 2017-05-24 PROBLEM — D69.1 PLATELET DYSFUNCTION DUE TO ASPIRIN (HCC): Status: ACTIVE | Noted: 2017-05-24

## 2017-05-24 PROBLEM — T39.015A PLATELET DYSFUNCTION DUE TO ASPIRIN (HCC): Status: ACTIVE | Noted: 2017-05-24

## 2017-05-24 LAB
ANION GAP SERPL CALC-SCNC: 5 MMOL/L (ref 0–11.9)
ANISOCYTOSIS BLD QL SMEAR: ABNORMAL
BASOPHILS # BLD AUTO: 0 % (ref 0–1.8)
BASOPHILS # BLD: 0 K/UL (ref 0–0.12)
BUN SERPL-MCNC: 17 MG/DL (ref 8–22)
CALCIUM SERPL-MCNC: 8 MG/DL (ref 8.5–10.5)
CFT BLD TEG: 4.7 MIN (ref 5–10)
CHLORIDE SERPL-SCNC: 107 MMOL/L (ref 96–112)
CLOT ANGLE BLD TEG: 69.4 DEGREES (ref 53–72)
CLOT LYSIS 30M P MA LENFR BLD TEG: 0 % (ref 0–8)
CO2 SERPL-SCNC: 24 MMOL/L (ref 20–33)
CREAT SERPL-MCNC: 0.62 MG/DL (ref 0.5–1.4)
CT.EXTRINSIC BLD ROTEM: 1.5 MIN (ref 1–3)
EOSINOPHIL # BLD AUTO: 0 K/UL (ref 0–0.51)
EOSINOPHIL NFR BLD: 0 % (ref 0–6.9)
ERYTHROCYTE [DISTWIDTH] IN BLOOD BY AUTOMATED COUNT: 52 FL (ref 35.9–50)
GFR SERPL CREATININE-BSD FRML MDRD: >60 ML/MIN/1.73 M 2
GLUCOSE BLD-MCNC: 126 MG/DL (ref 65–99)
GLUCOSE BLD-MCNC: 154 MG/DL (ref 65–99)
GLUCOSE BLD-MCNC: 163 MG/DL (ref 65–99)
GLUCOSE BLD-MCNC: 163 MG/DL (ref 65–99)
GLUCOSE BLD-MCNC: 195 MG/DL (ref 65–99)
GLUCOSE SERPL-MCNC: 160 MG/DL (ref 65–99)
HCT VFR BLD AUTO: 33.8 % (ref 37–47)
HGB BLD-MCNC: 10.6 G/DL (ref 12–16)
LYMPHOCYTES # BLD AUTO: 0.16 K/UL (ref 1–4.8)
LYMPHOCYTES NFR BLD: 0.9 % (ref 22–41)
MACROCYTES BLD QL SMEAR: ABNORMAL
MANUAL DIFF BLD: ABNORMAL
MCF BLD TEG: 69.4 MM (ref 50–70)
MCH RBC QN AUTO: 29.1 PG (ref 27–33)
MCHC RBC AUTO-ENTMCNC: 31.4 G/DL (ref 33.6–35)
MCV RBC AUTO: 92.9 FL (ref 81.4–97.8)
MONOCYTES # BLD AUTO: 0.16 K/UL (ref 0–0.85)
MONOCYTES NFR BLD AUTO: 0.9 % (ref 0–13.4)
MORPHOLOGY BLD-IMP: NORMAL
NEUTROPHILS # BLD AUTO: 17.38 K/UL (ref 2–7.15)
NEUTROPHILS NFR BLD: 75.2 % (ref 44–72)
NEUTS BAND NFR BLD MANUAL: 23 % (ref 0–10)
NRBC # BLD AUTO: 0 K/UL
NRBC BLD AUTO-RTO: 0 /100 WBC
PA AA BLD-ACNC: 21.6 %
PA ADP BLD-ACNC: 78.3 %
PLATELET # BLD AUTO: 283 K/UL (ref 164–446)
PMV BLD AUTO: 9.5 FL (ref 9–12.9)
POTASSIUM SERPL-SCNC: 4.3 MMOL/L (ref 3.6–5.5)
RBC # BLD AUTO: 3.64 M/UL (ref 4.2–5.4)
RBC BLD AUTO: PRESENT
SODIUM SERPL-SCNC: 136 MMOL/L (ref 135–145)
TEG ALGORITHM TGALG: ABNORMAL
WBC # BLD AUTO: 17.7 K/UL (ref 4.8–10.8)

## 2017-05-24 PROCEDURE — 94668 MNPJ CHEST WALL SBSQ: CPT

## 2017-05-24 PROCEDURE — 97162 PT EVAL MOD COMPLEX 30 MIN: CPT

## 2017-05-24 PROCEDURE — 36415 COLL VENOUS BLD VENIPUNCTURE: CPT

## 2017-05-24 PROCEDURE — A9270 NON-COVERED ITEM OR SERVICE: HCPCS | Performed by: SURGERY

## 2017-05-24 PROCEDURE — 700111 HCHG RX REV CODE 636 W/ 250 OVERRIDE (IP): Performed by: NURSE PRACTITIONER

## 2017-05-24 PROCEDURE — 770021 HCHG ROOM/CARE - ISO PRIVATE

## 2017-05-24 PROCEDURE — 94760 N-INVAS EAR/PLS OXIMETRY 1: CPT

## 2017-05-24 PROCEDURE — 700102 HCHG RX REV CODE 250 W/ 637 OVERRIDE(OP): Performed by: SURGERY

## 2017-05-24 PROCEDURE — A9270 NON-COVERED ITEM OR SERVICE: HCPCS | Performed by: NURSE PRACTITIONER

## 2017-05-24 PROCEDURE — 700102 HCHG RX REV CODE 250 W/ 637 OVERRIDE(OP): Performed by: NURSE PRACTITIONER

## 2017-05-24 PROCEDURE — G8978 MOBILITY CURRENT STATUS: HCPCS | Mod: CJ

## 2017-05-24 PROCEDURE — 85384 FIBRINOGEN ACTIVITY: CPT

## 2017-05-24 PROCEDURE — 85347 COAGULATION TIME ACTIVATED: CPT

## 2017-05-24 PROCEDURE — 85027 COMPLETE CBC AUTOMATED: CPT

## 2017-05-24 PROCEDURE — 700112 HCHG RX REV CODE 229: Performed by: SURGERY

## 2017-05-24 PROCEDURE — 700111 HCHG RX REV CODE 636 W/ 250 OVERRIDE (IP): Performed by: SURGERY

## 2017-05-24 PROCEDURE — G8979 MOBILITY GOAL STATUS: HCPCS | Mod: CI

## 2017-05-24 PROCEDURE — 85576 BLOOD PLATELET AGGREGATION: CPT

## 2017-05-24 PROCEDURE — 700105 HCHG RX REV CODE 258: Performed by: SURGERY

## 2017-05-24 PROCEDURE — 80048 BASIC METABOLIC PNL TOTAL CA: CPT

## 2017-05-24 PROCEDURE — 85007 BL SMEAR W/DIFF WBC COUNT: CPT

## 2017-05-24 PROCEDURE — 82962 GLUCOSE BLOOD TEST: CPT | Mod: 91

## 2017-05-24 RX ORDER — FAMOTIDINE 20 MG/1
20 TABLET, FILM COATED ORAL 2 TIMES DAILY
Status: DISCONTINUED | OUTPATIENT
Start: 2017-05-24 | End: 2017-05-28 | Stop reason: HOSPADM

## 2017-05-24 RX ORDER — FAMOTIDINE 20 MG/1
20 TABLET, FILM COATED ORAL 2 TIMES DAILY
Status: CANCELLED | OUTPATIENT
Start: 2017-05-24

## 2017-05-24 RX ADMIN — POLYETHYLENE GLYCOL 3350 1 PACKET: 17 POWDER, FOR SOLUTION ORAL at 21:17

## 2017-05-24 RX ADMIN — INSULIN LISPRO 2 UNITS: 100 INJECTION, SOLUTION INTRAVENOUS; SUBCUTANEOUS at 00:22

## 2017-05-24 RX ADMIN — DOCUSATE SODIUM 100 MG: 100 CAPSULE ORAL at 07:48

## 2017-05-24 RX ADMIN — INSULIN LISPRO 2 UNITS: 100 INJECTION, SOLUTION INTRAVENOUS; SUBCUTANEOUS at 12:00

## 2017-05-24 RX ADMIN — MAGNESIUM HYDROXIDE 30 ML: 400 SUSPENSION ORAL at 07:48

## 2017-05-24 RX ADMIN — ONDANSETRON 4 MG: 2 INJECTION INTRAMUSCULAR; INTRAVENOUS at 21:17

## 2017-05-24 RX ADMIN — DOCUSATE SODIUM 100 MG: 100 CAPSULE ORAL at 21:17

## 2017-05-24 RX ADMIN — ENOXAPARIN SODIUM 40 MG: 100 INJECTION SUBCUTANEOUS at 21:17

## 2017-05-24 RX ADMIN — HYDROMORPHONE HYDROCHLORIDE 5 MG: 2 INJECTION INTRAMUSCULAR; INTRAVENOUS; SUBCUTANEOUS at 14:08

## 2017-05-24 RX ADMIN — VANCOMYCIN HYDROCHLORIDE 2000 MG: 100 INJECTION, POWDER, LYOPHILIZED, FOR SOLUTION INTRAVENOUS at 21:18

## 2017-05-24 RX ADMIN — INSULIN LISPRO 2 UNITS: 100 INJECTION, SOLUTION INTRAVENOUS; SUBCUTANEOUS at 05:49

## 2017-05-24 RX ADMIN — FAMOTIDINE 20 MG: 20 TABLET, FILM COATED ORAL at 21:17

## 2017-05-24 RX ADMIN — STANDARDIZED SENNA CONCENTRATE AND DOCUSATE SODIUM 1 TABLET: 8.6; 5 TABLET, FILM COATED ORAL at 21:17

## 2017-05-24 RX ADMIN — FAMOTIDINE 20 MG: 10 INJECTION, SOLUTION INTRAVENOUS at 07:48

## 2017-05-24 RX ADMIN — POLYETHYLENE GLYCOL 3350 1 PACKET: 17 POWDER, FOR SOLUTION ORAL at 09:00

## 2017-05-24 ASSESSMENT — ENCOUNTER SYMPTOMS
FEVER: 0
NAUSEA: 1
CHILLS: 0
ABDOMINAL PAIN: 1
VOMITING: 0
SHORTNESS OF BREATH: 1

## 2017-05-24 ASSESSMENT — PAIN SCALES - GENERAL
PAINLEVEL_OUTOF10: 8
PAINLEVEL_OUTOF10: ASSUMED PAIN PRESENT
PAINLEVEL_OUTOF10: 8
PAINLEVEL_OUTOF10: 8
PAINLEVEL_OUTOF10: 7

## 2017-05-24 ASSESSMENT — COGNITIVE AND FUNCTIONAL STATUS - GENERAL
TURNING FROM BACK TO SIDE WHILE IN FLAT BAD: UNABLE
MOVING FROM LYING ON BACK TO SITTING ON SIDE OF FLAT BED: A LITTLE
WALKING IN HOSPITAL ROOM: A LITTLE
MOBILITY SCORE: 13
STANDING UP FROM CHAIR USING ARMS: A LITTLE
CLIMB 3 TO 5 STEPS WITH RAILING: A LOT
SUGGESTED CMS G CODE MODIFIER MOBILITY: CL
MOVING TO AND FROM BED TO CHAIR: UNABLE

## 2017-05-24 ASSESSMENT — COPD QUESTIONNAIRES
HAVE YOU SMOKED AT LEAST 100 CIGARETTES IN YOUR ENTIRE LIFE: YES
DURING THE PAST 4 WEEKS HOW MUCH DID YOU FEEL SHORT OF BREATH: SOME OF THE TIME
DO YOU EVER COUGH UP ANY MUCUS OR PHLEGM?: NO/ONLY WITH OCCASIONAL COLDS OR INFECTIONS
DO YOU EVER COUGH UP ANY MUCUS OR PHLEGM?: NO/ONLY WITH OCCASIONAL COLDS OR INFECTIONS
COPD SCREENING SCORE: 5
HAVE YOU SMOKED AT LEAST 100 CIGARETTES IN YOUR ENTIRE LIFE: YES

## 2017-05-24 ASSESSMENT — PATIENT HEALTH QUESTIONNAIRE - PHQ9
1. LITTLE INTEREST OR PLEASURE IN DOING THINGS: NOT AT ALL
SUM OF ALL RESPONSES TO PHQ QUESTIONS 1-9: 0
SUM OF ALL RESPONSES TO PHQ9 QUESTIONS 1 AND 2: 0
2. FEELING DOWN, DEPRESSED, IRRITABLE, OR HOPELESS: NOT AT ALL

## 2017-05-24 ASSESSMENT — LIFESTYLE VARIABLES
DO YOU DRINK ALCOHOL: NO
EVER_SMOKED: YES

## 2017-05-24 ASSESSMENT — GAIT ASSESSMENTS: GAIT LEVEL OF ASSIST: UNABLE TO PARTICIPATE

## 2017-05-24 NOTE — THERAPY
"Physical Therapy Evaluation completed.   Bed Mobility:  Supine to Sit:  (found attempting to txf to bed); see below  Transfers: Sit to Stand: Contact Guard Assist  Gait: Level Of Assist: Unable to Participate 2' to current safety concerns/lethargy and confusion    Plan of Care: Will benefit from Physical Therapy 3 times per week  Discharge Recommendations: Equipment: Will Continue to Assess for Equipment Needs. See below    Pt presents to PT for risk reduction for LOB and falling s/p recent abdominal surgery. During initial evaluation, pt is notably lethargic and unable to consistently miantain eyes open without consistent VC's during visit. She is able to demonstrate pre-gait activity with HHA and bed mobility with min/mod A 2' to current abdominal pain. PT limited pt's activity 2' to concerns with current level of lethargy and confusion. Anticipate as pain optimally managed and cognition clears to baseline, pt will progress well with OOB activity with both medical staff and PT. Unclear if pt would require conitnued skilled PT prior to or after d/c and will be highly dependent on functional progression and response to pain management. Will continue to visit.     See \"Rehab Therapy-Acute\" Patient Summary Report for complete documentation.     "

## 2017-05-24 NOTE — PROGRESS NOTES
Received pt from PACU to GSU this shift, stable with no apparent distress noted, appears drowsy but easy to arouse on stimulus, VSS. C/o pain on initial encounter, medicated per MAR, education/teaching provided regarding PCA use and s/e. Breathing even/unlabored, noted dim BLL/on continuous o2 at 4Lpm per NC sats at mid-low 90's denied SOB. Abdomen tender, (+)BSx2/absent to RLQ/RUQ, unable to pass flatus at this time, per pt feels slight nausea/no active vomiting, last BM was PTA 5/22. IFC intact/draining well to gravity. PIV intact/patent, (-)infi/pghleb with IVF infusing at 100ml/hr. ROM within functional limits, unable to ambulate at this time but per pt able to walk independently PTA. 2 RN skin check done, only able to perform frontal skin assessment as pt refused and very much in pain to be repositioned at this time. Dressing/abdl binder in place/ PHUONG in-situ to bulb suction. General skin surface is intact/noted scattered rashes to body surface. Oriented with room set-up. POC discussed within shift. Safety instructions given. Bed on lowest position. Call light within reach. No concerns at this time.

## 2017-05-24 NOTE — PROGRESS NOTES
Pt resting in bed, c/o nausea.  Pt given IV Zofran for relief. Pt rates pain 8/10 in ABD area, pushing PCA pain pump of IV Dilaudid. Pt is drowsy, easily awakens by voice.  Pt has mask on at 4L sating 90%, w/o mask, pt desats down to 84%.  Pt moaning while asleep.  Midline ABD dressing is C/D/I, PHUONG's x 3, draining sanguinous drainage, Murrell has bright clear yellow urine noted.

## 2017-05-24 NOTE — CARE PLAN
Problem: Communication  Goal: The ability to communicate needs accurately and effectively will improve  Oriented with room set-up. Safety instructions provided.     Problem: Infection  Goal: Will remain free from infection  IV abx infused/ wound dressing kept intact/clean.

## 2017-05-24 NOTE — PROGRESS NOTES
"Pharmacy Kinetics 56 y.o. 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 History of MRSA noted. Now post-op on GSU.    Other antibiotics: none    Allergies: Codeine and Tape     List concerns for accumulation of vancomycin: Hx recent surgical manipulation, Hx hardware, electrolyte derangement, BUN:Cr > 20:1, BMI ~37    Pertinent cultures to date:  17 abdominal wound culture x4 (NGTD)  3/5/17 abdominal wound culture x1 (Methicillin Resistant Staphylococcus aureus)    Recent Labs      17   1215  17   1435  17   0145   WBC  15.6*  12.4*  17.7*   NEUTSPOLYS   --    --   75.20*   BANDSSTABS   --    --   23.00*     Recent Labs      17   0145   BUN  17   CREATININE  0.62     Intake/Output Summary (Last 24 hours) at 17 1326  Last data filed at 17 1040   Gross per 24 hour   Intake 6363.75 ml   Output   2560 ml   Net 3803.75 ml      Blood pressure 148/90, pulse 92, temperature 36.5 °C (97.7 °F), resp. rate 18, height 1.651 m (5' 5\"), weight 100.3 kg (221 lb 1.9 oz), last menstrual period 2017, SpO2 92 %, not currently breastfeeding. Temp (24hrs), Av.7 °C (98.1 °F), Min:36.3 °C (97.3 °F), Max:37.3 °C (99.2 °F)    Estimated Creatinine Clearance: 121.7 mL/min (by C-G formula based on Cr of 0.62).    A/P   1. Vancomycin dose change: not indicated   2. Next vancomycin level: 17 @  3. Goal trough: 12-16 mcg/mL  4. Comments: VS stable. Afebrile. CrCl ~121 mL/min and stable. WBC elevated (likely secondary to recent OR excursion ). History of MRSA. Microbiology NGTD. Multiple factors for accumulation of vancomycin identified. History of vancomycin dosing noted, 2000 mg iv q24h yielded trough of ~12.3 mcg/mL in March " of 2017. Given identified concerns trough ordered prior to 5/25 dose to assess clearance. Pharmacy will follow and continue to adjust as appropriate.    Octaviano Fajardo, PHARMD

## 2017-05-24 NOTE — PROGRESS NOTES
"Pharmacy Kinetics 56 y.o. 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 History of MRSA noted. Now post-op on GSU.    Other antibiotics: none    Allergies: Codeine and Tape     List concerns for accumulation of vancomycin: Hx recent surgical manipulation, Hx hardware, electrolyte derangement, BUN:Cr > 20:1, BMI ~37    Pertinent cultures to date:  17 abdominal wound cultures (NGTD)  3/5/17 abdominal wound culture x1 (Methicillin Resistant Staphylococcus aureus)    Recent Labs      17   1215  17   1435   WBC  15.6*  12.4*     Intake/Output Summary (Last 24 hours) at 17  Last data filed at 17 1844   Gross per 24 hour   Intake   4400 ml   Output   1620 ml   Net   2780 ml      Blood pressure 147/83, pulse 102, temperature 36.7 °C (98 °F), resp. rate 16, height 1.651 m (5' 5\"), weight 100.3 kg (221 lb 1.9 oz), last menstrual period 2017, SpO2 94 %, not currently breastfeeding. Temp (24hrs), Av.6 °C (97.9 °F), Min:36.4 °C (97.6 °F), Max:36.7 °C (98.1 °F)    Estimated Creatinine Clearance: 95.5 mL/min (by C-G formula based on Cr of 0.79).    A/P   1. Vancomycin dose change: 2000 mg iv q24h   2. Next vancomycin level: 17 @2030  3. Goal trough: 12-16 mcg/mL  4. Comments: VS stable. Microbiology pending, history of MRSA noted. Afebrile. WBC elevated. Multiple factors for accumulation of vancomycin identified. History of vancomycin dosing noted, 2000 mg iv q24h yielded trough of ~12.3 mcg/mL in 2017 (however renal indices improved on this encounter). Will draw trough to ensure continued clearance. Pharmacy will follow and continue to adjust as appropriate.    Octaviano Fajardo, " PHARMD

## 2017-05-24 NOTE — RESPIRATORY CARE
COPD EDUCATION by COPD CLINICAL EDUCATOR  5/24/2017 at 10:29 AM by Katerin Kovacs     Patient reviewed by COPD education team. Patient does not qualify for COPD program.

## 2017-05-24 NOTE — PROGRESS NOTES
"  Trauma/Surgical Progress Note    Author: Wolf Ruggiero Date & Time created: 5/24/2017   7:41 AM     Interval Events:    PO day # 1 laparotomy. Intraoperative cultures negative at this time.   Sleepy but in pain upon awakening. Continue PCA. Pain management reviewed with patient and bedside RN.  Start lovenox this PM  Physical therapy  Counseled     Review of Systems   Constitutional: Negative for fever and chills.   Respiratory: Positive for shortness of breath.    Cardiovascular: Negative for chest pain.   Gastrointestinal: Positive for nausea and abdominal pain. Negative for vomiting.   Genitourinary: Negative for dysuria.     Hemodynamics:  Blood pressure 156/87, pulse 95, temperature 37.3 °C (99.2 °F), resp. rate 18, height 1.651 m (5' 5\"), weight 100.3 kg (221 lb 1.9 oz), last menstrual period 05/22/2017, SpO2 93 %, not currently breastfeeding.     Respiratory:    Respiration: 18, Pulse Oximetry: 93 %, O2 Daily Delivery Respiratory : OxyMask     PEP/CPT Method: Positive Airway Pressure Device  RUL Breath Sounds: Diminished, RML Breath Sounds: Diminished, RLL Breath Sounds: Diminished, MALENA Breath Sounds: Diminished, LLL Breath Sounds: Diminished  Fluids:    Intake/Output Summary (Last 24 hours) at 05/24/17 0741  Last data filed at 05/24/17 0400   Gross per 24 hour   Intake   6100 ml   Output   2230 ml   Net   3870 ml     Admit Weight: 103.6 kg (228 lb 6.3 oz)  Current      Physical Exam   Constitutional: She is oriented to person, place, and time. No distress.   HENT:   Head: Normocephalic.   Eyes: Conjunctivae are normal.   Neck: No JVD present.   Cardiovascular: Normal rate.    Pulmonary/Chest: No respiratory distress. She exhibits no tenderness.   Supplemental oxygen    Abdominal:   Obese. Tender.   Post operative dressings intact.  PHUONG drains serosang   Genitourinary:   Murrell to gravity   Musculoskeletal:   Moves all extremities    Neurological: She is alert and oriented to person, place, and time.   Skin: " Skin is warm and dry.   Nursing note and vitals reviewed.      Medical Decision Making/Problem List:    Active Hospital Problems    Diagnosis   • Open wound of epigastric region with complication [S31.102A]     Priority: High   • Infection and inflammatory reaction due to device, implant, and graft (CMS-[facility]) [T85.79XA]     Priority: High     5/23   - Exploratory laparotomy with removal of mesh,abdominal wall resection, abdominal wall reconstruction with biological mesh and rectus advancement flap  - Intraoperative cultures with no organisms seen  5/24 Vanco day # 2.      • Platelet dysfunction due to aspirin (CMS-[facility]) [T39.011A, D69.1]     Priority: Medium     5/23 - AA inhibition 94.4. Transfused 1 unit platelet pheresis.  5/24 - Repeat AA 21.6.        Core Measures & Quality Metrics:  Labs reviewed, Medications reviewed and Radiology images reviewed  Murrell catheter: One or Two Days Post Surgery (Day of Surgery being Day 0)      DVT Prophylaxis: Enoxaparin (Lovenox)    Ulcer prophylaxis: Not indicated    Assessed for rehab: Patient returned to prior level of function, rehabilitation not indicated at this time    RONY Score  Discussed patient condition with RN, Patient and trauma surgery, Dr. Warner Figueroa.

## 2017-05-24 NOTE — OP REPORT
Operative Note      PreOp Diagnosis: Chronic abdominal wall wound with infected prosthetic ventral hernia mesh    PostOp Diagnosis: Same    Procedure(s):  EXPLORATORY LAPAROTOMY - EXPLORE ABDOMINAL WALL, REMOVE PROSTHETIC MESH, ABDOMINAL WALL RESECTION INVOLVING OLD RETAINED MESH AND CHRONIC INFECTION, ABDOMINAL WALL RECONSTRUCTION WITH BIOLOGIC MESH AND RECTUS ADVANCEMENT FLAP- Wound Class: Dirty or Infected    Surgeon(s):  Warner Figueroa D.O.    Anesthesiologist/Type of Anesthesia:  Anesthesiologist: Brayan Guadalupe M.D./General    Surgical Staff:  Circulator: Kristi Duran R.N.  Relief Circulator: Leatha Solorzano R.N.  Relief Scrub: Tank White R.N.; Vivian Lopez; Marcelle Domínguez  Scrub Person: Floyd Mckinney [place]: Alee Thapa R.N.    Specimen: Abdominal wall wound for pathology and culture, prosthetic mesh for pathology and culture    Estimated Blood Loss: 900 mL     IV fluid: 4.5 L    Findings: Extensive incorporation of old mesh and abdominal wall requiring removal of a significant portion of the lower rectus sheaths bilaterally.    Complications: None noted    Drains: #1 Right lower quadrant drain is intraperitoneal under mesh  #2 left lower quadrant lateral drain is between the mesh and the midline fascial closure  #3 left lower quadrant medial drain under the mobilized skin flaps on top of the fascial closure    Indications: 56-year-old female with chronic infected prosthetic hernia mesh in her midline abdominal wound. Patient has had issues over the last 2 years including infection requiring debridement but was bridged with drainage and antibiotics. Most recently, she had an abscess deep to the fascia and above the mesh which was drained back in March and she has been managed with antibiotics and negative pressure wound therapy. She is here today for abdominal wall debridement, removal of mesh and reconstruction of her abdominal wall.    Operation: The patient was taken to the operating  room and placed supine on the operating table. An arterial line was placed as well as a Murrell catheter. The abdominal wound VAC was removed and the abdominal wall was prepped with Betadine gel and draped in standard fashion. She had a persistent midline wound with what appeared to be AlloDerm within. The incision was made encompassing the prior laparotomy scar and the entirety of the open wound. This dissection was then carried out to the fascia so that the entire scar and existing wound cavity were excised. The base of the cavity was left in place with the AlloDerm. We then entered the abdominal cavity in the epigastrium above the area where the mesh was noted on CT scan. Once we entered the peritoneal cavity were able to carefully dissect down towards the umbilicus using the Bovie cautery as well as sharp dissection. There were relatively few intestinal adhesions but there was significant adhesion of omentum to the midline wound. Approximately 12 cm above the umbilicus, we encountered the mesh. We were able to develop a plane between the mesh and the midline wound and were able to dissect out the remaining of the scar tissue of the midline including the allograft. Once this was removed we had exposed the middle portion of the mesh was tunneled bilaterally and inferiorly approximately 8 cm below the umbilicus. During the course of this dissection we did open up the fascia inferiorly to the level where we could excise most of the mesh. We mobilized skin flaps bilaterally in order to identify the rectus sheath appropriately. The rectus sheath was opened starting in the superior portion of the wound working inferiorly. Unfortunately there was significant loss of tissue in the area where her abscess had been and we had to excise a significant amount of scar including approximately 50% with of the rectus sheath for about 15 cm to below the umbilicus. The remainder of the rectus sheath on the left side was opened so that  we can mobilize the posterior sheath and allow for the fashion to meet in the midline. This process was repeated for the right sided rectus sheath which is intact except for approximately 7.5 cm of the periumbilical area. During the course of this dissection, hemostasis was the most part achieved with the Bovie cautery though in several areas we used figure-of-eight Vicryl sutures. This was an arduous dissection taking several hours. Were able to circumferentially dissect the mesh and remove the Prolene sutures. When this was completed, the mesh was passed off the table. The middle portion just deep to the open wound was sampled for culture and the remainder was sent for pathology. Once this was all completed we then measured the wound and shows a 20 x 30 cm Flex HD bioprosthetic mesh. This was centered over the wound within the peritoneal cavity. A Uriel-Merritt drain was brought into the peritoneum obvious stab wound in the right lower quadrant and placed under the mesh was to be placed. The mesh was secured circumferentially using interrupted 0 Ethicon sutures, ensuring that there were limited gaps between the sutures and that the mesh was taut. The wound was then irrigated with bacitracin saline and a drain was placed on top of the mesh via stab wound in the left lower quadrant. The midline fascia was then approximated using interrupted figure-of-eight 0 Prolene stitch. At this point the count was short one clamp and an x-ray was taken. The clamp was noted to be under the mesh. Prior to completion of fascial closure, several tacking sutures were taken down and the clamp was easily removed. 0 Ethicon sutures were then used to recheck the upper border of the mesh. We then completed our Prolene closure. Was again irrigated with bacitracin saline. The flaps were then tacked down to take up the dead space using interrupted 30 sutures. A drain was placed via stab wound in the left lower quadrant under the skin flaps  extending across the midline. Once this was completed we again irrigated the wound and closed the skin was staples. The drains were then secured using 2-0 nylon stitch. The skin was then cleaned and dried and dry sterile dressings were applied. The sponge, needle and final instrument count was correct at the end of the case.    5/23/2017 7:13 PM Warner Figueroa

## 2017-05-25 LAB
ANION GAP SERPL CALC-SCNC: 3 MMOL/L (ref 0–11.9)
BASOPHILS # BLD AUTO: 0 % (ref 0–1.8)
BASOPHILS # BLD AUTO: 0.3 % (ref 0–1.8)
BASOPHILS # BLD: 0 K/UL (ref 0–0.12)
BASOPHILS # BLD: 0.03 K/UL (ref 0–0.12)
BUN SERPL-MCNC: 13 MG/DL (ref 8–22)
CALCIUM SERPL-MCNC: 8.6 MG/DL (ref 8.5–10.5)
CHLORIDE SERPL-SCNC: 105 MMOL/L (ref 96–112)
CO2 SERPL-SCNC: 28 MMOL/L (ref 20–33)
CREAT SERPL-MCNC: 0.53 MG/DL (ref 0.5–1.4)
EOSINOPHIL # BLD AUTO: 0.1 K/UL (ref 0–0.51)
EOSINOPHIL # BLD AUTO: 0.18 K/UL (ref 0–0.51)
EOSINOPHIL NFR BLD: 0.9 % (ref 0–6.9)
EOSINOPHIL NFR BLD: 2 % (ref 0–6.9)
ERYTHROCYTE [DISTWIDTH] IN BLOOD BY AUTOMATED COUNT: 51.8 FL (ref 35.9–50)
ERYTHROCYTE [DISTWIDTH] IN BLOOD BY AUTOMATED COUNT: 52.9 FL (ref 35.9–50)
GFR SERPL CREATININE-BSD FRML MDRD: >60 ML/MIN/1.73 M 2
GLUCOSE BLD-MCNC: 122 MG/DL (ref 65–99)
GLUCOSE BLD-MCNC: 140 MG/DL (ref 65–99)
GLUCOSE SERPL-MCNC: 121 MG/DL (ref 65–99)
HCT VFR BLD AUTO: 23.8 % (ref 37–47)
HCT VFR BLD AUTO: 26.6 % (ref 37–47)
HGB BLD-MCNC: 7.3 G/DL (ref 12–16)
HGB BLD-MCNC: 8.2 G/DL (ref 12–16)
IMM GRANULOCYTES # BLD AUTO: 0.07 K/UL (ref 0–0.11)
IMM GRANULOCYTES NFR BLD AUTO: 0.8 % (ref 0–0.9)
LYMPHOCYTES # BLD AUTO: 0.48 K/UL (ref 1–4.8)
LYMPHOCYTES # BLD AUTO: 1.16 K/UL (ref 1–4.8)
LYMPHOCYTES NFR BLD: 12.6 % (ref 22–41)
LYMPHOCYTES NFR BLD: 4.4 % (ref 22–41)
MAGNESIUM SERPL-MCNC: 1.9 MG/DL (ref 1.5–2.5)
MANUAL DIFF BLD: NORMAL
MCH RBC QN AUTO: 29 PG (ref 27–33)
MCH RBC QN AUTO: 29.2 PG (ref 27–33)
MCHC RBC AUTO-ENTMCNC: 30.7 G/DL (ref 33.6–35)
MCHC RBC AUTO-ENTMCNC: 30.8 G/DL (ref 33.6–35)
MCV RBC AUTO: 94.4 FL (ref 81.4–97.8)
MCV RBC AUTO: 94.7 FL (ref 81.4–97.8)
MONOCYTES # BLD AUTO: 0.29 K/UL (ref 0–0.85)
MONOCYTES # BLD AUTO: 0.31 K/UL (ref 0–0.85)
MONOCYTES NFR BLD AUTO: 2.6 % (ref 0–13.4)
MONOCYTES NFR BLD AUTO: 3.4 % (ref 0–13.4)
MORPHOLOGY BLD-IMP: NORMAL
NEUTROPHILS # BLD AUTO: 10.13 K/UL (ref 2–7.15)
NEUTROPHILS # BLD AUTO: 7.45 K/UL (ref 2–7.15)
NEUTROPHILS NFR BLD: 80.9 % (ref 44–72)
NEUTROPHILS NFR BLD: 88.6 % (ref 44–72)
NEUTS BAND NFR BLD MANUAL: 3.5 % (ref 0–10)
NRBC # BLD AUTO: 0 K/UL
NRBC # BLD AUTO: 0 K/UL
NRBC BLD AUTO-RTO: 0 /100 WBC
NRBC BLD AUTO-RTO: 0 /100 WBC
PHOSPHATE SERPL-MCNC: 2 MG/DL (ref 2.5–4.5)
PLATELET # BLD AUTO: 197 K/UL (ref 164–446)
PLATELET # BLD AUTO: 221 K/UL (ref 164–446)
PLATELET BLD QL SMEAR: NORMAL
PMV BLD AUTO: 9.3 FL (ref 9–12.9)
PMV BLD AUTO: 9.6 FL (ref 9–12.9)
POTASSIUM SERPL-SCNC: 3.8 MMOL/L (ref 3.6–5.5)
RBC # BLD AUTO: 2.52 M/UL (ref 4.2–5.4)
RBC # BLD AUTO: 2.81 M/UL (ref 4.2–5.4)
RBC BLD AUTO: NORMAL
SODIUM SERPL-SCNC: 136 MMOL/L (ref 135–145)
VANCOMYCIN TROUGH SERPL-MCNC: 4.6 UG/ML (ref 10–20)
WBC # BLD AUTO: 11 K/UL (ref 4.8–10.8)
WBC # BLD AUTO: 9.2 K/UL (ref 4.8–10.8)

## 2017-05-25 PROCEDURE — A9270 NON-COVERED ITEM OR SERVICE: HCPCS | Performed by: NURSE PRACTITIONER

## 2017-05-25 PROCEDURE — 85027 COMPLETE CBC AUTOMATED: CPT

## 2017-05-25 PROCEDURE — 83735 ASSAY OF MAGNESIUM: CPT

## 2017-05-25 PROCEDURE — 700102 HCHG RX REV CODE 250 W/ 637 OVERRIDE(OP): Performed by: NURSE PRACTITIONER

## 2017-05-25 PROCEDURE — 700112 HCHG RX REV CODE 229: Performed by: SURGERY

## 2017-05-25 PROCEDURE — 80048 BASIC METABOLIC PNL TOTAL CA: CPT

## 2017-05-25 PROCEDURE — A9270 NON-COVERED ITEM OR SERVICE: HCPCS | Performed by: SURGERY

## 2017-05-25 PROCEDURE — 700111 HCHG RX REV CODE 636 W/ 250 OVERRIDE (IP): Performed by: NURSE PRACTITIONER

## 2017-05-25 PROCEDURE — 85025 COMPLETE CBC W/AUTO DIFF WBC: CPT

## 2017-05-25 PROCEDURE — 82962 GLUCOSE BLOOD TEST: CPT

## 2017-05-25 PROCEDURE — 85007 BL SMEAR W/DIFF WBC COUNT: CPT

## 2017-05-25 PROCEDURE — 6A550Z3 PHERESIS OF PLASMA, SINGLE: ICD-10-PCS | Performed by: SURGERY

## 2017-05-25 PROCEDURE — 700102 HCHG RX REV CODE 250 W/ 637 OVERRIDE(OP): Performed by: SURGERY

## 2017-05-25 PROCEDURE — 80202 ASSAY OF VANCOMYCIN: CPT

## 2017-05-25 PROCEDURE — 36415 COLL VENOUS BLD VENIPUNCTURE: CPT

## 2017-05-25 PROCEDURE — 94668 MNPJ CHEST WALL SBSQ: CPT

## 2017-05-25 PROCEDURE — 84100 ASSAY OF PHOSPHORUS: CPT

## 2017-05-25 PROCEDURE — 700111 HCHG RX REV CODE 636 W/ 250 OVERRIDE (IP): Performed by: SURGERY

## 2017-05-25 PROCEDURE — 700105 HCHG RX REV CODE 258: Performed by: SURGERY

## 2017-05-25 PROCEDURE — 770021 HCHG ROOM/CARE - ISO PRIVATE

## 2017-05-25 RX ORDER — POLYETHYLENE GLYCOL 3350 17 G/17G
1 POWDER, FOR SOLUTION ORAL ONCE
Status: COMPLETED | OUTPATIENT
Start: 2017-05-25 | End: 2017-05-25

## 2017-05-25 RX ORDER — HYDROCODONE BITARTRATE AND ACETAMINOPHEN 10; 325 MG/1; MG/1
1-2 TABLET ORAL EVERY 4 HOURS PRN
Status: DISCONTINUED | OUTPATIENT
Start: 2017-05-25 | End: 2017-05-28 | Stop reason: HOSPADM

## 2017-05-25 RX ADMIN — FAMOTIDINE 20 MG: 20 TABLET, FILM COATED ORAL at 08:07

## 2017-05-25 RX ADMIN — DOCUSATE SODIUM 100 MG: 100 CAPSULE ORAL at 20:52

## 2017-05-25 RX ADMIN — DIBASIC SODIUM PHOSPHATE, MONOBASIC POTASSIUM PHOSPHATE AND MONOBASIC SODIUM PHOSPHATE 2 TABLET: 852; 155; 130 TABLET ORAL at 13:08

## 2017-05-25 RX ADMIN — POLYETHYLENE GLYCOL 3350 1 PACKET: 17 POWDER, FOR SOLUTION ORAL at 08:07

## 2017-05-25 RX ADMIN — HYDROCODONE BITARTRATE AND ACETAMINOPHEN 2 TABLET: 10; 325 TABLET ORAL at 14:37

## 2017-05-25 RX ADMIN — HYDROCODONE BITARTRATE AND ACETAMINOPHEN 1 TABLET: 10; 325 TABLET ORAL at 10:30

## 2017-05-25 RX ADMIN — HYDROCODONE BITARTRATE AND ACETAMINOPHEN 2 TABLET: 10; 325 TABLET ORAL at 18:15

## 2017-05-25 RX ADMIN — DOCUSATE SODIUM 100 MG: 100 CAPSULE ORAL at 08:07

## 2017-05-25 RX ADMIN — FAMOTIDINE 20 MG: 20 TABLET, FILM COATED ORAL at 20:52

## 2017-05-25 RX ADMIN — ENOXAPARIN SODIUM 40 MG: 100 INJECTION SUBCUTANEOUS at 08:07

## 2017-05-25 RX ADMIN — DIBASIC SODIUM PHOSPHATE, MONOBASIC POTASSIUM PHOSPHATE AND MONOBASIC SODIUM PHOSPHATE 2 TABLET: 852; 155; 130 TABLET ORAL at 16:51

## 2017-05-25 RX ADMIN — HYDROMORPHONE HYDROCHLORIDE 0.5 MG: 1 INJECTION, SOLUTION INTRAMUSCULAR; INTRAVENOUS; SUBCUTANEOUS at 13:08

## 2017-05-25 RX ADMIN — MAGNESIUM HYDROXIDE 30 ML: 400 SUSPENSION ORAL at 08:07

## 2017-05-25 RX ADMIN — HYDROMORPHONE HYDROCHLORIDE 0.5 MG: 1 INJECTION, SOLUTION INTRAMUSCULAR; INTRAVENOUS; SUBCUTANEOUS at 20:53

## 2017-05-25 RX ADMIN — POLYETHYLENE GLYCOL 3350 1 PACKET: 17 POWDER, FOR SOLUTION ORAL at 21:45

## 2017-05-25 RX ADMIN — DIBASIC SODIUM PHOSPHATE, MONOBASIC POTASSIUM PHOSPHATE AND MONOBASIC SODIUM PHOSPHATE 2 TABLET: 852; 155; 130 TABLET ORAL at 20:53

## 2017-05-25 RX ADMIN — STANDARDIZED SENNA CONCENTRATE AND DOCUSATE SODIUM 1 TABLET: 8.6; 5 TABLET, FILM COATED ORAL at 20:52

## 2017-05-25 RX ADMIN — VANCOMYCIN HYDROCHLORIDE 2000 MG: 100 INJECTION, POWDER, LYOPHILIZED, FOR SOLUTION INTRAVENOUS at 21:30

## 2017-05-25 ASSESSMENT — PAIN SCALES - GENERAL
PAINLEVEL_OUTOF10: 6
PAINLEVEL_OUTOF10: ASSUMED PAIN PRESENT
PAINLEVEL_OUTOF10: 7
PAINLEVEL_OUTOF10: 9
PAINLEVEL_OUTOF10: 7
PAINLEVEL_OUTOF10: 8
PAINLEVEL_OUTOF10: 7
PAINLEVEL_OUTOF10: ASSUMED PAIN PRESENT

## 2017-05-25 ASSESSMENT — ENCOUNTER SYMPTOMS
ABDOMINAL PAIN: 1
NAUSEA: 1
FEVER: 0
VOMITING: 0
CHILLS: 0

## 2017-05-25 NOTE — PROGRESS NOTES
"  Trauma/Surgical Progress Note    Author: Wolf Ruggiero Date & Time created: 5/25/2017   10:14 AM     Interval Events:    Per patient dilaudid is making her nauseous   Interval removal of sibley   Up to chair   AM CBC pending   Hypophosphatemia     - Preliminary intraoperative cultures negative. Continue antibiotic therapy. Await final cultures.  - Continue abdominal drains  - DC dilaudid PCA. [place] Tornado. Per patient Norco has worked in past  - Electrolyte supplementation   - Full liquid diet   - Physical therapy evaluation for disposition   - Counseled       Review of Systems   Constitutional: Negative for fever and chills.   Cardiovascular: Negative for chest pain.   Gastrointestinal: Positive for nausea and abdominal pain (incisional ). Negative for vomiting.   Genitourinary: Negative for dysuria.   Skin: Negative for rash.     Hemodynamics:  Blood pressure 146/80, pulse 84, temperature 36.7 °C (98 °F), resp. rate 20, height 1.651 m (5' 5\"), weight 100.3 kg (221 lb 1.9 oz), last menstrual period 05/22/2017, SpO2 94 %, not currently breastfeeding.     Respiratory:    Respiration: 20, Pulse Oximetry: 94 %, O2 Daily Delivery Respiratory : Silicone Nasal Cannula     PEP/CPT Method: Positive Airway Pressure Device  RUL Breath Sounds: Clear, RML Breath Sounds: Diminished, RLL Breath Sounds: Diminished, MALENA Breath Sounds: Clear, LLL Breath Sounds: Diminished  Fluids:    Intake/Output Summary (Last 24 hours) at 05/25/17 1014  Last data filed at 05/25/17 0705   Gross per 24 hour   Intake 2556.75 ml   Output   1785 ml   Net 771.75 ml     Admit Weight: 103.6 kg (228 lb 6.3 oz)  Current      Physical Exam   Constitutional: She is oriented to person, place, and time. No distress.   HENT:   Head: Normocephalic.   Eyes: Conjunctivae are normal.   Neck: No JVD present.   Cardiovascular: Normal rate.    Pulmonary/Chest: No respiratory distress. She exhibits no tenderness.   Supplemental oxygen    Abdominal:   Obese. Tender. "   Post operative dressings intact.  PHUONG drain #1 24 hr output 80 cc  PHUONG drain #2 24 hr output 75 cc  PHUONG drain #3 24 hr output 155 cc   Musculoskeletal:   Moves all extremities    Neurological: She is alert and oriented to person, place, and time.   Skin: Skin is warm and dry.   Nursing note and vitals reviewed.      Medical Decision Making/Problem List:    Active Hospital Problems    Diagnosis   • Open wound of epigastric region with complication [S31.102A]     Priority: High   • Infection and inflammatory reaction due to device, implant, and graft (CMS-[facility]) [T85.79XA]     Priority: High     5/23   - Exploratory laparotomy with removal of mesh,abdominal wall resection, abdominal wall reconstruction with biological mesh and rectus advancement flap  - Preliminary Intraoperative cultures with no organisms seen  5/25 Vanco day # 3.      • Platelet dysfunction due to aspirin (CMS-[facility]) [T39.011A, D69.1]     Priority: Medium     5/23 - AA inhibition 94.4. Transfused 1 unit platelet pheresis.  5/24 - Repeat AA 21.6.         Core Measures & Quality Metrics:  Labs reviewed, Medications reviewed and Radiology images reviewed        DVT Prophylaxis: Enoxaparin (Lovenox)    Ulcer prophylaxis: Not indicated    Assessed for rehab: Patient returned to prior level of function, rehabilitation not indicated at this time    Total Score: 0    Discussed patient condition with RN, Patient and trauma surgery, Dr. Warner Figueroa

## 2017-05-25 NOTE — PROGRESS NOTES
"Pharmacy Kinetics 56 y.o. 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 History of MRSA noted. Now post-op on GSU.    Other antibiotics: none    Allergies: Codeine and Tape     List concerns for accumulation of vancomycin: Hx recent surgical manipulation, Hx hardware, electrolyte derangement, BUN:Cr > 20:1, BMI ~37    Pertinent cultures to date:  17 abdominal wound culture x4 (NGTD)  3/5/17 abdominal wound culture x1 (Methicillin Resistant Staphylococcus aureus)    Recent Labs      17   1215  17   1435  17   0145   WBC  15.6*  12.4*  17.7*   NEUTSPOLYS   --    --   75.20*   BANDSSTABS   --    --   23.00*     Recent Labs      17   0145  17   0401   BUN  17  13   CREATININE  0.62  0.53     Intake/Output Summary (Last 24 hours) at 17 0847  Last data filed at 17 0705   Gross per 24 hour   Intake 2556.75 ml   Output   1785 ml   Net 771.75 ml      Blood pressure 146/80, pulse 84, temperature 36.7 °C (98 °F), resp. rate 20, height 1.651 m (5' 5\"), weight 100.3 kg (221 lb 1.9 oz), last menstrual period 2017, SpO2 94 %, not currently breastfeeding. Temp (24hrs), Av.9 °C (98.5 °F), Min:36.6 °C (97.9 °F), Max:37.6 °C (99.6 °F)      A/P   1. Vancomycin dose change: not indicated  2. Next vancomycin level:  tonight at   3. Goal trough: 12-16 mcg/mL  4. Comments: Cultures with no growth to date. Trough tonight prior to 3rd total dose to ensure therapeutic levels. Okay to hang dose prior to result, will follow up on level tomorrow.    Cielo Kitchen, PharmD         "

## 2017-05-25 NOTE — CARE PLAN
Problem: Communication  Goal: The ability to communicate needs accurately and effectively will improve  POC discussed within shift.     Problem: Infection  Goal: Will remain free from infection  Contact iso maintained, IV abx infused.

## 2017-05-25 NOTE — PROGRESS NOTES
Received resting well in bed this shift, stable with no apparent distress noted, no AMS/A&Ox4, VSS. Per pt PCA doesn't seem to control her pain, made VELVET Crabtree regarding pt's concern, per provider, no changes will be made at this time. Sleeping/drowsy most of the time but easy to arouse on stimulus, appears to be unmotivated despite teaching d/t pain experience. Educated pt regarding pros/cons on early ambulation. Educated as well on narcotic use and s/e. Breathing even/unlabored, noted dim lung sounds to BLL, on continuous O2 at 4Lpm per NC/ sats at mid to low 90's , encouraged pt to utilize IS frequently. Abdomen tender, (+)hyperBSx4, c/o nausea but no active vomiting, tolerating current diet well. PIV intact/patent with IVF infusing x TKO. Abdl binder in place/ PHUONG x3 in-situ to bulb suction. FS within parameter, no episodes of hyper/hypoglycemia was noted. 2-max assist with ADLs/ transfers. Care provided. Needs attended. Contact iso maintained. Will continue with POC.

## 2017-05-26 PROBLEM — Z86.14 HISTORY OF MRSA INFECTION: Status: ACTIVE | Noted: 2017-05-26

## 2017-05-26 LAB
ANION GAP SERPL CALC-SCNC: 5 MMOL/L (ref 0–11.9)
BACTERIA TISS AEROBE CULT: ABNORMAL
BACTERIA TISS AEROBE CULT: ABNORMAL
BACTERIA WND AEROBE CULT: ABNORMAL
BACTERIA WND AEROBE CULT: ABNORMAL
BASOPHILS # BLD AUTO: 0.2 % (ref 0–1.8)
BASOPHILS # BLD: 0.02 K/UL (ref 0–0.12)
BUN SERPL-MCNC: 11 MG/DL (ref 8–22)
CALCIUM SERPL-MCNC: 8.2 MG/DL (ref 8.5–10.5)
CHLORIDE SERPL-SCNC: 105 MMOL/L (ref 96–112)
CO2 SERPL-SCNC: 30 MMOL/L (ref 20–33)
CREAT SERPL-MCNC: 0.5 MG/DL (ref 0.5–1.4)
EOSINOPHIL # BLD AUTO: 0.37 K/UL (ref 0–0.51)
EOSINOPHIL NFR BLD: 4.4 % (ref 0–6.9)
ERYTHROCYTE [DISTWIDTH] IN BLOOD BY AUTOMATED COUNT: 52.7 FL (ref 35.9–50)
GFR SERPL CREATININE-BSD FRML MDRD: >60 ML/MIN/1.73 M 2
GLUCOSE SERPL-MCNC: 93 MG/DL (ref 65–99)
GRAM STN SPEC: ABNORMAL
GRAM STN SPEC: ABNORMAL
HCT VFR BLD AUTO: 23.4 % (ref 37–47)
HGB BLD-MCNC: 7.2 G/DL (ref 12–16)
HGB RETIC QN AUTO: 30.2 PG/CELL (ref 29–35)
IMM GRANULOCYTES # BLD AUTO: 0.04 K/UL (ref 0–0.11)
IMM GRANULOCYTES NFR BLD AUTO: 0.5 % (ref 0–0.9)
IMM RETICS NFR: 24.2 % (ref 9.3–17.4)
IRON SATN MFR SERPL: ABNORMAL % (ref 15–55)
IRON SERPL-MCNC: <10 UG/DL (ref 40–170)
LYMPHOCYTES # BLD AUTO: 1.31 K/UL (ref 1–4.8)
LYMPHOCYTES NFR BLD: 15.5 % (ref 22–41)
MCH RBC QN AUTO: 29.3 PG (ref 27–33)
MCHC RBC AUTO-ENTMCNC: 30.8 G/DL (ref 33.6–35)
MCV RBC AUTO: 95.1 FL (ref 81.4–97.8)
MONOCYTES # BLD AUTO: 0.31 K/UL (ref 0–0.85)
MONOCYTES NFR BLD AUTO: 3.7 % (ref 0–13.4)
NEUTROPHILS # BLD AUTO: 6.41 K/UL (ref 2–7.15)
NEUTROPHILS NFR BLD: 75.7 % (ref 44–72)
NRBC # BLD AUTO: 0 K/UL
NRBC BLD AUTO-RTO: 0 /100 WBC
PLATELET # BLD AUTO: 197 K/UL (ref 164–446)
PMV BLD AUTO: 9.4 FL (ref 9–12.9)
POTASSIUM SERPL-SCNC: 3.7 MMOL/L (ref 3.6–5.5)
RBC # BLD AUTO: 2.46 M/UL (ref 4.2–5.4)
RETICS # AUTO: 0.06 M/UL (ref 0.04–0.06)
RETICS/RBC NFR: 2.3 % (ref 0.8–2.1)
SIGNIFICANT IND 70042: ABNORMAL
SIGNIFICANT IND 70042: ABNORMAL
SITE SITE: ABNORMAL
SITE SITE: ABNORMAL
SODIUM SERPL-SCNC: 140 MMOL/L (ref 135–145)
SOURCE SOURCE: ABNORMAL
SOURCE SOURCE: ABNORMAL
TIBC SERPL-MCNC: 204 UG/DL (ref 250–450)
WBC # BLD AUTO: 8.5 K/UL (ref 4.8–10.8)

## 2017-05-26 PROCEDURE — A9270 NON-COVERED ITEM OR SERVICE: HCPCS | Performed by: SURGERY

## 2017-05-26 PROCEDURE — 85025 COMPLETE CBC W/AUTO DIFF WBC: CPT

## 2017-05-26 PROCEDURE — A9270 NON-COVERED ITEM OR SERVICE: HCPCS | Performed by: NURSE PRACTITIONER

## 2017-05-26 PROCEDURE — 770021 HCHG ROOM/CARE - ISO PRIVATE

## 2017-05-26 PROCEDURE — 700105 HCHG RX REV CODE 258

## 2017-05-26 PROCEDURE — 80048 BASIC METABOLIC PNL TOTAL CA: CPT

## 2017-05-26 PROCEDURE — 83540 ASSAY OF IRON: CPT

## 2017-05-26 PROCEDURE — 700111 HCHG RX REV CODE 636 W/ 250 OVERRIDE (IP): Performed by: NURSE PRACTITIONER

## 2017-05-26 PROCEDURE — A9270 NON-COVERED ITEM OR SERVICE: HCPCS

## 2017-05-26 PROCEDURE — 700102 HCHG RX REV CODE 250 W/ 637 OVERRIDE(OP)

## 2017-05-26 PROCEDURE — 700111 HCHG RX REV CODE 636 W/ 250 OVERRIDE (IP): Performed by: SURGERY

## 2017-05-26 PROCEDURE — G8980 MOBILITY D/C STATUS: HCPCS | Mod: CI

## 2017-05-26 PROCEDURE — 97530 THERAPEUTIC ACTIVITIES: CPT

## 2017-05-26 PROCEDURE — 83550 IRON BINDING TEST: CPT

## 2017-05-26 PROCEDURE — 700102 HCHG RX REV CODE 250 W/ 637 OVERRIDE(OP): Performed by: NURSE PRACTITIONER

## 2017-05-26 PROCEDURE — 36415 COLL VENOUS BLD VENIPUNCTURE: CPT

## 2017-05-26 PROCEDURE — 700111 HCHG RX REV CODE 636 W/ 250 OVERRIDE (IP)

## 2017-05-26 PROCEDURE — 94668 MNPJ CHEST WALL SBSQ: CPT

## 2017-05-26 PROCEDURE — G8979 MOBILITY GOAL STATUS: HCPCS | Mod: CI

## 2017-05-26 PROCEDURE — 700112 HCHG RX REV CODE 229: Performed by: SURGERY

## 2017-05-26 PROCEDURE — 97535 SELF CARE MNGMENT TRAINING: CPT

## 2017-05-26 PROCEDURE — 700102 HCHG RX REV CODE 250 W/ 637 OVERRIDE(OP): Performed by: SURGERY

## 2017-05-26 PROCEDURE — 85046 RETICYTE/HGB CONCENTRATE: CPT

## 2017-05-26 RX ORDER — GABAPENTIN 100 MG/1
100 CAPSULE ORAL 3 TIMES DAILY PRN
Status: DISCONTINUED | OUTPATIENT
Start: 2017-05-26 | End: 2017-05-28 | Stop reason: HOSPADM

## 2017-05-26 RX ORDER — DIPHENHYDRAMINE HYDROCHLORIDE 50 MG/ML
25 INJECTION INTRAMUSCULAR; INTRAVENOUS ONCE
Status: COMPLETED | OUTPATIENT
Start: 2017-05-26 | End: 2017-05-26

## 2017-05-26 RX ORDER — DEXTROAMPHETAMINE SACCHARATE, AMPHETAMINE ASPARTATE, DEXTROAMPHETAMINE SULFATE AND AMPHETAMINE SULFATE 2.5; 2.5; 2.5; 2.5 MG/1; MG/1; MG/1; MG/1
10 TABLET ORAL
Status: DISCONTINUED | OUTPATIENT
Start: 2017-05-26 | End: 2017-05-28 | Stop reason: HOSPADM

## 2017-05-26 RX ORDER — DIPHENHYDRAMINE HCL 25 MG
25 TABLET ORAL ONCE
Status: COMPLETED | OUTPATIENT
Start: 2017-05-26 | End: 2017-05-26

## 2017-05-26 RX ORDER — CLONAZEPAM 1 MG/1
1 TABLET ORAL
Status: DISCONTINUED | OUTPATIENT
Start: 2017-05-26 | End: 2017-05-28 | Stop reason: HOSPADM

## 2017-05-26 RX ORDER — LUBIPROSTONE 24 UG/1
24 CAPSULE ORAL 2 TIMES DAILY WITH MEALS
Status: DISCONTINUED | OUTPATIENT
Start: 2017-05-26 | End: 2017-05-26

## 2017-05-26 RX ORDER — ACETAMINOPHEN 325 MG/1
650 TABLET ORAL ONCE
Status: COMPLETED | OUTPATIENT
Start: 2017-05-26 | End: 2017-05-26

## 2017-05-26 RX ORDER — PAROXETINE HYDROCHLORIDE 20 MG/1
20 TABLET, FILM COATED ORAL
Status: DISCONTINUED | OUTPATIENT
Start: 2017-05-26 | End: 2017-05-28 | Stop reason: HOSPADM

## 2017-05-26 RX ORDER — LEVOTHYROXINE SODIUM 0.05 MG/1
200 TABLET ORAL EVERY MORNING
Status: DISCONTINUED | OUTPATIENT
Start: 2017-05-26 | End: 2017-05-27

## 2017-05-26 RX ORDER — TRAZODONE HYDROCHLORIDE 50 MG/1
200 TABLET ORAL
Status: DISCONTINUED | OUTPATIENT
Start: 2017-05-26 | End: 2017-05-28 | Stop reason: HOSPADM

## 2017-05-26 RX ADMIN — HYDROCODONE BITARTRATE AND ACETAMINOPHEN 2 TABLET: 10; 325 TABLET ORAL at 18:13

## 2017-05-26 RX ADMIN — STANDARDIZED SENNA CONCENTRATE AND DOCUSATE SODIUM 1 TABLET: 8.6; 5 TABLET, FILM COATED ORAL at 20:38

## 2017-05-26 RX ADMIN — HYDROCODONE BITARTRATE AND ACETAMINOPHEN 2 TABLET: 10; 325 TABLET ORAL at 01:06

## 2017-05-26 RX ADMIN — ACETAMINOPHEN 650 MG: 325 TABLET, FILM COATED ORAL at 11:01

## 2017-05-26 RX ADMIN — CLONAZEPAM 1 MG: 1 TABLET ORAL at 20:36

## 2017-05-26 RX ADMIN — LEVOTHYROXINE SODIUM 200 MCG: 50 TABLET ORAL at 11:00

## 2017-05-26 RX ADMIN — DOCUSATE SODIUM 100 MG: 100 CAPSULE ORAL at 09:13

## 2017-05-26 RX ADMIN — IRON DEXTRAN 1775 MG: 50 INJECTION INTRAMUSCULAR; INTRAVENOUS at 15:04

## 2017-05-26 RX ADMIN — POLYETHYLENE GLYCOL 3350 1 PACKET: 17 POWDER, FOR SOLUTION ORAL at 20:37

## 2017-05-26 RX ADMIN — MAGNESIUM HYDROXIDE 30 ML: 400 SUSPENSION ORAL at 09:15

## 2017-05-26 RX ADMIN — HYDROCODONE BITARTRATE AND ACETAMINOPHEN 2 TABLET: 10; 325 TABLET ORAL at 09:44

## 2017-05-26 RX ADMIN — HYDROCODONE BITARTRATE AND ACETAMINOPHEN 2 TABLET: 10; 325 TABLET ORAL at 05:41

## 2017-05-26 RX ADMIN — VANCOMYCIN HYDROCHLORIDE 2000 MG: 100 INJECTION, POWDER, LYOPHILIZED, FOR SOLUTION INTRAVENOUS at 20:36

## 2017-05-26 RX ADMIN — DIBASIC SODIUM PHOSPHATE, MONOBASIC POTASSIUM PHOSPHATE AND MONOBASIC SODIUM PHOSPHATE 2 TABLET: 852; 155; 130 TABLET ORAL at 15:18

## 2017-05-26 RX ADMIN — POLYETHYLENE GLYCOL 3350 1 PACKET: 17 POWDER, FOR SOLUTION ORAL at 09:16

## 2017-05-26 RX ADMIN — HYDROCODONE BITARTRATE AND ACETAMINOPHEN 2 TABLET: 10; 325 TABLET ORAL at 22:20

## 2017-05-26 RX ADMIN — FAMOTIDINE 20 MG: 20 TABLET, FILM COATED ORAL at 09:15

## 2017-05-26 RX ADMIN — PAROXETINE HYDROCHLORIDE 20 MG: 20 TABLET, FILM COATED ORAL at 20:36

## 2017-05-26 RX ADMIN — DIBASIC SODIUM PHOSPHATE, MONOBASIC POTASSIUM PHOSPHATE AND MONOBASIC SODIUM PHOSPHATE 2 TABLET: 852; 155; 130 TABLET ORAL at 20:37

## 2017-05-26 RX ADMIN — HYDROCODONE BITARTRATE AND ACETAMINOPHEN 2 TABLET: 10; 325 TABLET ORAL at 13:39

## 2017-05-26 RX ADMIN — DOCUSATE SODIUM 100 MG: 100 CAPSULE ORAL at 20:37

## 2017-05-26 RX ADMIN — ENOXAPARIN SODIUM 40 MG: 100 INJECTION SUBCUTANEOUS at 09:13

## 2017-05-26 RX ADMIN — TRAZODONE HYDROCHLORIDE 200 MG: 50 TABLET ORAL at 20:37

## 2017-05-26 RX ADMIN — DIBASIC SODIUM PHOSPHATE, MONOBASIC POTASSIUM PHOSPHATE AND MONOBASIC SODIUM PHOSPHATE 2 TABLET: 852; 155; 130 TABLET ORAL at 09:16

## 2017-05-26 RX ADMIN — DIPHENHYDRAMINE HCL 25 MG: 25 TABLET ORAL at 11:01

## 2017-05-26 RX ADMIN — STANDARDIZED SENNA CONCENTRATE AND DOCUSATE SODIUM 1 TABLET: 8.6; 5 TABLET, FILM COATED ORAL at 05:41

## 2017-05-26 RX ADMIN — IRON DEXTRAN 25 MG: 50 INJECTION INTRAMUSCULAR; INTRAVENOUS at 11:31

## 2017-05-26 RX ADMIN — ONDANSETRON 4 MG: 2 INJECTION INTRAMUSCULAR; INTRAVENOUS at 20:37

## 2017-05-26 RX ADMIN — VANCOMYCIN HYDROCHLORIDE 2000 MG: 100 INJECTION, POWDER, LYOPHILIZED, FOR SOLUTION INTRAVENOUS at 09:05

## 2017-05-26 RX ADMIN — FAMOTIDINE 20 MG: 20 TABLET, FILM COATED ORAL at 20:36

## 2017-05-26 ASSESSMENT — PAIN SCALES - GENERAL
PAINLEVEL_OUTOF10: 7
PAINLEVEL_OUTOF10: 6
PAINLEVEL_OUTOF10: 8
PAINLEVEL_OUTOF10: 7
PAINLEVEL_OUTOF10: 8
PAINLEVEL_OUTOF10: 6
PAINLEVEL_OUTOF10: ASSUMED PAIN PRESENT
PAINLEVEL_OUTOF10: ASSUMED PAIN PRESENT
PAINLEVEL_OUTOF10: 7

## 2017-05-26 ASSESSMENT — GAIT ASSESSMENTS
DISTANCE (FEET): 50
GAIT LEVEL OF ASSIST: SUPERVISED

## 2017-05-26 ASSESSMENT — ENCOUNTER SYMPTOMS
CHILLS: 0
FEVER: 0
SHORTNESS OF BREATH: 1
VOMITING: 0
NAUSEA: 0
ABDOMINAL PAIN: 1

## 2017-05-26 ASSESSMENT — COGNITIVE AND FUNCTIONAL STATUS - GENERAL
SUGGESTED CMS G CODE MODIFIER MOBILITY: CJ
TURNING FROM BACK TO SIDE WHILE IN FLAT BAD: A LITTLE
MOBILITY SCORE: 20
CLIMB 3 TO 5 STEPS WITH RAILING: A LITTLE
STANDING UP FROM CHAIR USING ARMS: A LITTLE
MOVING TO AND FROM BED TO CHAIR: A LITTLE

## 2017-05-26 NOTE — CARE PLAN
Problem: Venous Thromboembolism (VTW)/Deep Vein Thrombosis (DVT) Prevention:  Goal: Patient will participate in Venous Thrombosis (VTE)/Deep Vein Thrombosis (DVT)Prevention Measures  Outcome: PROGRESSING AS EXPECTED  Educated about SCDs and does refuse these, taking lovenox and starting to increase out of bed.

## 2017-05-26 NOTE — CARE PLAN
Problem: Venous Thromboembolism (VTW)/Deep Vein Thrombosis (DVT) Prevention:  Goal: Patient will participate in Venous Thrombosis (VTE)/Deep Vein Thrombosis (DVT)Prevention Measures  On continuous DVT prophylaxis, encouraged pt to ambulate frequently/ perform ROM.    Problem: Respiratory:  Goal: Respiratory status will improve  Encouraged pt to utilize IS frequently, kept on continuous O2 at 4Lpm.

## 2017-05-26 NOTE — PROGRESS NOTES
"  Trauma/Surgical Progress Note    Author: Wolf Ruggiero Date & Time created: 5/26/2017   8:44 AM     Interval Events:    Feeling better. Adequate pain control with norco. Ambulatory   Asymptomatic anemia. Iron replacement per pharmacy kinetics.  Preliminary intraoperative cultures negative. Continue antibiotic therapy, await final cultures  Counseled     Review of Systems   Constitutional: Negative for fever and chills.   Respiratory: Positive for shortness of breath.         Intermittent home oxygen use   Cardiovascular: Negative for chest pain.   Gastrointestinal: Positive for abdominal pain (incisional ). Negative for nausea and vomiting.   Genitourinary: Negative for dysuria.   Skin: Negative for rash.     Hemodynamics:  Blood pressure 122/70, pulse 75, temperature 36.8 °C (98.3 °F), resp. rate 16, height 1.651 m (5' 5\"), weight 100.3 kg (221 lb 1.9 oz), last menstrual period 05/22/2017, SpO2 94 %, not currently breastfeeding.     Respiratory:    Respiration: 16, Pulse Oximetry: 94 %, O2 Daily Delivery Respiratory : Silicone Nasal Cannula     PEP/CPT Method: Positive Airway Pressure Device, Work Of Breathing / Effort: Mild  RUL Breath Sounds: Clear, RML Breath Sounds: Clear, RLL Breath Sounds: Diminished, MALENA Breath Sounds: Clear, LLL Breath Sounds: Diminished  Fluids:    Intake/Output Summary (Last 24 hours) at 05/26/17 0844  Last data filed at 05/26/17 0601   Gross per 24 hour   Intake   2000 ml   Output   1330 ml   Net    670 ml     Admit Weight: 103.6 kg (228 lb 6.3 oz)  Current      Physical Exam   Constitutional: She is oriented to person, place, and time. No distress.   HENT:   Head: Normocephalic.   Eyes: Conjunctivae are normal.   Neck: No JVD present.   Cardiovascular: Normal rate.    Pulmonary/Chest: No respiratory distress. She exhibits no tenderness.   Supplemental oxygen    Abdominal:   Obese. Tender.   Incision intact  PHUONG drain #1 24 hr output 80 cc  PHUONG drain #2 24 hr output 45 cc  PHUONG drain #3 24 " hr output 100 cc   Musculoskeletal:   Moves all extremities    Neurological: She is alert and oriented to person, place, and time.   Skin: Skin is warm and dry.   Nursing note and vitals reviewed.      Medical Decision Making/Problem List:    Active Hospital Problems    Diagnosis   • Open wound of epigastric region with complication [S31.102A]     Priority: High   • Infection and inflammatory reaction due to device, implant, and graft (CMS-HCC) [T85.79XA]     Priority: High     5/23   - Exploratory laparotomy with removal of mesh,abdominal wall resection, abdominal wall reconstruction with biological mesh and rectus advancement flap  - Preliminary Intraoperative cultures with no organisms seen  5/26 Vanco day # 4.      • Anemia [D64.9]     Priority: High     5/26 - transfusion not required. Iron replacement per pharmacy kinetics     • Platelet dysfunction due to aspirin (CMS-HCC) [T39.011A, D69.1]     Priority: Low     5/23 - AA inhibition 94.4. Transfused 1 unit platelet pheresis.  5/24 - Repeat AA 21.6.         Core Measures & Quality Metrics:  Labs reviewed, Medications reviewed and Radiology images reviewed        DVT Prophylaxis: Enoxaparin (Lovenox)    Ulcer prophylaxis: Not indicated    Assessed for rehab: Patient returned to prior level of function, rehabilitation not indicated at this time    Total Score: 0    Discussed patient condition with RN, Patient and trauma surgery, Dr. Warner Figueroa.

## 2017-05-26 NOTE — DOCUMENTATION QUERY
DOCUMENTATION QUERY    PROVIDERS: Please select “Cosign w/ note”to reply to query.    To better represent the severity of illness of your patient, please review the following information and exercise your independent professional judgment in responding to this query.     Anemia is documented in the Progress Notes. Based upon the clinical findings, risk factors, and treatment, can this diagnosis be further specified?    • Acute blood loss anemia  • Chronic blood loss anemia  • Iron defiencey anemia  • Iron defiency anemia and acute blood loss anemia  • Iron defiency anemia and chronic blood loss anemia  • Other type of anemia (please specify type)  • Other explanation of clinical findings  • Unable to determine         The medical record reflects the following:   Clinical Findings 5/23 OP note  -EBL: 900 mL is documented  5/23 lab results noted as  -Hemoglobin- 12.3  -Hematocrit-38.6  5/26 lab results noted as  -Hemoglobin 7.2  -Hematocrit 23.4  5/26 lab results Iron noted as  -Iron <10  -Total iron binding 204   Treatment  Iron replacement, labs   Risk Factors Sx, iron deficiency    Location within medical record  Progress Notes and Lab Results      Thank you,     Lindsay Barnes MSN, RN, CNL, CNML  Clinical   [email or web address]        · Acute blood loss anemia

## 2017-05-26 NOTE — PROGRESS NOTES
IV Iron Per Pharmacy Note    Patient Lean Body Weight:  57 kg  Reason for Iron Replacement: Iron Deficiency Anemia     Lab Results   Component Value Date/Time    WBC 8.5 05/26/2017 03:35 AM    RBC 2.46* 05/26/2017 03:35 AM    HEMOGLOBIN 7.2* 05/26/2017 03:35 AM    HEMATOCRIT 23.4* 05/26/2017 03:35 AM    MCV 95.1 05/26/2017 03:35 AM    MCH 29.3 05/26/2017 03:35 AM    MCHC 30.8* 05/26/2017 03:35 AM    MPV 9.4 05/26/2017 03:35 AM     Results for CARRIE HARRY (MRN [id number]) as of 5/26/2017 09:46   Ref. Range 5/26/2017 03:35   Reticulocyte Count Latest Ref Range: 0.8-2.1 % 2.3 (H)   Retic, Absolute Latest Ref Range: 0.04-0.06 M/uL 0.06   Imm. Reticulocyte Fraction Latest Ref Range: 9.3-17.4 % 24.2 (H)   Retic Hgb Equivalent Latest Ref Range: 29.0-35.0 pg/cell 30.2   Iron Latest Ref Range:  ug/dL <10 (L)   Total Iron Binding Latest Ref Range: 250-450 ug/dL 204 (L)   % Saturation Latest Ref Range: 15-55 % Unable to calculate % Saturation due to Iron of <10 ug/dL or TIBC of <105 ug/dL.      Recent Labs      05/26/17   0335   CREATININE  0.50      Assessment/Plan:  1. IV Iron Indicated.   2. Give Iron Dextran 25 mg IV test dose following diphenhydramine/acetaminophen premeds over 30 minutes per protocol.  3. If no reaction (Anaphylaxis, Hypotension/Hypertension, N/V/D, Chest pain/Back Pain, Urticaria/Pruritis) in the next hour, proceed to full dose. Nursing to call the pharmacy IV room at ext. [phone] for full dose.  4. Full dose: Iron Dextran 1750 mg IV over 4 hours. Continue to monitor for delayed ADR including: Arthralgia/myalgia, Headache/backache, chills/dizziness/malaise, moderate to high fever and n/v.      Sadia Whitman, SherleyD

## 2017-05-26 NOTE — PROGRESS NOTES
At baseline this shift, no apparent distress noted, resting well in bed, no AMS/A&Ox4, vitals WNL. C/o pain on initial encounter, medicated per MAR. Breathing even/unlabored, noted pt desats to mid 70's when O2 is accidentally removed, on continuous O2 at 4Lpm per NC/sats at mid-high 90's/ kept HOB elevated, denied SOB, encouraged to use IS more frequently. Abdomen tender s/p sx, (+)hypoBSx4, (-)N/V, tolerating current diet well, per pt able to pass flatus but no BM at this time, educated regarding importance of frequent ambulation/ increasing intake of clear liquids to hasten gastric motility and narcotic use in relation to constipation, verbalized understanding. PIV intact/patent x TKO. Dressing C/D/I, PHUONG in situ x3 to bulb suction. 1-2 person assist with ADLs/ bladder continent. Care provided. Needs attended. No concerns at this time. Will continue with POC.

## 2017-05-26 NOTE — PROGRESS NOTES
"Pharmacy Kinetics 56 y.o. female on vancomycin day # 4 2017    Currently on Vancomycin 2000 mg iv q24hr    Indication for Treatment: SSTI (removal of infected prosthetic ventral hernia mesh with abdominal wall resection 17)    Pertinent history per medical record:Admitted on 2017 for chronic abdominal wound with infected prosthetic ventral hernia mesh s/p removal 17. History of MRSA noted.  Cultures obtained intra-op.      Other antibiotics: None.    Allergies: Codeine and Tape     List concerns for renal function:  Hx recent surgical manipulation, Hx hardware, electrolyte derangement, BUN:Cr > 20:1, BMI ~37    Pertinent cultures to date:   17 abdominal wound culture coag negative staph 1/4  3/5/17 abdominal wound culture x1 (Methicillin Resistant Staphylococcus aureus)    Recent Labs      17   1435  17   0145  17   1050  17   2144  17   0335   WBC  12.4*  17.7*  11.0*  9.2  8.5   NEUTSPOLYS   --   75.20*  88.60*  80.90*  75.70*   BANDSSTABS   --   23.00*  3.50   --    --      Recent Labs      17   0145  17   0401  17   0335   BUN  17  13  11   CREATININE  0.62  0.53  0.50     Recent Labs      17   2144   VANCOTROUGH  4.6*     Intake/Output Summary (Last 24 hours) at 17 0958  Last data filed at 17 0719   Gross per 24 hour   Intake   1880 ml   Output   1377 ml   Net    503 ml      Blood pressure 139/87, pulse 75, temperature 36.6 °C (97.8 °F), resp. rate 16, height 1.651 m (5' 5\"), weight 100.3 kg (221 lb 1.9 oz), last menstrual period 2017, SpO2 94 %, not currently breastfeeding. Temp (24hrs), Av.6 °C (97.9 °F), Min:36.5 °C (97.7 °F), Max:36.8 °C (98.3 °F)      A/P   1. Vancomycin dose change: increase vancomycin to 2000mg IV q12h.   2. Next vancomycin level: 17 @ 0830  3. Goal trough: 12-16 mcg/mL  4. Comments: vancomycin level sub-therapeutic prior to 3rd total dose.  Even with accumulation patient would not " obtain therapeutic levels on q24h dosing.  Increased dose to q12h with trough prior to 4th q12h dose.  Once pt has accumulated on q12h dosing she will likely be able to return to q24h given pt body habitus and volume of distribution of vancomycin.  Renal indices remain good with adequate urine out put. Pt is afebrile, WBC WNL. Clinical pharmacist to follow up on level tomorrow morning and adjust dose if needed.     Sadia Whitman, PharmD

## 2017-05-26 NOTE — THERAPY
"Physical Therapy Treatment completed.   Bed Mobility:  Supine to Sit: Modified Independent  Transfers: Sit to Stand: Supervised  Gait: Level Of Assist: Supervised with No Equipment Needed       Plan of Care: Patient with no further skilled PT needs in the acute care setting at this time  Discharge Recommendations: Equipment: No Equipment Needed. See below    Pt progressing as expected. After this visit, pt appears functionally capable of d/c to home environment. Pt reports no concerns with functional ability to enter/exit and navigate home and reports will have assist as needed with IADls and transport. Anticipate no immediate skilled PT needs after d/c to home.  Pt should be up with staff as able to prevent deconditioning while here.     See \"Rehab Therapy-Acute\" Patient Summary Report for complete documentation.       "

## 2017-05-27 LAB
ANION GAP SERPL CALC-SCNC: 5 MMOL/L (ref 0–11.9)
BACTERIA SPEC ANAEROBE CULT: NORMAL
BACTERIA SPEC ANAEROBE CULT: NORMAL
BASOPHILS # BLD AUTO: 0.3 % (ref 0–1.8)
BASOPHILS # BLD: 0.03 K/UL (ref 0–0.12)
BUN SERPL-MCNC: 11 MG/DL (ref 8–22)
CALCIUM SERPL-MCNC: 8.1 MG/DL (ref 8.5–10.5)
CHLORIDE SERPL-SCNC: 99 MMOL/L (ref 96–112)
CO2 SERPL-SCNC: 32 MMOL/L (ref 20–33)
CREAT SERPL-MCNC: 0.73 MG/DL (ref 0.5–1.4)
EOSINOPHIL # BLD AUTO: 0.56 K/UL (ref 0–0.51)
EOSINOPHIL NFR BLD: 6.2 % (ref 0–6.9)
ERYTHROCYTE [DISTWIDTH] IN BLOOD BY AUTOMATED COUNT: 51.7 FL (ref 35.9–50)
GFR SERPL CREATININE-BSD FRML MDRD: >60 ML/MIN/1.73 M 2
GLUCOSE SERPL-MCNC: 110 MG/DL (ref 65–99)
HCT VFR BLD AUTO: 22.9 % (ref 37–47)
HGB BLD-MCNC: 7 G/DL (ref 12–16)
IMM GRANULOCYTES # BLD AUTO: 0.12 K/UL (ref 0–0.11)
IMM GRANULOCYTES NFR BLD AUTO: 1.3 % (ref 0–0.9)
LYMPHOCYTES # BLD AUTO: 1.97 K/UL (ref 1–4.8)
LYMPHOCYTES NFR BLD: 21.6 % (ref 22–41)
MCH RBC QN AUTO: 29.2 PG (ref 27–33)
MCHC RBC AUTO-ENTMCNC: 30.6 G/DL (ref 33.6–35)
MCV RBC AUTO: 95.4 FL (ref 81.4–97.8)
MONOCYTES # BLD AUTO: 0.48 K/UL (ref 0–0.85)
MONOCYTES NFR BLD AUTO: 5.3 % (ref 0–13.4)
NEUTROPHILS # BLD AUTO: 5.94 K/UL (ref 2–7.15)
NEUTROPHILS NFR BLD: 65.3 % (ref 44–72)
NRBC # BLD AUTO: 0 K/UL
NRBC BLD AUTO-RTO: 0 /100 WBC
PHOSPHATE SERPL-MCNC: 3.7 MG/DL (ref 2.5–4.5)
PLATELET # BLD AUTO: 217 K/UL (ref 164–446)
PMV BLD AUTO: 9.6 FL (ref 9–12.9)
POTASSIUM SERPL-SCNC: 3 MMOL/L (ref 3.6–5.5)
RBC # BLD AUTO: 2.4 M/UL (ref 4.2–5.4)
SIGNIFICANT IND 70042: NORMAL
SIGNIFICANT IND 70042: NORMAL
SITE SITE: NORMAL
SITE SITE: NORMAL
SODIUM SERPL-SCNC: 136 MMOL/L (ref 135–145)
SOURCE SOURCE: NORMAL
SOURCE SOURCE: NORMAL
VANCOMYCIN TROUGH SERPL-MCNC: 21.4 UG/ML (ref 10–20)
WBC # BLD AUTO: 9.1 K/UL (ref 4.8–10.8)

## 2017-05-27 PROCEDURE — 700102 HCHG RX REV CODE 250 W/ 637 OVERRIDE(OP): Performed by: SURGERY

## 2017-05-27 PROCEDURE — 700105 HCHG RX REV CODE 258

## 2017-05-27 PROCEDURE — A9270 NON-COVERED ITEM OR SERVICE: HCPCS | Performed by: SURGERY

## 2017-05-27 PROCEDURE — 84100 ASSAY OF PHOSPHORUS: CPT

## 2017-05-27 PROCEDURE — 700102 HCHG RX REV CODE 250 W/ 637 OVERRIDE(OP): Performed by: NURSE PRACTITIONER

## 2017-05-27 PROCEDURE — 36415 COLL VENOUS BLD VENIPUNCTURE: CPT

## 2017-05-27 PROCEDURE — 80202 ASSAY OF VANCOMYCIN: CPT

## 2017-05-27 PROCEDURE — 80048 BASIC METABOLIC PNL TOTAL CA: CPT

## 2017-05-27 PROCEDURE — 700111 HCHG RX REV CODE 636 W/ 250 OVERRIDE (IP)

## 2017-05-27 PROCEDURE — 770021 HCHG ROOM/CARE - ISO PRIVATE

## 2017-05-27 PROCEDURE — 85025 COMPLETE CBC W/AUTO DIFF WBC: CPT

## 2017-05-27 PROCEDURE — 700111 HCHG RX REV CODE 636 W/ 250 OVERRIDE (IP): Performed by: NURSE PRACTITIONER

## 2017-05-27 PROCEDURE — 700112 HCHG RX REV CODE 229: Performed by: SURGERY

## 2017-05-27 PROCEDURE — A9270 NON-COVERED ITEM OR SERVICE: HCPCS | Performed by: NURSE PRACTITIONER

## 2017-05-27 RX ORDER — POTASSIUM CHLORIDE 20 MEQ/1
20 TABLET, EXTENDED RELEASE ORAL 2 TIMES DAILY
Status: DISCONTINUED | OUTPATIENT
Start: 2017-05-27 | End: 2017-05-28 | Stop reason: HOSPADM

## 2017-05-27 RX ORDER — SODIUM CHLORIDE 9 MG/ML
INJECTION, SOLUTION INTRAVENOUS
Status: DISCONTINUED
Start: 2017-05-27 | End: 2017-05-27

## 2017-05-27 RX ORDER — BISACODYL 10 MG
10 SUPPOSITORY, RECTAL RECTAL ONCE
Status: DISPENSED | OUTPATIENT
Start: 2017-05-27 | End: 2017-05-28

## 2017-05-27 RX ORDER — LEVOTHYROXINE SODIUM 0.05 MG/1
200 TABLET ORAL EVERY MORNING
Status: DISCONTINUED | OUTPATIENT
Start: 2017-05-27 | End: 2017-05-28 | Stop reason: HOSPADM

## 2017-05-27 RX ADMIN — SODIUM CHLORIDE: 9 INJECTION, SOLUTION INTRAVENOUS at 07:30

## 2017-05-27 RX ADMIN — POLYETHYLENE GLYCOL 3350 1 PACKET: 17 POWDER, FOR SOLUTION ORAL at 21:52

## 2017-05-27 RX ADMIN — DIBASIC SODIUM PHOSPHATE, MONOBASIC POTASSIUM PHOSPHATE AND MONOBASIC SODIUM PHOSPHATE 2 TABLET: 852; 155; 130 TABLET ORAL at 21:53

## 2017-05-27 RX ADMIN — VANCOMYCIN HYDROCHLORIDE 1300 MG: 100 INJECTION, POWDER, LYOPHILIZED, FOR SOLUTION INTRAVENOUS at 22:30

## 2017-05-27 RX ADMIN — HYDROCODONE BITARTRATE AND ACETAMINOPHEN 2 TABLET: 10; 325 TABLET ORAL at 17:47

## 2017-05-27 RX ADMIN — FAMOTIDINE 20 MG: 20 TABLET, FILM COATED ORAL at 21:53

## 2017-05-27 RX ADMIN — HYDROCODONE BITARTRATE AND ACETAMINOPHEN 1 TABLET: 10; 325 TABLET ORAL at 09:37

## 2017-05-27 RX ADMIN — CLONAZEPAM 1 MG: 1 TABLET ORAL at 21:53

## 2017-05-27 RX ADMIN — DIBASIC SODIUM PHOSPHATE, MONOBASIC POTASSIUM PHOSPHATE AND MONOBASIC SODIUM PHOSPHATE 2 TABLET: 852; 155; 130 TABLET ORAL at 08:12

## 2017-05-27 RX ADMIN — HYDROCODONE BITARTRATE AND ACETAMINOPHEN 2 TABLET: 10; 325 TABLET ORAL at 05:42

## 2017-05-27 RX ADMIN — MAGNESIUM HYDROXIDE 30 ML: 400 SUSPENSION ORAL at 08:12

## 2017-05-27 RX ADMIN — HYDROCODONE BITARTRATE AND ACETAMINOPHEN 2 TABLET: 10; 325 TABLET ORAL at 13:46

## 2017-05-27 RX ADMIN — VANCOMYCIN HYDROCHLORIDE 2000 MG: 100 INJECTION, POWDER, LYOPHILIZED, FOR SOLUTION INTRAVENOUS at 08:26

## 2017-05-27 RX ADMIN — POTASSIUM CHLORIDE 20 MEQ: 1500 TABLET, EXTENDED RELEASE ORAL at 08:13

## 2017-05-27 RX ADMIN — HYDROCODONE BITARTRATE AND ACETAMINOPHEN 2 TABLET: 10; 325 TABLET ORAL at 21:53

## 2017-05-27 RX ADMIN — POLYETHYLENE GLYCOL 3350 1 PACKET: 17 POWDER, FOR SOLUTION ORAL at 08:13

## 2017-05-27 RX ADMIN — FAMOTIDINE 20 MG: 20 TABLET, FILM COATED ORAL at 08:12

## 2017-05-27 RX ADMIN — DIBASIC SODIUM PHOSPHATE, MONOBASIC POTASSIUM PHOSPHATE AND MONOBASIC SODIUM PHOSPHATE 2 TABLET: 852; 155; 130 TABLET ORAL at 15:04

## 2017-05-27 RX ADMIN — STANDARDIZED SENNA CONCENTRATE AND DOCUSATE SODIUM 1 TABLET: 8.6; 5 TABLET, FILM COATED ORAL at 21:54

## 2017-05-27 RX ADMIN — ENOXAPARIN SODIUM 40 MG: 100 INJECTION SUBCUTANEOUS at 08:13

## 2017-05-27 RX ADMIN — POTASSIUM CHLORIDE 20 MEQ: 1500 TABLET, EXTENDED RELEASE ORAL at 21:53

## 2017-05-27 RX ADMIN — DOCUSATE SODIUM 100 MG: 100 CAPSULE ORAL at 21:53

## 2017-05-27 RX ADMIN — DOCUSATE SODIUM 100 MG: 100 CAPSULE ORAL at 08:12

## 2017-05-27 RX ADMIN — TRAZODONE HYDROCHLORIDE 200 MG: 50 TABLET ORAL at 21:53

## 2017-05-27 RX ADMIN — LEVOTHYROXINE SODIUM 200 MCG: 50 TABLET ORAL at 07:13

## 2017-05-27 RX ADMIN — PAROXETINE HYDROCHLORIDE 20 MG: 20 TABLET, FILM COATED ORAL at 21:53

## 2017-05-27 ASSESSMENT — PAIN SCALES - GENERAL
PAINLEVEL_OUTOF10: 7
PAINLEVEL_OUTOF10: 7
PAINLEVEL_OUTOF10: 8
PAINLEVEL_OUTOF10: 6
PAINLEVEL_OUTOF10: 4
PAINLEVEL_OUTOF10: 7

## 2017-05-27 ASSESSMENT — ENCOUNTER SYMPTOMS
CHILLS: 0
FEVER: 0
VOMITING: 0
NAUSEA: 0
ABDOMINAL PAIN: 1
SHORTNESS OF BREATH: 1

## 2017-05-27 NOTE — CARE PLAN
Problem: Pain Management  Goal: Pain level will decrease to patient’s comfort goal  Outcome: PROGRESSING AS EXPECTED  Able to perform ADls with the pain medication she is on .     Problem: Urinary Elimination:  Goal: Ability to reestablish a normal urinary elimination pattern will improve  Outcome: PROGRESSING AS EXPECTED  Voiding well.

## 2017-05-27 NOTE — PROGRESS NOTES
Up in room easily, PHUONG intact, tolerated solid diet, taking pain meds every 4 hours for acute and chronic pain.

## 2017-05-27 NOTE — PROGRESS NOTES
Abdominal binder tubed to RN station per RN..If you have any questions or if equipment adjustments are needed please call the traction department at ext [phone].

## 2017-05-27 NOTE — CARE PLAN
Problem: Pain Management  Goal: Pain level will decrease to patient’s comfort goal  Outcome: PROGRESSING AS EXPECTED  Pain level will remain at an acceptable level.    Problem: Mobility  Goal: Risk for activity intolerance will decrease  Outcome: PROGRESSING AS EXPECTED  Patient will ambulate to restroom.

## 2017-05-27 NOTE — PROGRESS NOTES
"Pharmacy Kinetics 56 y.o. female on vancomycin day # 5 2017    Currently on Vancomycin 2,000 mg iv q12hr    Indication for Treatment: SSTI - removal of infected prosthetic ventral hernia mesh with abdominal wall resection 17    Pertinent history per medical record:Admitted on 2017 for chronic abdominal wound with infected prosthetic ventral hernia mesh s/p removal 17. History of MRSA noted. Cultures obtained intra-op.      Other antibiotics: None.    Allergies: Codeine and Tape     List concerns for renal function:  Hx recent surgical manipulation, Hx hardware, electrolyte derangement, BUN:Cr > 20:1, BMI ~37    Pertinent cultures to date:   17 abdominal wound culture coag negative staph /4  3/5/17 abdominal wound culture x1 (Methicillin Resistant Staphylococcus aureus)    Recent Labs      17   1050  17   2144  17   0335  17   0150   WBC  11.0*  9.2  8.5  9.1   NEUTSPOLYS  88.60*  80.90*  75.70*  65.30   BANDSSTABS  3.50   --    --    --      Recent Labs      17   0401  17   0335  17   0150   BUN  13  11  11   CREATININE  0.53  0.50  0.73     Recent Labs      17   2144  17   0834   VANCOTROUGH  4.6*  21.4*     Intake/Output Summary (Last 24 hours) at 17 1152  Last data filed at 17 0900   Gross per 24 hour   Intake   1480 ml   Output   1393 ml   Net     87 ml      Blood pressure 124/74, pulse 72, temperature 36.7 °C (98.1 °F), resp. rate 16, height 1.651 m (5' 5\"), weight 103.9 kg (229 lb 0.9 oz), last menstrual period 2017, SpO2 93 %, not currently breastfeeding. Temp (24hrs), Av.9 °C (98.5 °F), Min:36.5 °C (97.7 °F), Max:37.3 °C (99.2 °F)      A/P   1. Vancomycin dose change: 1,300mg IV q12h (1130, 2330)  2. Next vancomycin level: Monday, May 29th prior to am dose (not yet ordered)   3. Goal trough: 12 - 16 mcg/mL   4. Comments: Renal indices are stable, small upward trend in WBCs, afebrile. Vancomycin trough level " resulted this morning (0834) at 21.4mcg/mL which is above desired range as outlined above. Therefore, decreased dose to 1,300mg IV q12h and will redraw a vancomycin level in ~ 2 days to ensure patient is within goal range. D/t BMI of ~37, patient likely has a larger volume of distrubution and will continue to accumulate drug despite adequate renal function. Pharmacy will continue to monitor and draw levels.     Akila Kiran, FELICIAD

## 2017-05-27 NOTE — PROGRESS NOTES
"  Trauma/Surgical Progress Note    Author: Wolf Ruggiero Date & Time created: 5/27/2017   8:19 AM     Interval Events:    Progressing.   Continue drains and antibiotic therapy   Hemoglobin trend down. Transfusion not required.   Electrolyte supplementation  Approaching discharge.   Counseled     Review of Systems   Constitutional: Negative for fever and chills.   Respiratory: Positive for shortness of breath.         Intermittent home oxygen use   Cardiovascular: Negative for chest pain.   Gastrointestinal: Positive for abdominal pain (incisional ). Negative for nausea and vomiting.        5/26 Small BM   Genitourinary: Negative for dysuria.   Skin: Negative for rash.     Hemodynamics:  Blood pressure 124/74, pulse 72, temperature 36.7 °C (98.1 °F), resp. rate 16, height 1.651 m (5' 5\"), weight 100.3 kg (221 lb 1.9 oz), last menstrual period 05/22/2017, SpO2 93 %, not currently breastfeeding.     Respiratory:    Respiration: 16, Pulse Oximetry: 93 %, O2 Daily Delivery Respiratory : Silicone Nasal Cannula     PEP/CPT Method: Positive Airway Pressure Device, Work Of Breathing / Effort: Mild  RUL Breath Sounds: Clear, RML Breath Sounds: Clear, RLL Breath Sounds: Diminished, MALENA Breath Sounds: Clear, LLL Breath Sounds: Diminished  Fluids:    Intake/Output Summary (Last 24 hours) at 05/27/17 0819  Last data filed at 05/27/17 0700   Gross per 24 hour   Intake   1670 ml   Output   1393 ml   Net    277 ml     Admit Weight: 103.6 kg (228 lb 6.3 oz)  Current      Physical Exam   Constitutional: She is oriented to person, place, and time. No distress.   HENT:   Head: Normocephalic.   Eyes: Conjunctivae are normal.   Neck: No JVD present.   Cardiovascular: Normal rate.    Pulmonary/Chest: No respiratory distress. She exhibits no tenderness.   Supplemental oxygen    Abdominal:   Obese. Tender.   Incision intact  PHUONG drain #1 24 hr output 70 cc  PHUONG drain #2 24 hr output 49 cc  PHUONG drain #3 24 hr output 116 cc   Musculoskeletal: "   Moves all extremities    Neurological: She is alert and oriented to person, place, and time.   Skin: Skin is warm and dry.   Nursing note and vitals reviewed.      Medical Decision Making/Problem List:    Active Hospital Problems    Diagnosis   • Open wound of epigastric region with complication [S31.102A]     Priority: High   • Infection and inflammatory reaction due to device, implant, and graft (CMS-HCC) [T85.79XA]     Priority: High     5/23   - Exploratory laparotomy with removal of mesh,abdominal wall resection, abdominal wall reconstruction with biological mesh and rectus advancement flap  - Preliminary Intraoperative cultures with no organisms seen  5/27 Vanco day # 5   Drain # 1 In peritoneal cavity  Drain # 2 Bellow skin   Drain # 3 (farthest to the left) to remain in place until follow up outpatient appointment     • Anemia [D64.9]     Priority: High     5/26 - transfusion not required. Iron replacement per pharmacy kinetics  5/27 - transfusion not required     • History of MRSA infection [Z86.14]     Priority: Medium     Chronic condition treated with Doxycycline.  Continue Vancomycin until DC then resume Doxycycline.       • Platelet dysfunction due to aspirin (CMS-HCC) [T39.011A, D69.1]     Priority: Low     5/23 - AA inhibition 94.4. Transfused 1 unit platelet pheresis.  5/24 - Repeat AA 21.6.         Core Measures & Quality Metrics:  Labs reviewed, Medications reviewed and Radiology images reviewed        DVT Prophylaxis: Enoxaparin (Lovenox)    Ulcer prophylaxis: Not indicated    Assessed for rehab: Patient returned to prior level of function, rehabilitation not indicated at this time    Total Score: 0    Discussed patient condition with RN, Patient and trauma surgery, Dr. Travis Chacon.

## 2017-05-28 VITALS
OXYGEN SATURATION: 95 % | WEIGHT: 229.06 LBS | BODY MASS INDEX: 38.16 KG/M2 | SYSTOLIC BLOOD PRESSURE: 143 MMHG | TEMPERATURE: 97.9 F | HEIGHT: 65 IN | RESPIRATION RATE: 18 BRPM | DIASTOLIC BLOOD PRESSURE: 80 MMHG | HEART RATE: 74 BPM

## 2017-05-28 LAB
ANION GAP SERPL CALC-SCNC: 6 MMOL/L (ref 0–11.9)
ANISOCYTOSIS BLD QL SMEAR: ABNORMAL
BASOPHILS # BLD AUTO: 0 % (ref 0–1.8)
BASOPHILS # BLD: 0 K/UL (ref 0–0.12)
BUN SERPL-MCNC: 7 MG/DL (ref 8–22)
CALCIUM SERPL-MCNC: 8.2 MG/DL (ref 8.5–10.5)
CHLORIDE SERPL-SCNC: 102 MMOL/L (ref 96–112)
CO2 SERPL-SCNC: 31 MMOL/L (ref 20–33)
CREAT SERPL-MCNC: 0.5 MG/DL (ref 0.5–1.4)
EOSINOPHIL # BLD AUTO: 0.23 K/UL (ref 0–0.51)
EOSINOPHIL NFR BLD: 2.6 % (ref 0–6.9)
ERYTHROCYTE [DISTWIDTH] IN BLOOD BY AUTOMATED COUNT: 52.1 FL (ref 35.9–50)
GFR SERPL CREATININE-BSD FRML MDRD: >60 ML/MIN/1.73 M 2
GLUCOSE SERPL-MCNC: 82 MG/DL (ref 65–99)
HCT VFR BLD AUTO: 23.2 % (ref 37–47)
HGB BLD-MCNC: 7.1 G/DL (ref 12–16)
LYMPHOCYTES # BLD AUTO: 1.44 K/UL (ref 1–4.8)
LYMPHOCYTES NFR BLD: 16.4 % (ref 22–41)
MANUAL DIFF BLD: NORMAL
MCH RBC QN AUTO: 29.3 PG (ref 27–33)
MCHC RBC AUTO-ENTMCNC: 30.6 G/DL (ref 33.6–35)
MCV RBC AUTO: 95.9 FL (ref 81.4–97.8)
MICROCYTES BLD QL SMEAR: ABNORMAL
MONOCYTES # BLD AUTO: 0.15 K/UL (ref 0–0.85)
MONOCYTES NFR BLD AUTO: 1.7 % (ref 0–13.4)
MORPHOLOGY BLD-IMP: NORMAL
NEUTROPHILS # BLD AUTO: 6.98 K/UL (ref 2–7.15)
NEUTROPHILS NFR BLD: 74.1 % (ref 44–72)
NEUTS BAND NFR BLD MANUAL: 5.2 % (ref 0–10)
NRBC # BLD AUTO: 0.04 K/UL
NRBC BLD AUTO-RTO: 0.5 /100 WBC
PLATELET # BLD AUTO: 254 K/UL (ref 164–446)
PLATELET BLD QL SMEAR: NORMAL
PMV BLD AUTO: 9.1 FL (ref 9–12.9)
POLYCHROMASIA BLD QL SMEAR: NORMAL
POTASSIUM SERPL-SCNC: 3.2 MMOL/L (ref 3.6–5.5)
RBC # BLD AUTO: 2.42 M/UL (ref 4.2–5.4)
RBC BLD AUTO: PRESENT
SODIUM SERPL-SCNC: 139 MMOL/L (ref 135–145)
WBC # BLD AUTO: 8.8 K/UL (ref 4.8–10.8)

## 2017-05-28 PROCEDURE — A9270 NON-COVERED ITEM OR SERVICE: HCPCS | Performed by: NURSE PRACTITIONER

## 2017-05-28 PROCEDURE — A9270 NON-COVERED ITEM OR SERVICE: HCPCS | Performed by: SURGERY

## 2017-05-28 PROCEDURE — 700105 HCHG RX REV CODE 258

## 2017-05-28 PROCEDURE — 700102 HCHG RX REV CODE 250 W/ 637 OVERRIDE(OP): Performed by: NURSE PRACTITIONER

## 2017-05-28 PROCEDURE — 700111 HCHG RX REV CODE 636 W/ 250 OVERRIDE (IP): Performed by: NURSE PRACTITIONER

## 2017-05-28 PROCEDURE — 36415 COLL VENOUS BLD VENIPUNCTURE: CPT

## 2017-05-28 PROCEDURE — 80048 BASIC METABOLIC PNL TOTAL CA: CPT

## 2017-05-28 PROCEDURE — 700111 HCHG RX REV CODE 636 W/ 250 OVERRIDE (IP)

## 2017-05-28 PROCEDURE — 700102 HCHG RX REV CODE 250 W/ 637 OVERRIDE(OP): Performed by: SURGERY

## 2017-05-28 PROCEDURE — 85027 COMPLETE CBC AUTOMATED: CPT

## 2017-05-28 PROCEDURE — 85007 BL SMEAR W/DIFF WBC COUNT: CPT

## 2017-05-28 PROCEDURE — 700112 HCHG RX REV CODE 229: Performed by: SURGERY

## 2017-05-28 RX ADMIN — DOCUSATE SODIUM 100 MG: 100 CAPSULE ORAL at 08:45

## 2017-05-28 RX ADMIN — DIBASIC SODIUM PHOSPHATE, MONOBASIC POTASSIUM PHOSPHATE AND MONOBASIC SODIUM PHOSPHATE 2 TABLET: 852; 155; 130 TABLET ORAL at 08:45

## 2017-05-28 RX ADMIN — VANCOMYCIN HYDROCHLORIDE 1300 MG: 100 INJECTION, POWDER, LYOPHILIZED, FOR SOLUTION INTRAVENOUS at 11:10

## 2017-05-28 RX ADMIN — POTASSIUM CHLORIDE 20 MEQ: 1500 TABLET, EXTENDED RELEASE ORAL at 08:45

## 2017-05-28 RX ADMIN — LEVOTHYROXINE SODIUM 200 MCG: 50 TABLET ORAL at 05:08

## 2017-05-28 RX ADMIN — POLYETHYLENE GLYCOL 3350 1 PACKET: 17 POWDER, FOR SOLUTION ORAL at 08:45

## 2017-05-28 RX ADMIN — ENOXAPARIN SODIUM 40 MG: 100 INJECTION SUBCUTANEOUS at 08:45

## 2017-05-28 RX ADMIN — FAMOTIDINE 20 MG: 20 TABLET, FILM COATED ORAL at 08:45

## 2017-05-28 RX ADMIN — HYDROCODONE BITARTRATE AND ACETAMINOPHEN 2 TABLET: 10; 325 TABLET ORAL at 05:08

## 2017-05-28 RX ADMIN — HYDROCODONE BITARTRATE AND ACETAMINOPHEN 2 TABLET: 10; 325 TABLET ORAL at 09:25

## 2017-05-28 ASSESSMENT — ENCOUNTER SYMPTOMS
ABDOMINAL PAIN: 1
FEVER: 0
VOMITING: 0
CHILLS: 0
NAUSEA: 0
SHORTNESS OF BREATH: 1

## 2017-05-28 ASSESSMENT — PAIN SCALES - GENERAL
PAINLEVEL_OUTOF10: 7
PAINLEVEL_OUTOF10: 6

## 2017-05-28 NOTE — DISCHARGE INSTRUCTIONS
Discharge Instructions    Discharged to home by car with relative. Discharged via wheelchair, hospital escort: Yes.  Special equipment needed: Not Applicable    Be sure to schedule a follow-up appointment with your primary care doctor or any specialists as instructed.     Discharge Plan:   Diet Plan: Discussed  Activity Level: Discussed  Confirmed Follow up Appointment: Patient to Call and Schedule Appointment  Confirmed Symptoms Management: Discussed  Medication Reconciliation Updated: Yes  Influenza Vaccine Indication: Indicated: Not available from distributor/    I understand that a diet low in cholesterol, fat, and sodium is recommended for good health. Unless I have been given specific instructions below for another diet, I accept this instruction as my diet prescription.   Other diet: diet as tolerated    Special Instructions:   1.  Call or seek medical attention if questions or concerns arise   2.  Follow up with Dr. Warner Figueroa, surgery, as scheduled 5/30/2017 at 1100 am, as needed, if symptoms worsen and for wound check   3.  Follow up with primary care provider within one weeks time, as needed, if symptoms worsen and wound check   4.  No contact sports, heavy lifting, or strenuous activities until cleared by Dr. Warner Figueroa   5.  No swimming, hot tubs, baths, shower or wound submersion until cleared by Dr. Warner Figueroa   6.  No operation of machinery or motorized vehicles under the influence of narcotics   7.  No alcohol use under the influence of narcotics   8.  Drains to remain clean and in place. Change dry dressings as needed. Empty as needed and record 24 hr puts.   9.  Abdominal binder to be in place when ambulating   10. Seek immediate medical attention for signs and symptoms of infection and/or new or worsening abdominal pain    · Is patient discharged on Warfarin / Coumadin?   No     · Is patient Post Blood Transfusion?  No    Depression / Suicide Risk    As you are discharged  from this [facility] facility, it is important to learn how to keep safe from harming yourself.    Recognize the warning signs:  · Abrupt changes in personality, positive or negative- including increase in energy   · Giving away possessions  · Change in eating patterns- significant weight changes-  positive or negative  · Change in sleeping patterns- unable to sleep or sleeping all the time   · Unwillingness or inability to communicate  · Depression  · Unusual sadness, discouragement and loneliness  · Talk of wanting to die  · Neglect of personal appearance   · Rebelliousness- reckless behavior  · Withdrawal from people/activities they love  · Confusion- inability to concentrate     If you or a loved one observes any of these behaviors or has concerns about self-harm, here's what you can do:  · Talk about it- your feelings and reasons for harming yourself  · Remove any means that you might use to hurt yourself (examples: pills, rope, extension cords, firearm)  · Get professional help from the community (Mental Health, Substance Abuse, psychological counseling)  · Do not be alone:Call your Safe Contact- someone whom you trust who will be there for you.  · Call your local CRISIS HOTLINE 586-0963 or 907-784-1553  · Call your local Children's Mobile Crisis Response Team [place] (470) 429-2195 or www.Other Machine  · Call the toll free National Suicide Prevention Hotlines   · National Suicide Prevention Lifeline 401-360-LIYQ (9277)  · National Hope Line Network 800-SUICIDE (724-4835)  Exploratory Laparotomy, Adult, Care After  Refer to this sheet in the next few weeks. These instructions provide you with information about caring for yourself after your procedure. Your health care provider may also give you more specific instructions. Your treatment has been planned according to current medical practices, but problems sometimes occur. Call your health care provider if you have any problems or questions after  your procedure.  WHAT TO EXPECT AFTER THE PROCEDURE  After your procedure, it is typical to have:  · Abdominal soreness.  · Fatigue.  · A sore throat from tubes in your throat.  · A lack of appetite.  HOME CARE INSTRUCTIONS  Medicines  · Take medicines only as directed by your health care provider.  · Do not drive or operate heavy machinery while taking pain medicine.  Incision Care  · There are many different ways to close and cover an incision, including stitches (sutures), skin glue, and adhesive strips. Follow your health care provider's instructions about:  · Incision care.  · Bandage (dressing) changes and removal.  · Incision closure removal.  · Do not take showers or baths until your health care provider says that you can.  · Check your incision area daily for signs of infection. Watch for:  · Redness.  · Tenderness.  · Swelling.  · Drainage.  Activities  · Do not lift anything that is heavier than 10 pounds (4.5 kg) until your health care provider says that it is safe.  · Try to walk a little bit each day if your health care provider says that it is okay.  · Ask your health care provider when you can start to do your usual activities again, such as driving, going back to work, and having sex.  Eating and Drinking  · You may eat what you usually eat. Include lots of whole grains, fruits, and vegetables in your diet. This will help to prevent constipation.  · Drink enough fluid to keep your urine clear or pale yellow.  General Instructions  · Keep all follow-up visits as directed by your health care provider. This is important.  SEEK MEDICAL CARE IF:   · You have a fever.  · You have chills.  · Your pain medicine is not helping.  · You have constipation or diarrhea.  · You have nausea or vomiting.  · You have drainage, redness, swelling, or pain at your incision site.  SEEK IMMEDIATE MEDICAL CARE IF:  · Your pain is getting worse.  · It has been more than 3 days since you been able to have a bowel  movement.  · You have ongoing (persistent) vomiting.  · The edges of your incision open up.  · You have warmth, tenderness, and swelling in your calf.  · You have trouble breathing.  · You have chest pain.     This information is not intended to replace advice given to you by your health care provider. Make sure you discuss any questions you have with your health care provider.     Document Released: 08/01/2005 Document Revised: 01/08/2016 Document Reviewed: 08/05/2015  [employer] Interactive Patient Education ©2016 [employer]    Bulb Drain Home Care  A bulb drain consists of a thin rubber tube and a soft, round bulb that creates a gentle suction. The rubber tube is placed in the area where you had surgery. A bulb is attached to the end of the tube that is outside the body. The bulb drain removes excess fluid that normally builds up in a surgical wound after surgery. The color and amount of fluid will vary. Immediately after surgery, the fluid is bright red and is a little thicker than water. It may gradually change to a yellow or pink color and become more thin and water-like. When the amount decreases to about 1 or 2 tbsp in 24 hours, your health care provider will usually remove it.  DAILY CARE  · Keep the bulb flat (compressed) at all times, except while emptying it. The flatness creates suction. You can flatten the bulb by squeezing it firmly in the middle and then closing the cap.  · Keep sites where the tube enters the skin dry and covered with a bandage (dressing).  · Secure the tube 1-2 in (2.5-5.1 cm) below the insertion sites to keep it from pulling on your stitches. The tube is stitched in place and will not slip out.  · Secure the bulb as directed by your health care provider.  · For the first 3 days after surgery, there usually is more fluid in the bulb. Empty the bulb whenever it becomes half full because the bulb does not create enough suction if it is too full. The bulb could also overflow. Write  down how much fluid you remove each time you empty your drain. Add up the amount removed in 24 hours.  · Empty the bulb at the same time every day once the amount of fluid decreases and you only need to empty it once a day. Write down the amounts and the 24-hour totals to give to your health care provider. This helps your health care provider know when the tubes can be removed.  EMPTYING THE BULB DRAIN  Before emptying the bulb, get a measuring cup, a piece of paper and a pen, and wash your hands.  · Gently run your fingers down the tube (stripping) to empty any drainage from the tubing into the bulb. This may need to be done several times a day to clear the tubing of clots and tissue.  · Open the bulb cap to release suction, which causes it to inflate. Do not touch the inside of the cap.  · Gently run your fingers down the tube (stripping) to empty any drainage from the tubing into the bulb.  · Hold the cap out of the way, and pour fluid into the measuring cup.    · Squeeze the bulb to provide suction.   · Replace the cap.    · Check the tape that holds the tube to your skin. If it is becoming loose, you can remove the loose piece of tape and apply a new one. Then, pin the bulb to your shirt.    · Write down the amount of fluid you emptied out. Write down the date and each time you emptied your bulb drain. (If there are 2 bulbs, note the amount of drainage from each bulb and keep the totals separate. Your health care provider will want to know the total amounts for each drain and which tube is draining more.)    · Flush the fluid down the toilet and wash your hands.    · Call your health care provider once you have less than 2 tbsp of fluid collecting in the bulb drain every 24 hours.  If there is drainage around the tube site, change dressings and keep the area dry. Cleanse around tube with sterile saline and place dry gauze around site. This gauze should be changed when it is soiled. If it stays clean and  unsoiled, it should still be changed daily.   SEEK MEDICAL CARE IF:  · Your drainage has a bad smell or is cloudy.    · You have a fever.    · Your drainage is increasing instead of decreasing.    · Your tube fell out.    · You have redness or swelling around the tube site.    · You have drainage from a surgical wound.    · Your bulb drain will not stay flat after you empty it.    MAKE SURE YOU:   · Understand these instructions.  · Will watch your condition.  · Will get help right away if you are not doing well or get worse.     This information is not intended to replace advice given to you by your health care provider. Make sure you discuss any questions you have with your health care provider.     Document Released: 12/15/2001 Document Revised: 01/08/2016 Document Reviewed: 05/22/2013  [employer] Interactive Patient Education ©2016 [employer]

## 2017-05-28 NOTE — CARE PLAN
Problem: Pain Management  Goal: Pain level will decrease to patient’s comfort goal  Outcome: PROGRESSING AS EXPECTED  Patient pain will  remain at goal.    Problem: Urinary Elimination:  Goal: Ability to reestablish a normal urinary elimination pattern will improve  Outcome: PROGRESSING AS EXPECTED  Patient will ambulate to the bathroom.

## 2017-05-28 NOTE — PROGRESS NOTES
"Pharmacy Kinetics 56 y.o. 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 History of MRSA noted. Cultures obtained intra-op.      Other antibiotics: None.    Allergies: Codeine and Tape     List concerns for renal function:  Hx recent surgical manipulation, Hx hardware, electrolyte derangement, BUN:Cr > 20:1, BMI ~37    Pertinent cultures to date:    17 abdominal wound culture coag negative staph /4  3/5/17 abdominal wound culture x1 (Methicillin Resistant Staphylococcus aureus)    Recent Labs      17   1050  17   2144  17   0335  17   0150  17   0236   WBC  11.0*  9.2  8.5  9.1  8.8   NEUTSPOLYS  88.60*  80.90*  75.70*  65.30  74.10*   BANDSSTABS  3.50   --    --    --   5.20     Recent Labs      17   0335  17   0150  17   0236   BUN  11  11  7*   CREATININE  0.50  0.73  0.50     Recent Labs      17   2144  17   0834   VANCOTROUGH  4.6*  21.4*     Intake/Output Summary (Last 24 hours) at 17 0924  Last data filed at 17 0700   Gross per 24 hour   Intake   1760 ml   Output   1195 ml   Net    565 ml      Blood pressure 143/80, pulse 74, temperature 36.6 °C (97.9 °F), resp. rate 18, height 1.651 m (5' 5\"), weight 103.9 kg (229 lb 0.9 oz), last menstrual period 2017, SpO2 95 %, not currently breastfeeding. Temp (24hrs), Av.8 °C (98.2 °F), Min:36.6 °C (97.9 °F), Max:36.9 °C (98.4 °F)      A/P   1. Vancomycin dose change: Not indicated   2. Next vancomycin level:  1 - 2 days - pending discharge plans   3. Goal trough: 12 - 16 mcg/mL   4. Comments: Dose was decreased yesterday () from 2,000mg q12h to 1,300mg iv q12h d/t " trough level above desired range. According to progress notes, patient pending discharge and has a follow-up appointment with surgery scheduled. Vancomycin will continue until discharge and then will be changed to doxycycline. No repeat trough planned at this time, will continue to follow.     Akila Kiran, FELICIAD

## 2017-05-28 NOTE — PROGRESS NOTES
Pt discharged home via wheelchair. Home with aron drains x 2. Pt to follow up with Dr. Figueroa on 5/30/17 at 11am. Pt aware. No prescriptions given per Wolf Ruggiero. Pt understands how to empty drains. Pt returned demonstrated on drain empyting. Abdominal binder in place and pt aware to keep in place when ambulating. Pt understands discharge instructions. IV removed.

## 2017-05-28 NOTE — CARE PLAN
Problem: Safety  Goal: Will remain free from falls  Intervention: Assess risk factors for falls  Pt upself ambulates in hallway. Call light within reach. Calls for assistance as needed.       Problem: Bowel/Gastric:  Goal: Normal bowel function is maintained or improved  Pt states having a BM on 5/27/17    Problem: Knowledge Deficit  Goal: Knowledge of disease process/condition, treatment plan, diagnostic tests, and medications will improve  Pt instructed on pain management, ambulating, IS and call light    Problem: Pain Management  Goal: Pain level will decrease to patient’s comfort goal  Pain controlled with po pain meds

## 2017-05-28 NOTE — PROGRESS NOTES
"  Trauma/Surgical Progress Note    Author: Wolf Ruggiero Date & Time created: 5/28/2017   8:35 AM     Interval Events:    Cultures with coagulase-negative Staphylococcus species.  No leukocytosis.  Decrease drain output.   Pt feeling well and wound like to home.     DC drain # 2. Continue drains # 1 and #3  Resume home doxycyline upon discharge  Follow up appointment has been arranged with Dr. Figueroa this Tuesday 5/30/17 at 1100  Counseled     Review of Systems   Constitutional: Negative for fever and chills.   Respiratory: Positive for shortness of breath.         Intermittent home oxygen use   Cardiovascular: Negative for chest pain.   Gastrointestinal: Positive for abdominal pain (incisional ). Negative for nausea and vomiting.        5/27 Small BM   Genitourinary: Negative for dysuria.   Skin: Negative for rash.     Hemodynamics:  Blood pressure 143/63, pulse 83, temperature 36.8 °C (98.3 °F), resp. rate 18, height 1.651 m (5' 5\"), weight 103.9 kg (229 lb 0.9 oz), last menstrual period 05/22/2017, SpO2 93 %, not currently breastfeeding.     Respiratory:    Respiration: 18, Pulse Oximetry: 93 %     Work Of Breathing / Effort: Mild  RUL Breath Sounds: Clear, RML Breath Sounds: Clear, RLL Breath Sounds: Diminished, MALENA Breath Sounds: Clear, LLL Breath Sounds: Diminished  Fluids:    Intake/Output Summary (Last 24 hours) at 05/28/17 0835  Last data filed at 05/28/17 0253   Gross per 24 hour   Intake   1890 ml   Output   1005 ml   Net    885 ml     Admit Weight: 103.6 kg (228 lb 6.3 oz)  Current      Physical Exam   Constitutional: She is oriented to person, place, and time. No distress.   HENT:   Head: Normocephalic.   Eyes: Conjunctivae are normal.   Neck: No JVD present.   Cardiovascular: Normal rate.    Pulmonary/Chest: No respiratory distress. She exhibits no tenderness.   Supplemental oxygen    Abdominal:   Obese. Tender.   Incision intact  PHUONG drain #1 24 hr output 30 cc  PHUONG drain #2 24 hr output 15 cc  PHUONG " drain #3 24 hr output 15 cc   Musculoskeletal:   Ambulatory     Neurological: She is alert and oriented to person, place, and time.   Skin: Skin is warm and dry.   Nursing note and vitals reviewed.      Medical Decision Making/Problem List:    Active Hospital Problems    Diagnosis   • Open wound of epigastric region with complication [S31.102A]     Priority: High   • Infection and inflammatory reaction due to device, implant, and graft (CMS-HCC) [T85.79XA]     Priority: High     5/23   - Exploratory laparotomy with removal of mesh,abdominal wall resection, abdominal wall reconstruction with biological mesh and rectus advancement flap  - Preliminary Intraoperative cultures with no organisms seen  5/27 Vanco day # 5   Drain # 1 In peritoneal cavity  Drain # 2 Bellow skin   Drain # 3 (farthest to the left) to remain in place until follow up outpatient appointment      • Anemia [D64.9]     Priority: High     5/26 - transfusion not required. Iron replacement per pharmacy kinetics  5/28 - transfusion not required     • History of MRSA infection [Z86.14]     Priority: Medium     Chronic condition treated with Doxycycline.  Continue Vancomycin until DC then resume Doxycycline.     • Platelet dysfunction due to aspirin (CMS-HCC) [T39.011A, D69.1]     Priority: Low     5/23 - AA inhibition 94.4. Transfused 1 unit platelet pheresis.  5/24 - Repeat AA 21.6.          Core Measures & Quality Metrics:  Labs reviewed, Medications reviewed and Radiology images reviewed  Murrell catheter: No Murrell      DVT Prophylaxis: Enoxaparin (Lovenox)    Ulcer prophylaxis: Not indicated  Antibiotics: Treating active infection/contamination beyond 24 hours perioperative coverage  Assessed for rehab: Patient returned to prior level of function, rehabilitation not indicated at this time    Total Score: 0    Discussed patient condition with RN, Patient and trauma surgery, Dr. Travis Chacon .

## 2017-05-28 NOTE — PROGRESS NOTES
"Pt AA&OX4. Pain rating at 6. meds due at 910am. Pt aware and would like to get them at that time. No c/o n/v, n/t, pt on 3L NC. Wears home oxygen at 3L. Midline incision with staples and mckayla. No signs of infection noted. aron drain x 3 with minimal serosang output. Pt ambulates in hallway without assistance. Hypoactive BS. Last BM 5/27/17. Call light within reach. Plan of care discussed. Hourly rounding in place. Blood pressure 143/80, pulse 74, temperature 36.6 °C (97.9 °F), resp. rate 18, height 1.651 m (5' 5\"), weight 103.9 kg (229 lb 0.9 oz), last menstrual period 05/22/2017, SpO2 95 %, not currently breastfeeding.    "

## 2017-05-28 NOTE — PROGRESS NOTES
Continues to refuse suppository states she had a small BM today. Continues with abd binder in place.

## 2017-05-29 NOTE — DISCHARGE SUMMARY
DATE OF ADMISSION:  05/23/2017.    DATE OF DISCHARGE:  05/28/2017.    LENGTH OF STAY:  5 days.    ATTENDING SURGEON:  Warner Figueroa DO    CONSULTING PHYSICIANS:  None.    DISCHARGE DIAGNOSES:  1. Infection and inflammatory reaction due to device implant and/or graft.  2. Anemia.  3. Open wound of epigastric region with complication.  4. History of methicillin-resistant Staphylococcus aureus infection.  5. Platelet dysfunction due to aspirin.    PROCEDURES:  Date of procedure, 05/23/2017.  Procedure performed by Dr. Warner Figueroa.  Exploratory laparotomy to explore abdominal wall, removal of   prosthetic mesh, abdominal wall resection involving old retained mesh and   chronic infection, abdominal wall reconstruction with biological mesh and   rectus advancement flap.    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  Review of   the records indicated that the patient has had issues over the last 2 years   including infection, requiring debridement that was bridged with drainage and   antibiotics.  Most recently, she developed an abscess to the fascia and above   the mesh, which was drained back in March, and she had been managed with   antibiotics and negative pressure wound therapy.  Ultimately, she presented to   the hospital on 05/23/2017, for elective abdominal wall debridement, removal   of mesh, and reconstruction of her abdominal wall.    HOSPITAL COURSE:  The patient presented to the operating theater where she   underwent the above procedure.  Postoperatively, she did well.  At this time,   her incision is clean, dry, and intact with no drainage.  Staples are in   place.  She will be discharged home with drains #1 and #3 in place; #1 is the   right lower quadrant drain that is intraperitoneal under the mesh and drain #3   is the left lower quadrant medial drain under the mobilized skin flaps on top   of the fascial  closure.  Drainage from these at this time is serosanguineous.    Drain education has been provided.  She is tolerating an oral diet.  She has   had a small bowel movement.  She is afebrile.  She has no leukocytosis and   she is ambulatory.    During her stay, she received vancomycin antibiotic therapy.  Cultures   ultimately grew coagulase-negative Staphylococcus species.  Upon discharge,   she will resume her outpatient doxycycline regimen.    DISCHARGE PHYSICAL EXAMINATION:  Please see exam dated 05/28/2017.    DISCHARGE MEDICATIONS:  None.    DISPOSITION:  The patient will be discharged home in a good condition on   05/28/2017.  She has a followup appointment with Dr. Warner Alas this   Tuesday, 05/30, at 11:00 a.m.  She has been extensively counseled.  All of her   questions have been answered.  Special attention was paid to the signs and   symptoms of infection and new or worsening abdominal pain, to seek immediate   medical attention if these do develop.  She demonstrates understanding of her   discharge instructions and gives verbal compliance.    TIME SPENT AT DISCHARGE:  45 minutes.       ____________________________________     VELVET KING / LUKAS    DD:  05/28/2017 09:04:25  DT:  05/28/2017 18:57:36    D#:  [id number]  Job#:  [id number]    cc: Primary Care Provider , WARNER ALAS DO

## 2017-07-03 ENCOUNTER — HOSPITAL ENCOUNTER (EMERGENCY)
Facility: MEDICAL CENTER | Age: 57
End: 2017-07-03
Attending: EMERGENCY MEDICINE | Admitting: EMERGENCY MEDICINE
Payer: MEDICARE

## 2017-07-03 ENCOUNTER — APPOINTMENT (OUTPATIENT)
Dept: RADIOLOGY | Facility: MEDICAL CENTER | Age: 57
End: 2017-07-03
Attending: EMERGENCY MEDICINE
Payer: MEDICARE

## 2017-07-03 VITALS
OXYGEN SATURATION: 94 % | HEART RATE: 82 BPM | RESPIRATION RATE: 14 BRPM | DIASTOLIC BLOOD PRESSURE: 78 MMHG | SYSTOLIC BLOOD PRESSURE: 122 MMHG | WEIGHT: 200 LBS | TEMPERATURE: 98.1 F | BODY MASS INDEX: 33.32 KG/M2 | HEIGHT: 65 IN

## 2017-07-03 DIAGNOSIS — S30.1XXA ABDOMINAL WALL SEROMA, INITIAL ENCOUNTER: ICD-10-CM

## 2017-07-03 DIAGNOSIS — R46.89 AGGRESSIVE BEHAVIOR OF ADULT: ICD-10-CM

## 2017-07-03 DIAGNOSIS — R10.12 CONTINUOUS LUQ ABDOMINAL PAIN: Primary | ICD-10-CM

## 2017-07-03 LAB
ALBUMIN SERPL BCP-MCNC: 3.9 G/DL (ref 3.2–4.9)
ALBUMIN/GLOB SERPL: 1.2 G/DL
ALP SERPL-CCNC: 51 U/L (ref 30–99)
ALT SERPL-CCNC: 11 U/L (ref 2–50)
ANION GAP SERPL CALC-SCNC: 5 MMOL/L (ref 0–11.9)
APPEARANCE UR: CLEAR
APPEARANCE UR: NORMAL
AST SERPL-CCNC: 13 U/L (ref 12–45)
BASOPHILS # BLD AUTO: 0.8 % (ref 0–1.8)
BASOPHILS # BLD: 0.07 K/UL (ref 0–0.12)
BILIRUB SERPL-MCNC: 0.2 MG/DL (ref 0.1–1.5)
BILIRUB UR QL STRIP.AUTO: NEGATIVE
BUN SERPL-MCNC: 23 MG/DL (ref 8–22)
CALCIUM SERPL-MCNC: 9.2 MG/DL (ref 8.5–10.5)
CHLORIDE SERPL-SCNC: 106 MMOL/L (ref 96–112)
CO2 SERPL-SCNC: 25 MMOL/L (ref 20–33)
COLOR UR AUTO: NORMAL
COLOR UR: YELLOW
CREAT SERPL-MCNC: 0.66 MG/DL (ref 0.5–1.4)
CULTURE IF INDICATED INDCX: NO UA CULTURE
EOSINOPHIL # BLD AUTO: 0.13 K/UL (ref 0–0.51)
EOSINOPHIL NFR BLD: 1.4 % (ref 0–6.9)
ERYTHROCYTE [DISTWIDTH] IN BLOOD BY AUTOMATED COUNT: 54.1 FL (ref 35.9–50)
GFR SERPL CREATININE-BSD FRML MDRD: >60 ML/MIN/1.73 M 2
GLOBULIN SER CALC-MCNC: 3.3 G/DL (ref 1.9–3.5)
GLUCOSE SERPL-MCNC: 88 MG/DL (ref 65–99)
GLUCOSE UR STRIP.AUTO-MCNC: NEGATIVE MG/DL
GLUCOSE UR STRIP.AUTO-MCNC: NORMAL MG/DL
HCT VFR BLD AUTO: 38.7 % (ref 37–47)
HGB BLD-MCNC: 11.8 G/DL (ref 12–16)
IMM GRANULOCYTES # BLD AUTO: 0.03 K/UL (ref 0–0.11)
IMM GRANULOCYTES NFR BLD AUTO: 0.3 % (ref 0–0.9)
KETONES UR STRIP.AUTO-MCNC: NEGATIVE MG/DL
KETONES UR STRIP.AUTO-MCNC: NORMAL MG/DL
LEUKOCYTE ESTERASE UR QL STRIP.AUTO: NEGATIVE
LEUKOCYTE ESTERASE UR QL STRIP.AUTO: NORMAL
LIPASE SERPL-CCNC: 17 U/L (ref 11–82)
LYMPHOCYTES # BLD AUTO: 2.34 K/UL (ref 1–4.8)
LYMPHOCYTES NFR BLD: 25.6 % (ref 22–41)
MCH RBC QN AUTO: 28.2 PG (ref 27–33)
MCHC RBC AUTO-ENTMCNC: 30.5 G/DL (ref 33.6–35)
MCV RBC AUTO: 92.6 FL (ref 81.4–97.8)
MICRO URNS: NORMAL
MONOCYTES # BLD AUTO: 0.66 K/UL (ref 0–0.85)
MONOCYTES NFR BLD AUTO: 7.2 % (ref 0–13.4)
NEUTROPHILS # BLD AUTO: 5.92 K/UL (ref 2–7.15)
NEUTROPHILS NFR BLD: 64.7 % (ref 44–72)
NITRITE UR QL STRIP.AUTO: NEGATIVE
NITRITE UR QL STRIP.AUTO: NORMAL
NRBC # BLD AUTO: 0 K/UL
NRBC BLD AUTO-RTO: 0 /100 WBC
PH UR STRIP.AUTO: 5 [PH]
PH UR STRIP.AUTO: NORMAL [PH] (ref 5–8)
PLATELET # BLD AUTO: 323 K/UL (ref 164–446)
PMV BLD AUTO: 9.2 FL (ref 9–12.9)
POTASSIUM SERPL-SCNC: 3.9 MMOL/L (ref 3.6–5.5)
PROT SERPL-MCNC: 7.2 G/DL (ref 6–8.2)
PROT UR QL STRIP: NEGATIVE MG/DL
PROT UR QL STRIP: NORMAL MG/DL
RBC # BLD AUTO: 4.18 M/UL (ref 4.2–5.4)
RBC UR QL AUTO: NEGATIVE
RBC UR QL AUTO: NORMAL
SODIUM SERPL-SCNC: 136 MMOL/L (ref 135–145)
SP GR UR STRIP.AUTO: 1.02
SP GR UR STRIP.AUTO: NORMAL
WBC # BLD AUTO: 9.2 K/UL (ref 4.8–10.8)

## 2017-07-03 PROCEDURE — 81003 URINALYSIS AUTO W/O SCOPE: CPT

## 2017-07-03 PROCEDURE — 96372 THER/PROPH/DIAG INJ SC/IM: CPT | Mod: XU

## 2017-07-03 PROCEDURE — 96374 THER/PROPH/DIAG INJ IV PUSH: CPT | Mod: XU

## 2017-07-03 PROCEDURE — 304562 HCHG STAT O2 MASK/CANNULA

## 2017-07-03 PROCEDURE — 700117 HCHG RX CONTRAST REV CODE 255: Performed by: EMERGENCY MEDICINE

## 2017-07-03 PROCEDURE — 700111 HCHG RX REV CODE 636 W/ 250 OVERRIDE (IP): Performed by: EMERGENCY MEDICINE

## 2017-07-03 PROCEDURE — 99285 EMERGENCY DEPT VISIT HI MDM: CPT

## 2017-07-03 PROCEDURE — 83690 ASSAY OF LIPASE: CPT

## 2017-07-03 PROCEDURE — 85025 COMPLETE CBC W/AUTO DIFF WBC: CPT

## 2017-07-03 PROCEDURE — 74177 CT ABD & PELVIS W/CONTRAST: CPT

## 2017-07-03 PROCEDURE — 96375 TX/PRO/DX INJ NEW DRUG ADDON: CPT

## 2017-07-03 PROCEDURE — 96376 TX/PRO/DX INJ SAME DRUG ADON: CPT

## 2017-07-03 PROCEDURE — 81002 URINALYSIS NONAUTO W/O SCOPE: CPT

## 2017-07-03 PROCEDURE — 80053 COMPREHEN METABOLIC PANEL: CPT

## 2017-07-03 RX ORDER — DICYCLOMINE HYDROCHLORIDE 10 MG/ML
20 INJECTION INTRAMUSCULAR ONCE
Status: COMPLETED | OUTPATIENT
Start: 2017-07-03 | End: 2017-07-03

## 2017-07-03 RX ORDER — DIPHENHYDRAMINE HYDROCHLORIDE 50 MG/ML
25 INJECTION INTRAMUSCULAR; INTRAVENOUS ONCE
Status: COMPLETED | OUTPATIENT
Start: 2017-07-03 | End: 2017-07-03

## 2017-07-03 RX ORDER — PROMETHAZINE HYDROCHLORIDE 25 MG/1
25 TABLET ORAL EVERY 6 HOURS PRN
Qty: 30 TAB | Refills: 0 | Status: SHIPPED | OUTPATIENT
Start: 2017-07-03

## 2017-07-03 RX ORDER — ONDANSETRON 2 MG/ML
4 INJECTION INTRAMUSCULAR; INTRAVENOUS ONCE
Status: COMPLETED | OUTPATIENT
Start: 2017-07-03 | End: 2017-07-03

## 2017-07-03 RX ADMIN — ONDANSETRON 4 MG: 2 INJECTION INTRAMUSCULAR; INTRAVENOUS at 19:13

## 2017-07-03 RX ADMIN — DIPHENHYDRAMINE HYDROCHLORIDE 25 MG: 50 INJECTION, SOLUTION INTRAMUSCULAR; INTRAVENOUS at 20:01

## 2017-07-03 RX ADMIN — DICYCLOMINE HYDROCHLORIDE 20 MG: 20 INJECTION, SOLUTION INTRAMUSCULAR at 20:02

## 2017-07-03 RX ADMIN — HYDROMORPHONE HYDROCHLORIDE 1 MG: 1 INJECTION, SOLUTION INTRAMUSCULAR; INTRAVENOUS; SUBCUTANEOUS at 20:58

## 2017-07-03 RX ADMIN — HYDROMORPHONE HYDROCHLORIDE 1 MG: 1 INJECTION, SOLUTION INTRAMUSCULAR; INTRAVENOUS; SUBCUTANEOUS at 19:13

## 2017-07-03 RX ADMIN — IOHEXOL 100 ML: 350 INJECTION, SOLUTION INTRAVENOUS at 20:41

## 2017-07-03 ASSESSMENT — PAIN SCALES - GENERAL: PAINLEVEL_OUTOF10: 10

## 2017-07-03 NOTE — ED AVS SNAPSHOT
Park[employer]or Access Code: [email or web address]  Expires: 7/30/2017  4:09 AM    Your email address is not on file at [facility].  Email Addresses are required for you to sign up for [employer], please contact [phone] to verify your personal information and to provide your email address prior to attempting to register for [employer].    Thelma Abbott  [address] [place] [address]    Park[employer]or  A secure, online tool to manage your health information     [facility]’s Park[employer]or® is a secure, online tool that connects you to your personalized health information from the privacy of your home -- day or night - making it very easy for you to manage your healthcare. Once the activation process is completed, you can even access your medical information using the Parkinsor francisca, which is available for free in the Apple Francisca store or Google Play store.     To learn more about Parkinsor, visit [email or web address]    There are two levels of access available (as shown below):   My Chart Features  [facility] Primary Care Doctor [facility]  Specialists [facility]  Urgent  Care Non-[facility] Primary Care Doctor   Email your healthcare team securely and privately 24/7 X X X    Manage appointments: schedule your next appointment; view details of past/upcoming appointments X      Request prescription refills. X      View recent personal medical records, including lab and immunizations X X X X   View health record, including health history, allergies, medications X X X X   Read reports about your outpatient visits, procedures, consult and ER notes X X X X   See your discharge summary, which is a recap of your hospital and/or ER visit that includes your diagnosis, lab results, and care plan X X  X     How to register for Park[employer]or:  Once your e-mail address has been verified, follow the following steps to sign up for Park[employer]or.     1. Go to  [email or web address]  2. Click on the Sign Up Now box, which takes you to the New Member Sign Up page. You  will need to provide the following information:  a. Enter your [employer] Access Code exactly as it appears at the top of this page. (You will not need to use this code after you’ve completed the sign-up process. If you do not sign up before the expiration date, you must request a new code.)   b. Enter your date of birth.   c. Enter your home email address.   d. Click Submit, and follow the next screen’s instructions.  3. Create a [employer] ID. This will be your [employer] login ID and cannot be changed, so think of one that is secure and easy to remember.  4. Create a [employer] password. You can change your password at any time.  5. Enter your Password Reset Question and Answer. This can be used at a later time if you forget your password.   6. Enter your e-mail address. This allows you to receive e-mail notifications when new information is available in [employer].  7. Click Sign Up. You can now view your health information.    For assistance activating your [employer] account, call [phone]

## 2017-07-03 NOTE — ED AVS SNAPSHOT
Home Care Instructions                                                                                                                Thelma Abbott   MRN: [id number]    Department:  [facility], Emergency Dept   Date of Visit:  7/3/2017            [facility], Emergency Dept    [address]    [place] [address]    Phone:  [phone]      You were seen by     Vandana Hernandez M.D.      Your Diagnosis Was     Abdominal wall seroma, initial encounter     T88.8XXA, T79.2XXA       These are the medications you received during your hospitalization from 07/03/2017 1837 to 07/03/2017 2111     Date/Time Order Dose Route Action    07/03/2017 1913 HYDROmorphone (DILAUDID) injection 1 mg 1 mg Intravenous Given    07/03/2017 1913 ondansetron (ZOFRAN) syringe/vial injection 4 mg 4 mg Intravenous Given    07/03/2017 2002 dicyclomine (BENTYL) injection 20 mg 20 mg Intramuscular Given    07/03/2017 2001 diphenhydrAMINE (BENADRYL) injection 25 mg 25 mg Intravenous Given    07/03/2017 2041 iohexol (OMNIPAQUE) 350 mg/mL 100 mL Intravenous Given    07/03/2017 2058 HYDROmorphone (DILAUDID) injection 1 mg 1 mg Intravenous Given      Follow-up Information     1. Call Warner Figueroa D.O..    Specialty:  Surgery    Why:  Wednesday morning.  You will likely need an interventional radiology intervention to drain that fluid collection later this week    Contact information    [address]  [facility] [id number] [phone]        Medication Information     Review all of your home medications and newly ordered medications with your primary doctor and/or pharmacist as soon as possible. Follow medication instructions as directed by your doctor and/or pharmacist.     Please keep your complete medication list with you and share with your physician. Update the information when medications are discontinued, doses are changed, or new medications (including over-the-counter products) are  added; and carry medication information at all times in the event of emergency situations.               Medication List      START taking these medications        Instructions    Morning Afternoon Evening Bedtime    promethazine 25 MG Tabs   Commonly known as:  PHENERGAN        Take 1 Tab by mouth every 6 hours as needed for Nausea/Vomiting.   Dose:  25 mg                          ASK your doctor about these medications        Instructions    Morning Afternoon Evening Bedtime    AMITIZA 24 MCG capsule   Generic drug:  lubiprostone        Take 24 mcg by mouth 2 times a day, with meals.   Dose:  24 mcg                        amphetamine-dextroamphetamine 10 MG Tabs   Commonly known as:  ADDERALL        Take 10 mg by mouth 2 times a day.   Dose:  10 mg                        asa/apap/caffeine 250-250-65 MG Tabs   Commonly known as:  EXCEDRIN        Take 1 Tab by mouth every 6 hours as needed for Headache.   Dose:  1 Tab                        clonazepam 1 MG Tabs   Commonly known as:  KLONOPIN        Take 1 mg by mouth every bedtime.   Dose:  1 mg                        doxycycline 100 MG Tabs   Commonly known as:  VIBRAMYCIN        Take 100 mg by mouth 2 times a day.   Dose:  100 mg                        FLECTOR 1.3 % Ptch   Generic drug:  diclofenac epolamine        Apply 1 Patch to skin as directed 2 Times a Day.   Dose:  1 Patch                        gabapentin 100 MG Caps   Commonly known as:  NEURONTIN        Take 100 mg by mouth 3 times a day as needed.   Dose:  100 mg                        hydrocodone/acetaminophen  MG Tabs   Commonly known as:  NORCO        Take 1 Tab by mouth every four hours as needed (breakthrough pain).   Dose:  1 Tab                        levothyroxine 200 MCG Tabs   Commonly known as:  SYNTHROID        Take 1 Tab by mouth every morning. 30 mins before breakfast   Dose:  200 mcg                        OXYCONTIN 20 MG T12a   Generic drug:  oxyCODONE CR        Take 20 mg by mouth  every 12 hours. Indications: Moderate to Severe Chronic Pain   Dose:  20 mg                        paroxetine 20 MG Tabs   Commonly known as:  PAXIL        Take 20 mg by mouth every bedtime.   Dose:  20 mg                        PROBIOTIC DAILY PO        Take 1 Tab by mouth 2 Times a Day.   Dose:  1 Tab                        PROVENTIL HFA INH        Inhale 2 Puffs by mouth as needed.   Dose:  2 Puff                        SYMBICORT 160-4.5 MCG/ACT Aero   Generic drug:  budesonide-formoterol        USE 2 PUFF BY MOUTH TWICE PER DAY                        trazodone 100 MG Tabs   Commonly known as:  DESYREL        Take 200 mg by mouth every bedtime.   Dose:  200 mg                             Where to Get Your Medications      These medications were sent to [id number] - [place], [place] - [address]  [address] [address]     Phone:  [phone]    - promethazine 25 MG Tabs            Procedures and tests performed during your visit     CARDIAC MONITORING    CBC WITH DIFFERENTIAL    COMP METABOLIC PANEL    CONSENT FOR CONTRAST INJECTION    CT-ABDOMEN-PELVIS WITH    ESTIMATED GFR    LIPASE    O2 Protocol    POC URINALYSIS    SALINE LOCK    URINALYSIS CULTURE, IF INDICATED        Discharge Instructions       Seroma  A seroma is a collection of fluid that looks like swelling or a mass on the body. Seromas form on the body where tissue has been injured or cut. They are most common after surgeries. Seromas vary in size. Some are small and painless. Others may become large and cause pain or discomfort. Many seromas go away on their own; the fluid is naturally absorbed by the body. Some may require the fluid to be drained through medical procedures.   CAUSES   Seromas form as the result of damage to tissue or the removal of tissue. This tissue damage may occur during surgery or because of an injury or trauma. When tissue is disrupted or removed, empty space is created. The body's natural  defense system causes fluid to enter the empty space and form a seroma.  SYMPTOMS   · Swelling at the site of a surgical cut (incision) or an injury.  · Drainage of clear fluid at the surgery or injury site.  · Possible discomfort or pain.  DIAGNOSIS   Your health care provider will perform a physical exam. During the exam, the health care provider will press on the seroma using a hand or fingers (palpation). Various tests may be ordered to help confirm the diagnosis. These tests may include:  · Blood tests.  · Imaging tests such as ultrasonography or computed tomography (CT).  TREATMENT   Sometimes seromas resolve on their own and drain naturally in the body. Your health care provider may monitor you to make sure the seroma does not cause any complications.  If your seroma does not resolve on its own, treatment may include:  · Using a needle to drain the fluid from the seroma (needle aspiration).  · Inserting a flexible tube (catheter) to drain the fluid.  · Applying a dressing, such as an elastic bandage or binder.  · Use of antibiotic medicines if the seroma becomes infected.  · In rare cases, surgery may be done to remove the seroma and repair the area.  HOME CARE INSTRUCTIONS  · Follow your health care provider's instructions regarding activity levels and any limitations on movements.  · Only take over-the-counter or prescription medicines as directed by your health care provider.  · If your health care provider prescribes antibiotics, take them as directed. Finish them even if you start to feel better.  · Check your seroma every day for redness, warmth, or yellow drainage.  · Follow up with your health care provider as directed.  SEEK MEDICAL CARE IF:  · You develop a fever.  · You have pain, tenderness, redness, or warmth at the site of the seroma.  · You notice yellow drainage coming from the site of the seroma.  · Your seroma is getting bigger.     This information is not intended to replace advice given to  you by your health care provider. Make sure you discuss any questions you have with your health care provider.     Document Released: 04/14/2014 Document Revised: 01/08/2016 Document Reviewed: 04/14/2014  Elsevier Interactive Patient Education ©2016 Elsevier Inc.            Patient Information     Patient Information    Following emergency treatment: all patient requiring follow-up care must return either to a private physician or a clinic if your condition worsens before you are able to obtain further medical attention, please return to the emergency room.     Billing Information    At [facility], we work to make the billing process streamlined for our patients.  Our Representatives are here to answer any questions you may have regarding your hospital bill.  If you have insurance coverage and have supplied your insurance information to us, we will submit a claim to your insurer on your behalf.  Should you have any questions regarding your bill, we can be reached online or by phone as follows:  Online: You are able pay your bills online or live chat with our representatives about any billing questions you may have. We are here to help Monday - Friday from 8:00am to 7:30pm and 9:00am - 12:00pm on Saturdays.  Please visit [email or web address]  for more information.   Phone:  [phone] or [phone]    Please note that your emergency physician, surgeon, pathologist, radiologist, anesthesiologist, and other specialists are not employed by [facility] and will therefore bill separately for their services.  Please contact them directly for any questions concerning their bills at the numbers below:     Emergency Physician Services:  [phone]  [facility]:  [phone]  [facility]:  [phone]  [facility]:  [phone]    1. Your final bill may vary from the amount quoted upon discharge if all procedures are not complete at that  time, or if your doctor has additional procedures of which we are not aware. You will receive an additional bill if you return to the Emergency Department at [facility] for suture removal regardless of the facility of which the sutures were placed.     2. Please arrange for settlement of this account at the emergency registration.    3. All self-pay accounts are due in full at the time of treatment.  If you are unable to meet this obligation then payment is expected within 4-5 days.     4. If you have had radiology studies (CT, X-ray, Ultrasound, MRI), you have received a preliminary result during your emergency department visit. Please contact the radiology department [phone] to receive a copy of your final result. Please discuss the Final result with your primary physician or with the follow up physician provided.     Crisis Hotline:  [place] Crisis Hotline:  0-079-RRBIQBZ or 1-423.809.9971  [place] Crisis Hotline:    1-777.625.6387 or 792-539-8453         ED Discharge Follow Up Questions    1. In order to provide you with very good care, we would like to follow up with a phone call in the next few days.  May we have your permission to contact you?     YES /  NO    2. What is the best phone number to call you? (       )_____-__________    3. What is the best time to call you?      Morning  /  Afternoon  /  Evening                   Patient Signature:  ____________________________________________________________    Date:  ____________________________________________________________

## 2017-07-03 NOTE — ED AVS SNAPSHOT
7/3/2017    Thelma Abbott  [address] [place] [address]    Dear Thelma:    [facility] wants to ensure your discharge home is safe and you or your loved ones have had all of your questions answered regarding your care after you leave the hospital.    Below is a list of resources and contact information should you have any questions regarding your hospital stay, follow-up instructions, or active medical symptoms.    Questions or Concerns Regarding… Contact   Medical Questions Related to Your Discharge  (7 days a week, 8am-5pm) Contact a Nurse Care Coordinator   [phone]   Medical Questions Not Related to Your Discharge  (24 hours a day / 7 days a week)  Contact the Nurse Health Line   [phone]    Medications or Discharge Instructions Refer to your discharge packet   or contact your [facility] Primary Care Provider   [phone]   Follow-up Appointment(s) Schedule your appointment via [employer]   or contact Scheduling [phone]   Billing Review your statement via [employer]  or contact Billing [phone]   Medical Records Review your records via [employer]   or contact Medical Records [phone]     You may receive a telephone call within two days of discharge. This call is to make certain you understand your discharge instructions and have the opportunity to have any questions answered. You can also easily access your medical information, test results and upcoming appointments via the [employer] free online health management tool. You can learn more and sign up at [employer]. For assistance setting up your [employer] account, please call [phone].    Once again, we want to ensure your discharge home is safe and that you have a clear understanding of any next steps in your care. If you have any questions or concerns, please do not hesitate to contact us, we are here for you. Thank you for choosing [facility] for your healthcare needs.    Sincerely,    Your [facility] Healthcare Team

## 2017-07-04 NOTE — ED NOTES
"Pt becoming irate and verbally aggressive about not speaking to Dr. Hernandez personally. Pt states she is in pain and it is her right to get pain meds. Dr. Hernandez aware. Pt stated \"she would like to speak to Dr. Hernandez's direct supervisor\".   "

## 2017-07-04 NOTE — ED NOTES
Discharge orders received from ERP. New prescriptions sent to pt's pharmacy. Provided education and patient demonstrated understanding. Pt ambulatory off unit. All personal belonging with patient. Pt's  at bedside to drive pt home, pt stated she will not drive r/t pain med administration.

## 2017-07-04 NOTE — DISCHARGE INSTRUCTIONS
Seroma  A seroma is a collection of fluid that looks like swelling or a mass on the body. Seromas form on the body where tissue has been injured or cut. They are most common after surgeries. Seromas vary in size. Some are small and painless. Others may become large and cause pain or discomfort. Many seromas go away on their own; the fluid is naturally absorbed by the body. Some may require the fluid to be drained through medical procedures.   CAUSES   Seromas form as the result of damage to tissue or the removal of tissue. This tissue damage may occur during surgery or because of an injury or trauma. When tissue is disrupted or removed, empty space is created. The body's natural defense system causes fluid to enter the empty space and form a seroma.  SYMPTOMS   · Swelling at the site of a surgical cut (incision) or an injury.  · Drainage of clear fluid at the surgery or injury site.  · Possible discomfort or pain.  DIAGNOSIS   Your health care provider will perform a physical exam. During the exam, the health care provider will press on the seroma using a hand or fingers (palpation). Various tests may be ordered to help confirm the diagnosis. These tests may include:  · Blood tests.  · Imaging tests such as ultrasonography or computed tomography (CT).  TREATMENT   Sometimes seromas resolve on their own and drain naturally in the body. Your health care provider may monitor you to make sure the seroma does not cause any complications.  If your seroma does not resolve on its own, treatment may include:  · Using a needle to drain the fluid from the seroma (needle aspiration).  · Inserting a flexible tube (catheter) to drain the fluid.  · Applying a dressing, such as an elastic bandage or binder.  · Use of antibiotic medicines if the seroma becomes infected.  · In rare cases, surgery may be done to remove the seroma and repair the area.  HOME CARE INSTRUCTIONS  · Follow your health care provider's instructions regarding  activity levels and any limitations on movements.  · Only take over-the-counter or prescription medicines as directed by your health care provider.  · If your health care provider prescribes antibiotics, take them as directed. Finish them even if you start to feel better.  · Check your seroma every day for redness, warmth, or yellow drainage.  · Follow up with your health care provider as directed.  SEEK MEDICAL CARE IF:  · You develop a fever.  · You have pain, tenderness, redness, or warmth at the site of the seroma.  · You notice yellow drainage coming from the site of the seroma.  · Your seroma is getting bigger.     This information is not intended to replace advice given to you by your health care provider. Make sure you discuss any questions you have with your health care provider.     Document Released: 04/14/2014 Document Revised: 01/08/2016 Document Reviewed: 04/14/2014  Elsevier Interactive Patient Education ©2016 Elsevier Inc.

## 2017-07-04 NOTE — ED NOTES
Pt repeatedly pressing call light for Dr Hernandez and nurse, pt informed of ER capacity and all staff is currently being utilized in other portions of the department. Pt has instructed myself to no longer enter the room

## 2017-07-04 NOTE — ED NOTES
Chief Complaint   Patient presents with   • LUQ Pain     bib AMB from home with LUQ pain that started yesterday. extensive hx of abd surgeries     EMS admin 200 mcg of fent and 4mg of zofran. Pain still 10/10

## 2017-07-06 ENCOUNTER — HOSPITAL ENCOUNTER (OUTPATIENT)
Dept: LAB | Facility: MEDICAL CENTER | Age: 57
End: 2017-07-06
Attending: SURGERY
Payer: MEDICARE

## 2017-07-06 LAB
ALBUMIN SERPL BCP-MCNC: 4.1 G/DL (ref 3.2–4.9)
ALBUMIN/GLOB SERPL: 1 G/DL
ALP SERPL-CCNC: 54 U/L (ref 30–99)
ALT SERPL-CCNC: 12 U/L (ref 2–50)
ANION GAP SERPL CALC-SCNC: 7 MMOL/L (ref 0–11.9)
APTT PPP: 31.1 SEC (ref 24.7–36)
AST SERPL-CCNC: 16 U/L (ref 12–45)
BASOPHILS # BLD AUTO: 0 % (ref 0–1.8)
BASOPHILS # BLD: 0 K/UL (ref 0–0.12)
BILIRUB SERPL-MCNC: 0.2 MG/DL (ref 0.1–1.5)
BUN SERPL-MCNC: 19 MG/DL (ref 8–22)
CALCIUM SERPL-MCNC: 9.4 MG/DL (ref 8.5–10.5)
CHLORIDE SERPL-SCNC: 105 MMOL/L (ref 96–112)
CO2 SERPL-SCNC: 25 MMOL/L (ref 20–33)
CREAT SERPL-MCNC: 0.76 MG/DL (ref 0.5–1.4)
EOSINOPHIL # BLD AUTO: 0.46 K/UL (ref 0–0.51)
EOSINOPHIL NFR BLD: 5.3 % (ref 0–6.9)
ERYTHROCYTE [DISTWIDTH] IN BLOOD BY AUTOMATED COUNT: 55.9 FL (ref 35.9–50)
GFR SERPL CREATININE-BSD FRML MDRD: >60 ML/MIN/1.73 M 2
GLOBULIN SER CALC-MCNC: 4 G/DL (ref 1.9–3.5)
GLUCOSE SERPL-MCNC: 106 MG/DL (ref 65–99)
HCT VFR BLD AUTO: 44.2 % (ref 37–47)
HGB BLD-MCNC: 13.2 G/DL (ref 12–16)
INR PPP: 0.99 (ref 0.87–1.13)
LYMPHOCYTES # BLD AUTO: 1.52 K/UL (ref 1–4.8)
LYMPHOCYTES NFR BLD: 17.7 % (ref 22–41)
MANUAL DIFF BLD: NORMAL
MCH RBC QN AUTO: 28.1 PG (ref 27–33)
MCHC RBC AUTO-ENTMCNC: 29.9 G/DL (ref 33.6–35)
MCV RBC AUTO: 94 FL (ref 81.4–97.8)
MONOCYTES # BLD AUTO: 0.46 K/UL (ref 0–0.85)
MONOCYTES NFR BLD AUTO: 5.3 % (ref 0–13.4)
MORPHOLOGY BLD-IMP: NORMAL
NEUTROPHILS # BLD AUTO: 6.17 K/UL (ref 2–7.15)
NEUTROPHILS NFR BLD: 71.7 % (ref 44–72)
NRBC # BLD AUTO: 0 K/UL
NRBC BLD AUTO-RTO: 0 /100 WBC
PLATELET # BLD AUTO: 341 K/UL (ref 164–446)
PLATELET BLD QL SMEAR: NORMAL
PMV BLD AUTO: 9.6 FL (ref 9–12.9)
POTASSIUM SERPL-SCNC: 3.8 MMOL/L (ref 3.6–5.5)
PROT SERPL-MCNC: 8.1 G/DL (ref 6–8.2)
PROTHROMBIN TIME: 13.4 SEC (ref 12–14.6)
RBC # BLD AUTO: 4.7 M/UL (ref 4.2–5.4)
RBC BLD AUTO: NORMAL
SODIUM SERPL-SCNC: 137 MMOL/L (ref 135–145)
WBC # BLD AUTO: 8.6 K/UL (ref 4.8–10.8)

## 2017-07-06 PROCEDURE — 85007 BL SMEAR W/DIFF WBC COUNT: CPT

## 2017-07-06 PROCEDURE — 85610 PROTHROMBIN TIME: CPT | Mod: GA

## 2017-07-06 PROCEDURE — 85027 COMPLETE CBC AUTOMATED: CPT

## 2017-07-06 PROCEDURE — 36415 COLL VENOUS BLD VENIPUNCTURE: CPT

## 2017-07-06 PROCEDURE — 80053 COMPREHEN METABOLIC PANEL: CPT

## 2017-07-06 PROCEDURE — 85730 THROMBOPLASTIN TIME PARTIAL: CPT | Mod: GA

## 2017-07-20 ENCOUNTER — HOSPITAL ENCOUNTER (OUTPATIENT)
Dept: RADIOLOGY | Facility: MEDICAL CENTER | Age: 57
End: 2017-07-20
Attending: SURGERY
Payer: MEDICARE

## 2017-07-20 DIAGNOSIS — K65.1 PERITONEAL ABSCESS (HCC): ICD-10-CM

## 2017-07-20 NOTE — PROGRESS NOTES
Patient here for a peritoneal aspiration collection, upon ultrasound examination, per Dr Hilario not enough fluid to drain.

## 2017-09-02 ENCOUNTER — APPOINTMENT (OUTPATIENT)
Dept: RADIOLOGY | Facility: MEDICAL CENTER | Age: 57
End: 2017-09-02
Attending: EMERGENCY MEDICINE
Payer: MEDICARE

## 2017-09-02 ENCOUNTER — HOSPITAL ENCOUNTER (EMERGENCY)
Facility: MEDICAL CENTER | Age: 57
End: 2017-09-03
Attending: EMERGENCY MEDICINE
Payer: MEDICARE

## 2017-09-02 VITALS
DIASTOLIC BLOOD PRESSURE: 65 MMHG | SYSTOLIC BLOOD PRESSURE: 115 MMHG | OXYGEN SATURATION: 98 % | WEIGHT: 185 LBS | TEMPERATURE: 98.7 F | RESPIRATION RATE: 16 BRPM | BODY MASS INDEX: 30.82 KG/M2 | HEART RATE: 65 BPM | HEIGHT: 65 IN

## 2017-09-02 DIAGNOSIS — R10.11 RIGHT UPPER QUADRANT ABDOMINAL PAIN: ICD-10-CM

## 2017-09-02 DIAGNOSIS — R45.5 HOSTILE BEHAVIOR: Primary | ICD-10-CM

## 2017-09-02 DIAGNOSIS — Z76.5 DRUG-SEEKING BEHAVIOR: ICD-10-CM

## 2017-09-02 LAB
ALBUMIN SERPL BCP-MCNC: 3.7 G/DL (ref 3.2–4.9)
ALBUMIN/GLOB SERPL: 1.2 G/DL
ALP SERPL-CCNC: 50 U/L (ref 30–99)
ALT SERPL-CCNC: 17 U/L (ref 2–50)
ANION GAP SERPL CALC-SCNC: 5 MMOL/L (ref 0–11.9)
AST SERPL-CCNC: 21 U/L (ref 12–45)
BASOPHILS # BLD AUTO: 0.6 % (ref 0–1.8)
BASOPHILS # BLD: 0.06 K/UL (ref 0–0.12)
BILIRUB SERPL-MCNC: 0.2 MG/DL (ref 0.1–1.5)
BUN SERPL-MCNC: 22 MG/DL (ref 8–22)
CALCIUM SERPL-MCNC: 9.1 MG/DL (ref 8.5–10.5)
CHLORIDE SERPL-SCNC: 109 MMOL/L (ref 96–112)
CO2 SERPL-SCNC: 23 MMOL/L (ref 20–33)
CREAT SERPL-MCNC: 0.56 MG/DL (ref 0.5–1.4)
EOSINOPHIL # BLD AUTO: 0.43 K/UL (ref 0–0.51)
EOSINOPHIL NFR BLD: 4.3 % (ref 0–6.9)
ERYTHROCYTE [DISTWIDTH] IN BLOOD BY AUTOMATED COUNT: 49.4 FL (ref 35.9–50)
GFR SERPL CREATININE-BSD FRML MDRD: >60 ML/MIN/1.73 M 2
GLOBULIN SER CALC-MCNC: 3.2 G/DL (ref 1.9–3.5)
GLUCOSE SERPL-MCNC: 105 MG/DL (ref 65–99)
HCT VFR BLD AUTO: 43.3 % (ref 37–47)
HGB BLD-MCNC: 13.8 G/DL (ref 12–16)
IMM GRANULOCYTES # BLD AUTO: 0.04 K/UL (ref 0–0.11)
IMM GRANULOCYTES NFR BLD AUTO: 0.4 % (ref 0–0.9)
LIPASE SERPL-CCNC: 27 U/L (ref 11–82)
LYMPHOCYTES # BLD AUTO: 2.48 K/UL (ref 1–4.8)
LYMPHOCYTES NFR BLD: 24.6 % (ref 22–41)
MCH RBC QN AUTO: 27.5 PG (ref 27–33)
MCHC RBC AUTO-ENTMCNC: 31.9 G/DL (ref 33.6–35)
MCV RBC AUTO: 86.4 FL (ref 81.4–97.8)
MONOCYTES # BLD AUTO: 0.65 K/UL (ref 0–0.85)
MONOCYTES NFR BLD AUTO: 6.4 % (ref 0–13.4)
NEUTROPHILS # BLD AUTO: 6.43 K/UL (ref 2–7.15)
NEUTROPHILS NFR BLD: 63.7 % (ref 44–72)
NRBC # BLD AUTO: 0 K/UL
NRBC BLD AUTO-RTO: 0 /100 WBC
PLATELET # BLD AUTO: 274 K/UL (ref 164–446)
PMV BLD AUTO: 9.1 FL (ref 9–12.9)
POTASSIUM SERPL-SCNC: 4.1 MMOL/L (ref 3.6–5.5)
PROT SERPL-MCNC: 6.9 G/DL (ref 6–8.2)
RBC # BLD AUTO: 5.01 M/UL (ref 4.2–5.4)
SODIUM SERPL-SCNC: 137 MMOL/L (ref 135–145)
WBC # BLD AUTO: 10.1 K/UL (ref 4.8–10.8)

## 2017-09-02 PROCEDURE — 85025 COMPLETE CBC W/AUTO DIFF WBC: CPT

## 2017-09-02 PROCEDURE — 36415 COLL VENOUS BLD VENIPUNCTURE: CPT

## 2017-09-02 PROCEDURE — 99285 EMERGENCY DEPT VISIT HI MDM: CPT

## 2017-09-02 PROCEDURE — 304562 HCHG STAT O2 MASK/CANNULA

## 2017-09-02 PROCEDURE — 83690 ASSAY OF LIPASE: CPT

## 2017-09-02 PROCEDURE — 80053 COMPREHEN METABOLIC PANEL: CPT

## 2017-09-02 ASSESSMENT — PAIN SCALES - GENERAL: PAINLEVEL_OUTOF10: 10

## 2017-09-03 NOTE — ED NOTES
This writer into room with ERP to talk with pt about yelling and cursing at staff. Pt escalated and yelling and cursing at this writer, ERP and security, pt removed IV and escorted from department by security.

## 2017-09-03 NOTE — ED NOTES
This nurse in to talk with pt pt yelling and screaming at this nurse about not wanting to put on her gown and that she does not want any rude people taking care of her and that she has been here several times and everyone the last few times has been rude to her, pt informed that it is hospital routine to have patients put into a gown, pt continues to refuse to place gown on, pt continues to yell at this nurse about previous er visits and surgery that have not been down correctly, security at bedside with this nurse, erp notified of pt angry and aggressive status

## 2017-09-03 NOTE — ED PROVIDER NOTES
ED Provider Note    Scribed for Loco Sung M.D. by Loco Sung. 9/2/2017,  11:15 PM.    CHIEF COMPLAINT  Chief Complaint   Patient presents with   • RUQ Pain       HPI  Thelma Abbott is a 57 y.o. female who presents to the Emergency Department for sharp right upper quadrant abdominal pain for 2 days. She's had multiple abdominal surgeries and infectious complications, and is concerned that this feels like a previous infected hernia mesh and abscess. She denies vomiting, but has been nauseated. She denies fevers, constipation, or diarrhea. She does not appear uncomfortable. She is complaining of constant severe pain, which is non-radiating, and worse with palpation. She speaks slowly, seems drowsy, and oxygen saturation drops to 89% when she falls asleep.       Her  database review she's above the 95th percentile for narcotic prescriptions.         REVIEW OF SYSTEMS  See HPI for further details. All other systems are negative.     PAST MEDICAL HISTORY   has a past medical history of Arthritis; Asthma; Cancer (CMS-HCC); COPD (chronic obstructive pulmonary disease) (CMS-HCC); Diverticulitis; Diverticulosis; Emphysema of lung (CMS-HCC); GERD (gastroesophageal reflux disease); Heart burn; HTN (hypertension) (6/24/2010); Hypothyroid (6/24/2010); MRSA (methicillin resistant staph aureus) culture positive; MRSA infection; Pain (02-03-05); Palpitations; Psoriasis (6/24/2010); Psychiatric disorder; Thyroid cancer (CMS-HCC) (1990's); and Unspecified urinary incontinence.    SOCIAL HISTORY  Social History     Social History Main Topics   • Smoking status: Current Every Day Smoker     Packs/day: 1.00     Years: 15.00     Types: Cigarettes   • Smokeless tobacco: Never Used   • Alcohol use No   • Drug use:      Types: Inhaled      Comment: marijuana   • Sexual activity: Not on file     History   Drug Use   • Types: Inhaled     Comment: marijuana       SURGICAL HISTORY   has a past surgical history that  "includes wound dehiscence (12/11/2012); abdominal abscess drainage (12/15/2012); colostomy (12/15/2012); parastomal hernia repair (5/22/2013); colostomy closure (5/22/2013); primary c section; tubal coagulation laparoscopic bilateral; other (1990's); incision hernia repair (5/1/2014); wide excision (2/5/2015); flap closure (2/5/2015); other abdominal surgery (c/section x 2); colon resection laparoscopic (12/5/2012); exploratory laparotomy (12/15/2012); colonoscopy (1/18/2016); exploratory laparotomy (3/5/2017); exploratory laparotomy (3/9/2017); and exploratory laparotomy (5/23/2017).    CURRENT MEDICATIONS  Home Medications    **Home medications have not yet been reviewed for this encounter**         ALLERGIES  Allergies   Allergen Reactions   • Codeine Vomiting   • Tape Rash     Can use Silk tape only       PHYSICAL EXAM  VITAL SIGNS: /65   Pulse 65   Temp 37.1 °C (98.7 °F)   Resp 16   Ht 1.651 m (5' 5\")   Wt 83.9 kg (185 lb)   LMP 05/22/2017   SpO2 98%   BMI 30.79 kg/m²   Pulse ox interpretation: I interpret this pulse ox as normal.  Constitutional: Alert in no apparent distress.  HENT: No signs of trauma, Bilateral external ears normal, Nose normal.   Eyes: Conjunctiva normal, Non-icteric.   Neck: Normal range of motion, Supple, No stridor.   Lymphatic: No lymphadenopathy noted.   Cardiovascular: Regular rate and rhythm, no murmurs.   Thorax & Lungs: Normal breath sounds, No respiratory distress, No wheezing, No chest tenderness.   Abdomen: Bowel sounds normal, Soft,Moderate right upper quadrant tenderness, actually above the lower rib border on that side, no overlying skin changes, no swelling, No masses, No pulsatile masses. No peritoneal signs.  Skin: Warm, Dry, No erythema, No rash.   Back: No CVA tenderness.  Extremities: Intact distal pulses, No edema, No cyanosis.  Musculoskeletal: Good range of motion in all major joints. No or major deformities noted.   Neurologic: Alert , Normal motor " function, Normal sensory function, No focal deficits noted.   Psychiatric: Affect hostile, Judgment normal, Mood normal.     DIAGNOSTIC STUDIES / PROCEDURES    LABS  Labs Reviewed   CBC WITH DIFFERENTIAL   COMP METABOLIC PANEL   LIPASE   POC URINE PREGNANCY   POC URINALYSIS     All labs reviewed by me.    RADIOLOGY  CT-ABDOMEN-PELVIS WITH    (Results Pending)     The radiologist's interpretation of all radiological studies have been reviewed by me.    COURSE & MEDICAL DECISION MAKING  Nursing notes, VS, PMSFHx reviewed in chart.     11:15 PM Patient seen and examined at bedside. Differential diagnosis includes but is not limited toHepatobiliary disease, intra-abdominal infection related to previous surgeries, obstruction, chronic pain, kidney stone, narcotic dependence/drug-seeking behavior. This patient has unremarkable vital signs, though complaining of severe constant 10 out of 10 pain, despite seeming drowsy and falling asleep. She has a prescription history that clearly demonstrates narcotic dependence, and when last in this emergency department 2 months ago, was extremely confrontational regarding not receiving enough Dilaudid. She obviously has a history of multiple intra-abdominal complications, and unfortunately, I think her physical exam is compromised by her likely sensitized patient to pain due to narcotic dependence. I think her physical exam is fairly unreliable, and so I feel the only way to accurately exclude a significant intra-abdominal process is a CT scan. Ordered for CT of the abdomen and pelvis, and abdominal pain protocol labs to evaluate. Patient will be treated with fentanyl for her symptoms.     11:49 PM When the patient was asked for the 2nd time to change into a gown so she could be appropriately monitored and evaluated, she became irate, screaming at staff, requiring security presence at the bedside for patient and staff safety. I return to the bedside, and the patient had decided that  she would leave. She refused to let staff remove her IV, injected about herself. She was given a bandage to place on it, which she declined. She was then escorted to the door without physical contact, by security. She remained screaming, agitated, extremely inappropriate. This seems to be a nearly identical repeat of her behavior here during her July 3 visit. She is not intoxicated, she is independently ambulatory, she shows the capacity to make decisions, and had already described her concerns about her surgical history and history of intra-abdominal infections, so she is perfectly capable of making this decision to leave against medical advice. She refused to sign any paperwork.     DISPOSITION:  This patient left against medical advice.     FINAL IMPRESSION  1. Hostile behavior    2. Drug-seeking behavior    3. Right upper quadrant abdominal pain

## 2017-09-03 NOTE — ED NOTES
"Pt refused to get into gown on admission to ED. Pt states, \"I wore this, this will be just fine.\"   "

## 2017-09-03 NOTE — ED NOTES
This writer went to room to introduce self to patient, informed pt that she needs to change into a gown, pt started yelling at this writer that she is not changing into a gown and being rude to staff, pt storming around department. Janet nettles RN made aware.

## 2017-12-04 ENCOUNTER — APPOINTMENT (OUTPATIENT)
Dept: RADIOLOGY | Facility: MEDICAL CENTER | Age: 57
End: 2017-12-04
Payer: MEDICARE

## 2017-12-04 ENCOUNTER — HOSPITAL ENCOUNTER (EMERGENCY)
Facility: MEDICAL CENTER | Age: 57
End: 2017-12-04
Payer: MEDICARE

## 2017-12-04 VITALS
HEART RATE: 66 BPM | TEMPERATURE: 97.3 F | RESPIRATION RATE: 16 BRPM | HEIGHT: 65 IN | SYSTOLIC BLOOD PRESSURE: 148 MMHG | WEIGHT: 180 LBS | BODY MASS INDEX: 29.99 KG/M2 | OXYGEN SATURATION: 96 % | DIASTOLIC BLOOD PRESSURE: 89 MMHG

## 2017-12-04 LAB
ALBUMIN SERPL BCP-MCNC: 4.2 G/DL (ref 3.2–4.9)
ALBUMIN/GLOB SERPL: 1.4 G/DL
ALP SERPL-CCNC: 51 U/L (ref 30–99)
ALT SERPL-CCNC: 15 U/L (ref 2–50)
ANION GAP SERPL CALC-SCNC: 8 MMOL/L (ref 0–11.9)
APTT PPP: 30.1 SEC (ref 24.7–36)
AST SERPL-CCNC: 21 U/L (ref 12–45)
BASOPHILS # BLD AUTO: 0.9 % (ref 0–1.8)
BASOPHILS # BLD: 0.09 K/UL (ref 0–0.12)
BILIRUB SERPL-MCNC: 0.4 MG/DL (ref 0.1–1.5)
BNP SERPL-MCNC: 18 PG/ML (ref 0–100)
BUN SERPL-MCNC: 16 MG/DL (ref 8–22)
CALCIUM SERPL-MCNC: 9.9 MG/DL (ref 8.5–10.5)
CHLORIDE SERPL-SCNC: 106 MMOL/L (ref 96–112)
CO2 SERPL-SCNC: 24 MMOL/L (ref 20–33)
CREAT SERPL-MCNC: 0.56 MG/DL (ref 0.5–1.4)
EKG IMPRESSION: NORMAL
EOSINOPHIL # BLD AUTO: 0.16 K/UL (ref 0–0.51)
EOSINOPHIL NFR BLD: 1.7 % (ref 0–6.9)
ERYTHROCYTE [DISTWIDTH] IN BLOOD BY AUTOMATED COUNT: 48.5 FL (ref 35.9–50)
GFR SERPL CREATININE-BSD FRML MDRD: >60 ML/MIN/1.73 M 2
GLOBULIN SER CALC-MCNC: 2.9 G/DL (ref 1.9–3.5)
GLUCOSE SERPL-MCNC: 100 MG/DL (ref 65–99)
HCT VFR BLD AUTO: 46.8 % (ref 37–47)
HGB BLD-MCNC: 15.5 G/DL (ref 12–16)
IMM GRANULOCYTES # BLD AUTO: 0.03 K/UL (ref 0–0.11)
IMM GRANULOCYTES NFR BLD AUTO: 0.3 % (ref 0–0.9)
INR PPP: 1.15 (ref 0.87–1.13)
LIPASE SERPL-CCNC: 27 U/L (ref 11–82)
LYMPHOCYTES # BLD AUTO: 2.71 K/UL (ref 1–4.8)
LYMPHOCYTES NFR BLD: 28.4 % (ref 22–41)
MCH RBC QN AUTO: 29.9 PG (ref 27–33)
MCHC RBC AUTO-ENTMCNC: 33.1 G/DL (ref 33.6–35)
MCV RBC AUTO: 90.3 FL (ref 81.4–97.8)
MONOCYTES # BLD AUTO: 0.51 K/UL (ref 0–0.85)
MONOCYTES NFR BLD AUTO: 5.3 % (ref 0–13.4)
NEUTROPHILS # BLD AUTO: 6.04 K/UL (ref 2–7.15)
NEUTROPHILS NFR BLD: 63.4 % (ref 44–72)
NRBC # BLD AUTO: 0 K/UL
NRBC BLD AUTO-RTO: 0 /100 WBC
PLATELET # BLD AUTO: 284 K/UL (ref 164–446)
PMV BLD AUTO: 9 FL (ref 9–12.9)
POTASSIUM SERPL-SCNC: 3.7 MMOL/L (ref 3.6–5.5)
PROT SERPL-MCNC: 7.1 G/DL (ref 6–8.2)
PROTHROMBIN TIME: 14.4 SEC (ref 12–14.6)
RBC # BLD AUTO: 5.18 M/UL (ref 4.2–5.4)
SODIUM SERPL-SCNC: 138 MMOL/L (ref 135–145)
TROPONIN I SERPL-MCNC: <0.01 NG/ML (ref 0–0.04)
WBC # BLD AUTO: 9.5 K/UL (ref 4.8–10.8)

## 2017-12-04 PROCEDURE — 85025 COMPLETE CBC W/AUTO DIFF WBC: CPT

## 2017-12-04 PROCEDURE — 85730 THROMBOPLASTIN TIME PARTIAL: CPT

## 2017-12-04 PROCEDURE — 93005 ELECTROCARDIOGRAM TRACING: CPT

## 2017-12-04 PROCEDURE — 80053 COMPREHEN METABOLIC PANEL: CPT

## 2017-12-04 PROCEDURE — 84484 ASSAY OF TROPONIN QUANT: CPT

## 2017-12-04 PROCEDURE — 302449 STATCHG TRIAGE ONLY (STATISTIC)

## 2017-12-04 PROCEDURE — 83690 ASSAY OF LIPASE: CPT

## 2017-12-04 PROCEDURE — 83880 ASSAY OF NATRIURETIC PEPTIDE: CPT

## 2017-12-04 PROCEDURE — 85610 PROTHROMBIN TIME: CPT

## 2017-12-04 ASSESSMENT — PAIN SCALES - GENERAL: PAINLEVEL_OUTOF10: 10

## 2017-12-05 NOTE — ED NOTES
Chief Complaint   Patient presents with   • Chest Pain     radiates to left arm, x1wk     Pt wheeled to triage c/o chest pain x1wk. No distress noted. ekg done.

## 2017-12-12 ENCOUNTER — HOSPITAL ENCOUNTER (EMERGENCY)
Dept: HOSPITAL 8 - ED | Age: 57
LOS: 1 days | Discharge: HOME | End: 2017-12-13
Payer: MEDICARE

## 2017-12-12 VITALS — WEIGHT: 198.42 LBS | BODY MASS INDEX: 33.06 KG/M2 | HEIGHT: 65 IN

## 2017-12-12 DIAGNOSIS — J20.9: Primary | ICD-10-CM

## 2017-12-12 LAB
AST SERPL-CCNC: 25 U/L (ref 15–37)
BUN SERPL-MCNC: 17 MG/DL (ref 7–18)
HCT VFR BLD CALC: 42.2 % (ref 34.6–47.8)
HGB BLD-MCNC: 14.3 G/DL (ref 11.7–16.4)
IS PT STATUS REG ER OR PRE ER?: YES
WBC # BLD AUTO: 8.9 X10^3/UL (ref 3.4–10)

## 2017-12-12 PROCEDURE — 93005 ELECTROCARDIOGRAM TRACING: CPT

## 2017-12-12 PROCEDURE — 80053 COMPREHEN METABOLIC PANEL: CPT

## 2017-12-12 PROCEDURE — 85610 PROTHROMBIN TIME: CPT

## 2017-12-12 PROCEDURE — 36415 COLL VENOUS BLD VENIPUNCTURE: CPT

## 2017-12-12 PROCEDURE — 85025 COMPLETE CBC W/AUTO DIFF WBC: CPT

## 2017-12-12 PROCEDURE — 99285 EMERGENCY DEPT VISIT HI MDM: CPT

## 2017-12-12 PROCEDURE — 84484 ASSAY OF TROPONIN QUANT: CPT

## 2017-12-12 PROCEDURE — 83880 ASSAY OF NATRIURETIC PEPTIDE: CPT

## 2017-12-12 PROCEDURE — 71010: CPT

## 2017-12-13 VITALS — DIASTOLIC BLOOD PRESSURE: 81 MMHG | SYSTOLIC BLOOD PRESSURE: 124 MMHG

## 2021-03-15 DIAGNOSIS — Z23 NEED FOR VACCINATION: ICD-10-CM

## 2021-03-23 ENCOUNTER — HOSPITAL ENCOUNTER (OUTPATIENT)
Dept: HOSPITAL 8 - OUT | Age: 61
Setting detail: OBSERVATION
LOS: 1 days | Discharge: HOME | End: 2021-03-24
Attending: ORTHOPAEDIC SURGERY | Admitting: INTERNAL MEDICINE
Payer: MEDICARE

## 2021-03-23 VITALS — DIASTOLIC BLOOD PRESSURE: 86 MMHG | SYSTOLIC BLOOD PRESSURE: 147 MMHG

## 2021-03-23 VITALS — DIASTOLIC BLOOD PRESSURE: 86 MMHG | SYSTOLIC BLOOD PRESSURE: 153 MMHG

## 2021-03-23 VITALS — BODY MASS INDEX: 41.54 KG/M2 | WEIGHT: 249.34 LBS | HEIGHT: 65 IN

## 2021-03-23 DIAGNOSIS — E66.01: ICD-10-CM

## 2021-03-23 DIAGNOSIS — Z79.899: ICD-10-CM

## 2021-03-23 DIAGNOSIS — I10: ICD-10-CM

## 2021-03-23 DIAGNOSIS — Z20.822: ICD-10-CM

## 2021-03-23 DIAGNOSIS — J95.821: ICD-10-CM

## 2021-03-23 DIAGNOSIS — F41.9: ICD-10-CM

## 2021-03-23 DIAGNOSIS — S52.572A: Primary | ICD-10-CM

## 2021-03-23 DIAGNOSIS — G89.29: ICD-10-CM

## 2021-03-23 DIAGNOSIS — Z79.891: ICD-10-CM

## 2021-03-23 DIAGNOSIS — F17.200: ICD-10-CM

## 2021-03-23 DIAGNOSIS — E89.0: ICD-10-CM
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
## 2021-03-23 DIAGNOSIS — Z85.850: ICD-10-CM

## 2021-03-23 DIAGNOSIS — G47.30: ICD-10-CM
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
## 2021-03-23 LAB
ALBUMIN SERPL-MCNC: 4.2 G/DL (ref 3.4–5)
ALP SERPL-CCNC: 70 U/L (ref 45–117)
ALT SERPL-CCNC: 24 U/L (ref 12–78)
ANION GAP SERPL CALC-SCNC: 6 MMOL/L (ref 5–15)
BILIRUB SERPL-MCNC: 0.5 MG/DL (ref 0.2–1)
CALCIUM SERPL-MCNC: 9.4 MG/DL (ref 8.5–10.1)
CHLORIDE SERPL-SCNC: 107 MMOL/L (ref 98–107)
CREAT SERPL-MCNC: 0.81 MG/DL (ref 0.55–1.02)
PROT SERPL-MCNC: 8.1 G/DL (ref 6.4–8.2)

## 2021-03-23 PROCEDURE — 36415 COLL VENOUS BLD VENIPUNCTURE: CPT

## 2021-03-23 PROCEDURE — 76000 FLUOROSCOPY <1 HR PHYS/QHP: CPT

## 2021-03-23 PROCEDURE — 25608 OPTX DST RD XART FX/EPI SEP2: CPT

## 2021-03-23 PROCEDURE — 94640 AIRWAY INHALATION TREATMENT: CPT

## 2021-03-23 PROCEDURE — G0378 HOSPITAL OBSERVATION PER HR: HCPCS

## 2021-03-23 PROCEDURE — 87635 SARS-COV-2 COVID-19 AMP PRB: CPT

## 2021-03-23 PROCEDURE — 80053 COMPREHEN METABOLIC PANEL: CPT

## 2021-03-23 PROCEDURE — 73110 X-RAY EXAM OF WRIST: CPT

## 2021-03-23 PROCEDURE — 71045 X-RAY EXAM CHEST 1 VIEW: CPT

## 2021-03-23 PROCEDURE — 96372 THER/PROPH/DIAG INJ SC/IM: CPT

## 2021-03-23 PROCEDURE — C1713 ANCHOR/SCREW BN/BN,TIS/BN: HCPCS

## 2021-03-23 RX ADMIN — LABETALOL HYDROCHLORIDE PRN MG: 5 INJECTION INTRAVENOUS at 10:43

## 2021-03-23 RX ADMIN — HYDROCODONE BITARTRATE AND ACETAMINOPHEN PRN TAB: 10; 325 TABLET ORAL at 18:49

## 2021-03-23 RX ADMIN — HYDROMORPHONE HYDROCHLORIDE PRN MG: 1 INJECTION, SOLUTION INTRAMUSCULAR; INTRAVENOUS; SUBCUTANEOUS at 11:22

## 2021-03-23 RX ADMIN — HYDROMORPHONE HYDROCHLORIDE PRN MG: 1 INJECTION, SOLUTION INTRAMUSCULAR; INTRAVENOUS; SUBCUTANEOUS at 10:39

## 2021-03-23 RX ADMIN — LABETALOL HYDROCHLORIDE PRN MG: 5 INJECTION INTRAVENOUS at 10:38

## 2021-03-23 RX ADMIN — HYDROMORPHONE HYDROCHLORIDE PRN MG: 1 INJECTION, SOLUTION INTRAMUSCULAR; INTRAVENOUS; SUBCUTANEOUS at 11:08

## 2021-03-23 RX ADMIN — HYDROMORPHONE HYDROCHLORIDE PRN MG: 1 INJECTION, SOLUTION INTRAMUSCULAR; INTRAVENOUS; SUBCUTANEOUS at 10:52

## 2021-03-23 RX ADMIN — LABETALOL HYDROCHLORIDE PRN MG: 5 INJECTION INTRAVENOUS at 10:50

## 2021-03-23 RX ADMIN — HYDROMORPHONE HYDROCHLORIDE PRN MG: 1 INJECTION, SOLUTION INTRAMUSCULAR; INTRAVENOUS; SUBCUTANEOUS at 12:35

## 2021-03-23 RX ADMIN — GENTAMICIN SULFATE SCH DROP: 3 SOLUTION OPHTHALMIC at 20:00

## 2021-03-23 RX ADMIN — HYDROCODONE BITARTRATE AND ACETAMINOPHEN PRN TAB: 10; 325 TABLET ORAL at 22:46

## 2021-03-24 VITALS — SYSTOLIC BLOOD PRESSURE: 138 MMHG | DIASTOLIC BLOOD PRESSURE: 85 MMHG

## 2021-03-24 VITALS — DIASTOLIC BLOOD PRESSURE: 73 MMHG | SYSTOLIC BLOOD PRESSURE: 93 MMHG

## 2021-03-24 VITALS — SYSTOLIC BLOOD PRESSURE: 148 MMHG | DIASTOLIC BLOOD PRESSURE: 94 MMHG

## 2021-03-24 RX ADMIN — HYDROCODONE BITARTRATE AND ACETAMINOPHEN PRN TAB: 10; 325 TABLET ORAL at 06:25

## 2021-03-24 RX ADMIN — HYDROCODONE BITARTRATE AND ACETAMINOPHEN PRN TAB: 10; 325 TABLET ORAL at 10:52

## 2021-03-24 RX ADMIN — GENTAMICIN SULFATE SCH DROP: 3 SOLUTION OPHTHALMIC at 08:54

## 2021-03-24 RX ADMIN — GENTAMICIN SULFATE SCH DROP: 3 SOLUTION OPHTHALMIC at 01:16

## 2021-03-24 RX ADMIN — HYDROCODONE BITARTRATE AND ACETAMINOPHEN PRN TAB: 10; 325 TABLET ORAL at 14:54

## 2021-05-26 ENCOUNTER — HOSPITAL ENCOUNTER (EMERGENCY)
Dept: HOSPITAL 8 - ED | Age: 61
Discharge: HOME | End: 2021-05-26
Payer: MEDICARE

## 2021-05-26 VITALS — SYSTOLIC BLOOD PRESSURE: 136 MMHG | DIASTOLIC BLOOD PRESSURE: 84 MMHG

## 2021-05-26 VITALS — HEIGHT: 65 IN | WEIGHT: 231.49 LBS | BODY MASS INDEX: 38.57 KG/M2

## 2021-05-26 DIAGNOSIS — R06.02: ICD-10-CM

## 2021-05-26 DIAGNOSIS — R94.31: ICD-10-CM

## 2021-05-26 DIAGNOSIS — R07.2: Primary | ICD-10-CM

## 2021-05-26 DIAGNOSIS — I10: ICD-10-CM

## 2021-05-26 LAB
ALBUMIN SERPL-MCNC: 3.6 G/DL (ref 3.4–5)
ANION GAP SERPL CALC-SCNC: 6 MMOL/L (ref 5–15)
BASOPHILS # BLD AUTO: 0.1 X10^3/UL (ref 0–0.1)
BASOPHILS NFR BLD AUTO: 1 % (ref 0–1)
CALCIUM SERPL-MCNC: 9.3 MG/DL (ref 8.5–10.1)
CHLORIDE SERPL-SCNC: 108 MMOL/L (ref 98–107)
CREAT SERPL-MCNC: 0.77 MG/DL (ref 0.55–1.02)
EOSINOPHIL # BLD AUTO: 0.1 X10^3/UL (ref 0–0.4)
EOSINOPHIL NFR BLD AUTO: 2 % (ref 1–7)
ERYTHROCYTE [DISTWIDTH] IN BLOOD BY AUTOMATED COUNT: 14.8 % (ref 9.6–15.2)
LYMPHOCYTES # BLD AUTO: 1.5 X10^3/UL (ref 1–3.4)
LYMPHOCYTES NFR BLD AUTO: 20 % (ref 22–44)
MCH RBC QN AUTO: 33.7 PG (ref 27–34.8)
MCHC RBC AUTO-ENTMCNC: 34.4 G/DL (ref 32.4–35.8)
MD: NO
MONOCYTES # BLD AUTO: 0.5 X10^3/UL (ref 0.2–0.8)
MONOCYTES NFR BLD AUTO: 6 % (ref 2–9)
NEUTROPHILS # BLD AUTO: 5.3 X10^3/UL (ref 1.8–6.8)
NEUTROPHILS NFR BLD AUTO: 71 % (ref 42–75)
PLATELET # BLD AUTO: 183 X10^3/UL (ref 130–400)
PMV BLD AUTO: 7.8 FL (ref 7.4–10.4)
RBC # BLD AUTO: 4.84 X10^6/UL (ref 3.82–5.3)
T4 FREE SERPL-MCNC: 0.8 NG/DL (ref 0.76–1.46)
TROPONIN I SERPL-MCNC: < 0.015 NG/ML (ref 0–0.04)

## 2021-05-26 PROCEDURE — 80048 BASIC METABOLIC PNL TOTAL CA: CPT

## 2021-05-26 PROCEDURE — 71045 X-RAY EXAM CHEST 1 VIEW: CPT

## 2021-05-26 PROCEDURE — 99285 EMERGENCY DEPT VISIT HI MDM: CPT

## 2021-05-26 PROCEDURE — 36415 COLL VENOUS BLD VENIPUNCTURE: CPT

## 2021-05-26 PROCEDURE — 84443 ASSAY THYROID STIM HORMONE: CPT

## 2021-05-26 PROCEDURE — 85025 COMPLETE CBC W/AUTO DIFF WBC: CPT

## 2021-05-26 PROCEDURE — 84484 ASSAY OF TROPONIN QUANT: CPT

## 2021-05-26 PROCEDURE — 93005 ELECTROCARDIOGRAM TRACING: CPT

## 2021-05-26 PROCEDURE — 82040 ASSAY OF SERUM ALBUMIN: CPT

## 2021-05-26 PROCEDURE — 84439 ASSAY OF FREE THYROXINE: CPT

## 2021-05-26 NOTE — NUR
CC OF CHEST TIGHTNESS, CP, SOB, NAUSEA, HIGH BP, AND "NEEDING MORE OXYGEN THEN 
NORMAL". PT STATES SYMPTOMS STARTED AFTER BEING DC'D AFTER LEFT WRIST SURGERY. 
PT TOOK 20 MG LISINOPRIL, 0.2 MG CLONIDINE,  MG OF DILTIAZEM AT HOME 
TODAY. PT STATES THESE ARE NOT HER NORMAL DAILY MEDS. NORMAL FOR IS 10 MG 
LISINOPRIL  MG DILTIAZEM. TAKES 2 ASPIRINS DAILY. SO AT BEDSIDE.

## 2021-09-28 NOTE — ED NOTES
"Pt BIB [facility] for constant \"sharp\" RUQ pain x 2 days. + nausea, denies v/d. Hx of multiple abd surgeries due to abscesses from mesh per pt. Pt given 100 mcg fentanyl PTA by EMS. VSS.   " Patient is asking for lab results on 9-    Please call [phone]  Thank you

## 2022-08-19 NOTE — PROGRESS NOTES
Pt arrives ambulatory to IS accompanied by self.  she is here for daily IV ABX.  Pt denies gastric upset.  IV ABX infused as ordered, pt tolerated well, no evidence of adverse effects noted or expressed.  PICC flushes well, brisk blood return noted.  Pt dc'd to self care in no apparent distress.  Returns this evening for PM dose.   pt with fever high of 105 starting yesterday and headache, denies headache now. Last motrin was around 9:30-10am. No vomiting/diarrhea, no cough no congestion. iutd, no pmhx

## 2023-02-12 NOTE — PROGRESS NOTES
"Pt presents ambulatory with family for Daptomycin infusion. Pt declined automatic BP, due to \"too much pain\". RN offered manual BP and pt accepted. HTN noted, however pt reported \"feeling stressed\" and \"earlier at another doctor blood pressure was 130/82\". Pt does have ability to monitor BP at home and she will do so this evening and tomorrow and report to RN tomorrow at appt. Pt educated about s/s of increased BP and when to notify MD or seek care in ER, pt verbalized understanding. Pt reported that her \"wound vac battery is not holding charge\". Pt contacted wound vac company and they will be sending her a new pump tomorrow, her pump will be changed by home health per pt report. PICC accessed and blood return noted. Medication infused per orders without complications or adverse reactions. PICC flushed with saline and gauze with arm wrap applied. Pt to return tomorrow, appt confirmed with pt. Pt left ambulatory with family in no distress.   " English

## 2023-03-28 NOTE — IP AVS SNAPSHOT
DroneCast Access Code: [email or web address]  Expires: 4/13/2017  1:14 PM    Your email address is not on file at [facility].  Email Addresses are required for you to sign up for [employer], please contact [phone] to verify your personal information and to provide your email address prior to attempting to register for [employer].    Thelma Abbott  [address] [place] [address]    DroneCast  A secure, online tool to manage your health information     [facility]’s DroneCast® is a secure, online tool that connects you to your personalized health information from the privacy of your home -- day or night - making it very easy for you to manage your healthcare. Once the activation process is completed, you can even access your medical information using the DroneCast francisca, which is available for free in the Apple Francisca store or Google Play store.     To learn more about DroneCast, visit [email or web address]    There are two levels of access available (as shown below):   My Chart Features  [facility] Primary Care Doctor [facility]  Specialists [facility]  Urgent  Care Non-[facility] Primary Care Doctor   Email your healthcare team securely and privately 24/7 X X X    Manage appointments: schedule your next appointment; view details of past/upcoming appointments X      Request prescription refills. X      View recent personal medical records, including lab and immunizations X X X X   View health record, including health history, allergies, medications X X X X   Read reports about your outpatient visits, procedures, consult and ER notes X X X X   See your discharge summary, which is a recap of your hospital and/or ER visit that includes your diagnosis, lab results, and care plan X X  X     How to register for [employer]:  Once your e-mail address has been verified, follow the following steps to sign up for DroneCast.     1. Go to  [email or web address]  2. Click on the Sign Up Now box, which takes you to the New Member Sign Up page. You  will need to provide the following information:  a. Enter your [employer] [place] Access Code exactly as it appears at the top of this page. (You will not need to use this code after you’ve completed the sign-up process. If you do not sign up before the expiration date, you must request a new code.)   b. Enter your date of birth.   c. Enter your home email address.   d. Click Submit, and follow the next screen’s instructions.  3. Create a eFanst ID. This will be your [employer] login ID and cannot be changed, so think of one that is secure and easy to remember.  4. Create a [employer] password. You can change your password at any time.  5. Enter your Password Reset Question and Answer. This can be used at a later time if you forget your password.   6. Enter your e-mail address. This allows you to receive e-mail notifications when new information is available in [employer].  7. Click Sign Up. You can now view your health information.    For assistance activating your [employer] account, call [phone]          Oriented to time, place, person, situation Oriented to time, place, person, situation

## 2023-04-20 NOTE — ED PROVIDER NOTES
ED Provider Note    Scribed for Vandana Hernandez M.D. by Stacey Edmondson. 7/3/2017, 6:55 PM.    Primary care provider: JUSTICE Pedersen  Means of arrival: EMS  History obtained from: Patient  History limited by: None    CHIEF COMPLAINT  Chief Complaint   Patient presents with   • LUQ Pain     bib AMB from home with LUQ pain that started yesterday. extensive hx of abd surgeries       HPI  Thelma Abbott is a 56 y.o. female who presents to the Emergency Department for evaluation of constant and worsening left upper quadrant pain onset yesterday. The patient rates her pain a 10/10 in severity. She also reports feeling nauseated but denies any constipation, diarrhea, vomiting, difficulty urinating, or fevers. Per patient, she had abdominal surgery one moth ago and had mesh removed from her stomach. She reports that she has had ten abdominal surgeries in the past six years.    REVIEW OF SYSTEMS  Pertinent positives include left upper quadrant pain, nausea. Pertinent negatives include no diarrhea, constipation, vomiting, difficulty urinating, fever. As above, all other systems reviewed and are negative.   See HPI for further details.   C.    PAST MEDICAL HISTORY  Past Medical History   Diagnosis Date   • Diverticulitis    • Diverticulosis    • Hypothyroid 6/24/2010   • Thyroid cancer (CMS-HCC) 1990's   • HTN (hypertension) 6/24/2010   • Psoriasis 6/24/2010   • GERD (gastroesophageal reflux disease)    • Palpitations    • Unspecified urinary incontinence    • Psychiatric disorder      depression/anxiety   • MRSA infection      MRSA   • Arthritis    • Asthma    • Emphysema of lung (CMS-HCC)    • Cancer (CMS-HCC)      thyroid   • COPD (chronic obstructive pulmonary disease) (CMS-HCC)    • MRSA (methicillin resistant staph aureus) culture positive      abd wound-wound vac in place   • Pain 02-03-05     abd, 5/10,shoulders/back   • Heart burn        SURGICAL HISTORY  Past Surgical History   Procedure  Laterality Date   • Wound dehiscence  12/11/2012     Performed by Magno Baez M.D. at SURGERY [facility]   • Abdominal abscess drainage  12/15/2012     Performed by Warner Figueroa D.O. at SURGERY [facility]   • Colostomy  12/15/2012     Performed by Warner Figueroa D.O. at [facility]   • Parastomal hernia repair   5/22/2013     Performed by Warner Figueroa D.O. at SURGERY [facility]   • Colostomy closure  5/22/2013     Performed by Warner Figueroa D.O. at SURGERY [facility]   • Primary c section     • Tubal coagulation laparoscopic bilateral     • Other  1990's     thyroidectomy   • Incision hernia repair  5/1/2014     Performed by Joaquin Larios M.D. at SURGERY SAME DAY [facility]   • Wide excision  2/5/2015     Performed by Miki Samuel Jr., M.D. at SURGERY SAME DAY [facility]   • Flap closure  2/5/2015     Performed by Miki Samuel Jr., M.D. at SURGERY SAME DAY [facility]   • Other abdominal surgery  c/section x 2   • Colon resection laparoscopic  12/5/2012     Performed by aMgno Baez M.D. at SURGERY [facility]   • Exploratory laparotomy  12/15/2012     Performed by Warner Figueroa D.O. at [facility]   • Colonoscopy  1/18/2016     Procedure: COLONOSCOPY;  Surgeon: Denilson Menendez M.D.;  Location: [facility];  Service:    • Exploratory laparotomy  3/5/2017     Procedure: EXPLORATORY LAPAROTOMY - drainage of abdominal wall abscess;  Surgeon: Jamaal Curran M.D.;  Location: [facility];  Service:    • Exploratory laparotomy  3/9/2017     Procedure: EXPLORATORY LAPAROTOMY - REMOVAL MESH W/POSSIBLE INSERTION BIOLOGIC MESH AND PLACEMENT OF WOUND VAC;  Surgeon: Warner Figueroa D.O.;  Location: [facility];  Service:    • Exploratory laparotomy  5/23/2017     Procedure: EXPLORATORY LAPAROTOMY - EXPLORE ABDOMINAL WALL, REMOVE BIOLOGIC MESH, ABDOMINAL WALL RESECTION INVOLVING OLD RETAINED  MESH AND CHRONIC INFECTION, ABDOMINAL WALL RECONSTRUCTION WITH MESH;  Surgeon: Warner Figueroa D.O.;  Location: SURGERY [facility];  Service:        SOCIAL HISTORY  Social History   Substance Use Topics   • Smoking status: Current Every Day Smoker -- 1.00 packs/day for 15 years     Types: Cigarettes   • Smokeless tobacco: Never Used   • Alcohol Use: No      History   Drug Use No       FAMILY HISTORY  Family History   Problem Relation Age of Onset   • Cancer Mother    • Stroke Father    • Hypertension Father        CURRENT MEDICATIONS  No current facility-administered medications on file prior to encounter.     Current Outpatient Prescriptions on File Prior to Encounter   Medication Sig Dispense Refill   • doxycycline (VIBRAMYCIN) 100 MG Tab Take 100 mg by mouth 2 times a day.     • asa/apap/caffeine (EXCEDRIN) 250-250-65 MG Tab Take 1 Tab by mouth every 6 hours as needed for Headache.     • lubiprostone (AMITIZA) 24 MCG capsule Take 24 mcg by mouth 2 times a day, with meals.     • Probiotic Product (PROBIOTIC DAILY PO) Take 1 Tab by mouth 2 Times a Day.     • Albuterol Sulfate (PROVENTIL HFA INH) Inhale 2 Puffs by mouth as needed.     • amphetamine-dextroamphetamine (ADDERALL) 10 MG Tab Take 10 mg by mouth 2 times a day.     • diclofenac epolamine (FLECTOR) 1.3 % Patch Apply 1 Patch to skin as directed 2 Times a Day.     • SYMBICORT 160-4.5 MCG/ACT Aerosol USE 2 PUFF BY MOUTH TWICE PER DAY  5   • gabapentin (NEURONTIN) 100 MG Cap Take 100 mg by mouth 3 times a day as needed.  2   • clonazepam (KLONOPIN) 1 MG Tab Take 1 mg by mouth every bedtime.     • hydrocodone/acetaminophen (NORCO)  MG Tab Take 1 Tab by mouth every four hours as needed (breakthrough pain).     • paroxetine (PAXIL) 20 MG TABS Take 20 mg by mouth every bedtime.     • oxyCODONE CR (OXYCONTIN) 20 MG T12A Take 20 mg by mouth every 12 hours. Indications: Moderate to Severe Chronic Pain     • levothyroxine (SYNTHROID) 200 MCG TABS Take 1 Tab  "by mouth every morning. 30 mins before breakfast 30 Tab 6   • trazodone (DESYREL) 100 MG TABS Take 200 mg by mouth every bedtime.       ALLERGIES  Allergies   Allergen Reactions   • Codeine Vomiting   • Tape Rash     Can use Silk tape only       PHYSICAL EXAM  VITAL SIGNS: /90 mmHg  Pulse 77  Temp(Src) 36.7 °C (98.1 °F)  Resp 11  Ht 1.651 m (5' 5\")  Wt 90.719 kg (200 lb)  BMI 33.28 kg/m2  SpO2 95%  LMP 05/22/2017  Vitals reviewed.  Consitutional: Well-developed, well-nourished. Negative for: distress.  HENT: Normocephalic, right external ear normal, left external ear normal, oropharynx clear and moist.  Eyes: Conjunctivae normal, extraocular movements normal. Negative for: discharge in right and left eye, icterus.  Neck: Range of motion normal, supple. Negative for cervical adenopathy.  Cardiovascular: Normal rate, regular rhythm, heart sounds normal, intact distal pulses. Negative for: murmur, rub, gallop.  Pulmonary/Chest Wall: Effort normal, breath sounds normal. Negative for: respiratory distress, wheezes, rales, rhonchi.   Abdominal: Tender anterior left upper quadrant. Soft, bowel sounds normal. Negative for: distention, tenderness, rebound, guarding.  Musculoskeletal: Normal range of motion. Negative for edema.  Neurological: Alert and oriented x3. No focal deficits.  Skin: Warm, dry. Negative for rash.  Psych: Mood/affect normal, behavior normal, judgment normal.    DIAGNOSTIC STUDIES / PROCEDURES    LABS  Results for orders placed or performed during the hospital encounter of 07/03/17   CBC WITH DIFFERENTIAL   Result Value Ref Range    WBC 9.2 4.8 - 10.8 K/uL    RBC 4.18 (L) 4.20 - 5.40 M/uL    Hemoglobin 11.8 (L) 12.0 - 16.0 g/dL    Hematocrit 38.7 37.0 - 47.0 %    MCV 92.6 81.4 - 97.8 fL    MCH 28.2 27.0 - 33.0 pg    MCHC 30.5 (L) 33.6 - 35.0 g/dL    RDW 54.1 (H) 35.9 - 50.0 fL    Platelet Count 323 164 - 446 K/uL    MPV 9.2 9.0 - 12.9 fL    Neutrophils-Polys 64.70 44.00 - 72.00 %    " Lymphocytes 25.60 22.00 - 41.00 %    Monocytes 7.20 0.00 - 13.40 %    Eosinophils 1.40 0.00 - 6.90 %    Basophils 0.80 0.00 - 1.80 %    Immature Granulocytes 0.30 0.00 - 0.90 %    Nucleated RBC 0.00 /100 WBC    Neutrophils (Absolute) 5.92 2.00 - 7.15 K/uL    Lymphs (Absolute) 2.34 1.00 - 4.80 K/uL    Monos (Absolute) 0.66 0.00 - 0.85 K/uL    Eos (Absolute) 0.13 0.00 - 0.51 K/uL    Baso (Absolute) 0.07 0.00 - 0.12 K/uL    Immature Granulocytes (abs) 0.03 0.00 - 0.11 K/uL    NRBC (Absolute) 0.00 K/uL   COMP METABOLIC PANEL   Result Value Ref Range    Sodium 136 135 - 145 mmol/L    Potassium 3.9 3.6 - 5.5 mmol/L    Chloride 106 96 - 112 mmol/L    Co2 25 20 - 33 mmol/L    Anion Gap 5.0 0.0 - 11.9    Glucose 88 65 - 99 mg/dL    Bun 23 (H) 8 - 22 mg/dL    Creatinine 0.66 0.50 - 1.40 mg/dL    Calcium 9.2 8.5 - 10.5 mg/dL    AST(SGOT) 13 12 - 45 U/L    ALT(SGPT) 11 2 - 50 U/L    Alkaline Phosphatase 51 30 - 99 U/L    Total Bilirubin 0.2 0.1 - 1.5 mg/dL    Albumin 3.9 3.2 - 4.9 g/dL    Total Protein 7.2 6.0 - 8.2 g/dL    Globulin 3.3 1.9 - 3.5 g/dL    A-G Ratio 1.2 g/dL   LIPASE   Result Value Ref Range    Lipase 17 11 - 82 U/L   URINALYSIS CULTURE, IF INDICATED   Result Value Ref Range    Color Yellow     Character Clear     Specific Gravity 1.016 <1.035    Ph 5.0 5.0-8.0    Glucose Negative Negative mg/dL    Ketones Negative Negative mg/dL    Protein Negative Negative mg/dL    Bilirubin Negative Negative    Nitrite Negative Negative    Leukocyte Esterase Negative Negative    Occult Blood Negative Negative    Micro Urine Req see below     Culture Indicated No UA Culture   ESTIMATED GFR   Result Value Ref Range    GFR If African American >60 >60 mL/min/1.73 m 2    GFR If Non African American >60 >60 mL/min/1.73 m 2   POC URINALYSIS   Result Value Ref Range    POC Color sent to lab Negative    POC Appearance  Negative    POC Glucose  Negative mg/dL    POC Ketones  Negative mg/dL    POC Specific Gravity  <1.005 - >1.030    POC  Blood  Negative    POC Urine PH  5.0 - 8.0    POC Protein  Negative mg/dL    POC Nitrites  Negative    POC Leukocyte Esterase  Negative     All labs reviewed by me.    RADIOLOGY  CT-ABDOMEN-PELVIS WITH   Final Result      1.  Interval anterior abdominal surgery with placement of mesh with apparent postoperative seroma along the inferior aspect of the anterior abdominal wall, either sterile or infected.      2.  Diverticulosis without evidence of diverticulitis.      3.  Persistent right ovarian cyst.      4.  Bilateral renal cysts.        The radiologist's interpretation of all radiological studies have been reviewed by me.    COURSE & MEDICAL DECISION MAKING  Nursing notes, VS, PMSFHx reviewed in chart.    Obtained and reviewed past medical records which indicated the patient's mesh was infected with Staph. The patient has a history of diverticulitis.    6:55 PM Patient seen and examined at bedside. The patient presents with left upper quadrant pain and the differential diagnosis includes but is not limited to postoperative infection, ileus, diverticulitis, gastric outlet obstruction, small bowel obstruction. Ordered abdomen-pelvis CT, Urinalysis culture, POC Urine Pregnancy, CBC with differential, CMP, Lipase, POC Urinalysis. Patient will be treated with 1 mg IV Dilaudid and 4 mg IV Zofran for her symptoms.    2 visits to the patients room found her to the unhooked and in the bathroom. She continued to yell and per rate the nurse about more Dilaudid. Not knowing what her underlying etiology was, I ordered IM Bentyl and Benadryl which she yelled about and was demanding to see my supervisor.    8:56 PM Recheck: Patient re-evaluated at beside. Patient reports feeling extremely angry and frustrated because she thinks she is not being taken care of. I assured her that she was being cared for expeditiously and kindly and that she needed to engage in mutual respect. Discussed patient's condition and treatment plan.  "Patient's lab and radiology results discussed. The patient understood and is in agreement.     8:59 PM Paged General surgery. After getting the read on the CAT scan, patient was given another dose of IV Dilaudid.    9:02 PM Spoke with Dr. Baez, General surgery, about the patient's condition. He recommended an outpatient follow up for IR drain placement and no antibiotics in the meantime.     9:04 PM Recheck: Patient re-evaluated at [facility]. I informed the patient that Dr. Baez recommended an outpatient follow up. The patient understood and is in agreement, feeling chagrined and apologetic about her previous behavior. The patient will be discharged home, stating that she has plenty of Percocet but would like something for nausea.    Vitals at discharge: /78 mmHg  Pulse 82  Temp(Src) 36.7 °C (98.1 °F)  Resp 14  Ht 1.651 m (5' 5\")  Wt 90.719 kg (200 lb)  BMI 33.28 kg/m2  SpO2 94%  LMP 05/22/2017    The patient will return for new or worsening symptoms and is stable at the time of discharge.    The patient is referred to a primary physician for blood pressure management, diabetic screening, and for all other preventative health concerns.    DISPOSITION:  Patient will be discharged home in stable condition.    FOLLOW UP:  Warner Figueroa D.O.  [address]  [place] [facility] [address]  [phone]    Call  Wednesday morning.  You will likely need an interventional radiology intervention to drain that fluid collection later this week      OUTPATIENT MEDICATIONS:  Discharge Medication List as of 7/3/2017  9:11 PM      START taking these medications    Details   promethazine (PHENERGAN) 25 MG Tab Take 1 Tab by mouth every 6 hours as needed for Nausea/Vomiting., Disp-30 Tab, R-0, Normal             FINAL IMPRESSION  1. Continuous LUQ abdominal pain    2. Abdominal wall seroma, initial encounter    3. Aggressive behavior of adult          Stacey MUELLER (Estrellitaibrodri), am scribing for, and " in the presence of, Vandana Hernandez M.D..    Electronically signed by: Stacey Edmondson (Scribe), 7/3/2017    IVandana M.D. personally performed the services described in this documentation, as scribed by Stacey Edmondson in my presence, and it is both accurate and complete.    The note accurately reflects work and decisions made by me.  Vandana Hernandez  7/3/2017  11:30 PM                  No

## 2023-09-26 ENCOUNTER — PHARMACY VISIT (OUTPATIENT)
Dept: PHARMACY | Facility: MEDICAL CENTER | Age: 63
End: 2023-09-26
Payer: MEDICARE

## 2023-09-26 PROCEDURE — RXMED WILLOW AMBULATORY MEDICATION CHARGE: Performed by: INTERNAL MEDICINE

## 2023-09-26 RX ORDER — HYDROCODONE BITARTRATE AND ACETAMINOPHEN 10; 325 MG/1; MG/1
TABLET ORAL
Qty: 180 TABLET | Refills: 0 | OUTPATIENT
Start: 2023-09-26 | End: 2023-10-16 | Stop reason: SDUPTHER

## 2023-10-06 ENCOUNTER — HOSPITAL ENCOUNTER (OUTPATIENT)
Dept: RADIOLOGY | Facility: MEDICAL CENTER | Age: 63
End: 2023-10-06
Attending: INTERNAL MEDICINE
Payer: MEDICARE

## 2023-10-06 DIAGNOSIS — Z12.31 ENCOUNTER FOR SCREENING MAMMOGRAM FOR MALIGNANT NEOPLASM OF BREAST: ICD-10-CM

## 2023-10-06 PROCEDURE — 77063 BREAST TOMOSYNTHESIS BI: CPT

## 2023-10-23 ENCOUNTER — PHARMACY VISIT (OUTPATIENT)
Dept: PHARMACY | Facility: MEDICAL CENTER | Age: 63
End: 2023-10-23
Payer: MEDICARE

## 2023-10-23 PROCEDURE — RXMED WILLOW AMBULATORY MEDICATION CHARGE: Performed by: INTERNAL MEDICINE

## 2023-11-20 ENCOUNTER — PHARMACY VISIT (OUTPATIENT)
Dept: PHARMACY | Facility: MEDICAL CENTER | Age: 63
End: 2023-11-20
Payer: MEDICARE

## 2023-11-20 PROCEDURE — RXMED WILLOW AMBULATORY MEDICATION CHARGE: Performed by: INTERNAL MEDICINE

## 2023-12-19 ENCOUNTER — PHARMACY VISIT (OUTPATIENT)
Dept: PHARMACY | Facility: MEDICAL CENTER | Age: 63
End: 2023-12-19
Payer: MEDICARE

## 2023-12-19 PROCEDURE — RXMED WILLOW AMBULATORY MEDICATION CHARGE: Performed by: INTERNAL MEDICINE

## 2024-01-17 ENCOUNTER — PHARMACY VISIT (OUTPATIENT)
Dept: PHARMACY | Facility: MEDICAL CENTER | Age: 64
End: 2024-01-17
Payer: COMMERCIAL

## 2024-01-17 PROCEDURE — RXMED WILLOW AMBULATORY MEDICATION CHARGE: Performed by: INTERNAL MEDICINE

## 2024-02-14 ENCOUNTER — PHARMACY VISIT (OUTPATIENT)
Dept: PHARMACY | Facility: MEDICAL CENTER | Age: 64
End: 2024-02-14
Payer: COMMERCIAL

## 2024-02-14 PROCEDURE — RXMED WILLOW AMBULATORY MEDICATION CHARGE: Performed by: INTERNAL MEDICINE

## 2024-03-14 ENCOUNTER — PHARMACY VISIT (OUTPATIENT)
Dept: PHARMACY | Facility: MEDICAL CENTER | Age: 64
End: 2024-03-14
Payer: COMMERCIAL

## 2024-03-14 PROCEDURE — RXMED WILLOW AMBULATORY MEDICATION CHARGE: Performed by: INTERNAL MEDICINE

## 2024-04-09 PROCEDURE — RXMED WILLOW AMBULATORY MEDICATION CHARGE: Performed by: INTERNAL MEDICINE

## 2024-04-11 ENCOUNTER — PHARMACY VISIT (OUTPATIENT)
Dept: PHARMACY | Facility: MEDICAL CENTER | Age: 64
End: 2024-04-11
Payer: COMMERCIAL

## 2024-04-11 PROCEDURE — RXMED WILLOW AMBULATORY MEDICATION CHARGE: Performed by: INTERNAL MEDICINE

## 2024-05-06 ENCOUNTER — PHARMACY VISIT (OUTPATIENT)
Dept: PHARMACY | Facility: MEDICAL CENTER | Age: 64
End: 2024-05-06
Payer: COMMERCIAL

## 2024-05-06 PROCEDURE — RXMED WILLOW AMBULATORY MEDICATION CHARGE: Performed by: INTERNAL MEDICINE

## 2024-06-04 ENCOUNTER — PHARMACY VISIT (OUTPATIENT)
Dept: PHARMACY | Facility: MEDICAL CENTER | Age: 64
End: 2024-06-04
Payer: COMMERCIAL

## 2024-06-04 PROCEDURE — RXMED WILLOW AMBULATORY MEDICATION CHARGE: Performed by: INTERNAL MEDICINE

## 2024-07-01 ENCOUNTER — PHARMACY VISIT (OUTPATIENT)
Dept: PHARMACY | Facility: MEDICAL CENTER | Age: 64
End: 2024-07-01
Payer: COMMERCIAL

## 2024-07-01 PROCEDURE — RXMED WILLOW AMBULATORY MEDICATION CHARGE: Performed by: INTERNAL MEDICINE

## 2024-07-10 PROBLEM — M16.12 OSTEOARTHRITIS OF LEFT HIP: Status: ACTIVE | Noted: 2024-07-10

## 2024-07-29 PROCEDURE — RXMED WILLOW AMBULATORY MEDICATION CHARGE: Performed by: INTERNAL MEDICINE

## 2024-07-30 ENCOUNTER — PHARMACY VISIT (OUTPATIENT)
Dept: PHARMACY | Facility: MEDICAL CENTER | Age: 64
End: 2024-07-30
Payer: COMMERCIAL

## 2024-08-21 PROCEDURE — RXMED WILLOW AMBULATORY MEDICATION CHARGE: Performed by: INTERNAL MEDICINE

## 2024-08-22 ENCOUNTER — PHARMACY VISIT (OUTPATIENT)
Dept: PHARMACY | Facility: MEDICAL CENTER | Age: 64
End: 2024-08-22
Payer: COMMERCIAL

## 2024-09-23 ENCOUNTER — PHARMACY VISIT (OUTPATIENT)
Dept: PHARMACY | Facility: MEDICAL CENTER | Age: 64
End: 2024-09-23
Payer: COMMERCIAL

## 2024-09-23 PROCEDURE — RXMED WILLOW AMBULATORY MEDICATION CHARGE: Performed by: INTERNAL MEDICINE

## 2024-10-15 NOTE — DISCHARGE PLANNING
First appt scheduled for Wednesday 3/15/2017 at 17:00.  [facility]  [phone]  [address] [place] [address]  Cam Montoya [phone]  Fax [phone]   Wound vac delivered.    Home O2 should be delivered within the next hour from [facility].    Received message that pts home health co requesting name of overseeing MD for pts PCP which is an APRN. Overseeing Md is Dr Jasmine, updated [facility] who will confirm with [facility]. Trauma APN and bedside RN updated.    
Attempted to meet with the pt, pt in surgery. Will attempt to meet with pt tomorrow.   
CM attempted to meet with pt regarding dc screening, however pt is sound asleep. CM will attempt later. Per dr. Figueroa pt is going to OR tomorrow and further care/disposition is contingent on OR procedure/findings. PICC placement pending today in anticipation of iv atbx. ID consult pending.   
Call [facility] to follow up on referral, spoke to Betty. She is needing a PCP name that will follow patient after D/C and will need details on infusion, if it will be outpatient and where?     
Called [facility] to follow up on acceptance of patient, spoke to Betty. Patient information on PCP was given and faxed a order for  services. Betty will fax an approval for services to us and states that patient is approved to start services tomorrow. Notified Lindsay PENA via phone.   
IV antibiotic order faxed to [facility] [phone]. First appt scheduled for Wednesday 3/15/2017 at 17:00.  [facility]  [phone]  [address] [place] [address]    ORI Montoya [phone]  Fax [phone]     16:00  Order received for Home O2, choice form provided, choice of [facility] faxed to [facility]. Wound vac delivered by [facility].   
Pt will require wound vac and iv antibiotics for DC. Met with the pt at the bedside, pt would prefer to have Home Health provided wound vac changes and will use the [facility] for antibiotics. Home Health choice for [facility] [place] faxed to [facility].  Spoke with Lindsay at [facility], order and clinicals faxed to initiate wound vac auth. Will await IV antibiotic order.   [facility] Lindsay [phone]  Fax [phone]  
Received Choice form for HH services, referral has been sent to [facility] per the request of patient and Choice form.   
Received call @ 1113 from Toby from [facility] [employer] will be delivered within the next hour.   
Received choice form for DME services, referral has been sent to [facility] per patient and choice form request.  
Received fax noticed that [facility] will accept patient for HH services.   
Received order for IV infusion, faxed to [facility]. Left message with Lindsay with request for update on wound vac auth.  [facility] [phone]  Fax [phone]   
Warm

## 2024-10-21 ENCOUNTER — PHARMACY VISIT (OUTPATIENT)
Dept: PHARMACY | Facility: MEDICAL CENTER | Age: 64
End: 2024-10-21
Payer: COMMERCIAL

## 2024-10-21 PROCEDURE — RXMED WILLOW AMBULATORY MEDICATION CHARGE: Performed by: INTERNAL MEDICINE

## 2024-11-04 NOTE — OR NURSING
MD Villafana notified of MAPs in 50s on both arms with doppler around 4pm. Pt denied any symptoms. LVAD #'s stable. Rechecked MAPs q30 mins x2 with slow improvement with MAPs up to 60s. Encouraged PO and ordered pt soup for dinner. MD Mcdaniel also made aware. Losartan held.    MAP improved to 70 by 7pm. Pt was scheduled for hydralazine at 10pm with hold parameters. MD Yosef MCCORMACK'd of updated MAPs and MD changed hydralazine dose from 75mg to 50mg to assess response.        Report called to callie MILLS. Patient reports 5/10 tolerable soreness in abdomen. Patient currently on 4L oxymask. Patient encouraged to take deep breaths. Patient dozing intermittently.

## 2025-04-06 ENCOUNTER — HOSPITAL ENCOUNTER (OUTPATIENT)
Facility: MEDICAL CENTER | Age: 65
DRG: 470 | End: 2025-04-06
Attending: ORTHOPAEDIC SURGERY | Admitting: ORTHOPAEDIC SURGERY
Payer: MEDICARE

## 2025-04-06 PROBLEM — M16.12 PRIMARY OSTEOARTHRITIS OF LEFT HIP: Status: ACTIVE | Noted: 2025-04-06

## 2025-04-21 ENCOUNTER — APPOINTMENT (OUTPATIENT)
Dept: ADMISSIONS | Facility: MEDICAL CENTER | Age: 65
End: 2025-04-21
Attending: ORTHOPAEDIC SURGERY
Payer: MEDICARE

## 2025-04-22 ENCOUNTER — APPOINTMENT (OUTPATIENT)
Dept: RADIOLOGY | Facility: MEDICAL CENTER | Age: 65
End: 2025-04-22
Attending: STUDENT IN AN ORGANIZED HEALTH CARE EDUCATION/TRAINING PROGRAM
Payer: MEDICARE

## 2025-04-22 ENCOUNTER — HOSPITAL ENCOUNTER (EMERGENCY)
Facility: MEDICAL CENTER | Age: 65
End: 2025-04-22
Attending: STUDENT IN AN ORGANIZED HEALTH CARE EDUCATION/TRAINING PROGRAM
Payer: MEDICARE

## 2025-04-22 VITALS
HEART RATE: 60 BPM | RESPIRATION RATE: 17 BRPM | BODY MASS INDEX: 34.99 KG/M2 | TEMPERATURE: 97.6 F | WEIGHT: 210 LBS | OXYGEN SATURATION: 93 % | DIASTOLIC BLOOD PRESSURE: 64 MMHG | SYSTOLIC BLOOD PRESSURE: 141 MMHG | HEIGHT: 65 IN

## 2025-04-22 DIAGNOSIS — J96.11 CHRONIC HYPOXIC RESPIRATORY FAILURE, ON HOME OXYGEN THERAPY (HCC): ICD-10-CM

## 2025-04-22 DIAGNOSIS — Z99.81 CHRONIC HYPOXIC RESPIRATORY FAILURE, ON HOME OXYGEN THERAPY (HCC): ICD-10-CM

## 2025-04-22 DIAGNOSIS — I10 PRIMARY HYPERTENSION: ICD-10-CM

## 2025-04-22 DIAGNOSIS — M16.12 OSTEOARTHRITIS OF LEFT HIP, UNSPECIFIED OSTEOARTHRITIS TYPE: ICD-10-CM

## 2025-04-22 PROCEDURE — 96375 TX/PRO/DX INJ NEW DRUG ADDON: CPT

## 2025-04-22 PROCEDURE — 73502 X-RAY EXAM HIP UNI 2-3 VIEWS: CPT | Mod: LT

## 2025-04-22 PROCEDURE — 99285 EMERGENCY DEPT VISIT HI MDM: CPT

## 2025-04-22 PROCEDURE — 96374 THER/PROPH/DIAG INJ IV PUSH: CPT

## 2025-04-22 PROCEDURE — 700111 HCHG RX REV CODE 636 W/ 250 OVERRIDE (IP): Mod: JZ | Performed by: STUDENT IN AN ORGANIZED HEALTH CARE EDUCATION/TRAINING PROGRAM

## 2025-04-22 RX ORDER — CYCLOBENZAPRINE HCL 10 MG
10 TABLET ORAL 3 TIMES DAILY PRN
Qty: 30 TABLET | Refills: 0 | Status: SHIPPED | OUTPATIENT
Start: 2025-04-22

## 2025-04-22 RX ORDER — HYDROMORPHONE HYDROCHLORIDE 1 MG/ML
0.5 INJECTION, SOLUTION INTRAMUSCULAR; INTRAVENOUS; SUBCUTANEOUS ONCE
Status: COMPLETED | OUTPATIENT
Start: 2025-04-22 | End: 2025-04-22

## 2025-04-22 RX ORDER — LIDOCAINE 4 G/G
1 PATCH TOPICAL EVERY 24 HOURS
Qty: 10 PATCH | Refills: 0 | Status: SHIPPED | OUTPATIENT
Start: 2025-04-22

## 2025-04-22 RX ORDER — MORPHINE SULFATE 4 MG/ML
4 INJECTION INTRAVENOUS ONCE
Status: COMPLETED | OUTPATIENT
Start: 2025-04-22 | End: 2025-04-22

## 2025-04-22 RX ORDER — ONDANSETRON 2 MG/ML
4 INJECTION INTRAMUSCULAR; INTRAVENOUS ONCE
Status: COMPLETED | OUTPATIENT
Start: 2025-04-22 | End: 2025-04-22

## 2025-04-22 RX ORDER — ACETAMINOPHEN 325 MG/1
650 TABLET ORAL EVERY 6 HOURS PRN
Qty: 30 TABLET | Refills: 1 | Status: SHIPPED | OUTPATIENT
Start: 2025-04-22

## 2025-04-22 RX ADMIN — ONDANSETRON 4 MG: 2 INJECTION INTRAMUSCULAR; INTRAVENOUS at 20:02

## 2025-04-22 RX ADMIN — MORPHINE SULFATE 4 MG: 4 INJECTION, SOLUTION INTRAMUSCULAR; INTRAVENOUS at 18:22

## 2025-04-22 RX ADMIN — HYDROMORPHONE HYDROCHLORIDE 0.5 MG: 1 INJECTION, SOLUTION INTRAMUSCULAR; INTRAVENOUS; SUBCUTANEOUS at 20:02

## 2025-04-22 ASSESSMENT — FIBROSIS 4 INDEX: FIB4 SCORE: 1.71

## 2025-04-22 ASSESSMENT — PAIN DESCRIPTION - PAIN TYPE
TYPE: ACUTE PAIN

## 2025-04-23 NOTE — ED NOTES
Rounded on patient.    Patient up for discharge. Patient stated that she had no one at home to help her or come to the hospital to provide her transport home.  Patient stated that her daughter (family who helps her at home) was out of town. Patient stated that she was brought to hospital by ambulance.  Patient reported that she was unable to sit in a cab or walk up her steps at home. Patient reported that she was on oxygen at night and was dependent on it at home. Patient was tested on room air and within five minutes had decreased SPO2 to 86 % on room air. Patient administered oxygen at previous level of 3 lpm via nasal cannula.     contacted and informed of the above.  agreed to set up medical transport for the patient from the ED to her home via [facility]. Patient infpormed of plan and aware.

## 2025-04-23 NOTE — ED PROVIDER NOTES
"ER Provider Note    Scribed for Nandini Weems D.O. by Wolf Sawyer. 4/22/2025  5:44 PM    Primary Care Provider: Denver J Miller, M.D.    CHIEF COMPLAINT   Chief Complaint   Patient presents with    Hip Injury     L hip pain s/p fall and subsequent Fx 3 months ago. Pt is scheduled for surgery on 5/15 but feels that she needs surgery sooner due to pain. Pt is ambulatory \"but not very well.\"     EXTERNAL RECORDS REVIEWED  Outpatient Notes Patient seen at [facility] ED 4/10/2025 for left hip pain, neck pain, and headache. CT imaging done of neck and head for reported fall and  were negative. Xray negative for pelvic fracture.     HPI/ROS  LIMITATION TO HISTORY   Select: : None  OUTSIDE HISTORIAN(S):  None    Thelma Abbott is a 64 y.o. female who presents to the ED via EMS for evaluation of left hip pain, onset 3 months ago. The patient reports persistent pain since her left hip fracture 3 months ago and localizes the pain to the outside of her hip. She denies groin pain or any recent falls. She states she is scheduled for surgery on 5/15, but feels she needs the procedure sooner due to the pain. She reports she has requested to move the surgery up sooner but there is no availability. She states she was seen at [place] [facility] 1 week ago for this same pain where she had imaging done which was unremarkable. She states her pain has been uncontrolled at home and she has been taking 2 Norco 10 mg a day which has not alleviated her pain. She states she has been on the Norco for multiple years for chronic back pain. She notes she takes 3-4 600 mg Ibuprofen every 6-8 hours. She notes she has been on flexeril, gabapentin, lidocaine patches in the past. She denies cyclobenzaprine or Tylenol previously. She believes she is on a pain management contract currently. She denies a surgical history of her back. She notes she ambulates with a walker at baseline.   .     PAST MEDICAL HISTORY  Past Medical History:   Diagnosis " Date    Arthritis     Asthma     Cancer (HCC)     thyroid    COPD (chronic obstructive pulmonary disease) (HCC)     Diverticulitis     Diverticulosis     Emphysema of lung (HCC)     GERD (gastroesophageal reflux disease)     Heart burn     HTN (hypertension) 6/24/2010    Hypothyroid 6/24/2010    MRSA (methicillin resistant staph aureus) culture positive     abd wound-wound vac in place    MRSA infection     MRSA    Pain 02-03-05    abd, 5/10,shoulders/back    Palpitations     Psoriasis 6/24/2010    Psychiatric disorder     depression/anxiety    Thyroid cancer (HCC) 1990's    Unspecified urinary incontinence        SURGICAL HISTORY  Past Surgical History:   Procedure Laterality Date    EXPLORATORY LAPAROTOMY  5/23/2017    Procedure: EXPLORATORY LAPAROTOMY - EXPLORE ABDOMINAL WALL, REMOVE BIOLOGIC MESH, ABDOMINAL WALL RESECTION INVOLVING OLD RETAINED MESH AND CHRONIC INFECTION, ABDOMINAL WALL RECONSTRUCTION WITH MESH;  Surgeon: Warner Figueroa D.O.;  Location: [facility];  Service:     EXPLORATORY LAPAROTOMY  3/9/2017    Procedure: EXPLORATORY LAPAROTOMY - REMOVAL MESH W/POSSIBLE INSERTION BIOLOGIC MESH AND PLACEMENT OF WOUND VAC;  Surgeon: Warner Figueroa D.O.;  Location: [facility];  Service:     EXPLORATORY LAPAROTOMY  3/5/2017    Procedure: EXPLORATORY LAPAROTOMY - drainage of abdominal wall abscess;  Surgeon: Jamaal Curran M.D.;  Location: [facility];  Service:     COLONOSCOPY  1/18/2016    Procedure: COLONOSCOPY;  Surgeon: Denilson Menendez M.D.;  Location: [facility];  Service:     WIDE EXCISION  2/5/2015    Performed by Miki Samuel Jr., M.D. at SURGERY SAME DAY [facility]    FLAP CLOSURE  2/5/2015    Performed by Miki Samuel Jr., M.D. at SURGERY SAME DAY [facility]    INCISION HERNIA REPAIR  5/1/2014    Performed by Joaquin Larios M.D. at SURGERY SAME DAY [facility]    PARASTOMAL HERNIA REPAIR   5/22/2013    Performed by Warner KOHLER  GAVI Figueroa at SURGERY [facility]    COLOSTOMY CLOSURE  5/22/2013    Performed by Warner Figueroa D.O. at SURGERY [facility]    ABDOMINAL ABSCESS DRAINAGE  12/15/2012    Performed by Warner Figueroa D.O. at SURGERY [facility]    COLOSTOMY  12/15/2012    Performed by Warner Figueroa D.O. at SURGERY [facility]    EXPLORATORY LAPAROTOMY  12/15/2012    Performed by Warner Figueroa D.O. at SURGERY [facility]    WOUND DEHISCENCE  12/11/2012    Performed by Magno Baez M.D. at SURGERY [facility]    COLON RESECTION LAPAROSCOPIC  12/5/2012    Performed by Magno Baez M.D. at SURGERY [facility]    OTHER  1990's    thyroidectomy    OTHER ABDOMINAL SURGERY  c/section x 2    PRIMARY C SECTION      TUBAL COAGULATION LAPAROSCOPIC BILATERAL         FAMILY HISTORY  Family History   Problem Relation Age of Onset    Cancer Mother     Stroke Father     Hypertension Father        SOCIAL HISTORY   reports that she has been smoking cigarettes. She has a 15 pack-year smoking history. She has never used smokeless tobacco. She reports current drug use. Drug: Inhaled. She reports that she does not drink alcohol.    CURRENT MEDICATIONS  Discharge Medication List as of 4/22/2025  8:45 PM        CONTINUE these medications which have NOT CHANGED    Details   !! HYDROcodone/acetaminophen (NORCO)  MG Tab Take 1 tablet by mouth every 4 hours for 30 daysper pt, pt was in the hospital - II 8/21; per MD, ok to fill today - II 8/21Disp-180 Tablet, R-0, Normal      !! HYDROcodone/acetaminophen (NORCO)  MG Tab Take 1 tablet by mouth every 4 hours for 30 daysok to fill 10/17/24Disp-180 Tablet, R-0, Normal      !! HYDROcodone/acetaminophen (NORCO)  MG Tab Take 1 tablet by mouth every 4 hours for 30 daysok to fill 9/19/24Disp-180 Tablet, R-0, Normal      !! HYDROcodone/acetaminophen (NORCO)  MG Tab Take 1 tab(s) Orally every 4 hours for painInstr:ok to fill 7/29/24; for 30 days  per MD - II 7/29Disp-180 Tablet, R-0, Normal      !! HYDROcodone/acetaminophen (NORCO)  MG Tab Take 1 tab(s) Orally every 4 hours for 30 day(s) for pain,Instr:ok to fill 7/1/24; 30 days supply per Unruly CONTRERAS from MD's office - II 6/11Disp-180 Tablet, R-0, Normal      !! HYDROcodone/acetaminophen (NORCO)  MG Tab Take 1 tab(s) Oral every 4 hours for 30 days for pain30 days supply per Unruly CONTRERAS from MD's office - II 6/11; ,Instr:ok to fill 8/26/24Disp-180 Tablet, R-0, Normal      ALPRAZolam (XANAX) 1 MG Tab TAKE 1 TABLET BY MOUTH TWICE A DAY X30 DAY(S) AS NEEDED FOR ANXIETY,INSTR:F41.9, Historical Med      amLODIPine (NORVASC) 10 MG Tab Take 10 mg by mouth every day., Historical Med      carvedilol (COREG) 3.125 MG Tab Take 1 Tablet by mouth 2 times a day., Historical Med      celecoxib (CELEBREX) 200 MG Cap Take 200 mg by mouth every day., Historical Med      hydrOXYzine HCl (ATARAX) 25 MG Tab TAKE 1 TABLET BY MOUTH FOUR TIMES A DAY AS NEEDED FOR ITCHING, Historical Med      lisinopril (PRINIVIL) 20 MG Tab Take 20 mg by mouth every day., Historical Med      nabumetone (RELAFEN) 500 MG Tab Take 500 mg by mouth 4 times a day., Historical Med      diltiazem (CARDIZEM SR) 120 MG SR capsule Take 1 Capsule by mouth every 12 hours, Disp-200 Capsule, R-3, Normal      !! HYDROcodone/acetaminophen (NORCO)  MG Tab Take 1 tab(s) Orally every 4 hrs for 30 day(s)Instr:ok to fill 6/4/24Disp-180 Tablet, R-0, Normal      !! HYDROcodone/acetaminophen (NORCO)  MG Tab Take 1 tab(s) Oral every 4 hrs for 30 days.for 30 days per MD - II 4/8; Instr:ok to fill 5/6/24Disp-180 Tablet, R-0, Normal      !! HYDROcodone/acetaminophen (NORCO)  MG Tab Take 1 tab(s) Oral every 4 hrs for 30 days.30 days per MD Bayron JORDAN 4/8; Instr:ok to fill today.Disp-180 Tablet, R-0, Normal      !! HYDROcodone/acetaminophen (NORCO)  MG Tab Take 1 tab(s) Oral every 4 hrs for 30 day(s), Disp-180 Tablet, R-0, Normal      !!  HYDROcodone/acetaminophen (NORCO)  MG Tab Take 1 tab(s) Orally every 4 hours for 30 day(s)ok to fill 2/6/24Disp-180 Tablet, R-0, Normal      !! HYDROcodone/acetaminophen (NORCO)  MG Tab Take 1 tab(s) Orally every 4 hours for 30 day(s)Instr:ok to fill today.Disp-180 Tablet, R-0, Normal      !! HYDROcodone/acetaminophen (NORCO)  MG Tab Take 1 tablet by mouth every 4 hours for 30 days, Disp-180 Tablet, R-0, Normal      !! HYDROcodone/acetaminophen (NORCO)  MG Tab Take 1 tablet by mouth every 4 hours for 30 days -ok to fill today, Disp-180 Tablet, R-0, Normal      !! HYDROcodone/acetaminophen (NORCO)  MG Tab Take 1 tablet by mouth every 4 hours for 30 days -ok to fill 12/12/23, Disp-180 Tablet, R-0, Normal      promethazine (PHENERGAN) 25 MG Tab Take 1 Tab by mouth every 6 hours as needed for Nausea/Vomiting., Disp-30 Tab, R-0, Normal      doxycycline (VIBRAMYCIN) 100 MG Tab Take 100 mg by mouth 2 times a day., Historical Med      asa/apap/caffeine (EXCEDRIN) 250-250-65 MG Tab Take 1 Tab by mouth every 6 hours as needed for Headache., Historical Med      lubiprostone (AMITIZA) 24 MCG capsule Take 24 mcg by mouth 2 times a day, with meals., Historical Med      Probiotic Product (PROBIOTIC DAILY PO) Take 1 Tab by mouth 2 Times a Day., Historical Med      Albuterol Sulfate (PROVENTIL HFA INH) Inhale 2 Puffs by mouth as needed., Historical Med      amphetamine-dextroamphetamine (ADDERALL) 10 MG Tab Take 10 mg by mouth 2 times a day., Historical Med      diclofenac epolamine (FLECTOR) 1.3 % Patch Apply 1 Patch to skin as directed 2 Times a Day., Historical Med      SYMBICORT 160-4.5 MCG/ACT Aerosol USE 2 PUFF BY MOUTH TWICE PER DAY, R-5, GENA, Historical Med      gabapentin (NEURONTIN) 100 MG Cap Take 100 mg by mouth 3 times a day as needed., R-2, Historical Med      clonazepam (KLONOPIN) 1 MG Tab Take 1 mg by mouth every bedtime., Historical Med      !! hydrocodone/acetaminophen (NORCO)   "MG Tab Take 1 Tab by mouth every four hours as needed (breakthrough pain)., Historical Med      paroxetine (PAXIL) 20 MG TABS Take 20 mg by mouth every bedtime., Historical Med      oxyCODONE CR (OXYCONTIN) 20 MG T12A Take 20 mg by mouth every 12 hours. Indications: Moderate to Severe Chronic Pain, Historical Med      levothyroxine (SYNTHROID) 200 MCG TABS Take 1 Tab by mouth every morning. 30 mins before breakfast, Disp-30 Tab, R-6, Print Rx Paper      trazodone (DESYREL) 100 MG TABS Take 200 mg by mouth every bedtime., Historical Med       !! - Potential duplicate medications found. Please discuss with provider.          ALLERGIES  Codeine and Tape    PHYSICAL EXAM  BP (!) 142/67   Pulse 62   Temp 36.4 °C (97.6 °F) (Temporal)   Resp 16   Ht 1.651 m (5' 5\")   Wt 95.3 kg (210 lb)   LMP 05/22/2017   SpO2 94%   BMI 34.95 kg/m²   Pulse oximetry interpretation: I interpret the pulse oximetry as normal.  Constitutional: Awake and alert. No acute distress.  Head: NCAT.  HEENT: Normal Conjunctiva.  Neck: Grossly normal range of motion. Airway midline.  Cardiovascular: Normal heart rate, Normal rhythm.  Thorax & Lungs: No respiratory distress.   Abdomen: Normal inspection. Nontender. Nondistended  Skin: No obvious rash.  Musculoskeletal: No obvious deformity. Moves all extremities Well. CMS intact.  No focal tenderness to palpation  Neurologic: A&Ox4. Globally no weakness.   Psychiatric: Mood and affect are appropriate for situation.      DIAGNOSTIC STUDIES      RADIOLOGY/PROCEDURES   The attending emergency physician has independently interpreted the diagnostic imaging associated with this visit and am waiting the final reading from the radiologist.   My preliminary interpretation is a follows: no acute fracture    Radiologist interpretation:  DX-HIP-COMPLETE - UNILATERAL 2+ LEFT   Final Result      1.  No radiographic evidence of acute traumatic injury.      2.  Findings are consistent with severe osteoarthritis " of the left hip. Underlying avascular necrosis is possible. Arthritis in the right hip is severe but less prominent.          COURSE & MEDICAL DECISION MAKING     ASSESSMENT, COURSE AND PLAN  Care Narrative:     ED OBS: No; Patient does not meet criteria for ED Observation.     Nursing notes, VS, PMSFHx, labs, imaging, EKG reviewed in chart.     5:44 PM  64 y.o. YO female presents with worsening left hip pain since she fractured it 3 months ago. She is on a pain management contract and takes two 10 mg Norco daily which has not controlled her pain.   Afebrile and normal vital signs   On exam CMS is intact, no shortening of the left leg, no pain with logroll of, straight leg raise is appropriate  X-ray shows no fracture but severe arthritis possibly avascular necrosis  Differentials considered but not exhaustive list including fracture, dislocation, chronic pain    Medicated for pain with IV medications.  X-ray without emergent pathology.  She will be discharged instructed to follow-up with pain management for her continued pain until she has surgery.    Hypertension counseling:   I spent a total of 5 minutes counseling patient on blood pressure control and management.  We discussed medication management with PCP.  I recommended keeping a blood pressure journal, taking the blood pressure once in the morning once evening after resting for 5 minutes.  Presented in general to PCP and discussing medication management or adjustment.  Discussed hypertensive emergencies and what signs and symptoms to be monitoring for and to return to the ED for.  Patient verbalized understanding and was receptive of counseling.    ADDITIONAL PROBLEM LIST  Chronic hip pain  Hypertension  COPD on chronic oxygen    DISPOSITION AND DISCUSSIONS  I have discussed management of the patient with the following physicians and TONI's:  None    Discussion of management with other QHP or appropriate source(s): None     Escalation of care considered, and  ultimately not performed: acute inpatient care management, however at this time, the patient is most appropriate for outpatient management.    Barriers to care at this time, including but not limited to:  None .     Decision tools and prescription drugs considered including, but not limited to:  Tylenol, Flexeril, Lidocaine patch .    The patient will return for new or worsening symptoms and is stable at the time of discharge.    The patient is referred to a primary physician for blood pressure management, diabetic screening, and for all other preventative health concerns.      DISPOSITION:  Patient will be discharged home in stable condition.    FOLLOW UP:  Denver J Miller, M.D.  [address] [facility] [address]  [phone]            OUTPATIENT MEDICATIONS:  Discharge Medication List as of 4/22/2025  8:45 PM        START taking these medications    Details   cyclobenzaprine (FLEXERIL) 10 MG Tab Take 1 Tablet by mouth 3 times a day as needed for Moderate Pain., Disp-30 Tablet, R-0, Normal      acetaminophen (TYLENOL) 325 MG Tab Take 2 Tablets by mouth every 6 hours as needed for Moderate Pain., Disp-30 Tablet, R-1, Normal      lidocaine (ASPERFLEX) 4 % Patch Place 1 Patch on the skin every 24 hours., Disp-10 Patch, R-0, Normal               FINAL DIAGNOSIS  1. Osteoarthritis of left hip, unspecified osteoarthritis type    2. Primary hypertension    3. Chronic hypoxic respiratory failure, on home oxygen therapy (HCC)      Wolf MUELLER (Catina), am scribing for, and in the presence of, Nandini Weems D.O..    Electronically signed by: Wolf Sawyer (Catina), 4/22/2025    Nandini MUELLER D.O. personally performed the services described in this documentation, as scribed by Wolf Sawyer in my presence, and it is both accurate and complete.       The note accurately reflects work and decisions made by me.  Nandini Weems D.O.  4/22/2025  8:48 PM

## 2025-04-23 NOTE — ED NOTES
Rounded on patient.    Late entry: 1934. Patient had reported increased pain and nausea. ERP informed and ordered dilaudid 0.5 mg IV and ondansetron 4 mg IV.  Patient medicated per MAR.

## 2025-04-23 NOTE — ED TRIAGE NOTES
"Chief Complaint   Patient presents with    Hip Injury     L hip pain s/p fall and subsequent Fx 3 months ago. Pt is scheduled for surgery on 5/15 but feels that she needs surgery sooner due to pain. Pt is ambulatory \"but not very well.\"     Pt wheeled to triage for above complaint. A&Ox4, GCS 15, speaking in full sentences. Respirations equal and unlabored. NAD.  Appropriate protocols ordered.   Pt educated on triage process and instructed to notify staff of worsening condition.   Pt placed in the lobby in stable condition.     "

## 2025-04-23 NOTE — ED NOTES
Patient discharged home per ERP  Discharge teaching and education discussed with patient.  Patient verbalized understanding of discharge teaching and education. No other questions at this time.    PIV removed.    VSS. Patient alert and oriented.  Patient's ride arranged by self/GMT  Able to ambulate off unit safely with steady gait.  All belongings with patient at time of discharge.

## 2025-04-23 NOTE — DISCHARGE INSTRUCTIONS
Please follow-up with your orthopedic surgeon as you are well as your pain doctor regarding your medication regimen.  Based on what you are telling me you are able to take more Norco for your pain and this may be helpful to keep you comfortable until surgery.

## 2025-04-23 NOTE — DISCHARGE PLANNING
MANNY arranged wc & 3L O2 transport via [facility] to home address [address] [address]. Bedside RN updated. COBRA packet not needed.    Time: 2030-2100  Reservation: [id number]  Price: $189.26    Pt requires 3lpm [place] oxygen for her transport home and pt requires a wheelchair and help into her home upon arrival. MANNY arranged [employer] for a safe dc. Bedside RN updated.

## 2025-05-13 ENCOUNTER — PRE-ADMISSION TESTING (OUTPATIENT)
Dept: ADMISSIONS | Facility: MEDICAL CENTER | Age: 65
DRG: 470 | End: 2025-05-13
Attending: ORTHOPAEDIC SURGERY
Payer: MEDICARE

## 2025-05-13 VITALS — HEIGHT: 65 IN | BODY MASS INDEX: 33.32 KG/M2 | WEIGHT: 200 LBS

## 2025-05-13 RX ORDER — IBUPROFEN 200 MG
400 TABLET ORAL EVERY 6 HOURS PRN
COMMUNITY

## 2025-05-13 ASSESSMENT — FIBROSIS 4 INDEX: FIB4 SCORE: 1.71

## 2025-05-13 NOTE — PREADMIT AVS NOTE
Current Medications   Medication Instructions    ibuprofen (MOTRIN) 200 MG Tab Stop 5 days before surgery    cyclobenzaprine (FLEXERIL) 10 MG Tab Continue taking as prescribed.    lidocaine (ASPERFLEX) 4 % Patch Stop 48 hours before surgery    HYDROcodone/acetaminophen (NORCO)  MG Tab Continue taking as prescribed.    ALPRAZolam (XANAX) 1 MG Tab As needed medication, may take if needed, including morning of procedure     amLODIPine (NORVASC) 10 MG Tab Stop 24 hours before surgery    carvedilol (COREG) 3.125 MG Tab Continue taking medication as prescribed, including morning of procedure     hydrOXYzine HCl (ATARAX) 25 MG Tab Continue taking as prescribed.    lisinopril (PRINIVIL) 20 MG Tab Stop 24 hours before surgery    nabumetone (RELAFEN) 500 MG Tab Stop 5 days before surgery    diltiazem (CARDIZEM SR) 120 MG SR capsule Continue taking medication as prescribed, including morning of procedure     promethazine (PHENERGAN) 25 MG Tab As needed medication, may take if needed, including morning of procedure     asa/apap/caffeine (EXCEDRIN) 250-250-65 MG Tab Stop 5 days before surgery    lubiprostone (AMITIZA) 24 MCG capsule Hold medication day of procedure    Probiotic Product (PROBIOTIC DAILY PO) Stop 7 days before surgery    Albuterol Sulfate (PROVENTIL HFA INH) As needed medication, may take if needed, including morning of procedure     amphetamine-dextroamphetamine (ADDERALL) 10 MG Tab Hold medication day of procedure    SYMBICORT 160-4.5 MCG/ACT Aerosol As needed medication, may take if needed, including morning of procedure     gabapentin (NEURONTIN) 100 MG Cap As needed medication, may take if needed, including morning of procedure     clonazepam (KLONOPIN) 1 MG Tab As needed medication, may take if needed, including morning of procedure     hydrocodone/acetaminophen (NORCO)  MG Tab As needed medication, may take if needed, including morning of procedure     paroxetine (PAXIL) 20 MG TABS Continue  This visual field clearly demonstrated a minimum of 52% loss of upper field of vision OU, with upper lid skin in repose and elevated by taping of the lid to demonstrate potential correction. This field shows that taping the lids significantly improved this patient's superior field of vision by approximately 52%, OU. taking as prescribed.    levothyroxine (SYNTHROID) 200 MCG TABS Continue taking medication as prescribed, including morning of procedure     trazodone (DESYREL) 100 MG TABS As needed medication, may take if needed, including morning of procedure

## 2025-05-13 NOTE — PREPROCEDURE INSTRUCTIONS
Preadmit appointment completed with pt including all anesthesia instructions and directions.  Medication reconcilation and instructions given per anesthesia protocol. Handouts/Map sent to pt via email.  Pt instructed to call Dr. Dhillon with questions about orders for rehab hospital. Pt states she is not sure she has a ride to surgery DOS and cannot come to preadmit for lab appt, CM sent to Dr. Dhillon's MA/ re: these issues.    Special needs, fall risk.  02 at night 3L- has no portable tank.

## 2025-05-13 NOTE — DISCHARGE PLANNING
DISCHARGE PLANNING NOTE - TOTAL JOINT    Procedure: LEFT ANTERIOR TOTAL HIP ARTHROPLASTY   Procedure Date: 5/15/25  Insurance: Payor: MEDICARE / Plan: MEDICARE PART A & B / Product Type: *No Product type* /    Equipment currently available at home?  cane, front-wheel walker, raised toilet seat, shower chair, and ice packs  Steps into the home? 4  Steps within the home? 0  Toilet height? Standard  Type of shower? walk-in shower  Home Oxygen? YES. 3L/NC AT NIGHT. NO PORTABLE TANKS.  Portable tank?  NO  Oxygen Provider: [facility]  Planning same day discharge: NO. PT LIVES ALONE, AND HAVING DIFFICULTIES WITH DASILY ACTIVITIES DUE TO HIP. STATES SHE IS TO BE A DIRECT ADMIT TO HOSPITAL TOMORROW.    Is Outpatient Physical Therapy set up after surgery? No   Did you take the Total Joint Class and where or did you receive an Educational booklet? NO  Who will be your transportation home on day of discharge? GOING TO REHAB    Have you made arrangements to have someone stay with you at home for the first 3 days following discharge, and if so, whom? NO    Have you notified your surgeon that you do not have transportation or someone to help you after discharge? YES    Are you planning on going to a transitional care facility, for example a skilled nursing facility, post operatively for rehab, and if so, have you contacted your insurance plan to see if they cover this? YES AND  WITH [facility] IS WORKING ON IT, BUT [facility]  WILL NEED TO ARRANGE      Spoke with the pt. Emailed total joint handouts with a copy of Home Safety Checklist, Equipment Resource Guide, CHG scrub kit instructions. Expected process in Recovery Room and dc criteria discussed with pt. All questions answered and verbalizes understanding of all instructions.     Case message sent to Dr Molina HEBERT to inform pt does plan to go to ER tomorrow and she has not had MRSA nasal swab done.

## 2025-05-14 ENCOUNTER — APPOINTMENT (OUTPATIENT)
Dept: RADIOLOGY | Facility: MEDICAL CENTER | Age: 65
DRG: 470 | End: 2025-05-14
Attending: EMERGENCY MEDICINE
Payer: MEDICARE

## 2025-05-14 ENCOUNTER — HOSPITAL ENCOUNTER (EMERGENCY)
Facility: MEDICAL CENTER | Age: 65
DRG: 470 | End: 2025-05-14
Attending: EMERGENCY MEDICINE
Payer: MEDICARE

## 2025-05-14 ENCOUNTER — APPOINTMENT (OUTPATIENT)
Dept: ADMISSIONS | Facility: MEDICAL CENTER | Age: 65
DRG: 470 | End: 2025-05-14
Attending: ORTHOPAEDIC SURGERY
Payer: MEDICARE

## 2025-05-14 VITALS
HEART RATE: 73 BPM | RESPIRATION RATE: 20 BRPM | TEMPERATURE: 97.6 F | OXYGEN SATURATION: 94 % | BODY MASS INDEX: 33.27 KG/M2 | SYSTOLIC BLOOD PRESSURE: 126 MMHG | DIASTOLIC BLOOD PRESSURE: 58 MMHG | WEIGHT: 199.96 LBS

## 2025-05-14 DIAGNOSIS — M25.552 CHRONIC LEFT HIP PAIN: Primary | ICD-10-CM

## 2025-05-14 DIAGNOSIS — G89.29 CHRONIC LEFT HIP PAIN: Primary | ICD-10-CM

## 2025-05-14 PROCEDURE — 99284 EMERGENCY DEPT VISIT MOD MDM: CPT

## 2025-05-14 PROCEDURE — 96372 THER/PROPH/DIAG INJ SC/IM: CPT

## 2025-05-14 PROCEDURE — 700111 HCHG RX REV CODE 636 W/ 250 OVERRIDE (IP): Mod: JZ | Performed by: EMERGENCY MEDICINE

## 2025-05-14 PROCEDURE — 73501 X-RAY EXAM HIP UNI 1 VIEW: CPT | Mod: LT

## 2025-05-14 RX ORDER — ONDANSETRON 4 MG/1
4 TABLET, ORALLY DISINTEGRATING ORAL ONCE
Status: COMPLETED | OUTPATIENT
Start: 2025-05-14 | End: 2025-05-14

## 2025-05-14 RX ORDER — HYDROMORPHONE HYDROCHLORIDE 1 MG/ML
1 INJECTION, SOLUTION INTRAMUSCULAR; INTRAVENOUS; SUBCUTANEOUS ONCE
Status: COMPLETED | OUTPATIENT
Start: 2025-05-14 | End: 2025-05-14

## 2025-05-14 RX ORDER — ACETAMINOPHEN 500 MG
1000 TABLET ORAL ONCE
Status: CANCELLED | OUTPATIENT
Start: 2025-05-15 | End: 2025-05-16

## 2025-05-14 RX ADMIN — HYDROMORPHONE HYDROCHLORIDE 1 MG: 1 INJECTION, SOLUTION INTRAMUSCULAR; INTRAVENOUS; SUBCUTANEOUS at 16:26

## 2025-05-14 RX ADMIN — ONDANSETRON 4 MG: 4 TABLET, ORALLY DISINTEGRATING ORAL at 16:24

## 2025-05-14 ASSESSMENT — FIBROSIS 4 INDEX: FIB4 SCORE: 1.71

## 2025-05-14 NOTE — ED TRIAGE NOTES
"Chief Complaint   Patient presents with    Hip Pain     L side. \"Extreme pain\"; pt was bale to stand and pivot to [place] w/ assistance    Other     Pt scheduled to have hip replacement surgery tomorrow w/ Dr Dhillon     Pt OCTAVIO SEN from home, per EMS pt states \"I need to be admitted to have blood work and things done before my surgery tomorrow\"    No interventions done PTA by EMS   "

## 2025-05-14 NOTE — ED NOTES
"Pt medicated for c/o pain \"12\"/10.  Pt continues to ask pure wick.  Pure wick placed. Lights off per her request.  "

## 2025-05-14 NOTE — ED PROVIDER NOTES
"ED Provider Note    Primary care provider: Denver J Miller, M.D.    CHIEF COMPLAINT  Chief Complaint   Patient presents with    Hip Pain     L side. \"Extreme pain\"; pt was bale to stand and pivot to colton w/ assistance    Other     Pt scheduled to have hip replacement surgery tomorrow w/ Dr Alejo CARVAJAL  Thelma Abbott is a 64 y.o. female who presents to the Emergency Department with severe left hip pain.  She tells me she is here to be admitted for her hip surgery.  She is scheduled for hip surgery tomorrow and is supposed to report to [facility] 8:45 in the morning for hip surgery.  After the surgery she is going to be admitted and then she anticipates being transferred to a rehab for the next few weeks.  She denies any specific trauma but states she slipped a little bit earlier.    External record review: Patient with multiple ER visits this year, has been admitted to [facility] in April of this year for chest pain, workup was unrevealing troponins were trended and found to be flat and negative.  Underwent 2D echo that showed EF of 65%.  Patient did have PAF on admission which converted to sinus, they started her on aspirin with a low [id number] 2 score.  Patient seen in [facility] emergency department April 22 for left hip pain for about 3 months since a fall.  X-ray showed severe osteoarthritis left hip.  Patient does have some preop notes and a preadmit for surgery on the hip tomorrow by Dr. Hays.    Interestingly, there is a note from the orthopedic clinic May 8, EMS apparently contacted the clinic to voice a complaint that the patient keeps calling 911 for her intractable hip pain.  Per the note the patient initially had surgery planned for earlier this month but the surgery was delayed till May 15 as the patient pushed it back due to not having a ride.  The medical assistant apparently told her to go to [facility] emergency department if she has pain.    PAST MEDICAL " HISTORY   has a past medical history of Anemia, Arthritis, Asthma, Breath shortness, Cancer ([facility]), COPD (chronic obstructive pulmonary disease) ([facility], located [facility]), Diverticulitis, Diverticulosis, Emphysema of lung ([facility], located [facility]), GERD (gastroesophageal reflux disease), Heart burn, HTN (hypertension) (06/24/2010), Hypothyroid (06/24/2010), MRSA (methicillin resistant staph aureus) culture positive, MRSA infection, Pain (05/13/2025), Palpitations, Psoriasis (06/24/2010), Psychiatric disorder, Thyroid cancer ([facility], located [facility]) (09/01/1990), and Unspecified urinary incontinence.    SURGICAL HISTORY   has a past surgical history that includes wound dehiscence (12/11/2012); abdominal abscess drainage (12/15/2012); colostomy (12/15/2012); parastomal hernia repair (5/22/2013); colostomy closure (5/22/2013); primary c section; tubal coagulation laparoscopic bilateral; other (1990's); incision hernia repair (5/1/2014); wide excision (2/5/2015); flap closure (2/5/2015); other abdominal surgery (c/section x 2); colon resection laparoscopic (12/5/2012); exploratory laparotomy (12/15/2012); colonoscopy (1/18/2016); exploratory laparotomy (3/5/2017); exploratory laparotomy (3/9/2017); and exploratory laparotomy (5/23/2017).    PHYSICAL EXAM  VITAL SIGNS: /58   Pulse 73   Temp 36.4 °C (97.6 °F)   Resp 20   Wt 90.7 kg (199 lb 15.3 oz)   LMP 05/22/2017   SpO2 94%   BMI 33.27 kg/m²   Constitutional: Awake, alert in no apparent distress.  HENT: Normocephalic, Bilateral external ears normal. Nose normal.   Eyes: Conjunctiva normal, non-icteric, EOMI.    Thorax & Lungs: Easy unlabored respirations  Cardiovascular:    Abdomen:  No distention  Skin: Visualized skin is  Dry, No erythema, No rash.   Extremities:   atraumatic, pelvis stable.  Minimal left hip movement.  Neurologic: Alert, Grossly non-focal.   Psychiatric: Affect and Mood normal      RADIOLOGY  I have independently interpreted the diagnostic imaging associated with this visit and am waiting the  final reading from the radiologist.   My preliminary interpretation is as follows: Severe bilateral osteoarthritis without fracture    Radiologist interpretation:   DX-HIP-UNILATERAL-WITH PELVIS-1 VIEW LEFT   Final Result      Severe osteoarthritis of the hips bilaterally.          COURSE & MEDICAL DECISION MAKING  Pertinent Labs & Imaging studies reviewed. (See chart for details)    3:39 PM - Nursing notes reviewed, patient seen and examined at bedside.  I explained the patient that it is very unlikely that we can admit her for surgery tomorrow.  There was not a preop admission ordered.  We do not have any beds available in the hospital.  Explained to her that she is going to be discharged unless her orthopedic surgeon says otherwise.  She is then asking to talk to a patient advocate.  I did have our nurse text and volt the patient advocate which is no longer available in the hospital.    4:19 PM: Discussed with Dr. Hays, orthopedics.  He states the patient has not been able to show up to her appointments for preop either.  He is not sure if the transportation issue.  I told him about my decision to discharge the patient, he thinks that is appropriate.  He would recommend she come for her surgery tomorrow morning and hopefully anesthesia will be willing to put the patient under, though he is not 100% sure that they will sign off on doing general anesthesia without cardiology clearance.  He understands that she has been somewhat challenging.      4:35 PM discussed with Dr. Panchal regarding her options of admission, observation, discharge.    5:40 PM Dr. Panchal graciously evaluated, he was able to contact anesthesiology.  The patient has recently had a 2D echo, stress test.  The anesthesiologist requested that we obtain an EKG but after that she is okay for surgery tomorrow.  Does not need any further preoperative clearance at this time.  She does not require admission for clearance for surgery.    5:55 PM: Explained  to the patient that we do not have criteria for admission, again there are no hospital beds available, we are currently boarding 6 admitted patients in the ER, additional (out of a total of 11 actual beds not counting xiao beds).  She is refusing to leave, the significant other states that this costs too much money and [facility] is now refusing to transport her for nonemergent issues.  Once again the patient is demanding she speak to the patient advocate, explained that there is not one available but we can have her talk to our nursing supervisor.    6:20 PM nursing supervisor evaluated the patient.  Agrees that we do not have any criteria for admission.  Offered the patient transportation home which [facility] will cover the cost.    Discussion of management with other medical personnel: Case management, nursing supervisor, orthopedics, hospitalist, anesthesia.    Escalation of care considered, and ultimately not performed: acute inpatient care management, however at this time, the patient is most appropriate for outpatient management.    Barriers to care at this time, including but not limited to: Patient lacks financial resources.   .    Decision Making:  This is a challenging 64-year-old female with severe osteoarthritis and chronic hip pain who is supposed to undergo a hip replacement tomorrow.  There are notes from the MA at the orthopedic clinic suggesting that the patient present to the ER.  She basically shows up here telling me that I need to do preop labs, start an IV, give her IV pain medications and admit her.  Dr. Hays did not have any plans to direct admit the patient.  We do not have case management available today.  Anesthesia did evaluate her case and states that she is cleared for surgery tomorrow.  She does not need any further workup here in the ER.    I discussed this with her hospitalist as well.  We do not have any beds available upstairs, currently we are holding about 50% of our ER beds on  admitted patients as it is and the waiting room is quite crowded.  I do not feel that it would be appropriate to keep the patient in ED observation status overnight given our lack of capacity here.    This is quite a chronic issue, the patient has been evaluated multiple times, in fact [facility] has apparently refused transport for her as she has called 911 numerous times for her chronic hip pain.    She is understandably frustrated, she states that she has no money to get back home, she has no money or transportation to get back here to the hospital either.  Although I understand her plight, there is not much more we can offer at this point.  I did get our house supervisor involved as well as multiple physicians to agree that this would not be a reasonable plan to admit the patient.    [facility] will foot the bill for her discharge home, she will need to get creative with transportation back here tomorrow for her hip replacement.    The patient was discharged (see d/c instructions) was told to return immediately for any signs or symptoms listed, or any worsening at all.  The patient verbally agreed to the discharge precautions and follow-up plan which is documented in EPIC.    Discharge Medications:  Discharge Medication List as of 5/14/2025  7:21 PM          FINAL IMPRESSION  1. Chronic left hip pain

## 2025-05-14 NOTE — ED NOTES
"Patient on her call light several times.  Patient continues to request pain meds, pure wick and states she \"will not be leaving this ER today\"  Patient updated that her nurse will be in to medicate her.  ERP updated.  Dr Dhillon Pagejackie for consultation.    "

## 2025-05-15 ENCOUNTER — ANESTHESIA EVENT (OUTPATIENT)
Dept: SURGERY | Facility: MEDICAL CENTER | Age: 65
DRG: 470 | End: 2025-05-15
Payer: MEDICARE

## 2025-05-15 ENCOUNTER — HOSPITAL ENCOUNTER (INPATIENT)
Facility: MEDICAL CENTER | Age: 65
LOS: 3 days | DRG: 470 | End: 2025-05-18
Attending: ORTHOPAEDIC SURGERY | Admitting: ORTHOPAEDIC SURGERY
Payer: MEDICARE

## 2025-05-15 ENCOUNTER — APPOINTMENT (OUTPATIENT)
Dept: RADIOLOGY | Facility: MEDICAL CENTER | Age: 65
DRG: 470 | End: 2025-05-15
Attending: ORTHOPAEDIC SURGERY
Payer: MEDICARE

## 2025-05-15 ENCOUNTER — SURGERY (OUTPATIENT)
Age: 65
End: 2025-05-15
Payer: MEDICARE

## 2025-05-15 ENCOUNTER — ANESTHESIA (OUTPATIENT)
Dept: SURGERY | Facility: MEDICAL CENTER | Age: 65
DRG: 470 | End: 2025-05-15
Payer: MEDICARE

## 2025-05-15 DIAGNOSIS — T39.015A PLATELET DYSFUNCTION DUE TO ASPIRIN (HCC): ICD-10-CM

## 2025-05-15 DIAGNOSIS — G89.18 POST-OP PAIN: ICD-10-CM

## 2025-05-15 DIAGNOSIS — C73 MALIGNANT NEOPLASM OF THYROID GLAND (HCC): ICD-10-CM

## 2025-05-15 DIAGNOSIS — M16.12 PRIMARY OSTEOARTHRITIS OF LEFT HIP: ICD-10-CM

## 2025-05-15 DIAGNOSIS — D69.1 PLATELET DYSFUNCTION DUE TO ASPIRIN (HCC): ICD-10-CM

## 2025-05-15 DIAGNOSIS — T81.49XA SURGICAL WOUND INFECTION: Primary | ICD-10-CM

## 2025-05-15 PROBLEM — I50.9 CHF (CONGESTIVE HEART FAILURE) (HCC): Status: ACTIVE | Noted: 2025-05-15

## 2025-05-15 PROBLEM — J44.9 CHRONIC OBSTRUCTIVE PULMONARY DISEASE (COPD) (HCC): Status: ACTIVE | Noted: 2025-05-15

## 2025-05-15 PROCEDURE — 700101 HCHG RX REV CODE 250: Performed by: ORTHOPAEDIC SURGERY

## 2025-05-15 PROCEDURE — 160035 HCHG PACU - 1ST 60 MINS PHASE I: Performed by: ORTHOPAEDIC SURGERY

## 2025-05-15 PROCEDURE — 700102 HCHG RX REV CODE 250 W/ 637 OVERRIDE(OP): Performed by: PHYSICIAN ASSISTANT

## 2025-05-15 PROCEDURE — 97535 SELF CARE MNGMENT TRAINING: CPT

## 2025-05-15 PROCEDURE — 27130 TOTAL HIP ARTHROPLASTY: CPT | Mod: ASROC,LT

## 2025-05-15 PROCEDURE — 160015 HCHG STAT PREOP MINUTES: Performed by: ORTHOPAEDIC SURGERY

## 2025-05-15 PROCEDURE — A9270 NON-COVERED ITEM OR SERVICE: HCPCS | Performed by: ORTHOPAEDIC SURGERY

## 2025-05-15 PROCEDURE — 94640 AIRWAY INHALATION TREATMENT: CPT

## 2025-05-15 PROCEDURE — 160031 HCHG SURGERY MINUTES - 1ST 30 MINS LEVEL 5: Performed by: ORTHOPAEDIC SURGERY

## 2025-05-15 PROCEDURE — A9270 NON-COVERED ITEM OR SERVICE: HCPCS | Performed by: STUDENT IN AN ORGANIZED HEALTH CARE EDUCATION/TRAINING PROGRAM

## 2025-05-15 PROCEDURE — 160009 HCHG ANES TIME/MIN: Performed by: ORTHOPAEDIC SURGERY

## 2025-05-15 PROCEDURE — 700102 HCHG RX REV CODE 250 W/ 637 OVERRIDE(OP): Performed by: ORTHOPAEDIC SURGERY

## 2025-05-15 PROCEDURE — 770001 HCHG ROOM/CARE - MED/SURG/GYN PRIV*

## 2025-05-15 PROCEDURE — 94664 DEMO&/EVAL PT USE INHALER: CPT

## 2025-05-15 PROCEDURE — 160002 HCHG RECOVERY MINUTES (STAT): Performed by: ORTHOPAEDIC SURGERY

## 2025-05-15 PROCEDURE — 96366 THER/PROPH/DIAG IV INF ADDON: CPT

## 2025-05-15 PROCEDURE — 27130 TOTAL HIP ARTHROPLASTY: CPT | Mod: LT | Performed by: ORTHOPAEDIC SURGERY

## 2025-05-15 PROCEDURE — 94760 N-INVAS EAR/PLS OXIMETRY 1: CPT

## 2025-05-15 PROCEDURE — 700102 HCHG RX REV CODE 250 W/ 637 OVERRIDE(OP): Performed by: STUDENT IN AN ORGANIZED HEALTH CARE EDUCATION/TRAINING PROGRAM

## 2025-05-15 PROCEDURE — 0SRB06A REPLACEMENT OF LEFT HIP JOINT WITH OXIDIZED ZIRCONIUM ON POLYETHYLENE SYNTHETIC SUBSTITUTE, UNCEMENTED, OPEN APPROACH: ICD-10-PCS | Performed by: ORTHOPAEDIC SURGERY

## 2025-05-15 PROCEDURE — 160036 HCHG PACU - EA ADDL 30 MINS PHASE I: Performed by: ORTHOPAEDIC SURGERY

## 2025-05-15 PROCEDURE — 700105 HCHG RX REV CODE 258: Performed by: STUDENT IN AN ORGANIZED HEALTH CARE EDUCATION/TRAINING PROGRAM

## 2025-05-15 PROCEDURE — A9270 NON-COVERED ITEM OR SERVICE: HCPCS | Performed by: PHYSICIAN ASSISTANT

## 2025-05-15 PROCEDURE — 96375 TX/PRO/DX INJ NEW DRUG ADDON: CPT

## 2025-05-15 PROCEDURE — C1713 ANCHOR/SCREW BN/BN,TIS/BN: HCPCS | Performed by: ORTHOPAEDIC SURGERY

## 2025-05-15 PROCEDURE — 160042 HCHG SURGERY MINUTES - EA ADDL 1 MIN LEVEL 5: Performed by: ORTHOPAEDIC SURGERY

## 2025-05-15 PROCEDURE — 700105 HCHG RX REV CODE 258: Performed by: ORTHOPAEDIC SURGERY

## 2025-05-15 PROCEDURE — 700111 HCHG RX REV CODE 636 W/ 250 OVERRIDE (IP): Mod: JZ | Performed by: STUDENT IN AN ORGANIZED HEALTH CARE EDUCATION/TRAINING PROGRAM

## 2025-05-15 PROCEDURE — 160048 HCHG OR STATISTICAL LEVEL 1-5: Performed by: ORTHOPAEDIC SURGERY

## 2025-05-15 PROCEDURE — 700101 HCHG RX REV CODE 250: Performed by: STUDENT IN AN ORGANIZED HEALTH CARE EDUCATION/TRAINING PROGRAM

## 2025-05-15 PROCEDURE — 73502 X-RAY EXAM HIP UNI 2-3 VIEWS: CPT | Mod: LT

## 2025-05-15 PROCEDURE — C1776 JOINT DEVICE (IMPLANTABLE): HCPCS | Performed by: ORTHOPAEDIC SURGERY

## 2025-05-15 PROCEDURE — 96365 THER/PROPH/DIAG IV INF INIT: CPT

## 2025-05-15 PROCEDURE — 700111 HCHG RX REV CODE 636 W/ 250 OVERRIDE (IP): Mod: JZ | Performed by: ORTHOPAEDIC SURGERY

## 2025-05-15 PROCEDURE — 700111 HCHG RX REV CODE 636 W/ 250 OVERRIDE (IP): Performed by: ORTHOPAEDIC SURGERY

## 2025-05-15 DEVICE — IMPLANT R3 3 HOLE ACET SHELL 52MM (1EA): Type: IMPLANTABLE DEVICE | Site: HIP | Status: FUNCTIONAL

## 2025-05-15 DEVICE — STEM POLAR CEMENTLESS WITH COLLAR 1: Type: IMPLANTABLE DEVICE | Site: HIP | Status: FUNCTIONAL

## 2025-05-15 DEVICE — IMPLANT REF SPHER HEAD SCREW 20MM (1EA): Type: IMPLANTABLE DEVICE | Site: HIP | Status: FUNCTIONAL

## 2025-05-15 DEVICE — IMPLANT REF SPHER HEAD SCREW 25MM (1EA): Type: IMPLANTABLE DEVICE | Site: HIP | Status: FUNCTIONAL

## 2025-05-15 DEVICE — IMPLANT OXINIUM FEM HD 12/14 36 MM L/+8 (1EA): Type: IMPLANTABLE DEVICE | Site: HIP | Status: FUNCTIONAL

## 2025-05-15 DEVICE — IMPLANT R3 0 DEG XLPE ACET LNR 36MM X 52MM (1EA): Type: IMPLANTABLE DEVICE | Site: HIP | Status: FUNCTIONAL

## 2025-05-15 RX ORDER — METHOCARBAMOL 100 MG/ML
1000 INJECTION, SOLUTION INTRAMUSCULAR; INTRAVENOUS ONCE
Status: DISCONTINUED | OUTPATIENT
Start: 2025-05-15 | End: 2025-05-15 | Stop reason: HOSPADM

## 2025-05-15 RX ORDER — HALOPERIDOL 5 MG/ML
1 INJECTION INTRAMUSCULAR
Status: DISCONTINUED | OUTPATIENT
Start: 2025-05-15 | End: 2025-05-15 | Stop reason: HOSPADM

## 2025-05-15 RX ORDER — OXYCODONE HCL 5 MG/5 ML
10 SOLUTION, ORAL ORAL
Status: COMPLETED | OUTPATIENT
Start: 2025-05-15 | End: 2025-05-15

## 2025-05-15 RX ORDER — BISACODYL 10 MG
10 SUPPOSITORY, RECTAL RECTAL
Status: DISCONTINUED | OUTPATIENT
Start: 2025-05-15 | End: 2025-05-18 | Stop reason: HOSPADM

## 2025-05-15 RX ORDER — HYDROMORPHONE HYDROCHLORIDE 2 MG/ML
INJECTION, SOLUTION INTRAMUSCULAR; INTRAVENOUS; SUBCUTANEOUS PRN
Status: DISCONTINUED | OUTPATIENT
Start: 2025-05-15 | End: 2025-05-15 | Stop reason: SURG

## 2025-05-15 RX ORDER — SUCCINYLCHOLINE CHLORIDE 20 MG/ML
INJECTION INTRAMUSCULAR; INTRAVENOUS PRN
Status: DISCONTINUED | OUTPATIENT
Start: 2025-05-15 | End: 2025-05-15 | Stop reason: SURG

## 2025-05-15 RX ORDER — CELECOXIB 200 MG/1
200 CAPSULE ORAL 2 TIMES DAILY PRN
Status: DISCONTINUED | OUTPATIENT
Start: 2025-05-20 | End: 2025-05-18 | Stop reason: HOSPADM

## 2025-05-15 RX ORDER — MIDAZOLAM HYDROCHLORIDE 1 MG/ML
INJECTION INTRAMUSCULAR; INTRAVENOUS PRN
Status: DISCONTINUED | OUTPATIENT
Start: 2025-05-15 | End: 2025-05-15 | Stop reason: SURG

## 2025-05-15 RX ORDER — AMOXICILLIN 250 MG
1 CAPSULE ORAL
Status: DISCONTINUED | OUTPATIENT
Start: 2025-05-15 | End: 2025-05-18 | Stop reason: HOSPADM

## 2025-05-15 RX ORDER — PAROXETINE 20 MG/1
20 TABLET, FILM COATED ORAL EVERY MORNING
Status: DISCONTINUED | OUTPATIENT
Start: 2025-05-15 | End: 2025-05-18 | Stop reason: HOSPADM

## 2025-05-15 RX ORDER — MEPERIDINE HYDROCHLORIDE 25 MG/ML
6.25 INJECTION INTRAMUSCULAR; INTRAVENOUS; SUBCUTANEOUS
Status: DISCONTINUED | OUTPATIENT
Start: 2025-05-15 | End: 2025-05-15 | Stop reason: HOSPADM

## 2025-05-15 RX ORDER — ONDANSETRON 2 MG/ML
INJECTION INTRAMUSCULAR; INTRAVENOUS PRN
Status: DISCONTINUED | OUTPATIENT
Start: 2025-05-15 | End: 2025-05-15 | Stop reason: SURG

## 2025-05-15 RX ORDER — SODIUM PHOSPHATE,MONO-DIBASIC 19G-7G/118
1 ENEMA (ML) RECTAL
Status: DISCONTINUED | OUTPATIENT
Start: 2025-05-15 | End: 2025-05-18 | Stop reason: HOSPADM

## 2025-05-15 RX ORDER — CEFAZOLIN SODIUM 1 G/3ML
2 INJECTION, POWDER, FOR SOLUTION INTRAMUSCULAR; INTRAVENOUS ONCE
Status: COMPLETED | OUTPATIENT
Start: 2025-05-15 | End: 2025-05-15

## 2025-05-15 RX ORDER — OXYCODONE HYDROCHLORIDE 5 MG/1
5 TABLET ORAL
Status: DISCONTINUED | OUTPATIENT
Start: 2025-05-15 | End: 2025-05-18 | Stop reason: HOSPADM

## 2025-05-15 RX ORDER — MIDAZOLAM HYDROCHLORIDE 1 MG/ML
1 INJECTION INTRAMUSCULAR; INTRAVENOUS
Status: DISCONTINUED | OUTPATIENT
Start: 2025-05-15 | End: 2025-05-15 | Stop reason: HOSPADM

## 2025-05-15 RX ORDER — SODIUM CHLORIDE, SODIUM LACTATE, POTASSIUM CHLORIDE, CALCIUM CHLORIDE 600; 310; 30; 20 MG/100ML; MG/100ML; MG/100ML; MG/100ML
INJECTION, SOLUTION INTRAVENOUS CONTINUOUS
Status: DISCONTINUED | OUTPATIENT
Start: 2025-05-15 | End: 2025-05-15

## 2025-05-15 RX ORDER — HYDROMORPHONE HYDROCHLORIDE 1 MG/ML
0.1 INJECTION, SOLUTION INTRAMUSCULAR; INTRAVENOUS; SUBCUTANEOUS
Status: DISCONTINUED | OUTPATIENT
Start: 2025-05-15 | End: 2025-05-15 | Stop reason: HOSPADM

## 2025-05-15 RX ORDER — ACETAMINOPHEN 500 MG
1000 TABLET ORAL ONCE
Status: DISCONTINUED | OUTPATIENT
Start: 2025-05-15 | End: 2025-05-15 | Stop reason: HOSPADM

## 2025-05-15 RX ORDER — POLYETHYLENE GLYCOL 3350 17 G/17G
1 POWDER, FOR SOLUTION ORAL 2 TIMES DAILY PRN
Status: DISCONTINUED | OUTPATIENT
Start: 2025-05-15 | End: 2025-05-18 | Stop reason: HOSPADM

## 2025-05-15 RX ORDER — CARVEDILOL 3.12 MG/1
3.12 TABLET ORAL 2 TIMES DAILY
Status: DISCONTINUED | OUTPATIENT
Start: 2025-05-15 | End: 2025-05-18 | Stop reason: HOSPADM

## 2025-05-15 RX ORDER — HYDRALAZINE HYDROCHLORIDE 20 MG/ML
5 INJECTION INTRAMUSCULAR; INTRAVENOUS
Status: DISCONTINUED | OUTPATIENT
Start: 2025-05-15 | End: 2025-05-15 | Stop reason: HOSPADM

## 2025-05-15 RX ORDER — DEXAMETHASONE SODIUM PHOSPHATE 4 MG/ML
INJECTION, SOLUTION INTRA-ARTICULAR; INTRALESIONAL; INTRAMUSCULAR; INTRAVENOUS; SOFT TISSUE PRN
Status: DISCONTINUED | OUTPATIENT
Start: 2025-05-15 | End: 2025-05-15 | Stop reason: SURG

## 2025-05-15 RX ORDER — LIDOCAINE HYDROCHLORIDE 20 MG/ML
INJECTION, SOLUTION EPIDURAL; INFILTRATION; INTRACAUDAL; PERINEURAL PRN
Status: DISCONTINUED | OUTPATIENT
Start: 2025-05-15 | End: 2025-05-15 | Stop reason: SURG

## 2025-05-15 RX ORDER — LISINOPRIL 20 MG/1
20 TABLET ORAL DAILY
Status: DISCONTINUED | OUTPATIENT
Start: 2025-05-16 | End: 2025-05-18 | Stop reason: HOSPADM

## 2025-05-15 RX ORDER — ACETAMINOPHEN 325 MG/1
650 TABLET ORAL EVERY 6 HOURS
Status: DISCONTINUED | OUTPATIENT
Start: 2025-05-15 | End: 2025-05-18 | Stop reason: HOSPADM

## 2025-05-15 RX ORDER — TRANEXAMIC ACID 100 MG/ML
INJECTION, SOLUTION INTRAVENOUS PRN
Status: DISCONTINUED | OUTPATIENT
Start: 2025-05-15 | End: 2025-05-15 | Stop reason: SURG

## 2025-05-15 RX ORDER — SODIUM CHLORIDE 9 MG/ML
INJECTION, SOLUTION INTRAMUSCULAR; INTRAVENOUS; SUBCUTANEOUS
Status: DISCONTINUED | OUTPATIENT
Start: 2025-05-15 | End: 2025-05-15 | Stop reason: HOSPADM

## 2025-05-15 RX ORDER — HYDROMORPHONE HYDROCHLORIDE 1 MG/ML
0.4 INJECTION, SOLUTION INTRAMUSCULAR; INTRAVENOUS; SUBCUTANEOUS
Status: DISCONTINUED | OUTPATIENT
Start: 2025-05-15 | End: 2025-05-15 | Stop reason: HOSPADM

## 2025-05-15 RX ORDER — LEVOTHYROXINE SODIUM 100 UG/1
200 TABLET ORAL EVERY MORNING
Status: DISCONTINUED | OUTPATIENT
Start: 2025-05-16 | End: 2025-05-18 | Stop reason: HOSPADM

## 2025-05-15 RX ORDER — NABUMETONE 500 MG/1
500 TABLET, FILM COATED ORAL 4 TIMES DAILY
Status: DISCONTINUED | OUTPATIENT
Start: 2025-05-15 | End: 2025-05-15

## 2025-05-15 RX ORDER — BUPIVACAINE HYDROCHLORIDE 2.5 MG/ML
INJECTION, SOLUTION EPIDURAL; INFILTRATION; INTRACAUDAL; PERINEURAL
Status: DISCONTINUED | OUTPATIENT
Start: 2025-05-15 | End: 2025-05-15 | Stop reason: HOSPADM

## 2025-05-15 RX ORDER — METOPROLOL TARTRATE 1 MG/ML
1 INJECTION, SOLUTION INTRAVENOUS
Status: DISCONTINUED | OUTPATIENT
Start: 2025-05-15 | End: 2025-05-15 | Stop reason: HOSPADM

## 2025-05-15 RX ORDER — DILTIAZEM HYDROCHLORIDE 120 MG/1
120 CAPSULE, COATED, EXTENDED RELEASE ORAL EVERY 12 HOURS
Status: DISCONTINUED | OUTPATIENT
Start: 2025-05-15 | End: 2025-05-18 | Stop reason: HOSPADM

## 2025-05-15 RX ORDER — ALBUTEROL SULFATE 5 MG/ML
2.5 SOLUTION RESPIRATORY (INHALATION)
Status: DISCONTINUED | OUTPATIENT
Start: 2025-05-15 | End: 2025-05-15 | Stop reason: HOSPADM

## 2025-05-15 RX ORDER — AMLODIPINE BESYLATE 5 MG/1
10 TABLET ORAL DAILY
Status: DISCONTINUED | OUTPATIENT
Start: 2025-05-16 | End: 2025-05-18 | Stop reason: HOSPADM

## 2025-05-15 RX ORDER — TRAZODONE HYDROCHLORIDE 50 MG/1
200 TABLET ORAL
Status: DISCONTINUED | OUTPATIENT
Start: 2025-05-15 | End: 2025-05-18 | Stop reason: HOSPADM

## 2025-05-15 RX ORDER — DIPHENHYDRAMINE HYDROCHLORIDE 50 MG/ML
12.5 INJECTION, SOLUTION INTRAMUSCULAR; INTRAVENOUS
Status: DISCONTINUED | OUTPATIENT
Start: 2025-05-15 | End: 2025-05-15 | Stop reason: HOSPADM

## 2025-05-15 RX ORDER — ASPIRIN 81 MG/1
81 TABLET ORAL 2 TIMES DAILY
Status: DISCONTINUED | OUTPATIENT
Start: 2025-05-16 | End: 2025-05-18 | Stop reason: HOSPADM

## 2025-05-15 RX ORDER — OXYCODONE HYDROCHLORIDE 10 MG/1
10 TABLET ORAL
Status: DISCONTINUED | OUTPATIENT
Start: 2025-05-15 | End: 2025-05-18 | Stop reason: HOSPADM

## 2025-05-15 RX ORDER — KETOROLAC TROMETHAMINE 30 MG/ML
INJECTION, SOLUTION INTRAMUSCULAR; INTRAVENOUS
Status: DISCONTINUED | OUTPATIENT
Start: 2025-05-15 | End: 2025-05-15 | Stop reason: HOSPADM

## 2025-05-15 RX ORDER — ONDANSETRON 2 MG/ML
4 INJECTION INTRAMUSCULAR; INTRAVENOUS EVERY 4 HOURS PRN
Status: DISCONTINUED | OUTPATIENT
Start: 2025-05-15 | End: 2025-05-18 | Stop reason: HOSPADM

## 2025-05-15 RX ORDER — HALOPERIDOL 5 MG/ML
1 INJECTION INTRAMUSCULAR EVERY 6 HOURS PRN
Status: DISCONTINUED | OUTPATIENT
Start: 2025-05-15 | End: 2025-05-18 | Stop reason: HOSPADM

## 2025-05-15 RX ORDER — CLONAZEPAM 0.5 MG/1
1 TABLET ORAL
Status: DISCONTINUED | OUTPATIENT
Start: 2025-05-16 | End: 2025-05-18 | Stop reason: HOSPADM

## 2025-05-15 RX ORDER — DEXTROAMPHETAMINE SACCHARATE, AMPHETAMINE ASPARTATE, DEXTROAMPHETAMINE SULFATE AND AMPHETAMINE SULFATE 2.5; 2.5; 2.5; 2.5 MG/1; MG/1; MG/1; MG/1
10 TABLET ORAL
Status: DISCONTINUED | OUTPATIENT
Start: 2025-05-16 | End: 2025-05-17

## 2025-05-15 RX ORDER — ROCURONIUM BROMIDE 10 MG/ML
INJECTION, SOLUTION INTRAVENOUS PRN
Status: DISCONTINUED | OUTPATIENT
Start: 2025-05-15 | End: 2025-05-15 | Stop reason: SURG

## 2025-05-15 RX ORDER — LUBIPROSTONE 24 UG/1
24 CAPSULE ORAL 2 TIMES DAILY WITH MEALS
Status: DISCONTINUED | OUTPATIENT
Start: 2025-05-15 | End: 2025-05-15

## 2025-05-15 RX ORDER — LABETALOL HYDROCHLORIDE 5 MG/ML
5 INJECTION, SOLUTION INTRAVENOUS
Status: DISCONTINUED | OUTPATIENT
Start: 2025-05-15 | End: 2025-05-15 | Stop reason: HOSPADM

## 2025-05-15 RX ORDER — EPHEDRINE SULFATE 50 MG/ML
INJECTION, SOLUTION INTRAVENOUS PRN
Status: DISCONTINUED | OUTPATIENT
Start: 2025-05-15 | End: 2025-05-15 | Stop reason: SURG

## 2025-05-15 RX ORDER — CYCLOBENZAPRINE HCL 10 MG
10 TABLET ORAL 3 TIMES DAILY PRN
Status: DISCONTINUED | OUTPATIENT
Start: 2025-05-15 | End: 2025-05-18 | Stop reason: HOSPADM

## 2025-05-15 RX ORDER — MORPHINE SULFATE 0.5 MG/ML
INJECTION, SOLUTION EPIDURAL; INTRATHECAL; INTRAVENOUS
Status: DISCONTINUED | OUTPATIENT
Start: 2025-05-15 | End: 2025-05-15 | Stop reason: HOSPADM

## 2025-05-15 RX ORDER — ONDANSETRON 2 MG/ML
4 INJECTION INTRAMUSCULAR; INTRAVENOUS
Status: COMPLETED | OUTPATIENT
Start: 2025-05-15 | End: 2025-05-15

## 2025-05-15 RX ORDER — HYDROMORPHONE HYDROCHLORIDE 1 MG/ML
0.5 INJECTION, SOLUTION INTRAMUSCULAR; INTRAVENOUS; SUBCUTANEOUS
Status: DISCONTINUED | OUTPATIENT
Start: 2025-05-15 | End: 2025-05-18 | Stop reason: HOSPADM

## 2025-05-15 RX ORDER — SCOPOLAMINE 1 MG/3D
1 PATCH, EXTENDED RELEASE TRANSDERMAL
Status: DISCONTINUED | OUTPATIENT
Start: 2025-05-15 | End: 2025-05-18 | Stop reason: HOSPADM

## 2025-05-15 RX ORDER — HYDROMORPHONE HYDROCHLORIDE 1 MG/ML
0.2 INJECTION, SOLUTION INTRAMUSCULAR; INTRAVENOUS; SUBCUTANEOUS
Status: DISCONTINUED | OUTPATIENT
Start: 2025-05-15 | End: 2025-05-15 | Stop reason: HOSPADM

## 2025-05-15 RX ORDER — DOCUSATE SODIUM 100 MG/1
100 CAPSULE, LIQUID FILLED ORAL 2 TIMES DAILY
Status: DISCONTINUED | OUTPATIENT
Start: 2025-05-15 | End: 2025-05-18 | Stop reason: HOSPADM

## 2025-05-15 RX ORDER — ALBUTEROL SULFATE 90 UG/1
2 INHALANT RESPIRATORY (INHALATION)
Status: DISCONTINUED | OUTPATIENT
Start: 2025-05-15 | End: 2025-05-18 | Stop reason: HOSPADM

## 2025-05-15 RX ORDER — OXYCODONE HCL 5 MG/5 ML
5 SOLUTION, ORAL ORAL
Status: COMPLETED | OUTPATIENT
Start: 2025-05-15 | End: 2025-05-15

## 2025-05-15 RX ORDER — AMOXICILLIN 250 MG
1 CAPSULE ORAL NIGHTLY
Status: DISCONTINUED | OUTPATIENT
Start: 2025-05-15 | End: 2025-05-18 | Stop reason: HOSPADM

## 2025-05-15 RX ORDER — ACETAMINOPHEN 325 MG/1
650 TABLET ORAL EVERY 6 HOURS PRN
Status: DISCONTINUED | OUTPATIENT
Start: 2025-05-20 | End: 2025-05-18 | Stop reason: HOSPADM

## 2025-05-15 RX ORDER — DIPHENHYDRAMINE HYDROCHLORIDE 50 MG/ML
25 INJECTION, SOLUTION INTRAMUSCULAR; INTRAVENOUS EVERY 6 HOURS PRN
Status: DISCONTINUED | OUTPATIENT
Start: 2025-05-15 | End: 2025-05-18 | Stop reason: HOSPADM

## 2025-05-15 RX ORDER — EPHEDRINE SULFATE 50 MG/ML
5 INJECTION, SOLUTION INTRAVENOUS
Status: DISCONTINUED | OUTPATIENT
Start: 2025-05-15 | End: 2025-05-15 | Stop reason: HOSPADM

## 2025-05-15 RX ORDER — CELECOXIB 200 MG/1
200 CAPSULE ORAL 2 TIMES DAILY
Status: DISCONTINUED | OUTPATIENT
Start: 2025-05-15 | End: 2025-05-18 | Stop reason: HOSPADM

## 2025-05-15 RX ADMIN — KETOROLAC TROMETHAMINE 30 MG: 30 INJECTION, SOLUTION INTRAMUSCULAR; INTRAVENOUS at 11:33

## 2025-05-15 RX ADMIN — ROCURONIUM BROMIDE 50 MG: 10 INJECTION INTRAVENOUS at 11:02

## 2025-05-15 RX ADMIN — HYDROMORPHONE HYDROCHLORIDE 0.5 MG: 2 INJECTION INTRAMUSCULAR; INTRAVENOUS; SUBCUTANEOUS at 12:00

## 2025-05-15 RX ADMIN — DOCUSATE SODIUM 100 MG: 100 CAPSULE, LIQUID FILLED ORAL at 17:09

## 2025-05-15 RX ADMIN — SODIUM CHLORIDE, POTASSIUM CHLORIDE, SODIUM LACTATE AND CALCIUM CHLORIDE: 600; 310; 30; 20 INJECTION, SOLUTION INTRAVENOUS at 10:52

## 2025-05-15 RX ADMIN — PROPOFOL 40 MG: 10 INJECTION, EMULSION INTRAVENOUS at 11:36

## 2025-05-15 RX ADMIN — OXYCODONE HYDROCHLORIDE 10 MG: 10 TABLET ORAL at 19:50

## 2025-05-15 RX ADMIN — MIDAZOLAM HYDROCHLORIDE 2 MG: 1 INJECTION, SOLUTION INTRAMUSCULAR; INTRAVENOUS at 10:52

## 2025-05-15 RX ADMIN — SUCCINYLCHOLINE CHLORIDE 120 MG: 20 INJECTION, SOLUTION INTRAMUSCULAR; INTRAVENOUS at 11:00

## 2025-05-15 RX ADMIN — HYDROMORPHONE HYDROCHLORIDE 0.5 MG: 1 INJECTION, SOLUTION INTRAMUSCULAR; INTRAVENOUS; SUBCUTANEOUS at 17:09

## 2025-05-15 RX ADMIN — OXYCODONE HYDROCHLORIDE 10 MG: 10 TABLET ORAL at 16:07

## 2025-05-15 RX ADMIN — TRANEXAMIC ACID 1000 MG: 100 INJECTION, SOLUTION INTRAVENOUS at 11:53

## 2025-05-15 RX ADMIN — CEFAZOLIN 2 G: 1 INJECTION, POWDER, FOR SOLUTION INTRAMUSCULAR; INTRAVENOUS at 10:52

## 2025-05-15 RX ADMIN — LIDOCAINE HYDROCHLORIDE 100 MG: 20 INJECTION, SOLUTION EPIDURAL; INFILTRATION; INTRACAUDAL; PERINEURAL at 10:59

## 2025-05-15 RX ADMIN — CELECOXIB 200 MG: 200 CAPSULE ORAL at 17:09

## 2025-05-15 RX ADMIN — FENTANYL CITRATE 50 MCG: 50 INJECTION, SOLUTION INTRAMUSCULAR; INTRAVENOUS at 12:39

## 2025-05-15 RX ADMIN — PROPOFOL 40 MG: 10 INJECTION, EMULSION INTRAVENOUS at 12:04

## 2025-05-15 RX ADMIN — SUGAMMADEX 200 MG: 100 INJECTION, SOLUTION INTRAVENOUS at 12:05

## 2025-05-15 RX ADMIN — HYDROMORPHONE HYDROCHLORIDE 0.5 MG: 2 INJECTION INTRAMUSCULAR; INTRAVENOUS; SUBCUTANEOUS at 11:49

## 2025-05-15 RX ADMIN — PROPOFOL 30 MG: 10 INJECTION, EMULSION INTRAVENOUS at 12:07

## 2025-05-15 RX ADMIN — PROPOFOL 200 MG: 10 INJECTION, EMULSION INTRAVENOUS at 10:59

## 2025-05-15 RX ADMIN — PROPOFOL 30 MG: 10 INJECTION, EMULSION INTRAVENOUS at 12:02

## 2025-05-15 RX ADMIN — CEFAZOLIN 2 G: 2 INJECTION, POWDER, FOR SOLUTION INTRAMUSCULAR; INTRAVENOUS at 15:28

## 2025-05-15 RX ADMIN — EPHEDRINE SULFATE 10 MG: 50 INJECTION, SOLUTION INTRAVENOUS at 10:59

## 2025-05-15 RX ADMIN — PROPOFOL 50 MG: 10 INJECTION, EMULSION INTRAVENOUS at 12:08

## 2025-05-15 RX ADMIN — DEXAMETHASONE SODIUM PHOSPHATE 8 MG: 4 INJECTION INTRA-ARTICULAR; INTRALESIONAL; INTRAMUSCULAR; INTRAVENOUS; SOFT TISSUE at 11:05

## 2025-05-15 RX ADMIN — ONDANSETRON 4 MG: 2 INJECTION INTRAMUSCULAR; INTRAVENOUS at 12:36

## 2025-05-15 RX ADMIN — MOMETASONE FUROATE AND FORMOTEROL FUMARATE DIHYDRATE 2 PUFF: 200; 5 AEROSOL RESPIRATORY (INHALATION) at 19:09

## 2025-05-15 RX ADMIN — BUPIVACAINE HYDROCHLORIDE 30 ML: 2.5 INJECTION, SOLUTION EPIDURAL; INFILTRATION; INTRACAUDAL at 11:33

## 2025-05-15 RX ADMIN — SODIUM CHLORIDE 40 ML: 9 INJECTION, SOLUTION INTRAMUSCULAR; INTRAVENOUS; SUBCUTANEOUS at 11:36

## 2025-05-15 RX ADMIN — FENTANYL CITRATE 50 MCG: 50 INJECTION, SOLUTION INTRAMUSCULAR; INTRAVENOUS at 13:15

## 2025-05-15 RX ADMIN — TRANEXAMIC ACID 1000 MG: 100 INJECTION, SOLUTION INTRAVENOUS at 11:05

## 2025-05-15 RX ADMIN — MORPHINE SULFATE 4 MG: 0.5 INJECTION EPIDURAL; INTRATHECAL; INTRAVENOUS at 11:35

## 2025-05-15 RX ADMIN — OXYCODONE HYDROCHLORIDE 10 MG: 5 SOLUTION ORAL at 12:54

## 2025-05-15 RX ADMIN — TRAZODONE HYDROCHLORIDE 200 MG: 50 TABLET ORAL at 22:45

## 2025-05-15 RX ADMIN — VANCOMYCIN HYDROCHLORIDE 1500 MG: 5 INJECTION, POWDER, LYOPHILIZED, FOR SOLUTION INTRAVENOUS at 10:07

## 2025-05-15 RX ADMIN — FENTANYL CITRATE 100 MCG: 50 INJECTION, SOLUTION INTRAMUSCULAR; INTRAVENOUS at 10:59

## 2025-05-15 RX ADMIN — HYDROMORPHONE HYDROCHLORIDE 1 MG: 2 INJECTION INTRAMUSCULAR; INTRAVENOUS; SUBCUTANEOUS at 11:12

## 2025-05-15 RX ADMIN — ONDANSETRON 4 MG: 2 INJECTION INTRAMUSCULAR; INTRAVENOUS at 11:05

## 2025-05-15 RX ADMIN — ACETAMINOPHEN 650 MG: 325 TABLET, FILM COATED ORAL at 17:09

## 2025-05-15 RX ADMIN — PHENYLEPHRINE HYDROCHLORIDE 0.5 MCG/KG/MIN: 10 INJECTION INTRAVENOUS at 10:59

## 2025-05-15 RX ADMIN — HYDROMORPHONE HYDROCHLORIDE 0.5 MG: 1 INJECTION, SOLUTION INTRAMUSCULAR; INTRAVENOUS; SUBCUTANEOUS at 21:06

## 2025-05-15 SDOH — ECONOMIC STABILITY: TRANSPORTATION INSECURITY
IN THE PAST 12 MONTHS, HAS LACK OF RELIABLE TRANSPORTATION KEPT YOU FROM MEDICAL APPOINTMENTS, MEETINGS, WORK OR FROM GETTING THINGS NEEDED FOR DAILY LIVING?: NO

## 2025-05-15 SDOH — ECONOMIC STABILITY: TRANSPORTATION INSECURITY
IN THE PAST 12 MONTHS, HAS THE LACK OF TRANSPORTATION KEPT YOU FROM MEDICAL APPOINTMENTS OR FROM GETTING MEDICATIONS?: YES

## 2025-05-15 ASSESSMENT — COGNITIVE AND FUNCTIONAL STATUS - GENERAL
SUGGESTED CMS G CODE MODIFIER MOBILITY: CL
MOVING TO AND FROM BED TO CHAIR: A LOT
MOBILITY SCORE: 14
DRESSING REGULAR LOWER BODY CLOTHING: A LOT
DAILY ACTIVITIY SCORE: 18
STANDING UP FROM CHAIR USING ARMS: A LOT
SUGGESTED CMS G CODE MODIFIER DAILY ACTIVITY: CK
HELP NEEDED FOR BATHING: A LITTLE
DRESSING REGULAR UPPER BODY CLOTHING: A LITTLE
TOILETING: A LOT
WALKING IN HOSPITAL ROOM: A LOT
TURNING FROM BACK TO SIDE WHILE IN FLAT BAD: A LITTLE
CLIMB 3 TO 5 STEPS WITH RAILING: A LOT
MOVING FROM LYING ON BACK TO SITTING ON SIDE OF FLAT BED: A LITTLE

## 2025-05-15 ASSESSMENT — PATIENT HEALTH QUESTIONNAIRE - PHQ9
1. LITTLE INTEREST OR PLEASURE IN DOING THINGS: NOT AT ALL
4. FEELING TIRED OR HAVING LITTLE ENERGY: NOT AT ALL
SUM OF ALL RESPONSES TO PHQ QUESTIONS 1-9: 1
7. TROUBLE CONCENTRATING ON THINGS, SUCH AS READING THE NEWSPAPER OR WATCHING TELEVISION: NOT AT ALL
9. THOUGHTS THAT YOU WOULD BE BETTER OFF DEAD, OR OF HURTING YOURSELF: NOT AT ALL
SUM OF ALL RESPONSES TO PHQ9 QUESTIONS 1 AND 2: 1
2. FEELING DOWN, DEPRESSED, IRRITABLE, OR HOPELESS: SEVERAL DAYS
6. FEELING BAD ABOUT YOURSELF - OR THAT YOU ARE A FAILURE OR HAVE LET YOURSELF OR YOUR FAMILY DOWN: NOT AL ALL
3. TROUBLE FALLING OR STAYING ASLEEP OR SLEEPING TOO MUCH: NOT AT ALL
5. POOR APPETITE OR OVEREATING: NOT AT ALL
8. MOVING OR SPEAKING SO SLOWLY THAT OTHER PEOPLE COULD HAVE NOTICED. OR THE OPPOSITE, BEING SO FIGETY OR RESTLESS THAT YOU HAVE BEEN MOVING AROUND A LOT MORE THAN USUAL: NOT AT ALL

## 2025-05-15 ASSESSMENT — LIFESTYLE VARIABLES
TOTAL SCORE: 0
EVER FELT BAD OR GUILTY ABOUT YOUR DRINKING: NO
CONSUMPTION TOTAL: NEGATIVE
EVER HAD A DRINK FIRST THING IN THE MORNING TO STEADY YOUR NERVES TO GET RID OF A HANGOVER: NO
TOTAL SCORE: 0
HAVE PEOPLE ANNOYED YOU BY CRITICIZING YOUR DRINKING: NO
ON A TYPICAL DAY WHEN YOU DRINK ALCOHOL HOW MANY DRINKS DO YOU HAVE: 1
HOW MANY TIMES IN THE PAST YEAR HAVE YOU HAD 5 OR MORE DRINKS IN A DAY: 0
AVERAGE NUMBER OF DAYS PER WEEK YOU HAVE A DRINK CONTAINING ALCOHOL: 0
HAVE YOU EVER FELT YOU SHOULD CUT DOWN ON YOUR DRINKING: NO
DOES PATIENT WANT TO STOP DRINKING: NO
TOTAL SCORE: 0
ALCOHOL_USE: YES

## 2025-05-15 ASSESSMENT — PAIN DESCRIPTION - PAIN TYPE
TYPE: SURGICAL PAIN
TYPE: ACUTE PAIN;SURGICAL PAIN
TYPE: ACUTE PAIN;SURGICAL PAIN
TYPE: SURGICAL PAIN

## 2025-05-15 ASSESSMENT — SOCIAL DETERMINANTS OF HEALTH (SDOH)
WITHIN THE PAST 12 MONTHS, YOU WORRIED THAT YOUR FOOD WOULD RUN OUT BEFORE YOU GOT THE MONEY TO BUY MORE: SOMETIMES TRUE
WITHIN THE LAST YEAR, HAVE YOU BEEN KICKED, HIT, SLAPPED, OR OTHERWISE PHYSICALLY HURT BY YOUR PARTNER OR EX-PARTNER?: NO
WITHIN THE PAST 12 MONTHS, THE FOOD YOU BOUGHT JUST DIDN'T LAST AND YOU DIDN'T HAVE MONEY TO GET MORE: SOMETIMES TRUE
WITHIN THE LAST YEAR, HAVE YOU BEEN HUMILIATED OR EMOTIONALLY ABUSED IN OTHER WAYS BY YOUR PARTNER OR EX-PARTNER?: NO
WITHIN THE LAST YEAR, HAVE TO BEEN RAPED OR FORCED TO HAVE ANY KIND OF SEXUAL ACTIVITY BY YOUR PARTNER OR EX-PARTNER?: NO
WITHIN THE LAST YEAR, HAVE YOU BEEN AFRAID OF YOUR PARTNER OR EX-PARTNER?: NO
IN THE PAST 12 MONTHS, HAS THE ELECTRIC, GAS, OIL, OR WATER COMPANY THREATENED TO SHUT OFF SERVICE IN YOUR HOME?: YES

## 2025-05-15 ASSESSMENT — FIBROSIS 4 INDEX: FIB4 SCORE: 1.71

## 2025-05-15 NOTE — OR NURSING
1245: Report received from Juany MILLS. Respirations even and unlabored    1254: Denies nausea, tolerating water, pain 7/10 and given Oxycodone 10mg Oral Solution.    1315: Pain 8/10, given fentanyl 50mcg    1326: Report given to Juany MILLS

## 2025-05-15 NOTE — ANESTHESIA PREPROCEDURE EVALUATION
Case: [id number] Date/Time: 05/15/25 1044    Procedure: ARTHROPLASTY, HIP, TOTAL, ANTERIOR APPROACH (Left)    Location:  OR 03 / SURGERY [facility]    Surgeons: Denilson Hays M.D.          64F PMHx HTN, HFpEF, COPD on 3L NC at home    Relevant Problems   PULMONARY   (positive) Chronic obstructive pulmonary disease (COPD) (HCC)      CARDIAC   (positive) CHF (congestive heart failure) (HCC)      ENDO   (positive) Hypothyroid      Other   (positive) Osteoarthritis of left hip   (positive) Primary osteoarthritis of left hip       Physical Exam    Airway   Mallampati: II  TM distance: >3 FB  Neck ROM: full       Cardiovascular - normal exam  Rhythm: regular  Rate: normal    (-) murmur     Dental - normal exam           Pulmonary - normal examBreath sounds clear to auscultation     Abdominal    Neurological - normal exam               TTE 4/6/25  Conclusion   1. The left ventricle is moderately dilated with normal systolic function and no regional wall motion abnormalities. Ejection fraction 60-65% by Elizabeth's biplane. Mild concentric left ventricular hypertrophy. There is grade 1 (impaired relaxation) diastolic dysfunction with normal LV filling pressures.   2. Left atrium is mildly dilated; LA volume index 37 mL/m2.   3. Mild aortic regurgitation.   4. Mild tricuspid regurgitation.   5. Right ventricle is borderline dilated with normal function; the RVSP is normal measuring 32 mmHg. The right atrium is dilated.   6. The ascending aorta is dilated measuring 4.3 cm. *Abdominal aorta dissection noted (this is known).     Anesthesia Plan    ASA 3   ASA physical status 3 criteria: COPD - poorly controlled    Plan - general       Airway plan will be ETT          Induction: intravenous    Postoperative Plan: Postoperative administration of opioids is intended.    Pertinent diagnostic labs and testing reviewed    Informed Consent:    Anesthetic plan and risks discussed with patient.    Use of blood products discussed  with: patient whom consented to blood products.

## 2025-05-15 NOTE — OR NURSING
0845:  Pt O2 sats 85 on RA.  Placed on 3 L NC to optimize FiO2.  MD aware      Patient allergies and NPO status verified, home medication reconciliation completed and belongings secured. Patient verbalizes understanding of pain scale, expected course of stay and plan of care. Surgical site verified with patient. IV access established. Sequentials placed on legs.

## 2025-05-15 NOTE — ANESTHESIA PROCEDURE NOTES
Peripheral IV    Date/Time: 5/15/2025 12:20 PM    Performed by: Bud Harris M.D.  Authorized by: Bud Harris M.D.    Size:  20 G  Laterality:  Right  Peripheral IV Location:  AC  Site Prep:  Alcohol  Technique:  Ultrasound guided  Attempts:  1  Difficult IV necessitating physician skill: IV access difficult

## 2025-05-15 NOTE — PROGRESS NOTES
4 Eyes Skin Assessment Completed by GENE Vickers and GENE Kiser.    Head WDL  Ears WDL  Nose WDL  Mouth WDL  Neck WDL  Breast/Chest WDL  Shoulder Blades WDL  Spine WDL  (R) Arm/Elbow/Hand WDL  (L) Arm/Elbow/Hand WDL  Abdomen Bruising  Groin WDL  Scrotum/Coccyx/Buttocks WDL  (R) Leg WDL  (L) Leg Incision to hip with CDI benson dressing  (R) Heel/Foot/Toe WDL  (L) Heel/Foot/Toe WDL          Devices In Places Blood Pressure Cuff, Pulse Ox, SCD's, and NYA's      Interventions In Place Gray Ear Foams and Pillows    Possible Skin Injury No    Pictures Uploaded Into Epic N/A  Wound Consult Placed N/A  RN Wound Prevention Protocol Ordered No

## 2025-05-15 NOTE — H&P
Surgery Orthopedic History & Physical Note    Date  5/15/2025    Primary Care Physician  Denver J Miller, M.D.    CC  Left hip pain    HPI  This is a 64 y.o. female who presented with left hip pain and DJD    Past Medical History[1]    Past Surgical History[2]    Current Medications[3]    Social History     Socioeconomic History    Marital status:      Spouse name: Not on file    Number of children: Not on file    Years of education: Not on file    Highest education level: Not on file   Occupational History    Not on file   Tobacco Use    Smoking status: Every Day     Current packs/day: 1.00     Average packs/day: 1 pack/day for 16.4 years (16.4 ttl pk-yrs)     Types: Cigarettes     Start date: 2009    Smokeless tobacco: Never   Substance and Sexual Activity    Alcohol use: No    Drug use: Not Currently     Types: Inhaled     Comment: marijuana    Sexual activity: Not on file   Other Topics Concern    Not on file   Social History Narrative    She lives [place] min from [facility].  She has 4 grandkids.  [place]  [place] to [place].  G[place] up [place] with a pool [place] in [place].   Wants to quit smoking.  Previously a book keeper, now retired on disability.             Social Drivers of Health     Financial Resource Strain: High Risk (4/5/2025)    Received from [employer]    Overall Financial Resource Strain (CARDIA)     Difficulty of Paying Living Expenses: Hard   Food Insecurity: Food Insecurity Present (4/5/2025)    Received from [employer]    Hunger Vital Sign     Worried About Running Out of Food in the Last Year: Sometimes true     Ran Out of Food in the Last Year: Sometimes true   Transportation Needs: Unmet Transportation Needs (4/5/2025)    Received from [facility]    PRAPARE - Transportation     Lack of Transportation (Medical): No     Lack of Transportation (Non-Medical): Yes   Physical Activity: Inactive (4/5/2025)    Received from [facility]    Exercise Vital Sign     Days of Exercise per Week: 0 days     Minutes of Exercise per Session: 0 min   Stress: Stress Concern Present (4/5/2025)    Received from [facility]    [employer] - Occupational Stress Questionnaire     Feeling of Stress : Rather much   Social Connections: Socially Isolated (4/5/2025)    Received from [facility]    Social Connection and Isolation Panel [NHANES]     Frequency of Communication with Friends and Family: More than three times a week     Frequency of Social Gatherings with Friends and Family: More than three times a week     Attends Jewish Services: Never     Active Member of Clubs or Organizations: No     Attends Club or Organization Meetings: Never     Marital Status:    Intimate Partner Violence: Not At Risk (4/5/2025)    Received from [facility]    Humiliation, Afraid, Rape, and Kick questionnaire     Fear of Current or Ex-Partner: No     Emotionally Abused: No     Physically Abused: No     Sexually Abused: No   Housing Stability: High Risk (4/5/2025)    Received from [facility]    Housing Stability Vital Sign     Unable to Pay for Housing in the Last Year: Yes     Number of Times Moved in the Last Year: 0     Homeless in the Last Year: No       Family History   Problem Relation Age of Onset    Cancer Mother     Stroke Father     Hypertension Father        Allergies  Codeine and Tape    Review of Systems  Negative    Physical Exam    Vital Signs  Blood Pressure: 103/51   Temperature: 36.2 °C (97.2 °F)   Pulse: 70   Respiration: 16   Pulse Oximetry: (!) 85 % (See NN)     Alert/oriented  No labored breathing  Heart - regular  Skin - intact    Labs:      Radiology:  DX-HIP-UNILATERAL-W/O PELVIS-2/3 VIEWS LEFT    (Results Pending)   DX-PORTABLE FLUORO > 1 HOUR    (Results Pending)     Assessment/Plan:  * No Diagnosis Codes entered *  Procedure(s):  ARTHROPLASTY, HIP, TOTAL, ANTERIOR APPROACH         [1]    Past Medical History:  Diagnosis Date    Anemia     Arthritis     Asthma     Breath shortness     Cancer (HCC)     thyroid    COPD (chronic obstructive pulmonary disease) (HCC)     Diverticulitis     Diverticulosis     Emphysema of lung (HCC)     GERD (gastroesophageal reflux disease)     Heart burn     HTN (hypertension) 06/24/2010    Hypothyroid 06/24/2010    MRSA (methicillin resistant staph aureus) culture positive     abd wound-wound vac in place    MRSA infection     chronic from abdominal wound    Pain 05/13/2025    L hip 10    Palpitations     Psoriasis 06/24/2010    Psychiatric disorder     depression/anxiety    Thyroid cancer (HCC) 09/01/1990    Unspecified urinary incontinence    [2]   Past Surgical History:  Procedure Laterality Date    EXPLORATORY LAPAROTOMY  5/23/2017    Procedure: EXPLORATORY LAPAROTOMY - EXPLORE ABDOMINAL WALL, REMOVE BIOLOGIC MESH, ABDOMINAL WALL RESECTION INVOLVING OLD RETAINED MESH AND CHRONIC INFECTION, ABDOMINAL WALL RECONSTRUCTION WITH MESH;  Surgeon: Warner Figueroa D.O.;  Location: [facility];  Service:     EXPLORATORY LAPAROTOMY  3/9/2017    Procedure: EXPLORATORY LAPAROTOMY - REMOVAL MESH W/POSSIBLE INSERTION BIOLOGIC MESH AND PLACEMENT OF WOUND VAC;  Surgeon: Warner Figueroa D.O.;  Location: [facility];  Service:     EXPLORATORY LAPAROTOMY  3/5/2017    Procedure: EXPLORATORY LAPAROTOMY - drainage of abdominal wall abscess;  Surgeon: Jamaal Curran M.D.;  Location: SUR[facility];  Service:     COLONOSCOPY  1/18/2016    Procedure: COLONOSCOPY;  Surgeon: Denilson Menendez M.D.;  Location: [facility];  Service:     WIDE EXCISION  2/5/2015    Performed by Miki Samuel Jr., M.D. at SURGERY SAME DAY [facility]    FLAP CLOSURE  2/5/2015    Performed by Miki Samuel Jr., M.D. at SURGERY SAME DAY [facility]    INCISION HERNIA REPAIR  5/1/2014    Performed by Joaquin Larios M.D. at SURGERY [facility] DAY [facility]     PARASTOMAL HERNIA REPAIR   5/22/2013    Performed by Warner Figueroa D.O. at SURGERY [facility]    COLOSTOMY CLOSURE  5/22/2013    Performed by Warner Figueroa D.O. at SURGERY [facility]    ABDOMINAL ABSCESS DRAINAGE  12/15/2012    Performed by Warner Figueroa D.O. at SURGERY [facility]    COLOSTOMY  12/15/2012    Performed by Warner Figueroa D.O. at SURGERY [facility]    EXPLORATORY LAPAROTOMY  12/15/2012    Performed by Warner Figueroa D.O. at SURGERY [facility]    WOUND DEHISCENCE  12/11/2012    Performed by Magno Baez M.D. at SURGERY [facility]    COLON RESECTION LAPAROSCOPIC  12/5/2012    Performed by Magno Baez M.D. at SURGERY [facility]    OTHER  1990's    thyroidectomy    OTHER ABDOMINAL SURGERY  c/section x 2    PRIMARY C SECTION      TUBAL COAGULATION LAPAROSCOPIC BILATERAL     [3]   Current Facility-Administered Medications   Medication Dose Route Frequency Provider Last Rate Last Admin    lidocaine (Xylocaine) 1 % injection 0.5 mL  0.5 mL Intradermal Once PRN Denilson Hays M.D.        lactated ringers infusion   Intravenous Continuous Denilson Hays M.D.        ceFAZolin (Ancef) injection 2 g  2 g Intravenous Once Denilson Hays M.D.        vancomycin 1500 mg/250mL NS IVPB premix  1,500 mg Intravenous Once PRN Denilson Hays M.D. 166.7 mL/hr at 05/15/25 1007 1,500 mg at 05/15/25 1007    acetaminophen (Tylenol) tablet 1,000 mg  1,000 mg Oral Once Bud Harris M.D.

## 2025-05-15 NOTE — ED NOTES
RN in with ERP to update that she is being discharged and can return at 0830 for her surgical appointment with DR Dhillon.  ERP has reached out to Dr Dhillon our hospitalist and Anesthesiologist, Pt is cleared for surgery cardiac gamboa.        DAVID Ruiz notified as patient wants to speak with a patient advocate.

## 2025-05-15 NOTE — ED NOTES
"This RN at bedside to speak with pt and significant other. Pt states \" she has no way to get home, and therefore cannot be dc'd\". After review of  pt's previous visit on 4/22/2025 by this RN, pt was in a similar situation less than a month ago and was transported home by [facility]. This was recommended to pt, and pt states \" I have no money to pay for [facility].  This RN spoke with ERP and there is no medical reason for pt to remain in the ER,. [facility] is being arranged and payment approved by Jasbir Hutton. ERP and Charge RN aware.   "

## 2025-05-15 NOTE — OR NURSING
1225: To PACU from OR via gurney, awake, respirations spontaneous and non-labored. Stable on 6L O2 via mask. Ice pack applied over c/d/i left hip surgical dressings. LLE CMS intact.    1240: Pt c/o pain 8/10, medicated per prn orders. C/o nausea, medicated per prn orders.     1245: Report given to Ginger MILLS    1325: Report received from Ginger MILLS. Pt states pain is tolerable, 4/10. Denies nausea.     1330:Meets criteria to transfer to floor.     Transferred on O2 tank @ 236

## 2025-05-15 NOTE — DISCHARGE PLANNING
ER SW asked to assist with transportation of Pt home.  SW placed call to [facility] and arranged W/C with O2 transportation for [phone]  (ref#[id number]). MANNY updated [facility] ED staff with phone call.

## 2025-05-15 NOTE — DISCHARGE PLANNING
Care Transition Team Assessment    LSW spoke with pt at bedside to complete assessment. Pt verified the information on the face sheet. Pt lives alone in a mobile home that has four steps to enter. Pt reported that for the last six months she has had a lot of difficulty with ADLs/IADLs. Pt stated she typically needs assistance with meal preparation, home management, dressing, showering, and has not driven in six months. Pt orders door dash for food/groceries. Pt's S/O at bedside reported he helps pt when he can, however he does work full time. Pt additionally has a daughter who lives in [place], however she is limited support due to having her own family/work responsibilities. Pt uses 3L O2 at baseline, however she is unsure of the DME company. Pt additionally has a shower chair, cane, and 4WW. Pt stated she has tried to coordinate transportation with her daughter, however this is difficult due to her daughters busy schedule. Pt receives SSDI and  benefits totaling about $1,500/month. Pt denies any SA or MH concerns. Pt does not have any ACP documents and NOK is her daughter, Jody [phone].    Pt reported she has had HHC services in the past through [facility] and enjoyed their services.    PMR and SNF orders placed per protocol and SNF referrals sent.    hospitals #[id number]  LOC #[id number]    Information Source  Orientation Level: Oriented X4  Information Given By: Patient  Informant's Name: Thelma Abbott  Who is responsible for making decisions for patient? : Patient    Readmission Evaluation  Is this a readmission?: No    Elopement Risk  Legal Hold: No  Ambulatory or Self Mobile in Wheelchair: Yes  Disoriented: No  Psychiatric Symptoms: None  History of Wandering: No  Elopement this Admit: No  Vocalizing Wanting to Leave: No  Displays Behaviors, Body Language Wanting to Leave: No-Not at Risk for Elopement  Elopement Risk: Not at Risk for Elopement    Interdisciplinary Discharge  Planning  Lives with - Patient's Self Care Capacity: Alone and Unable to Care For Self  Patient or legal guardian wants to designate a caregiver: No  Support Systems: Spouse / Significant Other  Housing / Facility: Mobile Home  Prior Services: Home-Independent    Discharge Preparedness  What is your plan after discharge?: Home health care, Home with help  What are your discharge supports?: Child, Partner  Prior Functional Level: Ambulatory, Needs Assist with Activities of Daily Living, Independent with Medication Management, Uses Walker  Difficulity with ADLs: Bathing, Dressing, Walking  Difficulity with IADLs: Cooking, Driving, Laundry, Shopping    Functional Assesment  Prior Functional Level: Ambulatory, Needs Assist with Activities of Daily Living, Independent with Medication Management, Uses Walker    Finances  Financial Barriers to Discharge: No  Prescription Coverage: Yes    Vision / Hearing Impairment  Vision Impairment : Yes  Right Eye Vision: Impaired, Wears Glasses  Left Eye Vision: Impaired, Wears Glasses  Hearing Impairment : No    Advance Directive  Advance Directive?: None    Domestic Abuse  Possible Abuse/Neglect Reported to:: Not Applicable    Psychological Assessment  History of Substance Abuse: None  History of Psychiatric Problems: No  Non-compliant with Treatment: No  Newly Diagnosed Illness: No    Discharge Risks or Barriers  Discharge risks or barriers?: Post-acute placement / services, Lives alone, no community support  Patient risk factors: Cognitive / sensory / physical deficit, Lack of outside supports, Lives alone and no community support, Bariatric    Anticipated Discharge Information  Discharge Disposition: D/T to home under A care in anticipation of covered skilled care (06)  Discharge Address: [address] [place] [address] M[place] Maldonado [id number]

## 2025-05-15 NOTE — ANESTHESIA PROCEDURE NOTES
Airway    Date/Time: 5/15/2025 11:01 AM    Performed by: Bud Harris M.D.  Authorized by: Bud Harris M.D.    Location:  OR  Urgency:  Elective  Indications for Airway Management:  Anesthesia      Spontaneous Ventilation: absent    Sedation Level:  Deep  Preoxygenated: Yes    Patient Position:  Sniffing  Mask Difficulty Assessment:  0 - not attempted  Final Airway Type:  Endotracheal airway  Final Endotracheal Airway:  ETT  Cuffed: Yes    Technique Used for Successful ETT Placement:  Video laryngoscopy    Insertion Site:  Oral  Blade Type:  Glide  Laryngoscope Blade/Videolaryngoscope Blade Size:  3  ETT Size (mm):  7.0  Measured from:  Teeth  ETT to Teeth (cm):  23  Placement Verified by: auscultation and capnometry    Cormack-Lehane Classification:  Grade I - full view of glottis  Number of Attempts at Approach:  1  Number of Other Approaches Attempted:  0   Atraumatic. ETT secured with silk tape, per patient request

## 2025-05-15 NOTE — ANESTHESIA TIME REPORT
Anesthesia Start and Stop Event Times       Date Time Event    5/15/2025 1024 Ready for Procedure     1052 Anesthesia Start     1228 Anesthesia Stop          Responsible Staff  05/15/25      Name Role Begin End    Bud Harris M.D. Anesth 1052 1228          Overtime Reason:  no overtime (within assigned shift)    Comments:

## 2025-05-15 NOTE — THERAPY
Physical Therapy Contact Note    Patient Name: Thelma Abbott  Age:  64 y.o., Sex:  female  Medical Record #: [id number]  Today's Date: 5/15/2025       05/15/25 1601   Interdisciplinary Plan of Care Collaboration   IDT Collaboration with  Family / Caregiver;Nursing   Patient Position at End of Therapy In Bed;Bed Alarm On;Call Light within Reach;Tray Table within Reach;Phone within Reach;Family / Friend in Room   Collaboration Comments SPT attempted therapy eval, but pt was not interested in completing functional mobility evaluation due to fatigue and wanting to sleep. Pt was educated on precautions, supine exercises, and the importance of ice and mobilizing. Pt displayed adequate understanding of exercises/precautions. SPT attempted to motivate the pt once more to try sitting up/moving, but pt said she'd prefer if we came back a different time.

## 2025-05-15 NOTE — ED NOTES
Clinically stable. F/up with endocrinology.   Patient is stable for d/c at this time, anticipatory guidance provided, close follow-up encouraged, and ED return instructions have been detailed. Patient and family are both agreeable to the disposition, and plan and discharged home in ambulatory state and good condition.

## 2025-05-15 NOTE — ED NOTES
Reached out to Stiven JACKSON and DAVID regarding patient wanting a patient advocate.  Was asked to reach out to García Felder, García was voalte at 1631 and VM left at 1645 for assistance with patient.  No Call back at this time.

## 2025-05-15 NOTE — CARE PLAN
The patient is Stable - Low risk of patient condition declining or worsening    Shift Goals  Clinical Goals: Pain control, walk 50ft    Progress made toward(s) clinical / shift goals:  Discussed information regarding condition, treatment plan, diagnostic tests, and medications with patient.   Discussed information regarding condition, treatment plan, diagnostic tests, and medications with patient.   Problem: Fall Risk  Goal: Patient will remain free from falls  Outcome: Progressing     Problem: Pain - Standard  Goal: Alleviation of pain or a reduction in pain to the patient’s comfort goal  Outcome: Progressing       Patient is not progressing towards the following goals:

## 2025-05-15 NOTE — PROGRESS NOTES
Received report from PACU RN and assumed care of patient at 1435. Patient denies needs at this time. CMS intact. Dressing to left hip CDI. Patient able to void. Call light in reach. Bed in lowest locked position. Bed alarm on. Hourly rounding to continue.      NONE

## 2025-05-15 NOTE — OP REPORT
DATE OF PROCEDURE: 5/15/2025    PREOPERATIVE DIAGNOSIS:     Degenerative joint disease, left hip  Obesity (BMI 36.8)    POSTOPERATIVE DIAGNOSIS:     Degenerative joint disease, left hip  Obesity (BMI 36.8)    PROCEDURE: Left total hip arthroplasty    SURGEON: Denilson Hays MD    ASSISTANT: Raffy Gomez PA-C     ANESTHESIA: Bud Harris MD    ANESTHETIC: General    EBL:  400 cc    IMPLANTS:     1.  [employer] R3 acetabulum, 52 mm (2 acetabular screws: 25 mm, 20 mm)  2.  52 mm x 36 mm x 0 degree polyethylene insert  3.  Size 1 standard Polar stem with collar  4.  36 mm/+ 8 mm Oxinium femoral head    INDICATIONS: Thelma has severe arthritis of both hips. The left hip pain interferes with her ability to ambulate safely.  Conservative treatment is not an option.  She wished to proceed with left total hip arthroplasty. Risks were discussed and these include bleeding, infection, neurovascular injury, pain, stiffness, implant failure, leg length discrepancy, fracture, dislocation, thromboembolic phenomenon, anesthetic and medical complications etc.  Due to bilateral disease, she will likely have left leg long after surgery on this side, this can be equalized if and when right hip surgery is needed.    PROCEDURE DESCRIPTION: Patient was identified in the preoperative holding area and her left hip was marked.  She was taken to the operating room where a general anesthetic was administered.  Intravenous antibiotics and tranexamic acid were given.  Boots and SCDs were placed and then she was transferred to the [place] table and the boots were attached to the leg extensions.   Anterior abdominal soft tissue was taped to the opposite side of the bed.  The anterolateral hip and thigh was then prepped and draped in sterile fashion.  A timeout was held to confirm the patient's identity and correct surgical site.    Longitudinal incision was made distal and lateral to the ASIS down to the fascia overlying the  tensor fascia senia.  This fascia was incised longitudinally and then the TFL was elevated off the medial fascia and retracted laterally.  Deep fascia was incised and the lateral circumflex vessels were cauterized.  Precapsular fat was excised.  Capsulotomy was performed.  Anterior and posterior limbs of the capsule were tagged.  Retractors were placed around the medial and lateral aspect of the femoral neck.  Femoral neck was then cut in napkin-ring fashion and the femoral head was removed.    Labrum was excised.  I reamed medially at 49 mm and then at 52 mm.  I placed the cup in standard fashion using fluoroscopy to guide placement.  I secured this with 2 screws into the ilium.  The cup was washed and dried and then the liner was placed in standard fashion.    Then extended and adducted the left leg after releasing superolateral capsule up to the ilium.  Conjoint tendon was released allowing elevation of the proximal femur with external rotation.  I then opened up the medullary canal and used a rongeur to remove lateral bone.  Then broached up to size 1 where there was good axial and rotational stability of the broach.  We trialed with a +4 head and standard neck and found good stability with left leg slightly shorter.  Hip was dislocated and trials were removed.    The femoral stem was inserted.  Seo taper was washed and dried.  Femoral head was placed in standard fashion.  Hip was reduced.  Fluoroscopic image showed femoral stem contained within the bone and good stem position and hip reduction.  Dilute betadine soak was performed and hip was irrigated.  Additional dose of TXA was given.  Local anesthetic mixture was injected in the pericapsular tissues.  The capsule was repaired with #1 Vicryl.  Fascia over the TFL was repaired with #1 Vicryl.  Skin was closed with 2-0 Vicryl and 3-0 Monocryl.  Dermabond was applied followed by Acticoat and a benson VAC.  The patient was taken off of the operating table and out  of the boots.  I noted left leg a few mm longer as expected. She was then taken to the recovery room in stable condition.    POSTOPERATIVE PLAN:     1.  Weightbearing as tolerated with walker as needed  2.  DVT prophylaxis with early mobilization and aspirin 81 mg p.o. twice daily for 4 weeks  3.  Wound check and baseline radiographs in approximately 10-14 days

## 2025-05-15 NOTE — ED NOTES
NAM at bedside,  We have reached out to AMNNY at [facility] to help assist with [facility] transport home.  MANNY is agreeable to arranging ride home for patient with [facility].    Patient updated, continues to be upset, not wanting to discharge home,  states she will call [facility] emergency line.  NAM advised we are arranging [facility] transport for her and she needs to prepare for discharge home at this time.

## 2025-05-16 PROCEDURE — 94640 AIRWAY INHALATION TREATMENT: CPT

## 2025-05-16 PROCEDURE — 770001 HCHG ROOM/CARE - MED/SURG/GYN PRIV*

## 2025-05-16 PROCEDURE — 700111 HCHG RX REV CODE 636 W/ 250 OVERRIDE (IP): Mod: JZ | Performed by: ORTHOPAEDIC SURGERY

## 2025-05-16 PROCEDURE — 700102 HCHG RX REV CODE 250 W/ 637 OVERRIDE(OP): Performed by: ORTHOPAEDIC SURGERY

## 2025-05-16 PROCEDURE — A9270 NON-COVERED ITEM OR SERVICE: HCPCS | Performed by: ORTHOPAEDIC SURGERY

## 2025-05-16 PROCEDURE — 94760 N-INVAS EAR/PLS OXIMETRY 1: CPT

## 2025-05-16 PROCEDURE — 97166 OT EVAL MOD COMPLEX 45 MIN: CPT

## 2025-05-16 PROCEDURE — 97535 SELF CARE MNGMENT TRAINING: CPT

## 2025-05-16 PROCEDURE — 99407 BEHAV CHNG SMOKING > 10 MIN: CPT

## 2025-05-16 PROCEDURE — 700102 HCHG RX REV CODE 250 W/ 637 OVERRIDE(OP)

## 2025-05-16 PROCEDURE — 97163 PT EVAL HIGH COMPLEX 45 MIN: CPT

## 2025-05-16 PROCEDURE — A9270 NON-COVERED ITEM OR SERVICE: HCPCS

## 2025-05-16 RX ORDER — HYDROXYZINE HYDROCHLORIDE 25 MG/1
25 TABLET, FILM COATED ORAL 3 TIMES DAILY PRN
Status: DISCONTINUED | OUTPATIENT
Start: 2025-05-16 | End: 2025-05-18 | Stop reason: HOSPADM

## 2025-05-16 RX ADMIN — PAROXETINE HYDROCHLORIDE 20 MG: 20 TABLET, FILM COATED ORAL at 06:12

## 2025-05-16 RX ADMIN — ASPIRIN 81 MG: 81 TABLET, COATED ORAL at 06:11

## 2025-05-16 RX ADMIN — HYDROMORPHONE HYDROCHLORIDE 0.5 MG: 1 INJECTION, SOLUTION INTRAMUSCULAR; INTRAVENOUS; SUBCUTANEOUS at 20:45

## 2025-05-16 RX ADMIN — OXYCODONE HYDROCHLORIDE 10 MG: 10 TABLET ORAL at 10:24

## 2025-05-16 RX ADMIN — ACETAMINOPHEN 650 MG: 325 TABLET, FILM COATED ORAL at 11:38

## 2025-05-16 RX ADMIN — CARVEDILOL 3.12 MG: 3.12 TABLET, FILM COATED ORAL at 17:40

## 2025-05-16 RX ADMIN — OXYCODONE HYDROCHLORIDE 10 MG: 10 TABLET ORAL at 00:15

## 2025-05-16 RX ADMIN — MOMETASONE FUROATE AND FORMOTEROL FUMARATE DIHYDRATE 2 PUFF: 200; 5 AEROSOL RESPIRATORY (INHALATION) at 07:32

## 2025-05-16 RX ADMIN — CELECOXIB 200 MG: 200 CAPSULE ORAL at 17:40

## 2025-05-16 RX ADMIN — ACETAMINOPHEN 650 MG: 325 TABLET, FILM COATED ORAL at 23:23

## 2025-05-16 RX ADMIN — ACETAMINOPHEN 650 MG: 325 TABLET, FILM COATED ORAL at 17:39

## 2025-05-16 RX ADMIN — HYDROMORPHONE HYDROCHLORIDE 0.5 MG: 1 INJECTION, SOLUTION INTRAMUSCULAR; INTRAVENOUS; SUBCUTANEOUS at 07:17

## 2025-05-16 RX ADMIN — MOMETASONE FUROATE AND FORMOTEROL FUMARATE DIHYDRATE 2 PUFF: 200; 5 AEROSOL RESPIRATORY (INHALATION) at 19:35

## 2025-05-16 RX ADMIN — HYDROMORPHONE HYDROCHLORIDE 0.5 MG: 1 INJECTION, SOLUTION INTRAMUSCULAR; INTRAVENOUS; SUBCUTANEOUS at 15:41

## 2025-05-16 RX ADMIN — LEVOTHYROXINE SODIUM 200 MCG: 0.1 TABLET ORAL at 06:12

## 2025-05-16 RX ADMIN — DOCUSATE SODIUM 100 MG: 100 CAPSULE, LIQUID FILLED ORAL at 17:40

## 2025-05-16 RX ADMIN — CELECOXIB 200 MG: 200 CAPSULE ORAL at 06:11

## 2025-05-16 RX ADMIN — DOCUSATE SODIUM 100 MG: 100 CAPSULE, LIQUID FILLED ORAL at 06:12

## 2025-05-16 RX ADMIN — OXYCODONE HYDROCHLORIDE 10 MG: 10 TABLET ORAL at 19:33

## 2025-05-16 RX ADMIN — ACETAMINOPHEN 650 MG: 325 TABLET, FILM COATED ORAL at 06:11

## 2025-05-16 RX ADMIN — HYDROMORPHONE HYDROCHLORIDE 0.5 MG: 1 INJECTION, SOLUTION INTRAMUSCULAR; INTRAVENOUS; SUBCUTANEOUS at 11:38

## 2025-05-16 RX ADMIN — OXYCODONE HYDROCHLORIDE 10 MG: 10 TABLET ORAL at 14:31

## 2025-05-16 RX ADMIN — CLONAZEPAM 1 MG: 0.5 TABLET ORAL at 21:39

## 2025-05-16 RX ADMIN — HYDROXYZINE HYDROCHLORIDE 25 MG: 25 TABLET, FILM COATED ORAL at 09:20

## 2025-05-16 RX ADMIN — HYDROXYZINE HYDROCHLORIDE 25 MG: 25 TABLET, FILM COATED ORAL at 17:40

## 2025-05-16 RX ADMIN — HYDROMORPHONE HYDROCHLORIDE 0.5 MG: 1 INJECTION, SOLUTION INTRAMUSCULAR; INTRAVENOUS; SUBCUTANEOUS at 01:39

## 2025-05-16 RX ADMIN — TRAZODONE HYDROCHLORIDE 200 MG: 50 TABLET ORAL at 21:39

## 2025-05-16 RX ADMIN — SENNOSIDES AND DOCUSATE SODIUM 1 TABLET: 50; 8.6 TABLET ORAL at 21:39

## 2025-05-16 RX ADMIN — ACETAMINOPHEN 650 MG: 325 TABLET, FILM COATED ORAL at 00:15

## 2025-05-16 RX ADMIN — ASPIRIN 81 MG: 81 TABLET, COATED ORAL at 17:40

## 2025-05-16 RX ADMIN — OXYCODONE HYDROCHLORIDE 10 MG: 10 TABLET ORAL at 06:10

## 2025-05-16 ASSESSMENT — COGNITIVE AND FUNCTIONAL STATUS - GENERAL
CLIMB 3 TO 5 STEPS WITH RAILING: A LOT
DRESSING REGULAR UPPER BODY CLOTHING: A LITTLE
WALKING IN HOSPITAL ROOM: A LITTLE
DAILY ACTIVITIY SCORE: 16
DRESSING REGULAR LOWER BODY CLOTHING: A LOT
TURNING FROM BACK TO SIDE WHILE IN FLAT BAD: A LOT
HELP NEEDED FOR BATHING: A LOT
MOVING TO AND FROM BED TO CHAIR: A LITTLE
SUGGESTED CMS G CODE MODIFIER MOBILITY: CK
SUGGESTED CMS G CODE MODIFIER DAILY ACTIVITY: CK
TOILETING: TOTAL
STANDING UP FROM CHAIR USING ARMS: A LITTLE
MOVING FROM LYING ON BACK TO SITTING ON SIDE OF FLAT BED: A LITTLE
MOBILITY SCORE: 16

## 2025-05-16 ASSESSMENT — GAIT ASSESSMENTS
GAIT LEVEL OF ASSIST: CONTACT GUARD ASSIST
DEVIATION: ANTALGIC;DECREASED HEEL STRIKE;DECREASED TOE OFF
ASSISTIVE DEVICE: FRONT WHEEL WALKER
DISTANCE (FEET): 20

## 2025-05-16 ASSESSMENT — PAIN DESCRIPTION - PAIN TYPE
TYPE: ACUTE PAIN;SURGICAL PAIN
TYPE: SURGICAL PAIN;ACUTE PAIN
TYPE: ACUTE PAIN;SURGICAL PAIN
TYPE: SURGICAL PAIN
TYPE: SURGICAL PAIN
TYPE: ACUTE PAIN;SURGICAL PAIN
TYPE: SURGICAL PAIN
TYPE: ACUTE PAIN;SURGICAL PAIN
TYPE: SURGICAL PAIN

## 2025-05-16 ASSESSMENT — LIFESTYLE VARIABLES: PACK_YEARS: 16

## 2025-05-16 ASSESSMENT — ACTIVITIES OF DAILY LIVING (ADL): TOILETING: INDEPENDENT

## 2025-05-16 NOTE — THERAPY
Occupational Therapy   Initial Evaluation     Patient Name: Thelma Abbott  Age:  64 y.o., Sex:  female  Medical Record #: [id number]  Today's Date: 5/16/2025     Precautions  Precautions: (P) Anterior Hip Precautions, Weight Bearing As Tolerated Left Lower Extremity, Fall Risk    Assessment  Patient is 64 y.o. female with a diagnosis of Left SANTA (Anterior).  Additional factors influencing patient status / progress: Anterior hip precautions, WBAT LLE.    Pt lives alone in a mobile home in [place], [place].  No family nor friends in the area.  Joshua is visiting from [place], [place] and will not be availbale to assist.  PLOF Mod I to Indep for ADL's, transfers and mobility via 4WW.  Pt demonstrated Min assist supine to EOB sitting, Min assist sit/stand, min assist SPT/bedside chair, Max assist LBCM, Min assist UBCM, Total assist toileting.  Therapist reviewed environmental/safety awareness, fall precautions, Joint protection, Anterior hip precautions, AE/DME, ADL's and transfers.    Plan    Occupational Therapy Initial Treatment Plan   Treatment Interventions: (P) Self Care / Activities of Daily Living, Adaptive Equipment, Neuro Re-Education / Balance, Therapeutic Exercises, Therapeutic Activity, Family / Caregiver Training  Treatment Frequency: (P) 4 Times per Week  Duration: (P) Until Therapy Goals Met    DC Equipment Recommendations: (P) Unable to determine at this time  Discharge Recommendations: (P) Recommend post-acute placement for additional occupational therapy services prior to discharge home     Subjective    Pt was alert and required encouragement to participate w/ tx.     Objective       05/16/25 0950    Services   Is patient using  services for this encounter? No   Initial Contact Note    Initial Contact Note Order Received and Verified, Occupational Therapy Evaluation in Progress with Full Report to Follow.   Prior Living Situation   Prior Services Home-Independent   Housing /  Facility Mobile Home   Steps Into Home 4   Steps In Home 0   Rail Left Rail  (Steps into Home)   Bathroom Set up Walk In Shower;Shower Chair   Equipment Owned 4-Wheel Walker;Tub / Shower Seat;Raised Toilet Seat With Arms;Reacher;Oxygen  (Supplmental O2 at night only (2.0L))   Lives with - Patient's Self Care Capacity Alone and Unable to Care For Self   Comments Pt lives alone in a mobile home in [place], [place].  No family nor friends in the area.  Joshua is visiting from [place], [place] and will not be availbale to assist.  PLOF Mod I to Indep for ADL's, transfers and mobility via 4WW.   Prior Level of ADL Function   Self Feeding Independent   Grooming / Hygiene Independent   Bathing Independent   Dressing Independent   Toileting Independent   Prior Level of IADL Function   Medication Management Independent   Laundry Independent   Kitchen Mobility Independent   Finances Independent   Home Management Independent   Shopping Independent   Prior Level Of Mobility Independent With Device in Community;Independent With Steps in Community;Independent With Device in Home;Independent With Steps in Home   History of Falls   History of Falls Yes   Precautions   Precautions Anterior Hip Precautions;Weight Bearing As Tolerated Left Lower Extremity;Fall Risk   Vitals   Pulse Oximetry 92 %   O2 (LPM) 3   O2 Delivery Device Silicone Nasal Cannula   Pain   Intervention Cold Pack;Rest;Repositioned   Pain 0 - 10 Group   Location Hip   Location Orientation Left   Description Aching   Comfort Goal Perform Activity;Comfort with Movement   Non Verbal Descriptors   Non Verbal Scale  Calm;Unlabored Breathing   Cognition    Cognition / Consciousness X   Level of Consciousness Alert   Initiation Impaired   Comments Anxiety   Passive ROM Upper Body   Passive ROM Upper Body WDL   Active ROM Upper Body   Active ROM Upper Body  WDL   Strength Upper Body   Upper Body Strength  WDL   Upper Body Muscle Tone   Upper Body Muscle Tone  WDL   Coordination Upper  Body   Coordination WDL   Balance Assessment   Sitting Balance (Static) Fair +   Sitting Balance (Dynamic) Fair   Standing Balance (Static) Fair -   Standing Balance (Dynamic) Fair -   Weight Shift Sitting Fair   Weight Shift Standing Fair   Bed Mobility    Supine to Sit Minimal Assist   Comments Pt transferred to bedside chair at end of session.   ADL Assessment   Grooming Modified Independent;Standing   Upper Body Dressing Minimal Assist   Lower Body Dressing Maximal Assist   Toileting Total Assist   How much help from another person does the patient currently need...   Putting on and taking off regular lower body clothing? 2   Bathing (including washing, rinsing, and drying)? 2   Toileting, which includes using a toilet, bedpan, or urinal? 1   Putting on and taking off regular upper body clothing? 3   Taking care of personal grooming such as brushing teeth? 4   Eating meals? 4   6 Clicks Daily Activity Score 16   Functional Mobility   Sit to Stand Minimal Assist   Bed, Chair, Wheelchair Transfer Minimal Assist   Transfer Method Stand Pivot   Edema / Skin Assessment   Comments Surgical site/dressing clean, dry and intact.  Review of wear/care of ETHEL dressing.   Short Term Goals   Short Term Goal # 1 Sup bed mobility (to/from supine/EOB sitting)   Short Term Goal # 2 Mod I UBCM   Short Term Goal # 3 Min assist LBCM via AE/DME   Short Term Goal # 4 CGA to transfer to/from [facility] via DME   Education Group   Education Provided Hip Precautions;Joint Protection;Home Safety;Transfers;Role of Occupational Therapist;Activities of Daily Living;Adaptive Equipment;Use of Call Light   Role of Occupational Therapist Patient Response Patient;Acceptance;Explanation;Verbal Demonstration;Significant Other   Hip Precautions Patient Response Patient;Acceptance;Explanation;Demonstration;Handout;Teach Back;Verbal Demonstration;Action Demonstration;Significant Other   Joint Protection Patient Response  Patient;Acceptance;Explanation;Demonstration;Handout;Teach Back;Verbal Demonstration;Action Demonstration;Significant Other   Home Safety Patient Response Patient;Acceptance;Explanation;Demonstration;Verbal Demonstration;Significant Other;Reinforcement Needed   Transfers Patient Response Patient;Acceptance;Explanation;Demonstration;Teach Back;Verbal Demonstration;Action Demonstration;Significant Other;Reinforcement Needed   ADL Patient Response Patient;Acceptance;Explanation;Demonstration;Teach Back;Verbal Demonstration;Action Demonstration;Significant Other;Reinforcement Needed   Adaptive Equipment Patient Response Patient;Acceptance;Explanation;Demonstration;Teach Back;Verbal Demonstration;Action Demonstration;Significant Other;Reinforcement Needed   Use of Call Light Patient Response Patient;Acceptance;Explanation;Demonstration;Verbal Demonstration;Significant Other   Occupational Therapy Initial Treatment Plan    Treatment Interventions Self Care / Activities of Daily Living;Adaptive Equipment;Neuro Re-Education / Balance;Therapeutic Exercises;Therapeutic Activity;Family / Caregiver Training   Treatment Frequency 4 Times per Week   Duration Until Therapy Goals Met   Problem List   Problem List Decreased Active Daily Living Skills;Decreased Functional Mobility;Decreased Activity Tolerance;Safety Awareness Deficits / Cognition;Impaired Postural Control / Balance   Anticipated Discharge Equipment and Recommendations   DC Equipment Recommendations Unable to determine at this time   Discharge Recommendations Recommend post-acute placement for additional occupational therapy services prior to discharge home

## 2025-05-16 NOTE — DISCHARGE PLANNING
Case Management Discharge Planning    Admission Date: 5/15/2025  GMLOS: 1.7  ALOS: 1    6-Clicks ADL Score: 18  6-Clicks Mobility Score: 14  PT and/or OT Eval ordered: Yes  Post-acute Referrals Ordered: Yes  Post-acute Choice Obtained: Yes  Has referral(s) been sent to post-acute provider:  Yes      Anticipated Discharge Dispo: Discharge Disposition: D/T to home under HHA care in anticipation of covered skilled care (06)  Discharge Address: [address] [place] [address] [place] [address]    DME Needed: No    Action(s) Taken: IFTIKHAR RN met with pt at bedside to discuss SNF choice. Pt rolled to IP on 5/15. Per pt has been to [facility] in the past and does not want to go back there. Pt interested in [facility] but would like to go over list. SO at bedside as well. IFTIKHAR RN to come back to collect choice around 1400.     Escalations Completed: None

## 2025-05-16 NOTE — PROGRESS NOTES
" Subjective:    POD#1  No overnight events.    Difficulty controlling pain. Currently rated an 8 on a scale of 1-10.  Difficulty mobilizing. Declined PT yesterday.  Patient denies any fever, chills, chest pain, or SOB.    Objective:  /62   Pulse 66   Temp 35.9 °C (96.7 °F) (Temporal)   Resp 16   Ht 1.651 m (5' 5\")   Wt 100 kg (221 lb 5.5 oz)   SpO2 95%                 Intake/Output Summary (Last 24 hours) at 5/16/2025 0708  Last data filed at 5/15/2025 1949  Gross per 24 hour   Intake 640 ml   Output 600 ml   Net 40 ml         Physical Exam:    Lying comfortably in bed, in no acute distress  No labored breathing. On 3L via NC. SpO2 95%  Neurologically and vascularly intact with palpable pedal pulses bilaterally.  DF/PF intact bilaterally  ETHEL dressing C/D/I    Assessment:    Needs continued care s/p total left total hip arthroplasty.    Plan:      Diet as tolerated  WBAT with walker as needed to provide stability.  PT/OT- Needs to mobilize  Pain control/Ice   DVT ppx with early mobilization, ASA 81mg BID and Sequential Compression Devices    "

## 2025-05-16 NOTE — PROGRESS NOTES
Bedside report received from GENE Vickers. Patient resting in bed. Call bell within reach, bed alarm on and in place. All belongings within reach for patient. No further needs at this time.

## 2025-05-16 NOTE — ANESTHESIA POSTPROCEDURE EVALUATION
Patient: Thelma Abbott    Procedure Summary       Date: 05/15/25 Room / Location:  OR 03 / SURGERY [facility]    Anesthesia Start: 1052 Anesthesia Stop: 1228    Procedure: ARTHROPLASTY, HIP, TOTAL, ANTERIOR APPROACH (Left: Hip) Diagnosis: (Osteoarthritis of left hip)    Surgeons: Dneilson Hays M.D. Responsible Provider: Bud Harris M.D.    Anesthesia Type: general ASA Status: 3            Final Anesthesia Type: general  Last vitals  BP   Blood Pressure: 125/73    Temp   36.5 °C (97.7 °F)    Pulse   76   Resp   16    SpO2   91 %      Anesthesia Post Evaluation    Patient location during evaluation: PACU  Patient participation: complete - patient participated  Level of consciousness: awake and alert    Airway patency: patent  Anesthetic complications: no  Cardiovascular status: hemodynamically stable  Respiratory status: acceptable  Hydration status: euvolemic    PONV: none          There were no known notable events for this encounter.     Nurse Pain Score: 8 (NPRS)

## 2025-05-16 NOTE — RESPIRATORY CARE
"   EDUCATION by COPD CLINICAL EDUCATOR  5/16/2025 at 12:17 PM by Mary Cervantes, RRT     Patient interviewed by education team. Patient does not have a history or diagnosis of COPD. Patient is a smoker.  Therefore, smoking cessation education done. Smoking Cessation Intervention and education completed, 15 minutes spent on smoking cessation education with patient.  Provided smoking cessation packet with \"Tips to Quit\" and brochure for \"Free Smoking Cessation Classes\".     COPD Screen  COPD Risk Screening  Do you have a history of COPD?: No (No hx dx COPD, smoking hx 16 yrs and marijuana)    COPD Assessment  COPD Clinical Specialists ONLY  COPD Education Initiated: Yes--Short Intervention (Pt has no hx dx COPD, currently a smoker and marijuana, plans to quit during surgery healing, discussed the effects smoking has on wounds and what marijuana does to the lungs, education given)  DME Company: None  Physician Name: DENVER J MILLER, M.D.  Pulmonologist Name: none  Referrals Initiated: Yes  Pulmonary Rehab: N/A  Smoking Cessation: Yes  $ Smoking Cessation >10 Minutes: Symptomatic  Smoking Pack Years: 16  Hospice: N/A  Home Health Care: N/A  Mobile Urgent Care Services: N/A  Geriatric Specialty Group: N/A  Private In-Home Care Agency: N/A  $ Demo/Eval of SVN's, MDI's and Aerosols: Yes    PFT Results    No results found for: \"PFT\"    Meds to Beds  [facility] provides bedside medication delivery for all eligible patients at discharge and you have been automatically enrolled in the Meds to Beds Program. Would you like to opt out of this program for any reason?: No - Stay Opted In     MY COPD ACTION PLAN     It is recommended that patients and physicians/healthcare providers complete this action plan together. This plan should be discussed at each physician visit and updated as needed.    The green, yellow and red zones show groups of symptoms of COPD. This list of symptoms is not comprehensive, and you may experience other " "symptoms. In the \"Actions\" column, your healthcare provider has recommended actions for you to take based on your symptoms.    Patient Name: Thelma Abbott   YOB: 1960   Last Updated on:     Green Zone:  I am doing well today Actions     Usual activitiy and exercise level   Take daily medications     Usual amounts of cough and phlegm/mucus   Use oxygen as prescribed     Sleep well at night   Continue regular exercise/diet plan     Appetite is good   At all times avoid cigarette smoke, inhaled irritants     Daily Medications (these medications are taken every day):                Yellow Zone:  I am having a bad day or a COPD flare Actions     More breathless than usual   Continue daily medications     I have less energy for my daily activities   Use quick relief inhaler as ordered     Increased or thicker phlegm/mucus   Use oxygen as prescribed     Using quick relief inhaler/nebulizer more often   Get plenty of rest     Swelling of ankles more than usual   Use pursed lip breathing     More coughing than usual   At all times avoid cigarette smoke, inhaled irritants     I feel like I have a \"chest cold\"     Poor sleep and my symptoms woke me up     My appetite is not good     My medicine is not helping      Call provider immediately if symptoms don’t improve     Continue daily medications, add rescue medications:               Medications to be used during a flare up, (as Discussed with Provider):              Red Zone:  I need urgent medical care Actions     Severe shortness of breath even at rest   Call 911 or seek medical care immediately     Not able to do any activity because of breathing      Fever or shaking chills      Feeling confused or very drowsy       Chest pains      Coughing up blood                  "

## 2025-05-16 NOTE — THERAPY
Physical Therapy   Initial Evaluation     Patient Name: Thelma Abbott  Age:  64 y.o., Sex:  female  Medical Record #: [id number]  Today's Date: 5/16/2025     Precautions  Precautions: Fall Risk;Anterior Hip Precautions;Weight Bearing As Tolerated Left Lower Extremity    Assessment  Patient is 64 y.o. female s/p L SANTA (Anterior) w/ anterior precautions and WBAT L LE. Pt's PLOF indicates she was independent, but unable to care for herself. Pt lives alone in a 1SH w/ 4 steps to enter and no social support to help at home. She stated that she has had a difficult time caring for herself recently and reports having roughly 5 falls in the past 3 mo. Pt shows impairments w/ pain, motivation, ROM/strength deficit, antalgic gait, and poor tolerance to activity. Pt was educated on the importance of getting up every hour, icing, elevation, completing her exercises, and being upright. Pt tolerated tx fair today w/ bed mobility, sit<>stand transfers, and ambulating around the bed. Pt complained of aching pain and was anxious about getting up, but was able to do so. Pt will continue to benefit from skilled acute PT during her stay to work on increasing her functional mobility. Pt will anticipate to d/c to a post-acute setting to further assist in regaining her independence to ultimately return home.    Plan    Physical Therapy Initial Treatment Plan   Treatment Plan : Bed Mobility, Equipment, Gait Training, Neuro Re-Education / Balance, Self Care / Home Evaluation, Stair Training, Therapeutic Exercise, Therapeutic Activities  Treatment Frequency: 5 Times per Week  Duration: Until Therapy Goals Met    DC Equipment Recommendations: Unable to determine at this time  Discharge Recommendations: Recommend post-acute placement for additional physical therapy services prior to discharge home     Objective   05/16/25 1400   Precautions   Precautions Fall Risk;Anterior Hip Precautions;Weight Bearing As Tolerated Left Lower Extremity    Vitals   Pulse 72   Patient BP Position Supine   Blood Pressure 111/71   Pulse Oximetry 92 %   O2 (LPM) 3   O2 Delivery Device Nasal Cannula   Pain 0 - 10 Group   Location Hip   Location Orientation Left   Description Aching   Therapist Pain Assessment Prior to Activity;During Activity;Post Activity;Nurse Notified;5   Cognition    Cognition / Consciousness X   Level of Consciousness Alert   Attention Impaired   Initiation Impaired   Comments pt apprehensive w mvmt - anxious   Passive ROM Lower Body   Passive ROM Lower Body X   Comments limited due to postop pain/precautions   Active ROM Lower Body    Active ROM Lower Body  X   Comments limited due to postop pain/precautions   Strength Lower Body   Lower Body Strength  X   Comments limited due to postop pain/precautions   Sensation Lower Body   Lower Extremity Sensation   Not Tested   Balance   Sitting Balance (Static) Fair +   Sitting Balance (Dynamic) Fair   Standing Balance (Static) Fair -   Standing Balance (Dynamic) Poor +   Weight Shift Sitting Fair   Weight Shift Standing Fair   Bed Mobility    Supine to Sit Moderate Assist   Sit to Supine Minimal Assist   Scooting Minimal Assist   Comments head of bed elevated - rails up   Gait Analysis   Gait Level Of Assist Contact Guard Assist   Assistive Device Front Wheel Walker   Distance (Feet) 20   # of Times Distance was Traveled 1   Deviation Antalgic;Decreased Heel Strike;Decreased Toe Off   # of Stairs Climbed 0   Weight Bearing Status WBAT L LE   Functional Mobility   Sit to Stand Minimal Assist   Transfer Method Stand Step   Mobility supine, EOB, sit<>stand, ambulation around bed, back to supine   6 Clicks Assessment - How much HELP from from another person do you currently need... (If the patient hasn't done an activity recently, how much help from another person do you think he/she would need if he/she tried?)   Turning from your back to your side while in a flat bed without using bedrails? 2   Moving from  lying on your back to sitting on the side of a flat bed without using bedrails? 3   Moving to and from a bed to a chair (including a wheelchair)? 3   Standing up from a chair using your arms (e.g., wheelchair, or bedside chair)? 3   Walking in hospital room? 3   Climbing 3-5 steps with a railing? 2   6 clicks Mobility Score 16   Activity Tolerance   Sitting in Chair NT   Sitting Edge of Bed 2 mins   Standing 3 mins   Short Term Goals    Short Term Goal # 1 pt will be CGA w/ bed mobility in 6tx to show progression in independent bed mobility   Short Term Goal # 2 pt will be SBA w/ sit<>stand transfers in 6tx to show progression w/ LE strength and functional mobility   Short Term Goal # 3 pt will be ambulate 50 ft w/ SBA in 6tx to demonstrate improvement in gait and mobility   Short Term Goal # 4 pt will tolerate 2 steps w/ Marlena in 6tx to show improved ability to get into her home   Education Group   Education Provided Hip Precautions Anterior;Role of Physical Therapist;Use of Assistive Device;Weight Bearing Status;Weight Bearing Precautions   Hip Precautions Anterior Patient Response Patient;Significant Other;Acceptance;Explanation;Demonstration;Verbal Demonstration;Action Demonstration   Role of Physical Therapist Patient Response Patient;Significant Other;Acceptance;Explanation;Demonstration;Verbal Demonstration;Action Demonstration   Use of Assistive Device Patient Response Patient;Significant Other;Acceptance;Explanation;Demonstration;Verbal Demonstration;Action Demonstration   Weight Bearing Status Patient Response Patient;Significant Other;Acceptance;Explanation;Demonstration;Verbal Demonstration;Action Demonstration   Weight Bearing Precautions Patient Response Patient;Significant Other;Acceptance;Explanation;Demonstration;Verbal Demonstration;Action Demonstration   Physical Therapy Treatment Plan   Treatment Plan  Bed Mobility;Equipment;Gait Training;Neuro Re-Education / Balance;Self Care / Home  Evaluation;Stair Training;Therapeutic Exercise;Therapeutic Activities   Treatment Frequency 5 Times per Week   Duration Until Therapy Goals Met   Anticipated Discharge Equipment and Recommendations   DC Equipment Recommendations Unable to determine at this time   Discharge Recommendations Recommend post-acute placement for additional physical therapy services prior to discharge home   Interdisciplinary Plan of Care Collaboration   IDT Collaboration with  Nursing;Occupational Therapist;   Patient Position at End of Therapy In Bed;Bed Alarm On;Call Light within Reach;Tray Table within Reach;Phone within Reach;Family / Friend in Room   Collaboration Comments aware of visit and recs   Session Information   Date / Session Number  5/16 1 (1/4, 5/22)

## 2025-05-16 NOTE — DISCHARGE PLANNING
PM&R referral from Dr. Horta. DJD leading to L total hip arthroplasty. BMI 36. Age 65 yo. Medicare provider. Current documentation does not support CMS criteria for IRF level of care. No consult per protocol.

## 2025-05-16 NOTE — CARE PLAN
The patient is Stable - Low risk of patient condition declining or worsening    Shift Goals  Clinical Goals: Pain control 5/10 or less throughout shift, ambulate in halls  Patient Goals: Rest, pain control  Family Goals: SADIE    Progress made toward(s) clinical / shift goals:  Patients pain has been 8-10/10 throughout shift despite medications given.  Problem: Pain - Standard  Goal: Alleviation of pain or a reduction in pain to the patient’s comfort goal  Outcome: Not Progressing       Patient is not progressing towards the following goals:      Problem: Pain - Standard  Goal: Alleviation of pain or a reduction in pain to the patient’s comfort goal  Outcome: Not Progressing

## 2025-05-16 NOTE — PROGRESS NOTES
Received report from nightshift RN and assumed care of patient at 0700. Patient still declining to mobilize. POC discussed. CMS intact. ETHEL dressing to left hip CDI. O2 saturation 94% on basline 3L O2 via NC.Call light in reach. Bed in lowest locked position. Hourly rounding to continue.

## 2025-05-16 NOTE — CARE PLAN
The patient is Stable - Low risk of patient condition declining or worsening    Shift Goals  Clinical Goals: Patient will manage pain to tolerable level after interventions, remain free from falls, void, and ambulate during shift  Patient Goals: Rest, pain control  Family Goals: SADIE    Progress made toward(s) clinical / shift goals: Patient managed pain to tolerable level with PRN oxycodone, rest, ice packs, and PRN dilaudid. No falls sustained. Patient voided.       Patient is not progressing towards the following goals:    Problem: Post-Operative Hip Replacement  Goal: Early mobilization post surgery  5/16/2025 0722 by Anita Small, R.N.  Outcome: Not Progressing  Patient declined to ambulate 50 ft prior to 0000 d/t pain. Education provided.

## 2025-05-17 PROCEDURE — 700102 HCHG RX REV CODE 250 W/ 637 OVERRIDE(OP)

## 2025-05-17 PROCEDURE — A9270 NON-COVERED ITEM OR SERVICE: HCPCS | Performed by: PHYSICIAN ASSISTANT

## 2025-05-17 PROCEDURE — 94760 N-INVAS EAR/PLS OXIMETRY 1: CPT

## 2025-05-17 PROCEDURE — 770001 HCHG ROOM/CARE - MED/SURG/GYN PRIV*

## 2025-05-17 PROCEDURE — A9270 NON-COVERED ITEM OR SERVICE: HCPCS | Performed by: ORTHOPAEDIC SURGERY

## 2025-05-17 PROCEDURE — A9270 NON-COVERED ITEM OR SERVICE: HCPCS

## 2025-05-17 PROCEDURE — 99024 POSTOP FOLLOW-UP VISIT: CPT | Performed by: PHYSICIAN ASSISTANT

## 2025-05-17 PROCEDURE — 94640 AIRWAY INHALATION TREATMENT: CPT

## 2025-05-17 PROCEDURE — 700102 HCHG RX REV CODE 250 W/ 637 OVERRIDE(OP): Performed by: PHYSICIAN ASSISTANT

## 2025-05-17 PROCEDURE — 700102 HCHG RX REV CODE 250 W/ 637 OVERRIDE(OP): Performed by: ORTHOPAEDIC SURGERY

## 2025-05-17 PROCEDURE — 700111 HCHG RX REV CODE 636 W/ 250 OVERRIDE (IP): Mod: JZ | Performed by: ORTHOPAEDIC SURGERY

## 2025-05-17 RX ORDER — OXYCODONE HYDROCHLORIDE 5 MG/1
5-10 TABLET ORAL EVERY 4 HOURS PRN
Qty: 20 TABLET | Refills: 0
Start: 2025-05-17 | End: 2025-05-21

## 2025-05-17 RX ORDER — ALPRAZOLAM 0.5 MG
1 TABLET ORAL 2 TIMES DAILY PRN
Status: DISCONTINUED | OUTPATIENT
Start: 2025-05-17 | End: 2025-05-18 | Stop reason: HOSPADM

## 2025-05-17 RX ORDER — PSEUDOEPHEDRINE HCL 30 MG
100 TABLET ORAL 2 TIMES DAILY
Qty: 60 CAPSULE | Refills: 0
Start: 2025-05-17

## 2025-05-17 RX ORDER — ACETAMINOPHEN 325 MG/1
TABLET ORAL
Qty: 30 TABLET | Refills: 0 | Status: SHIPPED | OUTPATIENT
Start: 2025-05-17 | End: 2025-05-31

## 2025-05-17 RX ORDER — ASPIRIN 81 MG/1
81 TABLET ORAL 2 TIMES DAILY
Qty: 60 TABLET | Refills: 0
Start: 2025-05-17

## 2025-05-17 RX ADMIN — HYDROXYZINE HYDROCHLORIDE 25 MG: 25 TABLET, FILM COATED ORAL at 16:37

## 2025-05-17 RX ADMIN — OXYCODONE HYDROCHLORIDE 10 MG: 10 TABLET ORAL at 15:02

## 2025-05-17 RX ADMIN — OXYCODONE HYDROCHLORIDE 10 MG: 10 TABLET ORAL at 10:55

## 2025-05-17 RX ADMIN — DILTIAZEM HYDROCHLORIDE 120 MG: 120 CAPSULE, EXTENDED RELEASE ORAL at 05:53

## 2025-05-17 RX ADMIN — CLONAZEPAM 1 MG: 0.5 TABLET ORAL at 20:06

## 2025-05-17 RX ADMIN — LEVOTHYROXINE SODIUM 200 MCG: 0.1 TABLET ORAL at 05:52

## 2025-05-17 RX ADMIN — CELECOXIB 200 MG: 200 CAPSULE ORAL at 18:40

## 2025-05-17 RX ADMIN — OXYCODONE HYDROCHLORIDE 10 MG: 10 TABLET ORAL at 20:06

## 2025-05-17 RX ADMIN — DOCUSATE SODIUM 100 MG: 100 CAPSULE, LIQUID FILLED ORAL at 05:52

## 2025-05-17 RX ADMIN — ACETAMINOPHEN 650 MG: 325 TABLET, FILM COATED ORAL at 18:39

## 2025-05-17 RX ADMIN — ACETAMINOPHEN 650 MG: 325 TABLET, FILM COATED ORAL at 12:05

## 2025-05-17 RX ADMIN — HYDROMORPHONE HYDROCHLORIDE 0.5 MG: 1 INJECTION, SOLUTION INTRAMUSCULAR; INTRAVENOUS; SUBCUTANEOUS at 21:43

## 2025-05-17 RX ADMIN — DILTIAZEM HYDROCHLORIDE 120 MG: 120 CAPSULE, EXTENDED RELEASE ORAL at 18:40

## 2025-05-17 RX ADMIN — ASPIRIN 81 MG: 81 TABLET, COATED ORAL at 05:53

## 2025-05-17 RX ADMIN — LISINOPRIL 20 MG: 20 TABLET ORAL at 05:51

## 2025-05-17 RX ADMIN — HYDROXYZINE HYDROCHLORIDE 25 MG: 25 TABLET, FILM COATED ORAL at 12:06

## 2025-05-17 RX ADMIN — PAROXETINE HYDROCHLORIDE 20 MG: 20 TABLET, FILM COATED ORAL at 05:53

## 2025-05-17 RX ADMIN — ALPRAZOLAM 1 MG: 0.5 TABLET ORAL at 08:06

## 2025-05-17 RX ADMIN — ACETAMINOPHEN 650 MG: 325 TABLET, FILM COATED ORAL at 05:52

## 2025-05-17 RX ADMIN — TRAZODONE HYDROCHLORIDE 200 MG: 50 TABLET ORAL at 20:06

## 2025-05-17 RX ADMIN — OXYCODONE HYDROCHLORIDE 10 MG: 10 TABLET ORAL at 23:31

## 2025-05-17 RX ADMIN — ACETAMINOPHEN 650 MG: 325 TABLET, FILM COATED ORAL at 23:31

## 2025-05-17 RX ADMIN — ASPIRIN 81 MG: 81 TABLET, COATED ORAL at 18:39

## 2025-05-17 RX ADMIN — SENNOSIDES AND DOCUSATE SODIUM 1 TABLET: 50; 8.6 TABLET ORAL at 20:06

## 2025-05-17 RX ADMIN — DOCUSATE SODIUM 100 MG: 100 CAPSULE, LIQUID FILLED ORAL at 18:41

## 2025-05-17 RX ADMIN — DEXTROAMPHETAMINE SACCHARATE, AMPHETAMINE ASPARTATE MONOHYDRATE, DEXTROAMPHETAMINE SULFATE AND AMPHETAMINE SULFATE 10 MG: 2.5; 2.5; 2.5; 2.5 TABLET ORAL at 05:53

## 2025-05-17 RX ADMIN — AMLODIPINE BESYLATE 10 MG: 5 TABLET ORAL at 05:52

## 2025-05-17 RX ADMIN — HYDROMORPHONE HYDROCHLORIDE 0.5 MG: 1 INJECTION, SOLUTION INTRAMUSCULAR; INTRAVENOUS; SUBCUTANEOUS at 16:37

## 2025-05-17 RX ADMIN — CELECOXIB 200 MG: 200 CAPSULE ORAL at 05:51

## 2025-05-17 RX ADMIN — CARVEDILOL 3.12 MG: 3.12 TABLET, FILM COATED ORAL at 05:51

## 2025-05-17 RX ADMIN — MOMETASONE FUROATE AND FORMOTEROL FUMARATE DIHYDRATE 2 PUFF: 200; 5 AEROSOL RESPIRATORY (INHALATION) at 19:35

## 2025-05-17 RX ADMIN — OXYCODONE HYDROCHLORIDE 10 MG: 10 TABLET ORAL at 05:55

## 2025-05-17 RX ADMIN — HYDROMORPHONE HYDROCHLORIDE 0.5 MG: 1 INJECTION, SOLUTION INTRAMUSCULAR; INTRAVENOUS; SUBCUTANEOUS at 12:06

## 2025-05-17 ASSESSMENT — PAIN DESCRIPTION - PAIN TYPE
TYPE: ACUTE PAIN;SURGICAL PAIN
TYPE: SURGICAL PAIN
TYPE: ACUTE PAIN;SURGICAL PAIN
TYPE: ACUTE PAIN
TYPE: ACUTE PAIN;SURGICAL PAIN
TYPE: ACUTE PAIN
TYPE: ACUTE PAIN;SURGICAL PAIN
TYPE: SURGICAL PAIN;ACUTE PAIN

## 2025-05-17 NOTE — DISCHARGE PLANNING
Case Management Discharge Planning    Admission Date: 5/15/2025  GMLOS: 1.7  ALOS: 2    6-Clicks ADL Score: 16  6-Clicks Mobility Score: 16  PT and/or OT Eval ordered: yes  Post-acute Referrals Ordered: yes  Post-acute Choice Obtained: yes  Has referral(s) been sent to post-acute provider:  yes      Anticipated Discharge Dispo: Discharge Disposition: D/T to home under HHA care in anticipation of covered skilled care (06)  Discharge Address: [address] [place] [address] [place] [address]  [facility] accepted    DME Needed: none    Action(s) Taken: Received a message from charge nurse that the pt has discharge orders.     CM called [facility] and Marilin said that they will accept pt tomorrow. Marilin requested for 2pm dc.     CM confirmed with charge nurse that pt can sit on a wheelchair.     R[facility]eline order placed. Requested for wheelchair van pick u at 1400 tomorrow.     Escalations Completed: yes, received approval from  sup for cost of transportation.     Medically Clear: yes    Next Steps: follow up with [facility] tomorrow.     Barriers to Discharge: none    Is the patient up for discharge tomorrow: yes

## 2025-05-17 NOTE — DISCHARGE PLANNING
note:  Met with pt and notified her of dc tomorrow. She is agreeable to go to [facility]. She signed IMM . And Choice for [facility].

## 2025-05-17 NOTE — PROGRESS NOTES
Ortho progress note    S:  s/p left total hip arthroplasty-postoperative day #2    Patient is doing well.  Pain is controlled.  She would like her Xanax reordered.  She is ambulated with physical therapy and has been determined she needs to go to a skilled nursing facility.  No fevers or chills.  No chest pain, headache, shortness of breath.    All vital signs are stable    Exam:    NAD A& O x3  Breathing is nonlabored  RLE: +DF/PF/EHL SILT L4/L5/S1, soft calf compartments  LLE:  +DF/PF/EHL SILT L4/L5/S1, soft calf compartments.  Josseline dressing over left hip with good suction and no output.    Impression    1) status post left total hip arthroplasty-postoperative day #2    Plan:    1) discharge planning as per case management.  Patient be discharged to SNF today if placement can be achieved.  Discharge summary has been put into place.  Oxycodone handwritten prescription has been placed in the patient's chart.  2) weightbearing as tolerated with walker at all times  3) PT/OT while in house  4) DVT prophylaxis and pain control  5) call office with questions

## 2025-05-17 NOTE — PROGRESS NOTES
1915 Received report from day shift RN. Patient is awake and alert, resting in bed. A&O X4, room air, . Unlabored respiration observed. Surgical ETHEL dressing to left hip clean, dry and intact. Polar ice in place. Care plan discussed including pain management and ambulation. Pain level 8/10, meds will be given per MAR. Families at bedside. Call light within reach. Personal belongings within reach. No needs required at this time. Safety precautions in place.

## 2025-05-17 NOTE — CARE PLAN
The patient is Stable - Low risk of patient condition declining or worsening    Shift Goals  Clinical Goals: remain free from falls, manage pain at or above 4/10 with medication per MAR, mobilize chair for meals, maintain anterior hip precautions  Patient Goals: rest, manage pain, mobilize, maintain anterior hip precautions  Family Goals: n/a    Progress made toward(s) clinical / shift goals:  pt remained free from falls, pain was managed with medication per MAR, pt mobilized to chair for lunch as tolerated, pt given handout of ant hip precautions    Problem: Pain - Standard  Goal: Alleviation of pain or a reduction in pain to the patient’s comfort goal  Outcome: Progressing     Problem: Fall Risk  Goal: Patient will remain free from falls  Outcome: Progressing     Problem: Knowledge Deficit - Standard  Goal: Patient and family/care givers will demonstrate understanding of plan of care, disease process/condition, diagnostic tests and medications  Outcome: Progressing     Problem: Post-Operative Hip Replacement  Goal: Early mobilization post surgery  Outcome: Progressing     Problem: Pain - Post Surgery  Goal: Alleviation or reduction of pain post surgery  Outcome: Progressing     Problem: Incision Care  Goal: Optimal post surgical incision care  Outcome: Progressing     Problem: Bowel Elimination  Goal: Establish and maintain regular bowel function  Outcome: Progressing     Problem: Venous Thromboembolism (VTE) Prevention  Goal: The patient will remain free from venous thromboembolism (VTE)  Outcome: Progressing       Patient is not progressing towards the following goals:

## 2025-05-17 NOTE — DISCHARGE PLANNING
Received a message from Otilia lutz [facility] that she will work on transportation for pt tomorrow.

## 2025-05-17 NOTE — PROGRESS NOTES
0700 Received report from Night shift nurse  0806 Performed assessment  Pt is A&Ox4, complains of 8/10 pain; advised that she has to make a choice, if she wants her xanax or pain meds, pt opted for xanax. I will treat pain after, Updated on POC: manage pain, mobilize, use I.S.  Call light within reach, hourly rounding in place, bed in lowest position, bed alarm on.

## 2025-05-17 NOTE — DISCHARGE SUMMARY
Thelma Abbott was admitted on 5/15/2025 for Primary osteoarthritis of left hip [M16.12]  Patient was diagnosed with severe degenerative arthritis in the left hip and underwent a left hip by Dr. Denilson Hays on the date of admission. Please see dictated operative note for further information.    Hospital course: Patient had a left hip replacement on May 15, 2025.  Patient was slow to ambulate and needed extra work with physical therapy and it was determined that patient would benefit from skilled nursing facility placement.  Otherwise pain was controlled and vital signs are stable.  We will plan to discharge once placement can be achieved.    Examination on 5/17/2025 is as follows:    Alert and oriented x 3, no apparent distress  Breathing is nonlabored  Right lower extremity: Neurovascular intact with soft calf compartments  Left lower extremity: Neurovascular intact with soft calf compartments.  Soft thigh compartments.  Josseline dressing over left hip with good suction and no output.    The patient has done well, with no complications.  Patient denies chest pain, calf pain or shortness of breath.   Pain is well-controlled at present.  Patient is ambulating well with the use of an assistive device, and progressing in physical therapy.   Narcotic dosages were chosen by taking into account the the patient's previous history of opoid use and to ensure proper pain control after surgery    Discharge date: 5/17/2025     Patient is being discharged to SNF after physical therapy and placement can be achieved    Allergies:  Codeine and Tape       Medication List        START taking these medications        Instructions   acetaminophen 325 MG Tabs  Start taking on: May 17, 2025  Commonly known as: Tylenol   Take 2 Tablets by mouth every 6 hours as needed for Mild Pain for 7 days, THEN 2 Tablets every 6 hours as needed for up to 7 days.     aspirin 81 MG EC tablet   Take 1 Tablet by mouth 2 times a day.  Dose: 81 mg      docusate sodium 100 MG Caps   Take 100 mg by mouth 2 times a day.  Dose: 100 mg     oxyCODONE immediate-release 5 MG Tabs  Commonly known as: Roxicodone   Take 1-2 Tablets by mouth every four hours as needed for Severe Pain for up to 4 days.  Dose: 5-10 mg            CONTINUE taking these medications        Instructions   ALPRAZolam 1 MG Tabs  Commonly known as: Xanax   TAKE 1 TABLET BY MOUTH TWICE A DAY X30 DAY(S) AS NEEDED FOR ANXIETY,INSTR:F41.9     Amitiza 24 MCG capsule  Generic drug: lubiprostone   Take 24 mcg by mouth 2 times a day, with meals.  Dose: 24 mcg     amLODIPine 10 MG Tabs  Commonly known as: Norvasc   Take 10 mg by mouth every day.  Dose: 10 mg     amphetamine-dextroamphetamine 10 MG Tabs  Commonly known as: Adderall   Take 10 mg by mouth 2 times a day.  Dose: 10 mg     carvedilol 3.125 MG Tabs  Commonly known as: Coreg   Take 1 Tablet by mouth 2 times a day.  Dose: 1 Tablet     clonazePAM 1 MG Tabs  Commonly known as: KlonoPIN   Take 1 mg by mouth every bedtime.  Dose: 1 mg     cyclobenzaprine 10 MG Tabs  Commonly known as: Flexeril   Take 1 Tablet by mouth 3 times a day as needed for Moderate Pain.  Dose: 10 mg     diltiazem 120 MG SR capsule  Commonly known as: Cardizem SR   Take 1 Capsule by mouth every 12 hours  Dose: 120 mg     gabapentin 100 MG Caps  Commonly known as: Neurontin   Take 100 mg by mouth 3 times a day as needed.  Dose: 100 mg     hydrOXYzine HCl 25 MG Tabs  Commonly known as: Atarax   TAKE 1 TABLET BY MOUTH FOUR TIMES A DAY AS NEEDED FOR ITCHING     ibuprofen 200 MG Tabs  Commonly known as: Motrin   Take 400 mg by mouth every 6 hours as needed for Mild Pain.  Dose: 400 mg     levothyroxine 200 MCG Tabs  Commonly known as: Synthroid   Take 1 Tab by mouth every morning. 30 mins before breakfast  Dose: 200 mcg     lisinopril 20 MG Tabs  Commonly known as: Prinivil   Take 20 mg by mouth every day.  Dose: 20 mg     nabumetone 500 MG Tabs  Commonly known as: Relafen   Take 500 mg  by mouth 4 times a day.  Dose: 500 mg     PARoxetine 20 MG Tabs  Commonly known as: Paxil   Take 20 mg by mouth every bedtime.  Dose: 20 mg     PROBIOTIC DAILY PO   Take 1 Tab by mouth 2 Times a Day.  Dose: 1 Tablet     promethazine 25 MG Tabs  Commonly known as: Phenergan   Take 1 Tab by mouth every 6 hours as needed for Nausea/Vomiting.  Dose: 25 mg     PROVENTIL HFA INH   Inhale 2 Puffs by mouth as needed.  Dose: 2 Puff     Symbicort 160-4.5 MCG/ACT Aero  Generic drug: budesonide-formoterol   USE 2 PUFF BY MOUTH TWICE PER DAY     traZODone 100 MG Tabs  Commonly known as: Desyrel   Take 200 mg by mouth every bedtime.  Dose: 200 mg            STOP taking these medications      asa/apap/caffeine 250-250-65 MG Tabs  Commonly known as: Excedrin     HYDROcodone/acetaminophen  MG Tabs  Commonly known as: Norco     lidocaine 4 % Ptch  Commonly known as: Asperflex              Discharge Instructions:     Patient is instructed to ambulate and weight bear as tolerated with the use of an assistive device, and to continue physical therapy exercises given during this hospital stay.   Patient is to ice and elevate the surgical leg regularly, with pillows under the ankle, nothing is to be placed under the knee.   Patient was given detailed wound care instructions, and will leave the Incisional vac or silver dressing on until first post-op visit.  Patient should keep the benson dressing in place until postoperative visit.  Take medication as directed for DVT prophylaxis.  Patient is to follow up with Dr. Hays's office in 1-2 weeks.  Please call the office with questions.    Handwritten oxycodone prescription was left in the patient's chart for skilled nursing facility placement

## 2025-05-17 NOTE — CARE PLAN
The patient is Stable - Low risk of patient condition declining or worsening    Shift Goals  Clinical Goals: Pain will be controlled to tolerated level 4/10 or less. Pt will ambulate in hallway, tolearte regular diet. Remain free from falls .  Patient Goals: Pain management.  Family Goals: Rest and sleep    Progress made toward(s) clinical / shift goals:  Pain controlled by PRN pain medications.Pt not tolerated on ambulating in hallway due to pain, 4 steps at edge of bed. Pt tolerated regular diet. Zero fall to pt during the shift.     Patient is not progressing towards the following goals: No BM during the shift.      Problem: Bowel Elimination  Goal: Establish and maintain regular bowel function  5/17/2025 0619 by Nette Wolf R.N.  Outcome: Not Met  5

## 2025-05-18 VITALS
BODY MASS INDEX: 36.88 KG/M2 | DIASTOLIC BLOOD PRESSURE: 54 MMHG | TEMPERATURE: 97.6 F | RESPIRATION RATE: 17 BRPM | HEART RATE: 63 BPM | HEIGHT: 65 IN | OXYGEN SATURATION: 93 % | WEIGHT: 221.34 LBS | SYSTOLIC BLOOD PRESSURE: 108 MMHG

## 2025-05-18 PROCEDURE — 700102 HCHG RX REV CODE 250 W/ 637 OVERRIDE(OP): Performed by: ORTHOPAEDIC SURGERY

## 2025-05-18 PROCEDURE — A9270 NON-COVERED ITEM OR SERVICE: HCPCS | Performed by: ORTHOPAEDIC SURGERY

## 2025-05-18 PROCEDURE — 700102 HCHG RX REV CODE 250 W/ 637 OVERRIDE(OP)

## 2025-05-18 PROCEDURE — A9270 NON-COVERED ITEM OR SERVICE: HCPCS

## 2025-05-18 PROCEDURE — 700102 HCHG RX REV CODE 250 W/ 637 OVERRIDE(OP): Performed by: PHYSICIAN ASSISTANT

## 2025-05-18 PROCEDURE — A9270 NON-COVERED ITEM OR SERVICE: HCPCS | Performed by: PHYSICIAN ASSISTANT

## 2025-05-18 RX ADMIN — OXYCODONE HYDROCHLORIDE 10 MG: 10 TABLET ORAL at 05:45

## 2025-05-18 RX ADMIN — OXYCODONE HYDROCHLORIDE 10 MG: 10 TABLET ORAL at 12:52

## 2025-05-18 RX ADMIN — DOCUSATE SODIUM 100 MG: 100 CAPSULE, LIQUID FILLED ORAL at 05:43

## 2025-05-18 RX ADMIN — ACETAMINOPHEN 650 MG: 325 TABLET, FILM COATED ORAL at 05:45

## 2025-05-18 RX ADMIN — ALPRAZOLAM 1 MG: 0.5 TABLET ORAL at 05:44

## 2025-05-18 RX ADMIN — ACETAMINOPHEN 650 MG: 325 TABLET, FILM COATED ORAL at 12:52

## 2025-05-18 RX ADMIN — CELECOXIB 200 MG: 200 CAPSULE ORAL at 05:44

## 2025-05-18 RX ADMIN — HYDROXYZINE HYDROCHLORIDE 25 MG: 25 TABLET, FILM COATED ORAL at 08:56

## 2025-05-18 RX ADMIN — OXYCODONE HYDROCHLORIDE 10 MG: 10 TABLET ORAL at 08:57

## 2025-05-18 RX ADMIN — LEVOTHYROXINE SODIUM 200 MCG: 0.1 TABLET ORAL at 05:45

## 2025-05-18 RX ADMIN — PAROXETINE HYDROCHLORIDE 20 MG: 20 TABLET, FILM COATED ORAL at 05:45

## 2025-05-18 RX ADMIN — ASPIRIN 81 MG: 81 TABLET, COATED ORAL at 05:44

## 2025-05-18 ASSESSMENT — PAIN DESCRIPTION - PAIN TYPE
TYPE: ACUTE PAIN;SURGICAL PAIN
TYPE: SURGICAL PAIN
TYPE: SURGICAL PAIN
TYPE: ACUTE PAIN;SURGICAL PAIN
TYPE: SURGICAL PAIN

## 2025-05-18 NOTE — DISCHARGE PLANNING
Case Management Discharge Planning    Admission Date: 5/15/2025  GMLOS: 1.7  ALOS: 3    6-Clicks ADL Score: 16  6-Clicks Mobility Score: 16  PT and/or OT Eval ordered: Yes  Post-acute Referrals Ordered: Yes  Post-acute Choice Obtained: Yes  Has referral(s) been sent to post-acute provider:  Yes      Anticipated Discharge Dispo: Discharge Disposition: D/T to SNF with Medicare cert in anticipation of skilled care (03)  Discharge Address: [address] [place] [address] [place] [address]    DME Needed: No    Action(s) Taken: Completed COBRA given to bedside RN.     Escalations Completed: None    Medically Clear: Yes    Next Steps: Patient to discharge to [facility].     Barriers to Discharge: None    Is the patient up for discharge tomorrow: No

## 2025-05-18 NOTE — CARE PLAN
The patient is Stable - Low risk of patient condition declining or worsening    Shift Goals  Clinical Goals: Pain management to 4/10 or less, remain free from falls  Patient Goals: pain control, sleep  Family Goals: n/a    Progress made toward(s) clinical / shift goals:    Pain managed by medication per MAR, ice pack and rest. Patient remains free from falls and calls appropriately.     Patient is not progressing towards the following goals:

## 2025-05-18 NOTE — PROGRESS NOTES
"Educated patient on discharge instructions, rx medications and follow up with Dr. Horta's office, [facility] OF [address]  [place] [id number] [phone]        Pt voiced understanding . VSS /54   Pulse 63   Temp 36.4 °C (97.6 °F) (Oral)   Resp 17   Ht 1.651 m (5' 5\")   Wt 100 kg (221 lb 5.5 oz)   LMP 05/22/2017   SpO2 93%   BMI 36.83 kg/m²    Patient alert and appropriate. All questions and concerns addressed. No further questions or concerns at this time. Copy of discharge paperwork provided.  Patient out of department with [facility] in stable condition.    "

## 2025-05-18 NOTE — PROGRESS NOTES
Bedside report received from day RN, and assumed care of patient at change of shift. Chart, labs, and orders reviewed. Pt resting in bed, breathing even and unlabored, 8/10 hip pain, in no acute distress. To be medicated per MAR when available. A&Ox4 on 3L NC. Spouse at bedside. Fall precautions  in place and education provided. Call light within reach, bed locked and in lowest position, denies other needs at this time. Hourly rounding in progress.

## 2025-05-18 NOTE — DISCHARGE PLANNING
DC Transport Scheduled    Transport Company Scheduled:  [facility]    Scheduled Date: 5/18/2025  Scheduled Time: 1400    Transport Type: Wheelchair  Destination: [facility] ([place]), [address] [place] [address], [place] [address]    Notified care team of scheduled transport via [employer].     If there are any changes needed to the DC transportation scheduled, please contact [facility] at ext. [phone] between the hours of 3814-1650. If outside those hours, contact the ED Case Manager at ext. [phone].

## 2025-05-18 NOTE — PROGRESS NOTES
Received bedside report from NOC RN, assumed care of patient. Pt is sleeping comfortably in bed, 93% on baseline 3L via nasal canula. Chest rise and fall observed, breath is even and unlabored. Call light is within reach, bed locked in lowest position, hourly rounding in progress.

## 2025-05-18 NOTE — DISCHARGE INSTRUCTIONS
Discharge Instructions for the Orthopedic Patient    Follow up with your Primary Care Provider within 2 weeks of discharge to home regarding the following:    Review of current medications and diagnostic testing.  Surveillance for medical complications.  Workup and treatment of osteoporosis, if appropriate.       TOTAL HIP REPLACEMENT, AFTER-CARE GUIDELINES     These instructions provide you with information on caring for yourself and your hip after surgery. Your health care provider may also give you instructions that are more specific. Your treatment was planned and performed according to current medical practices but problems sometimes occur. Call your health care provider if you have any problems or questions.     WHAT TO EXPECT AFTER THE PROCEDURE   After your procedure, your hip will typically be stiff, sore, and bruised. This will improve over time.     Pain   Follow your home pain management plan as discussed with your nurse and as directed by your provider.   It is important to follow any scheduled pain medications for maximal pain relief.   If prescribed opioid medication, the goal is to use opioids only as needed and to wean off prescription pain medicine as soon as possible.   Ice use for pain control.  Put ice in a plastic bag.   Place a towel between your skin and the bag.   Leave the ice on for 20 minutes, 2-3 times a day at a minimum.   Most patients are off the pain pills by 3 weeks. If your pain continues to be severe, follow up with your provider.     Infection   Deep hip joint infections that require removal of the prostheses occur in less than 1.0% of patients. Lesser infections in the skin (cellulites) are more common and much more easily treated.   Keep the incision as clean and dry as possible.   Always wash your hands before touching your incision.   Avoid dental care for 3 months after surgery. Your provider may recommend taking a dose of antibiotics an hour prior to any dental procedure.  After 2 years, most providers recommend antibiotics only before an extensive procedure. Ask your provider what they recommend.   Signs and symptoms of infection include low-grade fever, redness, pain, swelling and drainage from your incision. Notify your provider IMMEDIATELY if you develop ANY of these symptoms.     Post op Disturbances   Bowel Habits - constipation is extremely common and caused by a combination of anesthesia, lack of mobility, dehydration and pain medicine. Use stool softeners or laxatives if necessary. It is important not to ignore this problem as bowel obstructions can be a serious complication after joint replacement surgery.   Mood/Energy Level - Many patients experience a lack of energy and endurance for up to 2-3 months after surgery. Some people feel down and can even become depressed. This is likely due to postoperative anemia, change in activity level, lack of sleep, pain medicine and just the emotional reaction to the surgery itself that is a big disruption in a person’s life. This usually passes. If symptoms persist, follow up with your primary care provider.   Returning to Work - Your provider will give you specific instructions based on your profession. Generally, if you work a sedentary job requiring little standing or walking, most patients may return within 2-6 weeks. Manual labor jobs involving walking, lifting and standing may take 3-4 months. Your provider’s office can provide a release to part-time or light duty work early on in your recovery and progress you to full duty as able.   Driving - You can begin driving once cleared by your provider, provided you are no longer taking narcotic pain medication or any other medications that impair driving. Discuss the length of time with your doctor as returning to driving will depend on things such as your vehicle, which hip was replaced (right or left) and if you do or do not have post-operative movement precautions.   Avoiding falls  - A fall during the first few weeks after surgery can damage your new hip and may result in a need for further surgery.  throw rugs and tack down loose carpeting. Be aware of floor hazards such as pets, small objects or uneven surfaces. Notify your provider of any falls.   Airport Metal Detectors - The sensitivity of metal detectors varies and it is likely that your prosthesis will cause an alarm. Inform the  of your artificial joint.     Diet   Resume your normal diet as tolerated.   It is important to achieve a healthy nutritional status by eating a well-balanced diet on a regular basis.   Your provider may recommend that you take iron and vitamin supplements.   Continue to drink plenty of fluids.     Shower/Bathing   You may shower as soon as you get home from the hospital unless otherwise instructed.   Keep your incision out of water to prevent infection. To keep the incision dry when showering, cover it with a plastic bag or plastic wrap. If your bandage is waterproof, this may not be necessary.   Pat incision dry if it gets wet. Do not rub. Notify your provider.   Do not submerge in a bath until cleared by your provider. Your staples must be out and the incision completely healed.     Dressing Changes: Only change your dressing if directed by your provider.   Wash hands.   Open all dressing change materials.   Remove old dressing and discard.   Inspect incision for signs of irritation or infection including redness, increase in clear drainage, yellow/green drainage, odor and surrounding skin hot to touch. Notify your provider if present.    the new dressing by one corner and lay over the incision. Be careful not to touch the inside of the dressing that will lay over the incision.   Secure in place as instructed.     Swelling/Bruising   Swelling is normal after hip replacement and can involve the thigh, knee, calf and foot.   Swelling can last from 3-6 months.   To reduce swelling,  elevate your leg higher than your heart while reclining. The first week you are home you should elevate your leg an equal amount of time as you are active.   The swelling is usually worse after you go home since you are upright for longer periods of time.   Bruising often does not appear until after you arrive home and can be quite dramatic- appearing purple, black, or green. Bruising is typically not concerning and will subside without any treatment.     Blood Clot Prevention   Your treatment plan includes multiple preventative measures to decrease the risk of blood clots in the legs (DVTs) and the less common, but serious, clots that travel to the lungs (pulmonary emboli). Most patients are at standard risk for them, but people who are at higher risk include those who have had previous clots, a family history of clotting, smoking, diabetes, obesity, advanced age, use estrogen and/or live a sedentary lifestyle.     Signs of blood clots in legs include - Swelling in thigh, calf or ankle that does not go down with elevation. Pain, heat and tenderness in calf, back of calf or groin area. NOTE: blood clots can occur in either leg.   Signs of blood clots in lungs include - Sudden increased shortness of breath, sudden onset of chest pain, and localized chest pain with coughing.   If you experience any of the above symptoms, notify your provider and seek medical attention immediately.   You received anticoagulant therapy (blood thinners) in the hospital. Continue the prescribed blood-thinning medication at home, as directed by your provider.   Your risk for developing a clot continues for up to 2-3 months after surgery. Avoid prolonged sitting and dehydration (long air trips and car trips). If you do take a trip during this time, please get up, move around every 1-1.5 hours, and discuss all travel plans with your provider.     Activity   Once you get home, stay active. The key is not to overdo it. While you can expect  some good days and some bad days, you should notice a gradual improvement over time and a gradual increase in endurance over the next 6 to 12 months.     Weight Bearing - You can begin to bear weight as tolerated unless otherwise directed by your provider. Use a walker, crutches or cane to assist with walking until you can walk smoothly (minimal or no limp) without assistance.   Physical Precautions - To assure proper recovery and tissue healing, you may be asked to take special precautions when moving (sitting, bending or sleeping) - usually for the first 6 weeks after surgery. Precautions vary from patient to patient depending on surgical approach used by your provider. Follow any specific precautions provided by your provider and physical therapist until cleared by your provider.   Sitting - Early on it is easier to get up from a tall chair or a chair with arms. The physical therapist will show you how to sit and stand from a chair keeping your affected leg out in front of you. Get up and move around on a regular basis--at least once every hour.   Walking - Walk as much as you like once your doctor gives permission to proceed, but remember that walking is no substitute for your prescribed exercises.  Follow the home exercise program prescribed during your hospital stay as directed by your physical therapist or provider.   Continued physical therapy may be prescribed after hospital discharge to strengthen your muscles and improve your gait/walking pattern. The decision to have therapy or not after the hospital stay is made by you and your provider prior to surgery or at your follow up appointment.   Swimming is an excellent low impact activity; you can begin as soon as the wound is healed and your provider clears you. Using a pair of training fins may make swimming a more enjoyable and effective exercise.   Sexual activity - Your provider can tell you when it is safe to resume sexual activity.   Other activities -  Low impact activities are preferred. If you have specific questions, consult your provider.    Infection statistic resource:   [email or web address]/contents/prosthetic-joint-infection-epidemiology-microbiology-clinical-manifestations-and-diagnosis

## 2025-05-22 NOTE — DISCHARGE SUMMARY
Thelma Abbott was admitted on 5/15/2025 for Primary osteoarthritis of left hip [M16.12]  Patient was diagnosed with severe degenerative arthritis in the left hip and underwent a left hip by Dr. Denilson Hays on the date of admission. Please see dictated operative note for further information.     Hospital course: Patient had a left hip replacement on May 15, 2025.  Patient was slow to ambulate and needed extra work with physical therapy and it was determined that patient would benefit from skilled nursing facility placement.  Otherwise pain was controlled and vital signs are stable.  We will plan to discharge once placement can be achieved.     Examination on 5/18/2025 is as follows:     Alert and oriented x 3, no apparent distress  Breathing is nonlabored  Right lower extremity: Neurovascular intact with soft calf compartments  Left lower extremity: Neurovascular intact with soft calf compartments.  Soft thigh compartments.  Josseilne dressing over left hip with good suction and no output.     The patient has done well, with no complications.  Patient denies chest pain, calf pain or shortness of breath.   Pain is well-controlled at present.  Patient is ambulating well with the use of an assistive device, and progressing in physical therapy.   Narcotic dosages were chosen by taking into account the the patient's previous history of opoid use and to ensure proper pain control after surgery     Discharge date: 5/17/2025      Patient is being discharged to SNF after physical therapy and placement can be achieved     Allergies:  Codeine and Tape         Medication List          START taking these medications         Instructions   acetaminophen 325 MG Tabs  Start taking on: May 17, 2025  Commonly known as: Tylenol    Take 2 Tablets by mouth every 6 hours as needed for Mild Pain for 7 days, THEN 2 Tablets every 6 hours as needed for up to 7 days.      aspirin 81 MG EC tablet    Take 1 Tablet by mouth 2 times a  day.  Dose: 81 mg      docusate sodium 100 MG Caps    Take 100 mg by mouth 2 times a day.  Dose: 100 mg      oxyCODONE immediate-release 5 MG Tabs  Commonly known as: Roxicodone    Take 1-2 Tablets by mouth every four hours as needed for Severe Pain for up to 4 days.  Dose: 5-10 mg                CONTINUE taking these medications         Instructions   ALPRAZolam 1 MG Tabs  Commonly known as: Xanax    TAKE 1 TABLET BY MOUTH TWICE A DAY X30 DAY(S) AS NEEDED FOR ANXIETY,INSTR:F41.9      Amitiza 24 MCG capsule  Generic drug: lubiprostone    Take 24 mcg by mouth 2 times a day, with meals.  Dose: 24 mcg      amLODIPine 10 MG Tabs  Commonly known as: Norvasc    Take 10 mg by mouth every day.  Dose: 10 mg      amphetamine-dextroamphetamine 10 MG Tabs  Commonly known as: Adderall    Take 10 mg by mouth 2 times a day.  Dose: 10 mg      carvedilol 3.125 MG Tabs  Commonly known as: Coreg    Take 1 Tablet by mouth 2 times a day.  Dose: 1 Tablet      clonazePAM 1 MG Tabs  Commonly known as: KlonoPIN    Take 1 mg by mouth every bedtime.  Dose: 1 mg      cyclobenzaprine 10 MG Tabs  Commonly known as: Flexeril    Take 1 Tablet by mouth 3 times a day as needed for Moderate Pain.  Dose: 10 mg      diltiazem 120 MG SR capsule  Commonly known as: Cardizem SR    Take 1 Capsule by mouth every 12 hours  Dose: 120 mg      gabapentin 100 MG Caps  Commonly known as: Neurontin    Take 100 mg by mouth 3 times a day as needed.  Dose: 100 mg      hydrOXYzine HCl 25 MG Tabs  Commonly known as: Atarax    TAKE 1 TABLET BY MOUTH FOUR TIMES A DAY AS NEEDED FOR ITCHING      ibuprofen 200 MG Tabs  Commonly known as: Motrin    Take 400 mg by mouth every 6 hours as needed for Mild Pain.  Dose: 400 mg      levothyroxine 200 MCG Tabs  Commonly known as: Synthroid    Take 1 Tab by mouth every morning. 30 mins before breakfast  Dose: 200 mcg      lisinopril 20 MG Tabs  Commonly known as: Prinivil    Take 20 mg by mouth every day.  Dose: 20 mg      nabumetone  500 MG Tabs  Commonly known as: Relafen    Take 500 mg by mouth 4 times a day.  Dose: 500 mg      PARoxetine 20 MG Tabs  Commonly known as: Paxil    Take 20 mg by mouth every bedtime.  Dose: 20 mg      PROBIOTIC DAILY PO    Take 1 Tab by mouth 2 Times a Day.  Dose: 1 Tablet      promethazine 25 MG Tabs  Commonly known as: Phenergan    Take 1 Tab by mouth every 6 hours as needed for Nausea/Vomiting.  Dose: 25 mg      PROVENTIL HFA INH    Inhale 2 Puffs by mouth as needed.  Dose: 2 Puff      Symbicort 160-4.5 MCG/ACT Aero  Generic drug: budesonide-formoterol    USE 2 PUFF BY MOUTH TWICE PER DAY      traZODone 100 MG Tabs  Commonly known as: Desyrel    Take 200 mg by mouth every bedtime.  Dose: 200 mg                STOP taking these medications       asa/apap/caffeine 250-250-65 MG Tabs  Commonly known as: Excedrin      HYDROcodone/acetaminophen  MG Tabs  Commonly known as: Norco      lidocaine 4 % Ptch  Commonly known as: Asperflex                   Discharge Instructions:      Patient is instructed to ambulate and weight bear as tolerated with the use of an assistive device, and to continue physical therapy exercises given during this hospital stay.   Patient is to ice and elevate the surgical leg regularly, with pillows under the ankle, nothing is to be placed under the knee.   Patient was given detailed wound care instructions, and will leave the Incisional vac or silver dressing on until first post-op visit.  Patient should keep the benson dressing in place until postoperative visit.  Take medication as directed for DVT prophylaxis.  Patient is to follow up with Dr. Hays's office in 1-2 weeks.  Please call the office with questions.     Handwritten oxycodone prescription was left in the patient's chart for skilled nursing facility placement

## 2025-07-11 ENCOUNTER — APPOINTMENT (OUTPATIENT)
Dept: RADIOLOGY | Facility: MEDICAL CENTER | Age: 65
End: 2025-07-11
Attending: EMERGENCY MEDICINE
Payer: MEDICARE

## 2025-07-11 ENCOUNTER — HOSPITAL ENCOUNTER (EMERGENCY)
Facility: MEDICAL CENTER | Age: 65
End: 2025-07-11
Attending: EMERGENCY MEDICINE
Payer: MEDICARE

## 2025-07-11 VITALS
SYSTOLIC BLOOD PRESSURE: 131 MMHG | HEIGHT: 65 IN | TEMPERATURE: 97 F | OXYGEN SATURATION: 95 % | HEART RATE: 71 BPM | RESPIRATION RATE: 16 BRPM | WEIGHT: 200 LBS | DIASTOLIC BLOOD PRESSURE: 84 MMHG | BODY MASS INDEX: 33.32 KG/M2

## 2025-07-11 DIAGNOSIS — S09.90XA CLOSED HEAD INJURY, INITIAL ENCOUNTER: Primary | ICD-10-CM

## 2025-07-11 DIAGNOSIS — F33.1 MODERATE EPISODE OF RECURRENT MAJOR DEPRESSIVE DISORDER (HCC): ICD-10-CM

## 2025-07-11 DIAGNOSIS — S42.295A OTHER CLOSED NONDISPLACED FRACTURE OF PROXIMAL END OF LEFT HUMERUS, INITIAL ENCOUNTER: ICD-10-CM

## 2025-07-11 LAB — POC BREATHALIZER: 0 PERCENT (ref 0–0.01)

## 2025-07-11 PROCEDURE — 73060 X-RAY EXAM OF HUMERUS: CPT | Mod: LT

## 2025-07-11 PROCEDURE — 73030 X-RAY EXAM OF SHOULDER: CPT | Mod: LT

## 2025-07-11 PROCEDURE — 99285 EMERGENCY DEPT VISIT HI MDM: CPT

## 2025-07-11 PROCEDURE — 700102 HCHG RX REV CODE 250 W/ 637 OVERRIDE(OP): Performed by: EMERGENCY MEDICINE

## 2025-07-11 PROCEDURE — 302970 POC BREATHALIZER: Performed by: EMERGENCY MEDICINE

## 2025-07-11 PROCEDURE — 70450 CT HEAD/BRAIN W/O DYE: CPT

## 2025-07-11 PROCEDURE — A9270 NON-COVERED ITEM OR SERVICE: HCPCS | Performed by: EMERGENCY MEDICINE

## 2025-07-11 RX ORDER — HYDROCODONE BITARTRATE AND ACETAMINOPHEN 5; 325 MG/1; MG/1
1 TABLET ORAL ONCE
Refills: 0 | Status: COMPLETED | OUTPATIENT
Start: 2025-07-11 | End: 2025-07-11

## 2025-07-11 RX ADMIN — HYDROCODONE BITARTRATE AND ACETAMINOPHEN 1 TABLET: 5; 325 TABLET ORAL at 15:39

## 2025-07-11 ASSESSMENT — FIBROSIS 4 INDEX: FIB4 SCORE: 0.87

## 2025-07-11 NOTE — ED PROVIDER NOTES
ED Provider Note    CHIEF COMPLAINT  Chief Complaint   Patient presents with    T-5000 GLF     Pt had unwitnessed GLF last night but pt has no memory of incident. Pt c/o left shoulder pain. +head strike takes ASA.        EXTERNAL RECORDS REVIEWED  6/24/2025, [facility] emergency department, acute pruritus.  5/18/2025, discharge summary from orthopedics: Left hip replacement    HPI/ROS  Thelma Abbott is a 64 y.o. female who presents via EMS for evaluation of a head injury and left shoulder injury.  She fell last night, she is not quite sure what happened that led to her fall.  She reports she does fall often however.  Injured her left shoulder.  She reports she did strike her head and does have a headache but denies any weakness numbness or tingling, no midline neck or back pain.  Takes a daily aspirin therefore she was made a code TBI prior to arrival and I evaluated her upon her arrival at the charge test.  She complains of left hip pain with a recent arthroplasty but reports she has been able to walk since falling.  She has no weakness numbness or tingling of the extremities.  No chest or abdominal pain.  No other physical complaints but does request psychiatric evaluation for depression    PAST MEDICAL HISTORY   has a past medical history of Anemia, Arthritis, Asthma, Breath shortness, Cancer ([facility]), COPD (chronic obstructive pulmonary disease) ([facility]), Diverticulitis, Diverticulosis, Emphysema of lung ([facility]), GERD (gastroesophageal reflux disease), Heart burn, HTN (hypertension) (06/24/2010), Hypothyroid (06/24/2010), MRSA (methicillin resistant staph aureus) culture positive, MRSA infection, Pain (05/13/2025), Palpitations, Psoriasis (06/24/2010), Psychiatric disorder, Thyroid cancer (HCC) (09/01/1990), and Unspecified urinary incontinence.    SURGICAL HISTORY   has a past surgical history that includes wound dehiscence (12/11/2012); abdominal abscess drainage (12/15/2012); colostomy (12/15/2012);  parastomal hernia repair (5/22/2013); colostomy closure (5/22/2013); primary c section; tubal coagulation laparoscopic bilateral; other (1990's); incision hernia repair (5/1/2014); wide excision (2/5/2015); flap closure (2/5/2015); other abdominal surgery (c/section x 2); colon resection laparoscopic (12/5/2012); exploratory laparotomy (12/15/2012); colonoscopy (1/18/2016); exploratory laparotomy (3/5/2017); exploratory laparotomy (3/9/2017); exploratory laparotomy (5/23/2017); and total hip arthroplasty (Left, 5/15/2025).    FAMILY HISTORY  Family History   Problem Relation Age of Onset    Cancer Mother     Stroke Father     Hypertension Father        SOCIAL HISTORY  Social History     Tobacco Use    Smoking status: Former     Current packs/day: 1.00     Average packs/day: 1 pack/day for 16.5 years (16.5 ttl pk-yrs)     Types: Cigarettes     Start date: 2009    Smokeless tobacco: Never   Vaping Use    Vaping status: Every Day    Substances: Nicotine   Substance and Sexual Activity    Alcohol use: No    Drug use: Not Currently     Types: Inhaled     Comment: marijuana    Sexual activity: Not on file       CURRENT MEDICATIONS  Home Medications       Reviewed by Priya Odell R.N. (Registered Nurse) on 07/11/25 at 1428  Med List Status: Not Addressed     Medication Last Dose Status   Albuterol Sulfate (PROVENTIL HFA INH)  Active   ALPRAZolam (XANAX) 1 MG Tab  Active   amLODIPine (NORVASC) 10 MG Tab  Active   amphetamine-dextroamphetamine (ADDERALL) 10 MG Tab  Active   aspirin 81 MG EC tablet  Active   carvedilol (COREG) 3.125 MG Tab  Active   clonazepam (KLONOPIN) 1 MG Tab  Active   cyclobenzaprine (FLEXERIL) 10 MG Tab  Active   diltiazem (CARDIZEM SR) 120 MG SR capsule  Active   docusate sodium 100 MG Cap  Active   gabapentin (NEURONTIN) 100 MG Cap  Active   hydrOXYzine HCl (ATARAX) 25 MG Tab  Active   ibuprofen (MOTRIN) 200 MG Tab  Active   levothyroxine (SYNTHROID) 200 MCG TABS  Active   lisinopril (PRINIVIL)  "20 MG Tab  Active   lubiprostone (AMITIZA) 24 MCG capsule  Active   nabumetone (RELAFEN) 500 MG Tab  Active   paroxetine (PAXIL) 20 MG TABS  Active   Probiotic Product (PROBIOTIC DAILY PO)  Active   promethazine (PHENERGAN) 25 MG Tab  Active   SYMBICORT 160-4.5 MCG/ACT Aerosol  Active   trazodone (DESYREL) 100 MG TABS  Active                  Audit from Redirected Encounters    **Home medications have not yet been reviewed for this encounter**         ALLERGIES  Allergies[1]    PHYSICAL EXAM  VITAL SIGNS: /63   Pulse 61   Temp 36 °C (96.8 °F) (Temporal)   Resp 20   Ht 1.651 m (5' 5\")   Wt 90.7 kg (200 lb)   LMP 05/22/2017   SpO2 94%   BMI 33.28 kg/m²    Constitutional: Alert in no apparent distress.  HENT: No signs of significant acute trauma.   Eyes: Chronic anisocoria  Chest: Normal nonlabored respirations  Skin: No appreciable rash on the exposed skin  Musculoskeletal: Limited range of motion of the left shoulder secondary to pain but neuro vas intact x 4 extremities  Neurologic: Alert, no obvious focal deficits noted.       EKG/LABS  Results for orders placed or performed during the hospital encounter of 07/11/25   POC BREATHALIZER    Collection Time: 07/11/25  4:37 PM   Result Value Ref Range    POC Breathalizer 0.00 0.00 - 0.01 Percent     *Note: Due to a large number of results and/or encounters for the requested time period, some results have not been displayed. A complete set of results can be found in Results Review.      I have independently interpreted this EKG    RADIOLOGY/PROCEDURES   I have independently interpreted the diagnostic imaging associated with this visit and am waiting the final reading from the radiologist.   My preliminary interpretation is as follows: No ICH    Radiologist interpretation:  DX-SHOULDER 2+ LEFT   Final Result      1.  Chronic appearing fracture of the surgical neck of the proximal left humerus. Underlying acute fracture is difficult to exclude.   2.  Moderate " osteoarthritis involving the glenohumeral and acromioclavicular joints.      DX-HUMERUS 2+ LEFT   Final Result      1.  Chronic appearing fracture of the surgical neck of the proximal left humerus, with a superimposed acute fracture difficult to exclude.   2.  No other fracture.   3.  Moderate osteoarthritis involving the left glenohumeral and acromioclavicular joints.      CT-HEAD W/O   Final Result      1.  Cerebral atrophy.   2.  White matter lucencies most consistent with small vessel ischemic change versus demyelination or gliosis.   3.  Otherwise, Head CT without contrast with no acute findings. No evidence of acute cerebral infarction, hemorrhage or mass lesion.                      COURSE & MEDICAL DECISION MAKING    ASSESSMENT, COURSE AND PLAN  Care Narrative: 64-year-old female who presents after a ground-level fall last night, head strike on aspirin with headache albeit without focal neurologic complaint or findings on exam.  Code TBI.  Also injured her left shoulder which is actually her primary complaint.  Neuro vas intact x 4 extremities.  Went to CT where a intracranial hemorrhage was excluded.  X-rays of the shoulder and humerus reveal a femoral neck fracture, unclear if this is acute or chronic as she reports she did break this 10 months ago although she admits that the pain had resolved until falling so at this point she will be placed in a sling and will follow-up with orthopedics.  Furthermore the patient does report feeling depressed, not actively suicidal however, will be evaluated by the psychiatric evaluator and provided resources    Psychiatric evaluator has seen the patient, the patient is able to contract for safety and again is not actively suicidal.  She was given some resources.  At this time the patient is discharged home in stable condition to follow-up with orthopedics, return instructions have been provided.          DISPOSITION AND DISCUSSIONS    Discussion of management with other  HP or appropriate source(s): Behavioral Health         FINAL DIAGNOSIS  1. Closed head injury, initial encounter    2. Other closed nondisplaced fracture of proximal end of left humerus, initial encounter    3. Moderate episode of recurrent major depressive disorder (HCC)         Electronically signed by: Joey Dill M.D., 7/11/2025 2:49 PM           [1]   Allergies  Allergen Reactions    Codeine Vomiting    Tape Rash     Can use Silk tape only

## 2025-07-11 NOTE — ED TRIAGE NOTES
"Chief Complaint   Patient presents with    T-5000 GLF     Pt had unwitnessed GLF last night but pt has no memory of incident. Pt c/o left shoulder pain. +head strike takes ASA.        OCTAVIO MCKINLEY from home for above complaint pt called EMS today for shoulder pain, she has been up and walking this morning GCS 15 A./O x 4. No deformity noted to left shoulder.           /63   Pulse 61   Temp 36 °C (96.8 °F) (Temporal)   Resp 20   Ht 1.651 m (5' 5\")   Wt 90.7 kg (200 lb)   LMP 05/22/2017   SpO2 94%   BMI 33.28 kg/m²     "

## 2025-07-12 NOTE — ED NOTES
PIV removed. Patient provided discharge instructions, verbalizes understanding and denies any further questions. Patient instructed to return if condition worsens. No distress noted. Pt personally contacting [facility] for transportation.

## 2025-07-12 NOTE — CONSULTS
"Patient is a 64 year old female with c/o pain to her shoulder.  She states that she fell last night in the kitchen.  Patient statement and affect are non congruent.  She initially stated that she did not want to go to a psychiatric facility.  When I asked her what she did what to have, she said that she wants to go to an assisted living facility.    Patient teaching r/t what services we can provide and said that we can not provide housing.  Patient then said that she was suicidal.  She denies having a plan or intent.  Patient reports having had a suicide attempt in the past but was unable to tell me when.  \"Maybe 5 year ago\"  she said.  Patient reports trying to drown herself at home but said that it did not work.    Patient was provided with numerous resources for mental health, [facility] transportation and [facility] senior services.  Patient verbally demonstrates understanding.  MD aware and in agreement.  New orders to discharge patient to home.   "

## 2025-07-12 NOTE — ED NOTES
[facility] denied transport for patient. Patient has medicare not medicaid. Patient states she cannot afford an uber. Offered cab voucher, patient states needs help getting inside at home. Patient tried calling daughter- no answer. This RN tried calling daughter, no answer. Social work aware.

## 2025-07-12 NOTE — ED NOTES
[facility] here for patient, patient refusing ride, says daughter is on the way. Called daughter to confirm, daughter said she is on way to pick her up

## 2025-07-12 NOTE — DISCHARGE PLANNING
MSW received notification that pt needs ride home. Pt's daughter isn't answering and  pt needs assistance to get into her house. Pt uses a walker at baseline. Pt stated she can get into her house.     MSW arranged [facility] ride for pt. ETA [id number]. Cost $137.76 Bedside RN updated.

## (undated) DEVICE — SET EXTENSION WITH 2 PORTS (48EA/CA) ***PART #2C8610 IS A SUBSTITUTE*****

## (undated) DEVICE — DETERGENT RENUZYME PLUS 10 OZ PACKET (50/BX)

## (undated) DEVICE — GLOVE BIOGEL SZ 8 SURGICAL PF LTX - (50PR/BX 4BX/CA)

## (undated) DEVICE — ELECTRODE DUAL RETURN W/ CORD - (50/PK)

## (undated) DEVICE — GOWN SURGEONS X-LARGE - DISP. (30/CA)

## (undated) DEVICE — HANDPIECE 10FT INTPLS SCT PLS IRRIGATION HAND CONTROL SET (6/PK)

## (undated) DEVICE — SUTURE 1 VICRYL PLUS CTX - 8 X 18 INCH (12/BX)

## (undated) DEVICE — STAPLER SKIN DISP - (6/BX 10BX/CA) VISISTAT

## (undated) DEVICE — TUBE CONNECT SUCTION CLEAR 120 X 1/4" (50EA/CA)"

## (undated) DEVICE — SUTURE 3-0 VICRYL PLUS SH - 8X 18 INCH (12/BX)

## (undated) DEVICE — DRAPE MEDI-SLUSH STERILE  - (40/CA)

## (undated) DEVICE — SUTURE 3-0 VICRYL PLUS SH - 27 INCH (36/BX)

## (undated) DEVICE — PAD PREP 24 X 48 CUFFED - (100/CA)

## (undated) DEVICE — DRESSING ABDOMINAL PAD STERILE 8 X 10" (360EA/CA)"

## (undated) DEVICE — GLOVE BIOGEL ECLIPSE  PF LATEX SIZE 6.5 (50PR/BX)

## (undated) DEVICE — HEAD HOLDER JUNIOR/ADULT

## (undated) DEVICE — BLADE SURGICAL #11 - (50/BX)

## (undated) DEVICE — GLOVE BIOGEL ECLIPSE PF LATEX SIZE 8.0  (50PR/BX)

## (undated) DEVICE — SODIUM CHL IRRIGATION 0.9% 1000ML (12EA/CA)

## (undated) DEVICE — LEAD SET 6 DISP. EKG NIHON KOHDEN

## (undated) DEVICE — SUTURE 5 ETHIBOND V-37 (12PK/BX)

## (undated) DEVICE — DISPOSABLE WOUND VAC PICO 10 X 20 CM - WOUND CARE (3/CA)

## (undated) DEVICE — PROTECTOR ULNA NERVE - (36PR/CA)

## (undated) DEVICE — GOWN WARMING STANDARD FLEX - (30/CA)

## (undated) DEVICE — PACK SINGLE BASIN - (6/CA)

## (undated) DEVICE — KIT ANESTHESIA W/CIRCUIT & 3/LT BAG W/FILTER (20EA/CA)

## (undated) DEVICE — GLOVE SZ 7.5 BIOGEL PI MICRO - PF LF (50PR/BX)

## (undated) DEVICE — KIT RADIAL ARTERY 20GA W/MAX BARRIER AND BIOPATCH  (5EA/CA) #10740 IS FOR THE SET RADIAL ARTERIAL

## (undated) DEVICE — SUTURE 0 ETHIBOND MO6 C/R - (12/BX) 8-18 INCH ETHICON

## (undated) DEVICE — CLIP MED LG INTNL HRZN TI ESCP - (20/BX)

## (undated) DEVICE — CANISTER SUCTION RIGID RED 1500CC (40EA/CA)

## (undated) DEVICE — BOVIE  BLADE 6 EXTENDED - (50/PK)

## (undated) DEVICE — SENSOR OXIMETER ADULT SPO2 RD SET (20EA/BX)

## (undated) DEVICE — SET SUCT.W/SLEEVE VIA-GUARD - (10/BX10BX/CA)

## (undated) DEVICE — GLOVE BIOGEL PI INDICATOR SZ 7.0 SURGICAL PF LF - (50/BX 4BX/CA)

## (undated) DEVICE — CLIP MED INTNL HRZN TI ESCP - (25/BX)

## (undated) DEVICE — SET LEADWIRE 5 LEAD BEDSIDE DISPOSABLE ECG (1SET OF 5/EA)

## (undated) DEVICE — PACK MAJOR BASIN - (2EA/CA)

## (undated) DEVICE — DRAPE SURGICAL U 77X120 - (10/CA)

## (undated) DEVICE — DRAIN J-VAC 10MM FLAT - (10/CA)

## (undated) DEVICE — TIP INTPLS HFLO ML ORFC BTRY - (12/CS) FOR SURGILAV

## (undated) DEVICE — GLOVE BIOGEL PI INDICATOR SZ 8.0 SURGICAL PF LF -(50/BX 4BX/CA)

## (undated) DEVICE — NEPTUNE 4 PORT MANIFOLD - (20/PK)

## (undated) DEVICE — SUTURE 2-0 VICRYL PLUS SH - 27 INCH (36/BX)

## (undated) DEVICE — GLOVE BIOGEL SZ 6.5 SURGICAL PF LTX (50PR/BX 4BX/CA)

## (undated) DEVICE — STAPLER 75MM LINEAR OPEN (3EA/BX)

## (undated) DEVICE — DRAIN PENROSE STERILE 1/4 X - 18 IN  (25EA/BX)

## (undated) DEVICE — GLOVE BIOGEL PI INDICATOR SZ 6.5 SURGICAL PF LF - (50/BX 4BX/CA)

## (undated) DEVICE — SUTURE GENERAL

## (undated) DEVICE — DRAPE C-ARM LARGE 41IN X 74 IN - (10/BX 2BX/CA)

## (undated) DEVICE — SUTURE 0 COATED VICRYL 6-18IN - (12PK/BX)

## (undated) DEVICE — PACK TOTAL HIP - (2EA/CA)

## (undated) DEVICE — DRAPE LAPAROTOMY T SHEET - (12EA/CA)

## (undated) DEVICE — DRESSING, WOUND VAC MED.

## (undated) DEVICE — COVER LIGHT HANDLE FLEXIBLE - SOFT (2EA/PK 80PK/CA)

## (undated) DEVICE — DERMABOND ADVANCED - (12EA/BX)

## (undated) DEVICE — CANISTER SUCTION 3000ML MECHANICAL FILTER AUTO SHUTOFF MEDI-VAC NONSTERILE LF DISP  (40EA/CA)

## (undated) DEVICE — GLOVE SZ 7 BIOGEL PI MICRO - PF LF (50PR/BX 4BX/CA)

## (undated) DEVICE — CHLORAPREP 26 ML APPLICATOR - ORANGE TINT(25/CA)

## (undated) DEVICE — TUBING CLEARLINK DUO-VENT - C-FLO (48EA/CA)

## (undated) DEVICE — SUTURE 3-0 MONOCRYL PLUS PS-1 - 27 INCH (36/BX)

## (undated) DEVICE — IMPLANT FLEXIBLE DRILL 25MM (1EA)

## (undated) DEVICE — SUTURE 2-0 VICRYL PLUS CT-1 - 8 X 18 INCH(12/BX)

## (undated) DEVICE — GLOVE LITE HANDLE DISP. - (1/PK 80PK/CS)

## (undated) DEVICE — LEAD SET 6 DISP. EKG NIHON KOHDEN (100EA/CA) [9859].

## (undated) DEVICE — DRAPE IOBAN LARGE 2 INCISE FILM (5EA/CA)

## (undated) DEVICE — SUCTION INSTRUMENT YANKAUER BULBOUS TIP W/O VENT (50EA/CA)

## (undated) DEVICE — LACTATED RINGERS INJ 1000 ML - (14EA/CA 60CA/PF)

## (undated) DEVICE — SLEEVE, VASO, THIGH, MED

## (undated) DEVICE — GLOVE BIOGEL PI ORTHO SZ 8 PF LF (40PR/BX)

## (undated) DEVICE — SPONGE GAUZESTER 4 X 4 4PLY - (128PK/CA)

## (undated) DEVICE — TUBE E-T HI-LO CUFF 7.5MM (10EA/PK)

## (undated) DEVICE — GLOVE BIOGEL SZ 7 SURGICAL PF LTX - (50PR/BX 4BX/CA)

## (undated) DEVICE — GLOVE BIOGEL PI ORTHO SZ 7.5 PF LF (40PR/BX)

## (undated) DEVICE — ELECTRODE 850 FOAM ADHESIVE - HYDROGEL RADIOTRNSPRNT (50/PK)

## (undated) DEVICE — SENSOR SPO2 NEO LNCS ADHESIVE (20/BX) SEE USER NOTES

## (undated) DEVICE — BLADE 90X18X1.27MM SAW SAGITTAL

## (undated) DEVICE — LIGASURE TISSUE FUSION  - SINGLE USE (6/CA)

## (undated) DEVICE — DRAPE SRG LG BCK TBL DISP - 10/CA

## (undated) DEVICE — TOWEL STOP TIMEOUT SAFETY FLAG (40EA/CA)

## (undated) DEVICE — GLOVE BIOGEL SZ 7.5 SURGICAL PF LTX - (50PR/BX 4BX/CA)

## (undated) DEVICE — MASK ANESTHESIA ADULT  - (100/CA)

## (undated) DEVICE — PAD LAP STERILE 18 X 18 - (5/PK 40PK/CA)

## (undated) DEVICE — BANDAGE ROLL STERILE BULKEE 4.5 IN X 4 YD (100EA/CA)

## (undated) DEVICE — SUTURE 2-0 ETHILON FS - (36/BX) 18 INCH

## (undated) DEVICE — DRESSING SURGICAL NUKNIT 6X9 (10EA/BX)

## (undated) DEVICE — GLOVE BIOGEL INDICATOR SZ 7SURGICAL PF LTX - (50/BX 4BX/CA)

## (undated) DEVICE — GLOVE BIOGEL INDICATOR SZ 7.5 SURGICAL PF LTX - (50PR/BX 4BX/CA)

## (undated) DEVICE — SUTURE 0 PROLENE CT-1 30 (36PK/BX)"

## (undated) DEVICE — CONTAINER SPECIMEN BAG OR - STERILE 4 OZ W/LID (100EA/CA)

## (undated) DEVICE — LENS/HOOD FOR SPACESUIT - (32/PK) PEEL AWAY FACE

## (undated) DEVICE — GLOVE, BIOGEL ECLIPSE, SZ 7.0, PF LTX (50/BX)

## (undated) DEVICE — HUMID-VENT HEAT AND MOISTURE EXCHANGE- (50/BX)

## (undated) DEVICE — GOWN SURGICAL XX-LARGE - (28EA/CA) SIRUS NON REINFORCED

## (undated) DEVICE — BLADE SURGICAL #15 - (50/BX 3BX/CA)

## (undated) DEVICE — DRESSING AQACEL ADVANTAGE ANTIMICROBIAL WITH HYDROFIBER .075IN X 18IN (5EA/BX)

## (undated) DEVICE — SYRINGE 30 ML LL (56/BX)

## (undated) DEVICE — CLIP LG INTNL HRZN TI ESCP LGT - (20/BX)

## (undated) DEVICE — TUBE E-T HI-LO CUFF 7.0MM (10EA/PK)

## (undated) DEVICE — SODIUM CHL. IRRIGATION 0.9% 3000ML (4EA/CA 65CA/PF)

## (undated) DEVICE — RESERVOIR SUCTION 100 CC - SILICONE (20EA/CA)

## (undated) DEVICE — TOWELS CLOTH SURGICAL - (4/PK 20PK/CA)

## (undated) DEVICE — KIT ROOM DECONTAMINATION

## (undated) DEVICE — SUTURE 2-0 VICRYL PLUS TP-1 - (24/BX)

## (undated) DEVICE — STAPLE 75MM LINEAR (12EA/BX)

## (undated) DEVICE — GOWN SURGEONS LARGE - (32/CA)

## (undated) DEVICE — BINDER ABDOMINAL 30X45X12IN - FITS 30-45IN WAIST 12IN HIGH